# Patient Record
Sex: MALE | Race: BLACK OR AFRICAN AMERICAN | NOT HISPANIC OR LATINO | Employment: OTHER | URBAN - METROPOLITAN AREA
[De-identification: names, ages, dates, MRNs, and addresses within clinical notes are randomized per-mention and may not be internally consistent; named-entity substitution may affect disease eponyms.]

---

## 2019-02-18 ENCOUNTER — APPOINTMENT (EMERGENCY)
Dept: CT IMAGING | Facility: HOSPITAL | Age: 69
DRG: 065 | End: 2019-02-18
Payer: COMMERCIAL

## 2019-02-18 ENCOUNTER — APPOINTMENT (OUTPATIENT)
Dept: ULTRASOUND IMAGING | Facility: HOSPITAL | Age: 69
DRG: 065 | End: 2019-02-18
Payer: COMMERCIAL

## 2019-02-18 ENCOUNTER — APPOINTMENT (OUTPATIENT)
Dept: MRI IMAGING | Facility: HOSPITAL | Age: 69
DRG: 065 | End: 2019-02-18
Payer: COMMERCIAL

## 2019-02-18 ENCOUNTER — HOSPITAL ENCOUNTER (INPATIENT)
Facility: HOSPITAL | Age: 69
LOS: 4 days | Discharge: HOME HEALTH | DRG: 065 | End: 2019-02-24
Attending: EMERGENCY MEDICINE | Admitting: GENERAL PRACTICE
Payer: COMMERCIAL

## 2019-02-18 DIAGNOSIS — I63.50 CEREBROVASCULAR ACCIDENT (CVA) DUE TO OCCLUSION OF CEREBRAL ARTERY (HCC): ICD-10-CM

## 2019-02-18 DIAGNOSIS — R42 DIZZINESS: Primary | ICD-10-CM

## 2019-02-18 DIAGNOSIS — G45.9 TIA (TRANSIENT ISCHEMIC ATTACK): ICD-10-CM

## 2019-02-18 DIAGNOSIS — R47.81 SLURRED SPEECH: ICD-10-CM

## 2019-02-18 DIAGNOSIS — I47.2 NSVT (NONSUSTAINED VENTRICULAR TACHYCARDIA) (HCC): ICD-10-CM

## 2019-02-18 PROBLEM — E11.9 DIABETES MELLITUS TYPE 2 IN NONOBESE (HCC): Status: ACTIVE | Noted: 2019-02-18

## 2019-02-18 LAB
ANION GAP SERPL CALCULATED.3IONS-SCNC: 10 MMOL/L (ref 4–13)
APTT PPP: 28 SECONDS (ref 26–38)
BILIRUB UR QL STRIP: NEGATIVE
BUN SERPL-MCNC: 9 MG/DL (ref 5–25)
CALCIUM SERPL-MCNC: 9.5 MG/DL (ref 8.3–10.1)
CHLORIDE SERPL-SCNC: 102 MMOL/L (ref 100–108)
CLARITY UR: CLEAR
CO2 SERPL-SCNC: 26 MMOL/L (ref 21–32)
COLOR UR: YELLOW
CREAT SERPL-MCNC: 1.08 MG/DL (ref 0.6–1.3)
ERYTHROCYTE [DISTWIDTH] IN BLOOD BY AUTOMATED COUNT: 11.8 % (ref 11.6–15.1)
GFR SERPL CREATININE-BSD FRML MDRD: 71 ML/MIN/1.73SQ M
GLUCOSE SERPL-MCNC: 122 MG/DL (ref 65–140)
GLUCOSE SERPL-MCNC: 198 MG/DL (ref 65–140)
GLUCOSE SERPL-MCNC: 221 MG/DL (ref 65–140)
GLUCOSE SERPL-MCNC: 232 MG/DL (ref 65–140)
GLUCOSE UR STRIP-MCNC: ABNORMAL MG/DL
HCT VFR BLD AUTO: 47 % (ref 36.5–49.3)
HGB BLD-MCNC: 15.1 G/DL (ref 12–17)
HGB UR QL STRIP.AUTO: NEGATIVE
INR PPP: 1.01 (ref 0.86–1.17)
KETONES UR STRIP-MCNC: NEGATIVE MG/DL
LEUKOCYTE ESTERASE UR QL STRIP: NEGATIVE
MCH RBC QN AUTO: 29.1 PG (ref 26.8–34.3)
MCHC RBC AUTO-ENTMCNC: 32.1 G/DL (ref 31.4–37.4)
MCV RBC AUTO: 91 FL (ref 82–98)
NITRITE UR QL STRIP: NEGATIVE
PH UR STRIP.AUTO: 6 [PH] (ref 4.5–8)
PLATELET # BLD AUTO: 213 THOUSANDS/UL (ref 149–390)
PMV BLD AUTO: 10.7 FL (ref 8.9–12.7)
POTASSIUM SERPL-SCNC: 3.8 MMOL/L (ref 3.5–5.3)
PROT UR STRIP-MCNC: NEGATIVE MG/DL
PROTHROMBIN TIME: 13.2 SECONDS (ref 11.8–14.2)
RBC # BLD AUTO: 5.19 MILLION/UL (ref 3.88–5.62)
SODIUM SERPL-SCNC: 138 MMOL/L (ref 136–145)
SP GR UR STRIP.AUTO: 1.02 (ref 1–1.03)
TROPONIN I SERPL-MCNC: <0.02 NG/ML
UROBILINOGEN UR QL STRIP.AUTO: 0.2 E.U./DL
WBC # BLD AUTO: 7.16 THOUSAND/UL (ref 4.31–10.16)

## 2019-02-18 PROCEDURE — 93005 ELECTROCARDIOGRAM TRACING: CPT

## 2019-02-18 PROCEDURE — 99220 PR INITIAL OBSERVATION CARE/DAY 70 MINUTES: CPT | Performed by: INTERNAL MEDICINE

## 2019-02-18 PROCEDURE — 93880 EXTRACRANIAL BILAT STUDY: CPT | Performed by: SURGERY

## 2019-02-18 PROCEDURE — 85027 COMPLETE CBC AUTOMATED: CPT | Performed by: EMERGENCY MEDICINE

## 2019-02-18 PROCEDURE — 84484 ASSAY OF TROPONIN QUANT: CPT | Performed by: EMERGENCY MEDICINE

## 2019-02-18 PROCEDURE — 70450 CT HEAD/BRAIN W/O DYE: CPT

## 2019-02-18 PROCEDURE — 36415 COLL VENOUS BLD VENIPUNCTURE: CPT | Performed by: EMERGENCY MEDICINE

## 2019-02-18 PROCEDURE — 82948 REAGENT STRIP/BLOOD GLUCOSE: CPT

## 2019-02-18 PROCEDURE — 99285 EMERGENCY DEPT VISIT HI MDM: CPT

## 2019-02-18 PROCEDURE — 85610 PROTHROMBIN TIME: CPT | Performed by: EMERGENCY MEDICINE

## 2019-02-18 PROCEDURE — 93880 EXTRACRANIAL BILAT STUDY: CPT

## 2019-02-18 PROCEDURE — 70551 MRI BRAIN STEM W/O DYE: CPT

## 2019-02-18 PROCEDURE — 80048 BASIC METABOLIC PNL TOTAL CA: CPT | Performed by: EMERGENCY MEDICINE

## 2019-02-18 PROCEDURE — 81003 URINALYSIS AUTO W/O SCOPE: CPT | Performed by: EMERGENCY MEDICINE

## 2019-02-18 PROCEDURE — 85730 THROMBOPLASTIN TIME PARTIAL: CPT | Performed by: EMERGENCY MEDICINE

## 2019-02-18 RX ORDER — ATORVASTATIN CALCIUM 40 MG/1
40 TABLET, FILM COATED ORAL EVERY EVENING
Status: DISCONTINUED | OUTPATIENT
Start: 2019-02-18 | End: 2019-02-21

## 2019-02-18 RX ORDER — ASPIRIN 325 MG
325 TABLET ORAL ONCE
Status: COMPLETED | OUTPATIENT
Start: 2019-02-18 | End: 2019-02-18

## 2019-02-18 RX ORDER — ONDANSETRON 2 MG/ML
4 INJECTION INTRAMUSCULAR; INTRAVENOUS EVERY 6 HOURS PRN
Status: DISCONTINUED | OUTPATIENT
Start: 2019-02-18 | End: 2019-02-24 | Stop reason: HOSPADM

## 2019-02-18 RX ORDER — ACETAMINOPHEN 325 MG/1
650 TABLET ORAL EVERY 4 HOURS PRN
Status: DISCONTINUED | OUTPATIENT
Start: 2019-02-18 | End: 2019-02-24 | Stop reason: HOSPADM

## 2019-02-18 RX ORDER — SODIUM CHLORIDE 9 MG/ML
1000 INJECTION, SOLUTION INTRAVENOUS ONCE
Status: COMPLETED | OUTPATIENT
Start: 2019-02-18 | End: 2019-02-18

## 2019-02-18 RX ORDER — ASPIRIN 325 MG
325 TABLET ORAL DAILY
Status: DISCONTINUED | OUTPATIENT
Start: 2019-02-19 | End: 2019-02-22

## 2019-02-18 RX ORDER — ASPIRIN 325 MG
325 TABLET ORAL DAILY
Status: DISCONTINUED | OUTPATIENT
Start: 2019-02-18 | End: 2019-02-18

## 2019-02-18 RX ADMIN — SODIUM CHLORIDE 1000 ML/HR: 0.9 INJECTION, SOLUTION INTRAVENOUS at 14:03

## 2019-02-18 RX ADMIN — ATORVASTATIN CALCIUM 40 MG: 40 TABLET, FILM COATED ORAL at 17:26

## 2019-02-18 RX ADMIN — INSULIN LISPRO 2 UNITS: 100 INJECTION, SOLUTION INTRAVENOUS; SUBCUTANEOUS at 21:25

## 2019-02-18 RX ADMIN — ASPIRIN 325 MG: 325 TABLET ORAL at 15:40

## 2019-02-18 NOTE — ASSESSMENT & PLAN NOTE
Possible TIA  Patient is somewhat slow to talk although he also had some trouble with walking last night  He did not check his sugar and I am also concerned about if patient was having hypoglycemic episode  He does not check his sugars  on aspirin and statin  His blood pressure is controlled and really not elevated  PT/OT evaluation and neurology evaluation    Stroke pathway started

## 2019-02-18 NOTE — ASSESSMENT & PLAN NOTE
No results found for: HGBA1C    Recent Labs     02/18/19  1257   POCGLU 221*       Blood Sugar Average: Last 72 hrs:  (P) 221     Patient has history of diabetes mellitus  His blood sugars 221  He takes a medication injection in addition to metformin  He does not know the name of the medication  He has not seen a primary care physician for many months as he moved from [place] to here last year  Would ask  to have him establish with a primary care physician    a1c 8 9 ss coverage

## 2019-02-18 NOTE — PLAN OF CARE
Problem: Potential for Falls  Goal: Patient will remain free of falls  Description  INTERVENTIONS:  - Assess patient frequently for physical needs  -  Identify cognitive and physical deficits and behaviors that affect risk of falls  -  [place] fall precautions as indicated by assessment   - Educate patient/family on patient safety including physical limitations  - Instruct patient to call for assistance with activity based on assessment  - Modify environment to reduce risk of injury  - Consider OT/PT consult to assist with strengthening/mobility  Outcome: Progressing     Problem: Neurological Deficit  Goal: Neurological status is stable or improving  Description  Interventions:  - Monitor and assess patient's level of consciousness, motor function, sensory function, and level of assistance needed for ADLs  - Monitor and report changes from baseline  Collaborate with interdisciplinary team to initiate plan and implement interventions as ordered  - Provide and maintain a safe environment  - Utilize seizure precautions  - Utilize fall precautions  - Utilize aspiration precautions  - Utilize bleeding precautions  Outcome: Progressing     Problem: Activity Intolerance/Impaired Mobility  Goal: Mobility/activity is maintained at optimum level for patient  Description  Interventions:  - Assess and monitor patient  barriers to mobility and need for assistive/adaptive devices  - Assess patient's emotional response to limitations  - Collaborate with interdisciplinary team and initiate plans and interventions as ordered  - Encourage independent activity per ability   - Maintain proper body alignment  - Perform active/passive rom as tolerated/ordered    - Plan activities to conserve energy   - Turn patient  Outcome: Progressing     Problem: Communication Impairment  Goal: Ability to express needs and understand communication  Description  Assess patient's communication skills and ability to understand information  Patient will demonstrate use of effective communication techniques, alternative methods of communication and understanding even if not able to speak  - Encourage communication and provide alternate methods of communication as needed  - Collaborate with case management/ for discharge needs  - Include patient/family/caregiver in decisions related to communication  Outcome: Progressing     Problem: Potential for Aspiration  Goal: Non-ventilated patient's risk of aspiration is minimized  Description  Assess and monitor vital signs, respiratory status, and labs (WBC)  Monitor for signs of aspiration (tachypnea, cough, rales, wheezing, cyanosis, fever)  - Assess and monitor patient's ability to swallow  - Place patient up in chair to eat if possible  - HOB up at 90 degrees to eat if unable to get patient up into chair   - Supervise patient during oral intake  - Instruct patient to take small bites  - Instruct patient to take small single sips when taking liquids  - Follow patient-specific strategies generated by speech pathologist   Outcome: Progressing     Problem: Nutrition  Goal: Nutrition/Hydration status is improving  Description  Monitor and assess patient's nutrition/hydration status for malnutrition (ex- brittle hair, bruises, dry skin, pale skin and conjunctiva, muscle wasting, smooth red tongue, and disorientation)  Collaborate with interdisciplinary team and initiate plan and interventions as ordered  Monitor patient's weight and dietary intake as ordered or per policy  Utilize nutrition screening tool and intervene per policy  Determine patient's food preferences and provide high-protein, high-caloric foods as appropriate  - Assist patient with eating   - Allow adequate time for meals   - Encourage patient to take dietary supplement as ordered    - Collaborate with clinical nutritionist   - Include patient/family/caregiver in decisions related to nutrition    Outcome: Progressing     Problem: PAIN - ADULT  Goal: Verbalizes/displays adequate comfort level or baseline comfort level  Description  Interventions:  - Encourage patient to monitor pain and request assistance  - Assess pain using appropriate pain scale  - Administer analgesics based on type and severity of pain and evaluate response  - Implement non-pharmacological measures as appropriate and evaluate response  - Consider cultural and social influences on pain and pain management  - Notify physician/advanced practitioner if interventions unsuccessful or patient reports new pain  Outcome: Progressing     Problem: INFECTION - ADULT  Goal: Absence or prevention of progression during hospitalization  Description  INTERVENTIONS:  - Assess and monitor for signs and symptoms of infection  - Monitor lab/diagnostic results  - Monitor all insertion sites, i e  indwelling lines, tubes, and drains  - Monitor endotracheal (as able) and nasal secretions for changes in amount and color  - [place] appropriate cooling/warming therapies per order  - Administer medications as ordered  - Instruct and encourage patient and family to use good hand hygiene technique  - Identify and instruct in appropriate isolation precautions for identified infection/condition  Outcome: Progressing  Goal: Absence of fever/infection during neutropenic period  Description  INTERVENTIONS:  - Monitor WBC  - Implement neutropenic guidelines  Outcome: Progressing     Problem: SAFETY ADULT  Goal: Maintain or return to baseline ADL function  Description  INTERVENTIONS:  -  Assess patient's ability to carry out ADLs; assess patient's baseline for ADL function and identify physical deficits which impact ability to perform ADLs (bathing, care of mouth/teeth, toileting, grooming, dressing, etc )  - Assess/evaluate cause of self-care deficits   - Assess range of motion  - Assess patient's mobility; develop plan if impaired  - Assess patient's need for assistive devices and provide as appropriate  - Encourage maximum independence but intervene and supervise when necessary  ¯ Involve family in performance of ADLs  ¯ Assess for home care needs following discharge   ¯ Request OT consult to assist with ADL evaluation and planning for discharge  ¯ Provide patient education as appropriate  Outcome: Progressing  Goal: Maintain or return mobility status to optimal level  Description  INTERVENTIONS:  - Assess patient's baseline mobility status (ambulation, transfers, stairs, etc )    - Identify cognitive and physical deficits and behaviors that affect mobility  - Identify mobility aids required to assist with transfers and/or ambulation (gait belt, sit-to-stand, lift, walker, cane, etc )  - [place] fall precautions as indicated by assessment  - Record patient progress and toleration of activity level on Mobility SBAR; progress patient to next Phase/Stage  - Instruct patient to call for assistance with activity based on assessment  - Request Rehabilitation consult to assist with strengthening/weightbearing, etc   Outcome: Progressing     Problem: DISCHARGE PLANNING  Goal: Discharge to home or other facility with appropriate resources  Description  INTERVENTIONS:  - Identify barriers to discharge w/patient and caregiver  - Arrange for needed discharge resources and transportation as appropriate  - Identify discharge learning needs (meds, wound care, etc )  - Arrange for interpretive services to assist at discharge as needed  - Refer to Case Management Department for coordinating discharge planning if the patient needs post-hospital services based on physician/advanced practitioner order or complex needs related to functional status, cognitive ability, or social support system  Outcome: Progressing     Problem: Knowledge Deficit  Goal: Patient/family/caregiver demonstrates understanding of disease process, treatment plan, medications, and discharge instructions  Description  Complete learning assessment and assess knowledge base    Interventions:  - Provide teaching at level of understanding  - Provide teaching via preferred learning methods  Outcome: Progressing

## 2019-02-18 NOTE — ED PROVIDER NOTES
History  Chief Complaint   Patient presents with    CVA/TIA-like Symptoms     Patient presents with dizziness and slurred speech that began last night  HPI  79 yo M presents with dizziness that started last night at about 10 pm  He states he was having trouble balancing and could not walk  His wife also noted that he had some slurred speech "sounded like he was drunk " Slurred speech resolved prior to arrival to ED, still feeling dizzy although improved from last night, difficult to characterize but somewhat like room is spinning, constant and worse when trying to walk  Denies weakness, numbness, confusion  Has history of DM, hld but has not been taking his medications because they are difficult to swallow  Denies chest pain, shortness of breath, abdominal pain, nausea  None       Past Medical History:   Diagnosis Date    Diabetes mellitus ([facility] [address] )        History reviewed  No pertinent surgical history  History reviewed  No pertinent family history  I have reviewed and agree with the history as documented  Social History     Tobacco Use    Smoking status: Never Smoker    Smokeless tobacco: Never Used   Substance Use Topics    Alcohol use: Never     Frequency: Never    Drug use: Never        Review of Systems   Constitutional: Negative for chills and fever  HENT: Negative for dental problem and ear pain  Eyes: Negative for pain and redness  Respiratory: Negative for cough and shortness of breath  Cardiovascular: Negative for chest pain and palpitations  Gastrointestinal: Negative for abdominal pain and nausea  Endocrine: Negative for polydipsia and polyphagia  Genitourinary: Negative for dysuria and frequency  Musculoskeletal: Negative for arthralgias and joint swelling  Skin: Negative for color change and rash  Neurological: Positive for dizziness and speech difficulty  Negative for headaches  Psychiatric/Behavioral: Negative for behavioral problems and confusion     All other systems reviewed and are negative  Physical Exam  Physical Exam   Constitutional: He is oriented to person, place, and time  He appears well-developed and well-nourished  No distress  HENT:   Head: Atraumatic  Right Ear: External ear normal    Left Ear: External ear normal    Nose: Nose normal    Eyes: Pupils are equal, round, and reactive to light  Conjunctivae and EOM are normal    Neck: Normal range of motion  Neck supple  No JVD present  Cardiovascular: Normal rate, regular rhythm and normal heart sounds  No murmur heard  Pulmonary/Chest: Effort normal and breath sounds normal  No respiratory distress  He has no wheezes  Abdominal: Soft  Bowel sounds are normal  He exhibits no distension  There is no tenderness  Musculoskeletal: Normal range of motion  He exhibits no edema  Neurological: He is alert and oriented to person, place, and time  No cranial nerve deficit  CN II-XII intact grossly, no focal deficits  Normal strength and sensation in b/l upper and lower extremities  Unsteady gait   Skin: Skin is warm and dry  Capillary refill takes less than 2 seconds  He is not diaphoretic  Psychiatric: He has a normal mood and affect  His behavior is normal    Nursing note and vitals reviewed        Vital Signs  ED Triage Vitals [02/18/19 1250]   Temperature Pulse Respirations Blood Pressure SpO2   98 3 °F (36 8 °C) 80 16 156/70 100 %      Temp Source Heart Rate Source Patient Position - Orthostatic VS BP Location FiO2 (%)   Oral Monitor Sitting Left arm --      Pain Score       No Pain           Vitals:    02/18/19 1250 02/18/19 1403 02/18/19 1408   BP: 156/70 122/75 122/75   Pulse: 80 69 69   Patient Position - Orthostatic VS: Sitting Lying Lying       Visual Acuity      ED Medications  Medications   sodium chloride 0 9 % infusion (1,000 mL/hr Intravenous New Bag 2/18/19 1403)       Diagnostic Studies  Results Reviewed     Procedure Component Value Units Date/Time    UA (URINE) with reflex to Microscopic [[id number]]  (Abnormal) Collected:  02/18/19 1412    Lab Status:  Final result Specimen:  Urine, Clean Catch Updated:  02/18/19 1428     Color, UA Yellow     Clarity, UA Clear     Specific Gravity, UA 1 025     pH, UA 6 0     Leukocytes, UA Negative     Nitrite, UA Negative     Protein, UA Negative mg/dl      Glucose,  (1/4%) mg/dl      Ketones, UA Negative mg/dl      Urobilinogen, UA 0 2 E U /dl      Bilirubin, UA Negative     Blood, UA Negative    Troponin I [[id number]]  (Normal) Collected:  02/18/19 1325    Lab Status:  Final result Specimen:  Blood from Arm, Right Updated:  02/18/19 1356     Troponin I <0 02 ng/mL     Basic metabolic panel [[id number]]  (Abnormal) Collected:  02/18/19 1326    Lab Status:  Final result Specimen:  Blood from Arm, Right Updated:  02/18/19 1347     Sodium 138 mmol/L      Potassium 3 8 mmol/L      Chloride 102 mmol/L      CO2 26 mmol/L      ANION GAP 10 mmol/L      BUN 9 mg/dL      Creatinine 1 08 mg/dL      Glucose 232 mg/dL      Calcium 9 5 mg/dL      eGFR 71 ml/min/1 73sq m     Narrative:       National Kidney Disease Education Program recommendations are as follows:  GFR calculation is accurate only with a steady state creatinine  Chronic Kidney disease less than 60 ml/min/1 73 sq  meters  Kidney failure less than 15 ml/min/1 73 sq  meters      Protime-INR [[id number]]  (Normal) Collected:  02/18/19 1326    Lab Status:  Final result Specimen:  Blood from Arm, Right Updated:  02/18/19 1344     Protime 13 2 seconds      INR 1 01    APTT [[id number]]  (Normal) Collected:  02/18/19 1326    Lab Status:  Final result Specimen:  Blood from Arm, Right Updated:  02/18/19 1344     PTT 28 seconds     CBC and Platelet [[id number]]  (Normal) Collected:  02/18/19 1325    Lab Status:  Final result Specimen:  Blood from Arm, Right Updated:  02/18/19 1336     WBC 7 16 Thousand/uL      RBC 5 19 Million/uL      Hemoglobin 15 1 g/dL      Hematocrit 47 0 %      MCV 91 fL      MCH 29 1 pg MCHC 32 1 g/dL      RDW 11 8 %      Platelets 659 Thousands/uL      MPV 10 7 fL     Fingerstick Glucose (POCT) [[id number]]  (Abnormal) Collected:  02/18/19 1257    Lab Status:  Final result Updated:  02/18/19 1303     POC Glucose 221 mg/dl                  CT head without contrast   Final Result by Fina Gomez MD (02/18 1405)      No acute intracranial abnormality  Workstation performed: [id number]                    Procedures  ECG 12 Lead Documentation  Date/Time: 2/18/2019 1:13 PM  Performed by: Karime Blake MD  Authorized by: Karime Blake MD     Comments:      NSR rate of 78, normal axis and intervals, no acute ST elevations or depressions           Phone Contacts  ED Phone Contact    ED Course                               MDM  Number of Diagnoses or Management Options  Dizziness:   Slurred speech:   Diagnosis management comments: 81 yo M with DM, hld presenting with slurred speech and dizziness starting yesterday evening, CT head unremarkable, labs unremarkable except for glucose somewhat high at 221, still feels unsteady on feet cannot rule out posterior circulation cva, will admit observation/tele for stroke pathway      Disposition  Final diagnoses:   Dizziness   Slurred speech     Time reflects when diagnosis was documented in both MDM as applicable and the Disposition within this note     Time User Action Codes Description Comment    2/18/2019  2:17 PM Jose Center Add [R42] Dizziness     2/18/2019  2:17 PM Jose Center Add [R47 81] Slurred speech       ED Disposition     ED Disposition Condition Date/Time Comment    Admit Stable Mon Feb 18, 2019  2:14 PM Case was discussed with MO and the patient's admission status was agreed to be Admission Status: observation status to the service of Dr Matt Mancilla          Follow-up Information    None         Patient's Medications    No medications on file     No discharge procedures on file      ED Provider  Electronically Signed by Imtiaz Brink MD  02/18/19 0182

## 2019-02-19 ENCOUNTER — APPOINTMENT (OUTPATIENT)
Dept: CT IMAGING | Facility: HOSPITAL | Age: 69
DRG: 065 | End: 2019-02-19
Payer: COMMERCIAL

## 2019-02-19 ENCOUNTER — APPOINTMENT (OUTPATIENT)
Dept: NON INVASIVE DIAGNOSTICS | Facility: HOSPITAL | Age: 69
DRG: 065 | End: 2019-02-19
Payer: COMMERCIAL

## 2019-02-19 ENCOUNTER — TELEPHONE (OUTPATIENT)
Dept: INTERNAL MEDICINE CLINIC | Facility: CLINIC | Age: 69
End: 2019-02-19

## 2019-02-19 ENCOUNTER — TRANSITIONAL CARE MANAGEMENT (OUTPATIENT)
Dept: INTERNAL MEDICINE CLINIC | Facility: CLINIC | Age: 69
End: 2019-02-19

## 2019-02-19 PROBLEM — I63.50 CEREBROVASCULAR ACCIDENT (CVA) DUE TO OCCLUSION OF CEREBRAL ARTERY (HCC): Status: ACTIVE | Noted: 2019-02-18

## 2019-02-19 PROBLEM — I47.29 NSVT (NONSUSTAINED VENTRICULAR TACHYCARDIA): Status: ACTIVE | Noted: 2019-02-19

## 2019-02-19 PROBLEM — I47.2 NSVT (NONSUSTAINED VENTRICULAR TACHYCARDIA) (HCC): Status: ACTIVE | Noted: 2019-02-19

## 2019-02-19 PROBLEM — E78.2 MIXED HYPERLIPIDEMIA: Status: ACTIVE | Noted: 2019-02-19

## 2019-02-19 LAB
ANION GAP SERPL CALCULATED.3IONS-SCNC: 10 MMOL/L (ref 4–13)
ATRIAL RATE: 78 BPM
BILIRUB UR QL STRIP: NEGATIVE
BUN SERPL-MCNC: 8 MG/DL (ref 5–25)
CALCIUM SERPL-MCNC: 9 MG/DL (ref 8.3–10.1)
CHLORIDE SERPL-SCNC: 107 MMOL/L (ref 100–108)
CHOLEST SERPL-MCNC: 151 MG/DL (ref 50–200)
CLARITY UR: CLEAR
CO2 SERPL-SCNC: 24 MMOL/L (ref 21–32)
COLOR UR: YELLOW
CREAT SERPL-MCNC: 0.96 MG/DL (ref 0.6–1.3)
ERYTHROCYTE [DISTWIDTH] IN BLOOD BY AUTOMATED COUNT: 11.7 % (ref 11.6–15.1)
EST. AVERAGE GLUCOSE BLD GHB EST-MCNC: 197 MG/DL
GFR SERPL CREATININE-BSD FRML MDRD: 94 ML/MIN/1.73SQ M
GLUCOSE SERPL-MCNC: 140 MG/DL (ref 65–140)
GLUCOSE SERPL-MCNC: 152 MG/DL (ref 65–140)
GLUCOSE SERPL-MCNC: 174 MG/DL (ref 65–140)
GLUCOSE SERPL-MCNC: 180 MG/DL (ref 65–140)
GLUCOSE SERPL-MCNC: 195 MG/DL (ref 65–140)
GLUCOSE UR STRIP-MCNC: NEGATIVE MG/DL
HBA1C MFR BLD: 8.5 % (ref 4.2–6.3)
HCT VFR BLD AUTO: 42.2 % (ref 36.5–49.3)
HDLC SERPL-MCNC: 29 MG/DL (ref 40–60)
HGB BLD-MCNC: 13.9 G/DL (ref 12–17)
HGB UR QL STRIP.AUTO: NEGATIVE
KETONES UR STRIP-MCNC: NEGATIVE MG/DL
LDLC SERPL CALC-MCNC: 81 MG/DL (ref 0–100)
LEUKOCYTE ESTERASE UR QL STRIP: NEGATIVE
MAGNESIUM SERPL-MCNC: 1.8 MG/DL (ref 1.6–2.6)
MCH RBC QN AUTO: 29 PG (ref 26.8–34.3)
MCHC RBC AUTO-ENTMCNC: 32.9 G/DL (ref 31.4–37.4)
MCV RBC AUTO: 88 FL (ref 82–98)
NITRITE UR QL STRIP: NEGATIVE
P AXIS: 67 DEGREES
PH UR STRIP.AUTO: 7 [PH] (ref 4.5–8)
PLATELET # BLD AUTO: 210 THOUSANDS/UL (ref 149–390)
PMV BLD AUTO: 10.5 FL (ref 8.9–12.7)
POTASSIUM SERPL-SCNC: 3.7 MMOL/L (ref 3.5–5.3)
PR INTERVAL: 140 MS
PROT UR STRIP-MCNC: NEGATIVE MG/DL
QRS AXIS: -4 DEGREES
QRSD INTERVAL: 78 MS
QT INTERVAL: 342 MS
QTC INTERVAL: 389 MS
RBC # BLD AUTO: 4.8 MILLION/UL (ref 3.88–5.62)
SODIUM SERPL-SCNC: 141 MMOL/L (ref 136–145)
SP GR UR STRIP.AUTO: 1.01 (ref 1–1.03)
T WAVE AXIS: -11 DEGREES
TRIGL SERPL-MCNC: 203 MG/DL
UROBILINOGEN UR QL STRIP.AUTO: 1 E.U./DL
VENTRICULAR RATE: 78 BPM
WBC # BLD AUTO: 6.54 THOUSAND/UL (ref 4.31–10.16)

## 2019-02-19 PROCEDURE — 99226 PR SBSQ OBSERVATION CARE/DAY 35 MINUTES: CPT | Performed by: GENERAL PRACTICE

## 2019-02-19 PROCEDURE — 92522 EVALUATE SPEECH PRODUCTION: CPT

## 2019-02-19 PROCEDURE — G8979 MOBILITY GOAL STATUS: HCPCS

## 2019-02-19 PROCEDURE — G8997 SWALLOW GOAL STATUS: HCPCS

## 2019-02-19 PROCEDURE — 83036 HEMOGLOBIN GLYCOSYLATED A1C: CPT | Performed by: EMERGENCY MEDICINE

## 2019-02-19 PROCEDURE — 83735 ASSAY OF MAGNESIUM: CPT | Performed by: INTERNAL MEDICINE

## 2019-02-19 PROCEDURE — 82948 REAGENT STRIP/BLOOD GLUCOSE: CPT

## 2019-02-19 PROCEDURE — 97163 PT EVAL HIGH COMPLEX 45 MIN: CPT

## 2019-02-19 PROCEDURE — G8978 MOBILITY CURRENT STATUS: HCPCS

## 2019-02-19 PROCEDURE — 93010 ELECTROCARDIOGRAM REPORT: CPT | Performed by: INTERNAL MEDICINE

## 2019-02-19 PROCEDURE — 99205 OFFICE O/P NEW HI 60 MIN: CPT | Performed by: NURSE PRACTITIONER

## 2019-02-19 PROCEDURE — 85027 COMPLETE CBC AUTOMATED: CPT | Performed by: INTERNAL MEDICINE

## 2019-02-19 PROCEDURE — G8988 SELF CARE GOAL STATUS: HCPCS

## 2019-02-19 PROCEDURE — G8996 SWALLOW CURRENT STATUS: HCPCS

## 2019-02-19 PROCEDURE — 99204 OFFICE O/P NEW MOD 45 MIN: CPT | Performed by: INTERNAL MEDICINE

## 2019-02-19 PROCEDURE — 97166 OT EVAL MOD COMPLEX 45 MIN: CPT

## 2019-02-19 PROCEDURE — 81003 URINALYSIS AUTO W/O SCOPE: CPT | Performed by: INTERNAL MEDICINE

## 2019-02-19 PROCEDURE — 70496 CT ANGIOGRAPHY HEAD: CPT

## 2019-02-19 PROCEDURE — 80048 BASIC METABOLIC PNL TOTAL CA: CPT | Performed by: INTERNAL MEDICINE

## 2019-02-19 PROCEDURE — G8987 SELF CARE CURRENT STATUS: HCPCS

## 2019-02-19 PROCEDURE — 70498 CT ANGIOGRAPHY NECK: CPT

## 2019-02-19 PROCEDURE — 80061 LIPID PANEL: CPT | Performed by: INTERNAL MEDICINE

## 2019-02-19 RX ORDER — MAGNESIUM SULFATE HEPTAHYDRATE 40 MG/ML
2 INJECTION, SOLUTION INTRAVENOUS ONCE
Status: COMPLETED | OUTPATIENT
Start: 2019-02-19 | End: 2019-02-20

## 2019-02-19 RX ORDER — POTASSIUM CHLORIDE 20 MEQ/1
20 TABLET, EXTENDED RELEASE ORAL ONCE
Status: COMPLETED | OUTPATIENT
Start: 2019-02-19 | End: 2019-02-19

## 2019-02-19 RX ORDER — POTASSIUM CHLORIDE 20 MEQ/1
40 TABLET, EXTENDED RELEASE ORAL ONCE
Status: COMPLETED | OUTPATIENT
Start: 2019-02-19 | End: 2019-02-19

## 2019-02-19 RX ADMIN — METOPROLOL TARTRATE 12.5 MG: 25 TABLET ORAL at 22:50

## 2019-02-19 RX ADMIN — INSULIN LISPRO 1 UNITS: 100 INJECTION, SOLUTION INTRAVENOUS; SUBCUTANEOUS at 17:24

## 2019-02-19 RX ADMIN — INSULIN LISPRO 1 UNITS: 100 INJECTION, SOLUTION INTRAVENOUS; SUBCUTANEOUS at 23:24

## 2019-02-19 RX ADMIN — IOHEXOL 85 ML: 350 INJECTION, SOLUTION INTRAVENOUS at 17:38

## 2019-02-19 RX ADMIN — ENOXAPARIN SODIUM 40 MG: 40 INJECTION SUBCUTANEOUS at 09:26

## 2019-02-19 RX ADMIN — POTASSIUM CHLORIDE 20 MEQ: 1500 TABLET, EXTENDED RELEASE ORAL at 01:36

## 2019-02-19 RX ADMIN — INSULIN LISPRO 1 UNITS: 100 INJECTION, SOLUTION INTRAVENOUS; SUBCUTANEOUS at 09:26

## 2019-02-19 RX ADMIN — ATORVASTATIN CALCIUM 40 MG: 40 TABLET, FILM COATED ORAL at 17:24

## 2019-02-19 RX ADMIN — ASPIRIN 325 MG: 325 TABLET ORAL at 09:26

## 2019-02-19 RX ADMIN — POTASSIUM CHLORIDE 40 MEQ: 1500 TABLET, EXTENDED RELEASE ORAL at 17:24

## 2019-02-19 RX ADMIN — MAGNESIUM SULFATE HEPTAHYDRATE 2 G: 40 INJECTION, SOLUTION INTRAVENOUS at 19:28

## 2019-02-19 RX ADMIN — INSULIN LISPRO 1 UNITS: 100 INJECTION, SOLUTION INTRAVENOUS; SUBCUTANEOUS at 12:02

## 2019-02-19 RX ADMIN — METOPROLOL TARTRATE 12.5 MG: 25 TABLET ORAL at 14:46

## 2019-02-19 NOTE — PLAN OF CARE
Problem: PHYSICAL THERAPY ADULT  Goal: Performs mobility at highest level of function for planned discharge setting  See evaluation for individualized goals  Description  Treatment/Interventions: Functional transfer training, LE strengthening/ROM, Elevations, Therapeutic exercise, Endurance training, Gait training, Spoke to nursing, OT  Equipment Recommended: (none anticipated)       See flowsheet documentation for full assessment, interventions and recommendations  Note:   Prognosis: Good  Problem List: Decreased strength, Decreased endurance, Impaired balance, Decreased mobility  Assessment: Pt is 80 y o  male seen for PT evaluation on 2/19/2019 s/p admit to [facility] on 2/18/2019 w/ Cerebrovascular accident (CVA) due to occlusion of cerebral artery ([facility] [address] )  Pt presents c slurred speech, not steady on his feet  Pt reports improvement of such  PT consulted to assess pt's functional mobility and d/c needs  Order placed for PT eval and tx, w/ up w/ A order  Performed at least 2 patient identifiers during session: Name and wristband  Comorbidities affecting pt's physical performance at time of assessment include: DM  PTA, pt was independent w/ all functional mobility w/ no AD/DME, ambulates unrestricted distances and all terrain, has 0 ERICK, lives w/ wife and family in [facility] and retired  Personal factors affecting pt at time of IE include: lives in [address] [facility]  Please find objective findings from PT assessment regarding body systems outlined above with impairments and limitations including weakness, impaired balance, decreased endurance, gait deviations, decreased activity tolerance and fall risk, as well as mobility assessment (need for SBA assist w/ all phases of mobility when usually ambulating independently and need for cueing for mobility technique)  The following objective measures performed on IE also reveal limitations: Barthel Index: 75/100 and Modified Dario: 2 (slight disability)  Pt's clinical presentation is currently unstable/unpredictable seen in pt's presentation of poor blood sugar control, on telemetry monitoring and ongoing medical assessment  Pt to benefit from continued PT tx to address deficits as defined above and maximize level of functional independent mobility and consistency  From PT/mobility standpoint, recommendation at time of d/c would be Home PT with family support pending progress in order to facilitate return to PLOF  Barriers to Discharge: None     Recommendation: Home PT, Home with family support     PT - OK to Discharge: Yes(when medically cleared c HHPT and family support)    See flowsheet documentation for full assessment

## 2019-02-19 NOTE — SOCIAL WORK
[facility]  Referral placed to [address] [place] [address]  with the understanding that this can be cancelled at d/c if he feels it is not needed

## 2019-02-19 NOTE — PHYSICAL THERAPY NOTE
Physical Therapy Evaluation     Patient's Name: Edwardo Schaffer    Admitting Diagnosis  TIA (transient ischemic attack) [G45 9]  Dizziness [R42]  Slurred speech [R47 81]  CVA (cerebral vascular accident) [facility]) [I63 9]    Problem List  Patient Active Problem List   Diagnosis    Cerebrovascular accident (CVA) due to occlusion of cerebral artery ([facility] )    Diabetes mellitus type 2 in nonobese ([place] [address] )    NSVT (nonsustained ventricular tachycardia) ([place] [address] )    Mixed hyperlipidemia       Past Medical History  Past Medical History:   Diagnosis Date    Diabetes mellitus ([place] [address] )        Past Surgical History  History reviewed  No pertinent surgical history  02/19/19 1013   Note Type   Note type Eval only   Pain Assessment   Pain Assessment No/denies pain   Pain Score No Pain   Home Living   Type of Home House  ([facility])   Home Layout Two level;Bed/bath upstairs;1/2 bath on main level  (No ERICK, 10- 15 steps to 2nd floor)   Bathroom Shower/Tub Tub/shower unit   Bathroom Toilet Standard   Bathroom Equipment Grab bars in shower   [address]   (no AD/DME at baseline)   Prior Function   Level of [place] Independent with ADLs and functional mobility   Lives With Spouse; Family   Receives Help From Family   ADL Assistance Independent   IADLs Independent   Falls in the last 6 months 0   Vocational Retired   Comments (+) driving   Restrictions/Precautions   [place] Bearing Precautions Per Order No   Braces or Orthoses   (none per pt)   Other Precautions Bed Alarm; Fall Risk;Telemetry;Multiple lines   General   Family/Caregiver Present No   Cognition   Overall Cognitive Status WFL   Arousal/Participation Alert   Attention Within functional limits   Orientation Level Oriented X4   Memory Within functional limits   Following Commands Follows all commands and directions without difficulty   RUE Assessment   RUE Assessment WFL   LUE Assessment   LUE Assessment WFL   RLE Assessment   RLE Assessment WFL   LLE Assessment   LLE Assessment WFL   Coordination   Movements are Fluid and Coordinated 1   Sensation WFL   Light Touch   RLE Light Touch Grossly intact   LLE Light Touch Grossly intact   Bed Mobility   Supine to Sit 5  Supervision   Additional items Verbal cues;HOB elevated   Sit to Supine 5  Supervision   Additional items HOB elevated;Verbal cues   Transfers   Sit to Stand 5  Supervision   Additional items Assist x 1;Verbal cues   Stand to Sit 5  Supervision   Additional items Assist x 1;Verbal cues   Additional Comments pt reports feeling "woozy" at times, no overt LOB observed   Ambulation/Elevation   Gait pattern Short stride; Inconsistent tamiko   Gait Assistance 5  Supervision   Additional items Assist x 1;Verbal cues   Assistive Device None   Distance 200'   Stair Management Assistance 5  Supervision   Additional items Assist x 1;Verbal cues   Stair Management Technique One rail L;Alternating pattern; Foreward   Number of Stairs 13   Balance   Static Sitting Normal   Dynamic Sitting Normal   Static Standing Good   Dynamic Standing Fair +   Ambulatory Fair +   Endurance Deficit   Endurance Deficit Yes   Activity Tolerance   Activity Tolerance Patient limited by fatigue  ("I feel whoozy")   Medical Staff Made Aware OT Balbir Amato   Nurse Made Aware RN Talat Pepe verbalized pt appropriate to see, made aware of session outcome/recs   Assessment   Prognosis Good   Problem List Decreased strength;Decreased endurance; Impaired balance;Decreased mobility   Assessment Pt is 80 y o  male seen for PT evaluation on 2/19/2019 s/p admit to [facility] on 2/18/2019 w/ Cerebrovascular accident (CVA) due to occlusion of cerebral artery ([facility] )  Pt presents c slurred speech, not steady on his feet  Pt reports improvement of such  PT consulted to assess pt's functional mobility and d/c needs  Order placed for PT eval and tx, w/ up w/ A order   Performed at least 2 patient identifiers during session: Name and wristband  Comorbidities affecting pt's physical performance at time of assessment include: DM  PTA, pt was independent w/ all functional mobility w/ no AD/DME, ambulates unrestricted distances and all terrain, has 0 ERICK, lives w/ wife and family in 2 level [facility] and retired  Personal factors affecting pt at time of IE include: lives in 2 story house  Please find objective findings from PT assessment regarding body systems outlined above with impairments and limitations including weakness, impaired balance, decreased endurance, gait deviations, decreased activity tolerance and fall risk, as well as mobility assessment (need for SBA assist w/ all phases of mobility when usually ambulating independently and need for cueing for mobility technique)  The following objective measures performed on IE also reveal limitations: Barthel Index: 75/100 and Modified [place]: 2 (slight disability)  Pt's clinical presentation is currently unstable/unpredictable seen in pt's presentation of poor blood sugar control, on telemetry monitoring and ongoing medical assessment  Pt to benefit from continued PT tx to address deficits as defined above and maximize level of functional independent mobility and consistency  From PT/mobility standpoint, recommendation at time of d/c would be Home PT with family support pending progress in order to facilitate return to PLOF     Barriers to Discharge None   Goals   Patient Goals to return home   STG Expiration Date 03/01/19   Short Term Goal #1 In 7-10 days: Increase bilateral LE strength 1/2 grade to facilitate independent mobility, Perform all bed mobility tasks modified independent to decrease caregiver burden, Perform all transfers modified independent to improve independence, Ambulate > 300 ft  with least restrictive assistive device modified independent w/o LOB and w/ normalized gait pattern 100% of the time, Navigate 13 stairs modified independent with unilateral handrail to facilitate return to previous living environment, Increase all balance 1/2 grade to decrease risk for falls, Complete exercise program independently, Tolerate 4 hr OOB to faciliate upright tolerance, Improve Barthel Index score to 90 or greater to facilitate independence and PT provider will perform functional balance assessment to determine fall risk   Treatment Day 0   Plan   Treatment/Interventions Functional transfer training;LE strengthening/ROM; Elevations; Therapeutic exercise; Endurance training;Gait training;Spoke to nursing;OT   PT Frequency   (3-5x/wk)   Recommendation   Recommendation Home PT; Home with family support   Equipment Recommended   (none anticipated)   PT - OK to Discharge Yes  (when medically cleared c HHPT and family support)   Modified [place] Scale   Modified [place] Scale 2   Barthel Index   Feeding 10   Bathing 0   Grooming Score 5   Dressing Score 5   Bladder Score 10   Bowels Score 10   Toilet Use Score 10   Transfers (Bed/Chair) Score 10   Mobility (Level Surface) Score 10   Stairs Score 5   Barthel Index Score 75         Donna Points, PT, DPT

## 2019-02-19 NOTE — UTILIZATION REVIEW
Initial Clinical Review    Admission: Date/Time/Statement: OBSERVATION 2/18/19 @1414  Orders Placed This Encounter   Procedures    Place in Observation (expected length of stay for this patient is less than two midnights)     Standing Status:   Standing     Number of Occurrences:   1     Order Specific Question:   Admitting Physician     Answer:   Jeevan Menendez [[id number]]     Order Specific Question:   Level of Care     Answer:   Med Surg [16]     ED: Date/Time/Mode of Arrival:   ED Arrival Information     Expected Arrival Acuity Means of Arrival Escorted By Service Admission Type    - 2/18/2019 12:43 Emergent Walk-In [facility] Emergency    Arrival Complaint    Nausea/slurred speech        Chief Complaint:   Chief Complaint   Patient presents with    CVA/TIA-like Symptoms     Patient presents with dizziness and slurred speech that began last night  Assessment/Plan: 79 yo m to ED from home admitted under OBS due to possible TIA with slurred speech  Sx started 2200 the night pta, unsteady on feet  On arrival, is more slow to speak  Concern for possible hypoglycemic episode  Consulting neuro, stroke pathway started  Workup in progress       ED Vital Signs:   ED Triage Vitals [02/18/19 1250]   Temperature Pulse Respirations Blood Pressure SpO2   98 3 °F (36 8 °C) 80 16 156/70 100 %      Temp Source Heart Rate Source Patient Position - Orthostatic VS BP Location FiO2 (%)   Oral Monitor Sitting Left arm --      Pain Score       No Pain        Wt Readings from Last 1 Encounters:   02/19/19 73 5 kg (162 lb 0 6 oz)     Vital Signs (abnormal):  x 1  Pertinent Labs/Diagnostic Test Results:   Gluc 221, 232, 122, 198  Trop wnl  Cbc,  Chem wnl  a1c 8 5      UA: 250 gluc     CT head: nothing acute  MRI brain: pending  CTA head/neck: pending  Echo: pending    ED Treatment:   Medication Administration from 02/18/2019 1243 to 02/18/2019 1651       Date/Time Order Dose Route Action     02/18/2019 1403 sodium chloride 0 9 % infusion 1,000 mL/hr Intravenous New Bag     02/18/2019 1540 aspirin tablet 325 mg 325 mg Oral Given        Past Medical History:   Diagnosis Date    Diabetes mellitus ([facility] )      Admitting Diagnosis: TIA (transient ischemic attack) [G45 9]  Dizziness [R42]  Slurred speech [R47 81]  CVA (cerebral vascular accident) ([facility] ) [I63 9]  Age/Sex: 80 y o  male    Admission Orders:  Scheduled Meds:   Current Facility-Administered Medications:  acetaminophen 650 mg Oral Q4H PRN   aspirin 325 mg Oral Daily   atorvastatin 40 mg Oral QPM   enoxaparin 40 mg Subcutaneous Daily   insulin lispro 1-6 Units Subcutaneous TID AC   insulin lispro 1-6 Units Subcutaneous HS   ondansetron 4 mg Intravenous Q6H PRN     Cons PT/OT/speech  Echo  Cons nutrition  Cons case mgmt  Cons cardio  Cons neuro  Diabetic diet  NIH stroke scale  Dysphagia eval  Up w/assist  scd's  I/O  Daily wt  Tele  Neuro checks Every 1 hour x 4 hours, then every 2 hours x 4, then every 4 hours x 72 hours  Stroke education  Gluc checks ac / hs      Network Utilization Review Department  Phone: [phone]; Fax [phone]  [email or web address]  ATTENTION: Please call with any questions or concerns to [phone]  and carefully listen to the prompts so that you are directed to the right person  Send all requests for admission clinical reviews, approved or denied determinations and any other requests to fax [phone]   All voicemails are confidential

## 2019-02-19 NOTE — SOCIAL WORK
CM met with pt at bedside  OBS status reviewed and signed by pt  Copy to pt and copy to MR for scanning  Pt lives with his wife Rochelle Lim in a townhouse with 15 steps w/railing to reach his bedroom and no ERICK  Pt is able to navigate steps and is independent with ADL's  He uses no DME's  He has never been in rehab or used [place] services  Denies substance abuse, tobacco abuse or mental health issues  He uses [facility] and has no problem with his co-pays  He does not have a POA or Advanced Directive and does not want info at this time  He is retired, but still drives  His daughter Glen Chatman will transport home  CM discussed d/c needs including [place] services, but pt does not feel this will be needed  Pt recently moved here from [place] and does not have a local PCP  An appoint was set up with Dr Nadeem Hong for Friday, 3/1/19 at 11:15  CM will follow through hospitalization  CM reviewed discharge planning process including the following: identifying help at home, patient preference for discharge planning needs, pharmacy preference, and availability of treatment team to discuss questions or concerns patient and/or family may have regarding understanding medications and recognizing signs and symptoms once discharged  CM also encouraged patient to follow up with all recommended appointments after discharge  Patient advised of importance for patient and family to participate in managing patients medical well being  CM name and role reviewed  Discharge Checklist reviewed and CM will continue to monitor for progress toward discharge goals in nursing and provider rounds

## 2019-02-19 NOTE — PLAN OF CARE
Recommend 4gm Na (PATRICIA), CCD 2 diet  RD provided Heart Healthy Consistent Carbohydrate nutrition education  Problem: Nutrition/Hydration-ADULT  Goal: Nutrient/Hydration intake appropriate for improving, restoring or maintaining nutritional needs  Description  Monitor and assess patient's nutrition/hydration status for malnutrition (ex- brittle hair, bruises, dry skin, pale skin and conjunctiva, muscle wasting, smooth red tongue, and disorientation)  Collaborate with interdisciplinary team and initiate plan and interventions as ordered  Monitor patient's weight and dietary intake as ordered or per policy  Utilize nutrition screening tool and intervene per policy  Determine patient's food preferences and provide high-protein, high-caloric foods as appropriate       INTERVENTIONS:  - Monitor oral intake, urinary output, labs, and treatment plans  - Assess nutrition and hydration status and recommend course of action  - Evaluate amount of meals eaten  - Assist patient with eating if necessary   - Allow adequate time for meals  - Recommend/ encourage appropriate diets, oral nutritional supplements, and vitamin/mineral supplements  - Order, calculate, and assess calorie counts as needed  - Recommend, monitor, and adjust tube feedings and TPN/PPN based on assessed needs  - Assess need for intravenous fluids  - Provide specific nutrition/hydration education as appropriate  - Include patient/family/caregiver in decisions related to nutrition  Outcome: Progressing

## 2019-02-19 NOTE — PLAN OF CARE
Problem: DISCHARGE PLANNING - CARE MANAGEMENT  Goal: Discharge to post-acute care or home with appropriate resources  Description  INTERVENTIONS:  - Conduct assessment to determine patient/family and health care team treatment goals, and need for post-acute services based on payer coverage, community resources, and patient preferences, and barriers to discharge  - Address psychosocial, clinical, and financial barriers to discharge as identified in assessment in conjunction with the patient/family and health care team  - Arrange appropriate level of post-acute services according to patient's   needs and preference and payer coverage in collaboration with the physician and health care team  - Communicate with and update the patient/family, physician, and health care team regarding progress on the discharge plan  - Arrange appropriate transportation to post-acute venues   Outcome: Progressing  Note:   CM met with pt at bedside  OBS status reviewed and signed by pt  Copy to pt and copy to MR for scanning  Pt lives with his wife Nadege Jones in a townhouse with 15 steps w/railing to reach his bedroom and no ERICK  Pt is able to navigate steps and is independent with ADL's  He uses no DME's  He has never been in rehab or used [facility] services  Denies substance abuse, tobacco abuse or mental health issues  He uses [facility] and has no problem with his co-pays  He does not have a POA or Advanced Directive and does not want info at this time  He is retired, but still drives  His daughter Qasim Will will transport home  CM discussed d/c needs including [facility] services, but pt does not feel this will be needed  Pt recently moved here from [place] and does not have a local PCP  An appoint was set up with Dr Marialuisa Berger for Friday, 3/1/19 at 11:15  CM will follow through hospitalization      CM reviewed discharge planning process including the following: identifying help at home, patient preference for discharge planning needs, pharmacy preference, and availability of treatment team to discuss questions or concerns patient and/or family may have regarding understanding medications and recognizing signs and symptoms once discharged  CM also encouraged patient to follow up with all recommended appointments after discharge  Patient advised of importance for patient and family to participate in managing patient?s medical well being  CM name and role reviewed  Discharge Checklist reviewed and CM will continue to monitor for progress toward discharge goals in nursing and provider rounds

## 2019-02-19 NOTE — CONSULTS
Consultation - Cardiology Team One  Emilia Henderson 80 y o  male MRN: [id number]  Unit/Bed#: -01 Encounter: [id number]    Inpatient consult to Cardiology  Consult performed by: Dinesh Vizcarra PA-C  Consult ordered by: Shirley Alonso MD          Physician Requesting Consult: Milena Snow,*  Reason for Consult / Principal Problem:  NSVT    HPI: Cardiologist Dr Tucker Graves is a 80y o  year old male who has a history of uncontrolled diabetes with A1c of 8 5% and hyperlipidemia presents with slurred speech and unstable gait for 2 days which has been slowly improving since admission  Initially had been worked up by neurology with MRI brain which shows some acute lacunar infarcts  On telemetry patient had an episode of 12 beat run of NSVT overnight followed by sinus rhythm with frequent PVCs and thus cardiology was consulted  K - 3 8 during that time, was given 20mEq PO x1  Denies any chest pain or discomfort, palpitations, shortness of breath, pain or swelling in the extremities  Patient denies previous history of CAD, CHF, arrhythmias, abnormal ECHO or stress tests  Denies family history of cardiac disease  Denies TOB, occasional Etoh use       REVIEW OF SYSTEMS:  Constitutional:  Denies fever or chills   Eyes:  Denies change in visual acuity   HENT:  Denies nasal congestion or sore throat   Respiratory:  Denies cough or shortness of breath   Cardiovascular:  Denies chest pain or edema   GI:  Denies abdominal pain, nausea, vomiting, bloody stools or diarrhea   :  Denies dysuria, frequency, difficulty in micturition and nocturia  Musculoskeletal:  Denies back pain or joint pain   Neurologic:  Denies headache, focal weakness or sensory changes   Endocrine:  Denies polyuria or polydipsia   Lymphatic:  Denies swollen glands   Psychiatric:  Denies depression or anxiety     Historical Information   Past Medical History:   Diagnosis Date    Diabetes mellitus ([address] ) History reviewed  No pertinent surgical history  Social History     Substance and Sexual Activity   Alcohol Use Never    Frequency: Never     Social History     Substance and Sexual Activity   Drug Use Never     Social History     Tobacco Use   Smoking Status Never Smoker   Smokeless Tobacco Never Used       Family History: History reviewed  No pertinent family history  MEDS & ALLERGIES:  all current active meds have been reviewed and current meds:   Current Facility-Administered Medications   Medication Dose Route Frequency    acetaminophen (TYLENOL) tablet 650 mg  650 mg Oral Q4H PRN    aspirin tablet 325 mg  325 mg Oral Daily    atorvastatin (LIPITOR) tablet 40 mg  40 mg Oral QPM    enoxaparin (LOVENOX) subcutaneous injection 40 mg  40 mg Subcutaneous Daily    insulin lispro (HumaLOG) 100 units/mL subcutaneous injection 1-6 Units  1-6 Units Subcutaneous TID AC    insulin lispro (HumaLOG) 100 units/mL subcutaneous injection 1-6 Units  1-6 Units Subcutaneous HS    ondansetron (ZOFRAN) injection 4 mg  4 mg Intravenous Q6H PRN        No Known Allergies    OBJECTIVE:  Vitals:   Vitals:    19 1329   BP: 159/86   Pulse: 89   Resp:    Temp: 97 7 °F (36 5 °C)   SpO2: 99%     Body mass index is 26 96 kg/m²  Systolic (37BBQ), PHV:994 , Min:118 , NN     Diastolic (55XHS), TE, Min:71, Max:86      Intake/Output Summary (Last 24 hours) at 2019 1354  Last data filed at 2019 1103  Gross per 24 hour   Intake 240 ml   Output 1250 ml   Net -1010 ml     Weight (last 2 days)     Date/Time   Weight    19 1330   73 5 (162)    19 0600   73 5 (162 04)    19 1657   73 7 (162 48)    19 1250   72 4 (159 61)            Invasive Devices     Peripheral Intravenous Line            Peripheral IV 19 Right Antecubital 1 day                PHYSICAL EXAMS:  General:  Patient is not in acute distress, laying in the bed comfortably, awake, alert responding to commands    Head: Normocephalic, Atraumatic  HEENT: White sclera, pink conjunctiva,  PERRLA,pharynx benign  Neck:  Supple, no neck vein distention, carotids+2/+2 no bruits, thyromegaly, adenopathy  Respiratory: clear to P/A  Cardiovascular:  PMI normal, S1-S2 normal, No  Murmurs, thrills, gallops, rubs   Regular rhythm  GI:  Abdomen soft nontender  No hepatosplenomegaly, adenopathy, ascites,or rebound tenderness  Extremities: + trace bilateral lower extremity edema    normal pulses, no calf tenderness, no joint deformities, no venous disease   Integument:  No skin rashes or ulceration  Lymphatic:  No cervical or inguinal lymphadenopathy  Neurologic:  Patient is awake alert, responding to command, well-oriented to time and place and person moving all extremities    LABORATORY RESULTS:  Results from last 7 days   Lab Units 02/18/19  1325   TROPONIN I ng/mL <0 02     CBC with diff:   Results from last 7 days   Lab Units 02/19/19  0500 02/18/19  1325   WBC Thousand/uL 6 54 7 16   HEMOGLOBIN g/dL 13 9 15 1   HEMATOCRIT % 42 2 47 0   MCV fL 88 91   PLATELETS Thousands/uL 210 213   MCH pg 29 0 29 1   MCHC g/dL 32 9 32 1   RDW % 11 7 11 8   MPV fL 10 5 10 7       CMP:  Results from last 7 days   Lab Units 02/19/19  0138 02/18/19  1326   POTASSIUM mmol/L 3 7 3 8   CHLORIDE mmol/L 107 102   CO2 mmol/L 24 26   BUN mg/dL 8 9   CREATININE mg/dL 0 96 1 08   CALCIUM mg/dL 9 0 9 5   EGFR ml/min/1 73sq m 94 71       BMP:  Results from last 7 days   Lab Units 02/19/19  0138 02/18/19  1326   POTASSIUM mmol/L 3 7 3 8   CHLORIDE mmol/L 107 102   CO2 mmol/L 24 26   BUN mg/dL 8 9   CREATININE mg/dL 0 96 1 08   CALCIUM mg/dL 9 0 9 5            Results from last 7 days   Lab Units 02/19/19  0138   MAGNESIUM mg/dL 1 8     Results from last 7 days   Lab Units 02/19/19  0500   HEMOGLOBIN A1C % 8 5*         Results from last 7 days   Lab Units 02/18/19  1326   INR  1 01       Lipid Profile:   No results found for: CHOL  Lab Results   Component Value Date    HDL 29 (L) 02/19/2019     Lab Results   Component Value Date    LDLCALC 81 02/19/2019     Lab Results   Component Value Date    TRIG 203 (H) 02/19/2019       EKG reviewed personally:  Unavailable to me at this time    Telemetry reviewed personally:   12-beat run of NSVT with single fused beat, frequent PVCs  Assessment/Plan:  1- CVA/TIA  MRI brain shows patchy areas of diffusion restriction bilaterally in the jenaro suggesting recent/acute pontine lacunar infarctions mild chronic microangiopathy  CT head and bilateral carotid artery duplex normal  Patient started on aspirin, statin  Neurology following    2- Monomorphic NSVT on telemetry  EKG shows QT normal interval   Was given KCl 20mEq PO x1, repeat K level- 3 7, magnesium- 1 8  Give magnesium 2g x 1 and KCl 40mEq x 1  Repeat BMP tomorrow AM  Check echocardiogram to assess LVEF, valvular and/or wall motion abnormalities  Start Lopressor 12 5 mg b i d  Continue ASA, statin  Will consider ischemic workup prior to discharge  3- Hypertension  Had been previously placed on antihypertensives per his PCP in [place] however patient states that he has not taking medications regularly  Stable, continue to monitor    4- Hyperlipidemia  Total cholesterol 151, triglycerides 203, HDL 29, LDL 81  Continue with statin    Code Status: Level 1 - Full Code    Counseling / Coordination of Care  Total floor / unit time spent today 35 minutes  Greater than 50% of total time was spent with the patient and / or family counseling and / or coordination of care  A description of the counseling / coordination of care: Review of history, current assessment, development of a plan      Muna Lowry PA-C  2/19/2019,1:54 PM

## 2019-02-19 NOTE — SPEECH THERAPY NOTE
Speech Pathology  Assessment    Patient Name: Berto العلي  NRJRC'I Date: 2/19/2019     Problem List  Patient Active Problem List   Diagnosis    TIA (transient ischemic attack)    Diabetes mellitus type 2 in nonobese [facility])     Past Medical History  Past Medical History:   Diagnosis Date    Diabetes mellitus ([address] )        MOTOR SPEECH EVALUATION    Impressions:  Pt presented c no s/s dysphonia or dysphagia  He presented c mildly slowed speech rate and c/o decline in memory  Recommendation/Plan: await results of MRI, PT & OT findings and recommendations  ? Benefit from additional cognitive/linguistic testing/tx  Chief Complaint:   Slurred speech     History of Present Illness:     Berto العلي is a 80 y o  male who presents with slowed and slurred speech, as well as unsteadiness on his feet  W/U for etiology underway (await MRI results)  DX:  TIA, DM      Motor Speech Assessment:    Vowel prolongation: /a/ (Normal 15-20 seconds): 15 secs    Diadochokinesis: mildly slowed rate    puh tuh kuh (or buttercup)- (normal should exceed 8 reps in 10 seconds): 7    Articulation:  Single words: good articulatory precision    Multisyllabic words: good articulatory precision but mildly slowed rate    Repetition of multisyllabic words: good articulatory precision but again mildly slowed rate noted    Oral Motor Skills:  Facial symmetry: WFL  Labial: WFL  Lingual:no asymmetry and good ROM  Palate: no asymmetry, good elevation  Voice: No dysphonia     Oral apraxia:  No s/s    Conversation:  Articulatory groping:  none  Imprecise articulation;  None this am  Decreased breath support for speech:  none  Mildly Slow rate    Swallow Assessment:   Pt took 3ozs of thin liquid via successive sips with no s/s laryngeal penetration or aspiration  Pt passed RN screening assessment as well

## 2019-02-19 NOTE — PLAN OF CARE
Problem: OCCUPATIONAL THERAPY ADULT  Goal: Performs self-care activities at highest level of function for planned discharge setting  See evaluation for individualized goals  Description  Treatment Interventions: Functional transfer training, Endurance training, Compensatory technique education, Continued evaluation, Energy conservation, Activityengagement          See flowsheet documentation for full assessment, interventions and recommendations  Note:   Limitation: Decreased endurance, Decreased Safe judgement during ADL, Decreased high-level ADLs  Prognosis: Good  Assessment: Patient is a 80 y o  male seen for OT evaluation s/p admit to [id number] [place] [address] on 2/18/2019 w/TIA (transient ischemic attack)  Patient presented to ED with slurred speech and difficulty walking  Commorbidities affecting patient's functional performance at time of assessment include: DM type II, non sustained ventricular tachycardia, mixed hyperlipidemia  Orders placed for OT evaluation and treatment  Performed at least two patient identifiers during session including name and wristband  Prior to admission, Patient reporting independent with ADLs/ IADLs, lives with his wife in a townhouse, 2 story, no ERICK with 15 steps to 2nd floor bedroom, Patient ambulates with no AD, usually drives  Personal factors affecting patient at time of initial evaluation include: steps to enter, limited insight into deficits, decreased initiation and engagement, difficulty performing ADLs and difficulty performing IADLs  Upon evaluation, patient requires supervision, set up and contact guard assist for UB ADLs, supervision, set up and contact guard assist for LB ADLs, transfers and functional ambulation in room and bathroom with supervision assist, with no AD   Occupational performance is affected by the following deficits: dynamic sit/ stand balance deficit with poor standing tolerance time for self care and functional mobility, decreased activity tolerance and decreased safety awareness related to management of diabetes and effects on physical functioning    Therapist completed expanded review of medical records and additional review of physical, cognitive or psychosocial history, clinical examination identifying 3-5 performance deficits, clinical decision making of a moderate complexity, consistent with moderate complexity level evaluation  Patient to benefit from continued Occupational Therapy treatment while in the hospital to address deficits as defined above and maximize level of functional independence with ADLs and functional mobility  Occupational Performance areas to address include: bathing/ shower, dressing, transfer to all surfaces, functional mobility, health maintenance, medication routine/ management, IADLs: safety procedures, Leisure Participation and Social participation  From OT standpoint, recommendation at time of d/c would be Home with family support, [address] and Home OT         OT Discharge Recommendation: Home OT

## 2019-02-19 NOTE — TELEPHONE ENCOUNTER
ALLI: Jennifer Ahn    I'm hoping I handled this correctly- since the patient has to establish w/a PCP dr red Iverson  No episode in his chart  Disch from [facility] today, 2/19/19  Don't think this falls under TCM; since Brett Stone is establishing  Has an appt w/Dr Greene to establish on March 1 at 11:15AM - 30 minute appt

## 2019-02-19 NOTE — PLAN OF CARE
Problem: DISCHARGE PLANNING - CARE MANAGEMENT  Goal: Discharge to post-acute care or home with appropriate resources  Description  INTERVENTIONS:  - Conduct assessment to determine patient/family and health care team treatment goals, and need for post-acute services based on payer coverage, community resources, and patient preferences, and barriers to discharge  - Address psychosocial, clinical, and financial barriers to discharge as identified in assessment in conjunction with the patient/family and health care team  - Arrange appropriate level of post-acute services according to patient's   needs and preference and payer coverage in collaboration with the physician and health care team  - Communicate with and update the patient/family, physician, and health care team regarding progress on the discharge plan  - Arrange appropriate transportation to post-acute venues   2/19/2019 1121 by Kelvin Underwood RN  Outcome: Progressing  Note:   Pt will now consider [facility] services  Referral placed to [facility] with the understanding that this can be cancelled at d/c if he feels it is not needed

## 2019-02-19 NOTE — CONSULTS
Neurology Consult- Inge Or 1950, 80 y o  male    MRN: [id number] Unit/Bed#: -01 Encounter: [id number]      Inpatient consult to Neurology  Consult performed by: [place], CRNP  Consult ordered by: Jose Antonio Kennedy MD      Reason for Consult / Principal Problem:  Disequilibrium and slurred speech  Hx and PE limited by:  none  Review of previous medical records was completed, although the patient has very little of medical record available he has recently relocated to the [facility]      Family, was not present at the bedside for history and examination, the patient was deemed to be a reliable historian  * Cerebrovascular accident (CVA) due to occlusion of cerebral artery [facility])  Assessment & Plan  This patient reports his onset symptoms yesterday a rather abrupt  He reports the initial sensation of being off balance has dissipated but he reports a slurred speech which he initially experienced yesterday has persisted although it has improved  He reports he previously had not been on either a statin or a low-dose aspirin, which he is now started on an should continue  He reports he has never been told he has had a stroke before  I have discussed with this patient stroke risk factors including that of dyslipidemia and poorly controlled diabetes  He is new to this area and will be adopting of both a new Internal Medicine physician and I have discussed with him that he will need to follow up with us at our [address] office for his neurologic follow-up  Mixed hyperlipidemia  Assessment & Plan  I reviewed this patient's cholesterol panel with him and specifically his poor HDL  He is generally near goal with his LDL  I have discussed with him the risk that poorly controlled lipidemia serves in cerebral vascular disease  He has not been on a statin by his report previously      Diabetes mellitus type 2 in nonobese [facility])  Assessment & Plan  Lab Results   Component Value Date    HGBA1C 8 5 (H) 02/19/2019     I have discussed with the patient that his poorly controlled diabetes remains a stroke risk factor  He has well-preserved sensation in his bilateral lower extremities at this point and reinforced with him the maintenance of tight blood sugar control to avoid the occurrence of neuropathy  I have discussed with him to start with and endocrinologist in this area  HPI: Elva Santamaria is a right handed  80 y o  male who  neurology is asked to see for a complaint of an episode at home of what he reports to be were he felt like he was read off balance and could not stand correctly  He reports that he felt like he was going to sort of fall over  He also reports that he had slurred speech episode  He presented to the emergency room last yesterday approximately 12 noon or so  He reports that over the course of the day yesterday he began to feel better and today he generally feels much improved  Others no family some at the bedside to supplement his history although he appears to be a reliable historian  He reports he has never had this episode before  He reports he is generally well  ROS: 12 system cued query:  He denies any visual disturbance or field loss  He denies any headache at this time  No further episodes of this off balance or disequilibrium that he experienced yesterday  Upon more specific questioning it does not appear that this was dizziness as opposed to a more generalized disequilibrium  However the patient does report that he feels as if subjectively his speech is still slightly slurred compared to what he reports is his premorbid baseline  He denies any chest pain or shortness of breath no palpitations  He denies that he had any lateralizing features such as weakness on 1 side or the other    No some limb numbness or tingling no bladder or bowel complaints no recent constitutional problems or fever etc       Historical Information Past Medical History:   Diagnosis Date    Diabetes mellitus ([facility] [address] )      History reviewed  No pertinent surgical history  Social History  the patient is  he lives at home with his wife  He is a adamant nonsmoker only very rare social alcohol no recreational is  Family History: History reviewed  No pertinent family history  No Known Allergies  Meds:all current active meds have been reviewed    Scheduled Meds:  Current Facility-Administered Medications:  acetaminophen 650 mg Oral Q4H PRN   aspirin 325 mg Oral Daily   atorvastatin 40 mg Oral QPM   enoxaparin 40 mg Subcutaneous Daily   insulin lispro 1-6 Units Subcutaneous TID AC   insulin lispro 1-6 Units Subcutaneous HS   metoprolol tartrate 12 5 mg Oral Q12H PINO   ondansetron 4 mg Intravenous Q6H PRN     PRN Meds:   acetaminophen    ondansetron      Physical Exam:   Objective   Vitals:Blood pressure 119/73, pulse 73, temperature 98 06 °F (36 7 °C), resp  rate 19, height 5' 5" (1 651 m), weight 73 5 kg (162 lb), SpO2 100 %  ,Body mass index is 26 96 kg/m²  Patient was examined in bed he was gaited and return to the bed  General: alert, appears stated age and cooperative  Head: Normocephalic, without obvious abnormality, atraumatic  Oral exam: lips, mucosa, and tongue moist;   Neck: no carotid bruit,   Lungs: clear to auscultation ant  bilaterally  Heart: regular rate and rhythm, S1, S2 normal, no murmur appreciated,   Abdomen: soft, +BS    Extremities: atraumatic, no cyanosis or edema    Neurologic:   Mental status: Alert, oriented, thought content appropriate, there were no cognitive deficits he is English-speaking and fluent in [place] although his accent is present    CN Exam: TRACIE, EOM's I, VF full, Gaze conjugate No acute volitional sensory or motor lateralizations (No PP on face) however his eye active eye closure on the left appeared to be tighter with a greater degree of of resistance to manual lid retraction than his right , Hearing I B, CNIX-XII I B  Motor: full power, age appropriate x 4 limbs  Sensory: intact  X 4 limbs, 4 mod inc lt, temp, vib and was persistent in the bilateral great toe  and prop was also grossly intact , (not PP tested)  Cerebellar: no past pointing or drift from Romberg position, no ataxia w maneuvers, Fine motor & finger taps, age appropriate, WNL  DTR's: Age appropriate, WNL; Plantars: downgoing B  Gait: Fluid smooth, no LOB w cadance change  He was able to tolerate a tandem of approximately 6-8 feet his forward reverse and forced gaits were all within normal limits  Lab Results:   I have personally reviewed pertinent reports  , CBC:   Results from last 7 days   Lab Units 02/19/19  0500 02/18/19  1325   WBC Thousand/uL 6 54 7 16   RBC Million/uL 4 80 5 19   HEMOGLOBIN g/dL 13 9 15 1   HEMATOCRIT % 42 2 47 0   MCV fL 88 91   PLATELETS Thousands/uL 210 213   , BMP/CMP:   Results from last 7 days   Lab Units 02/19/19  0138 02/18/19  1326   SODIUM mmol/L 141 138   POTASSIUM mmol/L 3 7 3 8   CHLORIDE mmol/L 107 102   CO2 mmol/L 24 26   BUN mg/dL 8 9   CREATININE mg/dL 0 96 1 08   CALCIUM mg/dL 9 0 9 5   EGFR ml/min/1 73sq m 94 71   , Vitamin B12:   , HgBA1C:   Results from last 7 days   Lab Units 02/19/19  0500   HEMOGLOBIN A1C % 8 5*   , TSH:   , Coagulation:   Results from last 7 days   Lab Units 02/18/19  1326   INR  1 01   , Lipid Profile:   Results from last 7 days   Lab Units 02/19/19  0500   HDL mg/dL 29*   LDL CALC mg/dL 81   TRIGLYCERIDES mg/dL 203*        Imaging Studies: I have personally reviewed pertinent films in PACS the films were reviewed with the patient at bedside indicating his stroke  Of this was apparent on both his CT scan as well as his MRI  He does not have vascular imaging other than as carotid ultrasound and have asked him to have a CTA of his head and neck  EEG, Echo, Pathology, and Other Studies: His echocardiogram is pending      Counseling / Coordination of Care  Total time spent today 45 minutes  Greater than 50% of total time was spent with the patient and / or family counseling and / or coordination of care  A description of the counseling / coordination of care: Of the above was discussed with the patient the bedside  He also some reviewed his films with me at the bedside  We discussed diabetes dyslipidemia medications the role of diet and exercise follow-up care for both his stroke as well as his diabetes  He had several questions I believe they were all addressed to satisfaction at this time  Dictation voice to text software has been used in the creation of this document  Please consider this in light of any contextual or grammatical errors

## 2019-02-19 NOTE — ASSESSMENT & PLAN NOTE
Lab Results   Component Value Date    HGBA1C 8 5 (H) 02/19/2019     I have discussed with the patient that his poorly controlled diabetes remains a stroke risk factor  He has well-preserved sensation in his bilateral lower extremities at this point and reinforced with him the maintenance of tight blood sugar control to avoid the occurrence of neuropathy  I have discussed with him to start with and endocrinologist in this area

## 2019-02-19 NOTE — PROGRESS NOTES
Called by RN for 7 beats of NSVT followed by 4 beats  Labs reviewed, no Mg     K 3 8 - replete w/KCl 20 mEq PO x 1     Check BMP and Mg STAT

## 2019-02-19 NOTE — OCCUPATIONAL THERAPY NOTE
Occupational Therapy Evaluation        Patient Name: Oniel Sharma  [id number] Date: 2/19/2019 02/19/19 0598   Note Type   Note type Eval only   Restrictions/Precautions   Weight Bearing Precautions Per Order No   Other Precautions Bed Alarm; Fall Risk   Pain Assessment   Pain Assessment No/denies pain   Pain Score No Pain   Home Living   Type of Home Other (Comment)  ([facility])   Home Layout Two level;Bed/bath upstairs;1/2 bath on main level  (No ERICK, 10- 15 steps to 2nd floor)   Bathroom Shower/Tub Tub/shower unit   Bathroom Toilet Standard   Bathroom Equipment Grab bars in shower   [address] Other (Comment)  (No DME)   Additional Comments ambulatory with no AD   Prior Function   Level of [place] Independent with ADLs and functional mobility   Lives With Spouse; Family   Receives Help From Family   ADL Assistance Independent   IADLs Independent   Falls in the last 6 months 0   Vocational Retired   Comments patient drives   Lifestyle   Autonomy Patient reporting independent with ADLs/ IADLs, lives with his wife in a townhouse, 2 story, no ERICK with 15 steps to 2nd floor bedroom, Patient ambulates with no AD, usually drives      Reciprocal Relationships Supportive Family   Service to Others Retired    Psychosocial   Psychosocial (WDL) WDL   ADL   Eating Assistance 6  Modified independent   [address] [facility] [address]  [address] [place] [address] [address]  [address]  [address]  5  [address]  5  [id number] [facility] 5  Supervision/Setup   Bed Mobility   Supine to Sit 5  Supervision   Additional items Verbal cues   Transfers   Sit to Stand 5  Supervision   Additional items Verbal cues   Toilet transfer 5  Supervision   Additional items Verbal cues   Functional Mobility   Functional Mobility 5  Supervision   Additional items   (no AD/ close contact guard due to lightheadedness)   Balance   Static Sitting Normal   Dynamic Sitting Normal   Static Standing Good   Dynamic Standing Fair +   Activity Tolerance   Activity Tolerance Patient limited by fatigue  ("I feel whoozy")   Nurse Made Aware RN Adwoa Vaca verbalized patient appropriate for therapy , made aware of concern for LOB, recommended supervision/ contact guard for mobility tasks  RUE Assessment   RUE Assessment WFL   LUE Assessment   LUE Assessment WFL   Hand Function   Gross Motor Coordination Functional   Fine Motor Coordination Functional   Sensation   Light Touch No apparent deficits  (BUEs)   Vision-Basic Assessment   Current Vision Wears glasses only for reading   Patient Visual Report   (No significant changes reported)   Perception   Inattention/Neglect Appears intact   Cognition   Overall Cognitive Status WFL   Arousal/Participation Alert; Cooperative   Attention Within functional limits   Orientation Level Oriented X4   Memory Within functional limits   Following Commands Follows all commands and directions without difficulty   Assessment   Limitation Decreased endurance;Decreased Safe judgement during ADL;Decreased high-level ADLs   Prognosis Good   Assessment Patient is a 80 y o  male seen for OT evaluation s/p admit to [id number] [place] [address] on 2/18/2019 w/TIA (transient ischemic attack)  Patient presented to ED with slurred speech and difficulty walking  Commorbidities affecting patient's functional performance at time of assessment include: DM type II, non sustained ventricular tachycardia, mixed hyperlipidemia  Orders placed for OT evaluation and treatment  Performed at least two patient identifiers during session including name and wristband    Prior to admission, Patient reporting independent with ADLs/ IADLs, lives with his wife in a townhouse, 2 story, no ERICK with 15 steps to 2nd floor bedroom, Patient ambulates with no AD, usually drives  Personal factors affecting patient at time of initial evaluation include: steps to enter, limited insight into deficits, decreased initiation and engagement, difficulty performing ADLs and difficulty performing IADLs  Upon evaluation, patient requires supervision, set up and contact guard assist for UB ADLs, supervision, set up and contact guard assist for LB ADLs, transfers and functional ambulation in room and bathroom with supervision assist, with no AD  Occupational performance is affected by the following deficits: dynamic sit/ stand balance deficit with poor standing tolerance time for self care and functional mobility, decreased activity tolerance and decreased safety awareness related to management of diabetes and effects on physical functioning    Therapist completed expanded review of medical records and additional review of physical, cognitive or psychosocial history, clinical examination identifying 3-5 performance deficits, clinical decision making of a moderate complexity, consistent with moderate complexity level evaluation  Patient to benefit from continued Occupational Therapy treatment while in the hospital to address deficits as defined above and maximize level of functional independence with ADLs and functional mobility  Occupational Performance areas to address include: bathing/ shower, dressing, transfer to all surfaces, functional mobility, health maintenance, medication routine/ management, IADLs: safety procedures, Leisure Participation and Social participation  From OT standpoint, recommendation at time of d/c would be Home with family support, [address] and [place] OT  Goals   Patient Goals "go home"   Plan   Treatment Interventions Functional transfer training; Endurance training; Compensatory technique education;Continued evaluation; Energy conservation; Activityengagement   Goal Expiration Date 02/22/19   OT Frequency 2-3x/wk   Recommendation   OT Discharge Recommendation Home OT   Barthel Index   Feeding 10   Bathing 0   Grooming Score 5   Dressing Score 5   Bladder Score 10   Bowels Score 10   Toilet Use Score 10   Transfers (Bed/Chair) Score 10   Mobility (Level Surface) Score 10   Stairs Score 5   Barthel Index Score 75   Modified [place] Scale   Modified [place] Scale 2     Patient  will engage in ongoing cognitive assessment  to assist with safe d/c planning/recommendations  Patient will identify 3 positive coping strategies to assist with emotional regulation and management  Patient will engage in depression screen/ leisure interest check list to identify s/s of depression and facilitate patients involvement in play/ leisure tasks  Patient will engage in ongoing visual perceptual assessments, screens and activities to r/o visual perceptual impairments affecting functional performance  Patient  will engage in activity configuration activity with good participation and mod I to increase time management skills and improve participation in a structured routine to improve overall quality of life

## 2019-02-19 NOTE — PROGRESS NOTES
Progress Note Turner Alanis 1950, 80 y o  male MRN: [id number]    Unit/Bed#: -01 Encounter: [id number]    Primary Care Provider: No primary care provider on file  Date and time admitted to hospital: 2/18/2019 12:51 PM        NSVT (nonsustained ventricular tachycardia) ([facility] [address] )  Assessment & Plan  On telemetry-k replaced  Echo pending  Cardiology consult    Diabetes mellitus type 2 in nonobese [facility])  Assessment & Plan  No results found for: HGBA1C    Recent Labs     02/18/19  1257   POCGLU 221*       Blood Sugar Average: Last 72 hrs:  (P) 221     Patient has history of diabetes mellitus  His blood sugars 221  He takes a medication injection in addition to metformin  He does not know the name of the medication  He has not seen a primary care physician for many months as he moved from [place] to here last year  Would ask  to have him establish with a primary care physician  a1c 8 9 ss coverage      * TIA (transient ischemic attack)  Assessment & Plan  Possible TIA  Patient is somewhat slow to talk although he also had some trouble with walking last night  He did not check his sugar and I am also concerned about if patient was having hypoglycemic episode  He does not check his sugars  on aspirin and statin  His blood pressure is controlled and really not elevated  PT/OT evaluation and neurology evaluation  Stroke pathway started        VTE Pharmacologic Prophylaxis:   Pharmacologic: Enoxaparin (Lovenox)  Mechanical VTE Prophylaxis in Place: Yes    Patient Centered Rounds: I have performed bedside rounds with nursing staff today  Discussions with Specialists or Other Care Team Provider:     Education and Discussions with Family / Patient:     Time Spent for Care: 30 minutes  More than 50% of total time spent on counseling and coordination of care as described above  Current Length of Stay: 0 day(s)    Current Patient Status: Observation   Certification Statement:  The patient will continue to require additional inpatient hospital stay due to tia/nsvt    Discharge Plan: home    Code Status: Level 1 - Full Code      Subjective:   No c/o-denies chest pain or shortness of breath    Objective:     Vitals:   Temp (24hrs), Av °F (36 7 °C), Min:97 34 °F (36 3 °C), Max:98 5 °F (36 9 °C)    Temp:  [97 34 °F (36 3 °C)-98 5 °F (36 9 °C)] 97 7 °F (36 5 °C)  HR:  [] 82  Resp:  [16-21] 18  BP: (118-156)/(70-83) 120/74  SpO2:  [94 %-100 %] 98 %  Body mass index is 26 96 kg/m²  Input and Output Summary (last 24 hours): Intake/Output Summary (Last 24 hours) at 2019 0935  Last data filed at 2019 0601  Gross per 24 hour   Intake    Output 1250 ml   Net -1250 ml       Physical Exam:     Physical Exam   Constitutional: He is oriented to person, place, and time  He appears well-developed and well-nourished  HENT:   Head: Normocephalic and atraumatic  Eyes: Pupils are equal, round, and reactive to light  EOM are normal    Neck: Normal range of motion  Neck supple  Cardiovascular: Normal rate and regular rhythm  Pulmonary/Chest: Effort normal and breath sounds normal    Abdominal: Soft  Bowel sounds are normal    Musculoskeletal: He exhibits no edema  Neurological: He is alert and oriented to person, place, and time  No cranial nerve deficit  Skin: Skin is warm and dry  Psychiatric: He has a normal mood and affect           Additional Data:     Labs:    Results from last 7 days   Lab Units 19  0500   WBC Thousand/uL 6 54   HEMOGLOBIN g/dL 13 9   HEMATOCRIT % 42 2   PLATELETS Thousands/uL 210     Results from last 7 days   Lab Units 19  0138   SODIUM mmol/L 141   POTASSIUM mmol/L 3 7   CHLORIDE mmol/L 107   CO2 mmol/L 24   BUN mg/dL 8   CREATININE mg/dL 0 96   ANION GAP mmol/L 10   CALCIUM mg/dL 9 0   GLUCOSE RANDOM mg/dL 140     Results from last 7 days   Lab Units 19  1326   INR  1 01     Results from last 7 days   Lab Units 19  0606 02/18/19  2044 02/18/19  1700 02/18/19  1257   POC GLUCOSE mg/dl 152* 198* 122 221*     Results from last 7 days   Lab Units 02/19/19  0500   HEMOGLOBIN A1C % 8 5*               * I Have Reviewed All Lab Data Listed Above  * Additional Pertinent Lab Tests Reviewed: All Labs Within Last 24 Hours Reviewed    Imaging:    Imaging Reports Reviewed Today Include:   Imaging Personally Reviewed by Myself Includes:      Recent Cultures (last 7 days):           Last 24 Hours Medication List:     Current Facility-Administered Medications:  acetaminophen 650 mg Oral Q4H PRN Idelle Every, MD   aspirin 325 mg Oral Daily Idelle Every, MD   atorvastatin 40 mg Oral QPM Idelle Every, MD   enoxaparin 40 mg Subcutaneous Daily Idelle Every, MD   insulin lispro 1-6 Units Subcutaneous TID Scot Ibarra MD   insulin lispro 1-6 Units Subcutaneous HS Idelle Every, MD   ondansetron 4 mg Intravenous Q6H PRN Idelle Every, MD        Today, Patient Was Seen By: Jose Alberto Tee MD    ** Please Note: Dictation voice to text software may have been used in the creation of this document   **

## 2019-02-19 NOTE — ASSESSMENT & PLAN NOTE
This patient reports his onset symptoms yesterday a rather abrupt  He reports the initial sensation of being off balance has dissipated but he reports a slurred speech which he initially experienced yesterday has persisted although it has improved  He reports he previously had not been on either a statin or a low-dose aspirin, which he is now started on an should continue  He reports he has never been told he has had a stroke before  I have discussed with this patient stroke risk factors including that of dyslipidemia and poorly controlled diabetes  He is new to this area and will be adopting of both a new Internal Medicine physician and I have discussed with him that he will need to follow up with us at our [address] office for his neurologic follow-up

## 2019-02-20 ENCOUNTER — APPOINTMENT (OUTPATIENT)
Dept: NON INVASIVE DIAGNOSTICS | Facility: HOSPITAL | Age: 69
DRG: 065 | End: 2019-02-20
Payer: COMMERCIAL

## 2019-02-20 ENCOUNTER — APPOINTMENT (OUTPATIENT)
Dept: CT IMAGING | Facility: HOSPITAL | Age: 69
DRG: 065 | End: 2019-02-20
Payer: COMMERCIAL

## 2019-02-20 LAB
GLUCOSE SERPL-MCNC: 158 MG/DL (ref 65–140)
GLUCOSE SERPL-MCNC: 163 MG/DL (ref 65–140)
GLUCOSE SERPL-MCNC: 177 MG/DL (ref 65–140)
GLUCOSE SERPL-MCNC: 233 MG/DL (ref 65–140)

## 2019-02-20 PROCEDURE — 99232 SBSQ HOSP IP/OBS MODERATE 35: CPT | Performed by: GENERAL PRACTICE

## 2019-02-20 PROCEDURE — 82948 REAGENT STRIP/BLOOD GLUCOSE: CPT

## 2019-02-20 PROCEDURE — 92507 TX SP LANG VOICE COMM INDIV: CPT

## 2019-02-20 PROCEDURE — 99233 SBSQ HOSP IP/OBS HIGH 50: CPT | Performed by: PSYCHIATRY & NEUROLOGY

## 2019-02-20 PROCEDURE — 93799 UNLISTED CV SVC/PROCEDURE: CPT

## 2019-02-20 PROCEDURE — 93306 TTE W/DOPPLER COMPLETE: CPT | Performed by: INTERNAL MEDICINE

## 2019-02-20 PROCEDURE — 92610 EVALUATE SWALLOWING FUNCTION: CPT

## 2019-02-20 PROCEDURE — 70450 CT HEAD/BRAIN W/O DYE: CPT

## 2019-02-20 PROCEDURE — 92526 ORAL FUNCTION THERAPY: CPT

## 2019-02-20 PROCEDURE — 93306 TTE W/DOPPLER COMPLETE: CPT

## 2019-02-20 RX ORDER — SODIUM CHLORIDE 9 MG/ML
75 INJECTION, SOLUTION INTRAVENOUS CONTINUOUS
Status: DISCONTINUED | OUTPATIENT
Start: 2019-02-20 | End: 2019-02-22

## 2019-02-20 RX ORDER — CLOPIDOGREL BISULFATE 75 MG/1
300 TABLET ORAL ONCE
Status: COMPLETED | OUTPATIENT
Start: 2019-02-20 | End: 2019-02-20

## 2019-02-20 RX ORDER — CLOPIDOGREL BISULFATE 75 MG/1
75 TABLET ORAL DAILY
Status: DISCONTINUED | OUTPATIENT
Start: 2019-02-21 | End: 2019-02-24 | Stop reason: HOSPADM

## 2019-02-20 RX ORDER — CLOPIDOGREL BISULFATE 75 MG/1
75 TABLET ORAL DAILY
Status: DISCONTINUED | OUTPATIENT
Start: 2019-02-20 | End: 2019-02-20

## 2019-02-20 RX ADMIN — METOPROLOL TARTRATE 12.5 MG: 25 TABLET ORAL at 21:20

## 2019-02-20 RX ADMIN — SODIUM CHLORIDE 500 ML: 0.9 INJECTION, SOLUTION INTRAVENOUS at 09:00

## 2019-02-20 RX ADMIN — SODIUM CHLORIDE 75 ML/HR: 0.9 INJECTION, SOLUTION INTRAVENOUS at 17:00

## 2019-02-20 RX ADMIN — CLOPIDOGREL BISULFATE 300 MG: 75 TABLET ORAL at 17:00

## 2019-02-20 RX ADMIN — ASPIRIN 325 MG: 325 TABLET ORAL at 08:18

## 2019-02-20 RX ADMIN — ENOXAPARIN SODIUM 40 MG: 40 INJECTION SUBCUTANEOUS at 08:19

## 2019-02-20 RX ADMIN — ATORVASTATIN CALCIUM 40 MG: 40 TABLET, FILM COATED ORAL at 17:33

## 2019-02-20 RX ADMIN — METOPROLOL TARTRATE 12.5 MG: 25 TABLET ORAL at 08:18

## 2019-02-20 RX ADMIN — INSULIN LISPRO 1 UNITS: 100 INJECTION, SOLUTION INTRAVENOUS; SUBCUTANEOUS at 22:56

## 2019-02-20 RX ADMIN — INSULIN LISPRO 1 UNITS: 100 INJECTION, SOLUTION INTRAVENOUS; SUBCUTANEOUS at 08:19

## 2019-02-20 RX ADMIN — INSULIN LISPRO 3 UNITS: 100 INJECTION, SOLUTION INTRAVENOUS; SUBCUTANEOUS at 17:01

## 2019-02-20 NOTE — SPEECH THERAPY NOTE
Speech/Language Pathology Progress Note    Patient Name: Shauna MICHEL Date: 2/20/2019    Subjective:  "Why do I have these hiccups?"    Objective:  F/U to swallowing evaluation completed bedside  Wife now bedside  And, pt presenting with hiccoughs throughout entire session  Wife presented c questions re: reason for dysarthria (?evalution of CVA vs new stroke) and she stated desire to speak c MD   Education re: swallowing A & P completed  I assessed pt with additional nectar thick and thin liquid  Pt presented c "wet" vocal quality and cough (each x1) c 3ozs of thin liquid but no s/s c nectar thick liquid  Pt, wife & I agreed that presence of (frequent) hiccoughs may interfere c chewing/swallowing with more textured food  All in agreement with conservative diet of puree & nectar thick liquids  Assessment:  Pt presented c occasion of "wet" vocal quality and with cough x1 in f/u assessment with liquids  Pt also with frequent hiccoughs  I spoke c MD re: same (and diet downgraded to maximize safety)  Plan/Recommendations:  Initiate pureed diet c nectar thick liquid  Dysphagia tx to continue 3-5 x weekly

## 2019-02-20 NOTE — SPEECH THERAPY NOTE
Speech-Language Pathology Bedside Swallow Evaluation      Patient Name: Cat Saldivar    CABGH'M Date: 2/20/2019     Problem List  Patient Active Problem List   Diagnosis    Cerebrovascular accident (CVA) due to occlusion of cerebral artery ([facility] )    Diabetes mellitus type 2 in nonobese ([facility] )    NSVT (nonsustained ventricular tachycardia) ([facility] )    Mixed hyperlipidemia       Past Medical History  Past Medical History:   Diagnosis Date    Diabetes mellitus ([place] [address] )        Summary   Pt presented with s/s suggestive of mild-moderate oral and suspected mild pharyngeal dysphagia  Symptoms or concerns included oral weakness and mildly delayed and effortful appearing swallow  Management of soft moist food and individual sips of  liquid was functional (risk for dysphagia c dense solid food/pills and if liquids ingested quickly)    Recommendations: mechanically altered/level 2 diet, thin liquids and liquids 1 sip at a time     Recommended Form of Meds: whole with puree and crushed with puree     Aspiration precautions and compensatory swallowing strategies: upright posture, slow rate of feeding and small bites/sips        History of Present Illness:     Kimberly Sam a 80 y  o  male who was admitted 2/18 with slowed and slurred speech, as well as unsteadiness on his feet   Swallowing screened 2/19 c no s/s dysphagia noted 2/19 am   Early this am (2/20 00:40), speech & swallowing problems reported  DX:  Recent/acute pontine lacunar infarcts b/l  Swallowing Evaluation requested & completed bedside      Past medical history:  Please see H&P for details    Special Studies:  MRI 2/18 pm:   1  Patchy areas of faint diffusion restriction bilaterally in the jenaro suggesting recent /acute pontine lacunar infarctions  2   Mild, chronic microangiopathy  CT of head of 2/20:   Subacute infarct identified within the mid to inferior jenaro corresponding to the subtle diffusion abnormality identified on MRI 2 days ago    The CT finding appears more prominent than the diffusion abnormality on MRI  If patient's symptoms are   worsening, consider repeat diffusion-weighted imaging to reassess stroke size    No hemorrhagic transformation    Mild chronic microangiopathic change within the cerebral hemispheres      Social/Education/Vocational Hx:  Pt lives with family    Swallow Information   Current Risks for Dysphagia & Aspiration: CVA c new osnet of dysarthria    Current Diet: NPO      Baseline Diet: regular diet and thin liquids      Baseline Assessment   Behavior/Cognition: alert    Speech/Language Status: able to follow commands and able to express needs with no s/s aphasia but pt now c s/s moderate dysarthria    Patient Positioning: upright in bed    Pain Status/Interventions/Response to Interventions: No report of or nonverbal indications of pain  Swallow Mechanism Exam    Facial: right facial droop  Labial: decreased strength on R  Lingual: decreased strength and decreased coordination (but no gross asymmetry)  Velum: symmetrical  Mandible: adequate ROM  Dentition: adequate  Vocal quality:clear/adequate   Volitional Cough: strong/productive     Consistencies Assessed and Performance   Consistencies Administered: thin liquids, nectar thick, puree, mechanical soft and soft solids      Oral Stage: mild-moderate  Mastication was prolonged and effortful appearing c soft/solid food, but adequate with mechanical soft  Bolus formation and transfer were functional with minimal residue noted along teeth and in R cheek  No overt s/s premature spillage over base of tongue  Mild labial leakage x1 c large sip of nectar thick liquid  Pharyngeal Stage: suspect mild imapirment  Swallow Mechanics:  Swallowing initiation appeared prompt c liquids but mildly delayed and effortful appearing with foods  Laryngeal rise was palpated and judged to be within functional limits    No coughing, throat clearing, change in vocal quality or respiratory status noted today  Esophageal Concerns: none reported    Summary and Recommendations (see above)    Results Reviewed with: patient and MD     Treatment Recommended: yes    Frequency of treatment: 3-5x weekly    Patient Stated Goal: "I want to here and here to get better" (pointed to R extremities and to face/mouth)  Dysphagia Goals per SLP:   1  pt will tolerate Level 1,2 & then 3 dysphagi diet items with thin liquid without s/s of aspiration x48 hrs for each consistency  2   pt will demonstrate accurate use of trained strategy with 90% accuracy given minimal cues  Pt/Family Education: initiated  Pt and caregivers would benefit from/require continued education

## 2019-02-20 NOTE — TELEPHONE ENCOUNTER
Pt is currently in hospital; has not been discharged  I will deep TCM note open and continue when discharged

## 2019-02-20 NOTE — PROGRESS NOTES
Progress Note - Neurology   Ken Potter 80 y o  male MRN: [id number]  Unit/Bed#: -01 Encounter: [id number]    Assessment/Plan:  64-year-old male with history of diabetes, hyperlipidemia presenting with gait instability and dysarthria  Neuro imaging consistent with left pontine infarction  1  L pontine infarction:  -stroke pathway initiated:  -repeat CT head obtained this morning due to worsening of dysarthria, noted interval increase and pontine infarction size compared to MRI this admission  -MRI brain with left pontomedullary infarction  -CTA head/neck read is pending  -carotid Dopplers obtained with no evidence of carotid disease  -echocardiogram pending  -on full-dose aspirin and statin therapy  Will add plavix and continue dual antiplatelet upon discharge    -hemoglobin A1c of 8 5, lipid panel with LDL of 81  -encourage more normotension/minimal hypertension (start on IV Fluids)  -frequent neuro checks  -telemetry monitoring   -stroke education provided  -PT/OT/speech therapy/PMR evaluations  -will arrange follow-up with vascular Neurology as outpatient for continued stroke management      Subjective:   Patient resting in bed, asked to evaluate acutely by primary team following worsening of dysarthric speech beginning overnight per nursing staff  Patient also voiced tingling in the bilateral lower extremities, but upon questioning the patient he states this has been going on for several weeks  He denies any current headache, acute vision changes, focal weakness or sensory changes  Vitals: Blood pressure 114/68, pulse 68, temperature 98 1 °F (36 7 °C), resp  rate 18, height 5' 5" (1 651 m), weight 75 2 kg (165 lb 12 6 oz), SpO2 97 %  ,Body mass index is 27 59 kg/m²  Physical Exam:  Physical Exam   Constitutional: He is oriented to person, place, and time  He appears well-developed and well-nourished  HENT:   Head: Normocephalic and atraumatic     Eyes: Pupils are equal, round, and reactive to light  Conjunctivae and EOM are normal    Neck: Normal range of motion  Neck supple  Cardiovascular: Normal rate and regular rhythm  Pulmonary/Chest: Effort normal and breath sounds normal    Abdominal: Soft  Bowel sounds are normal    Musculoskeletal: Normal range of motion  Neurological: He is alert and oriented to person, place, and time  He has a normal Finger-Nose-Finger Test and a normal Heel to [employer]    Reflex Scores:       Patellar reflexes are 2+ on the right side and 2+ on the left side  Skin: Skin is warm and dry  Neurologic Exam     Mental Status   Oriented to person, place, and time  Able to read  Able to repeat  Normal comprehension  Speech is dysarthric, no aphasia with naming objects or conversing  Cranial Nerves     CN II   Visual fields full to confrontation  CN III, IV, VI   Pupils are equal, round, and reactive to light  Extraocular motions are normal    Diplopia: none  Ophthalmoparesis: none    CN V   Facial sensation intact  CN VII   Facial expression full, symmetric  CN VIII   CN VIII normal      CN IX, X   CN IX normal    CN X normal      CN XI   CN XI normal      CN XII   CN XII normal      Some possible restricted end gaze bilaterally and upgaze  Motor Exam   Muscle bulk: normal  Overall muscle tone: normal  Right arm pronator drift: absent  Left arm pronator drift: absent  5/5 upper and lower extremity strength throughout  No motor focality or laterality on exam      Sensory Exam   Light touch normal      No extinction with double simultaneous tactile stimulation       Gait, Coordination, and Reflexes     Coordination   Finger to nose coordination: normal  Heel to shin coordination: normal    Tremor   Resting tremor: absent  Intention tremor: absent    Reflexes   Right patellar: 2+  Left patellar: 2+  Right ankle clonus: absent  Left ankle clonus: absent      Lab, Imaging and other studies:   CT head 02/20/2019: Subacute infarct identified within the mid to inferior jenaro corresponding to the subtle diffusion abnormality identified on MRI 2 days ago  The CT finding appears more prominent than the diffusion abnormality on MRI  If patient's symptoms are   worsening, consider repeat diffusion-weighted imaging to reassess stroke size      No hemorrhagic transformation      Mild chronic microangiopathic change within the cerebral hemispheres      CBC:   Results from last 7 days   Lab Units 02/19/19  0500 02/18/19  1325   WBC Thousand/uL 6 54 7 16   RBC Million/uL 4 80 5 19   HEMOGLOBIN g/dL 13 9 15 1   HEMATOCRIT % 42 2 47 0   MCV fL 88 91   PLATELETS Thousands/uL 210 213   , BMP/CMP:   Results from last 7 days   Lab Units 02/19/19  0138 02/18/19  1326   SODIUM mmol/L 141 138   POTASSIUM mmol/L 3 7 3 8   CHLORIDE mmol/L 107 102   CO2 mmol/L 24 26   BUN mg/dL 8 9   CREATININE mg/dL 0 96 1 08   CALCIUM mg/dL 9 0 9 5   EGFR ml/min/1 73sq m 94 71   , Vitamin B12:   , HgBA1C:   Results from last 7 days   Lab Units 02/19/19  0500   HEMOGLOBIN A1C % 8 5*   , TSH:   , Coagulation:   Results from last 7 days   Lab Units 02/18/19  1326   INR  1 01   , Lipid Profile:   Results from last 7 days   Lab Units 02/19/19  0500   HDL mg/dL 29*   LDL CALC mg/dL 81   TRIGLYCERIDES mg/dL 203*   , Ammonia:   , Urinalysis:   Results from last 7 days   Lab Units 02/19/19  0436 02/18/19  1412   COLOR UA  Yellow Yellow   CLARITY UA  Clear Clear   SPEC GRAV UA  1 015 1 025   PH UA  7 0 6 0   LEUKOCYTES UA  Negative Negative   NITRITE UA  Negative Negative   GLUCOSE UA mg/dl Negative 250 (1/4%)*   KETONES UA mg/dl Negative Negative   BILIRUBIN UA  Negative Negative   BLOOD UA  Negative Negative   , Drug Screen:   , Medication Drug Levels:       Invalid input(s): CARBAMAZEPINE,  PHENOBARB, LACOSAMIDE, OXCARBAZEPINE  VTE Prophylaxis: Sequential compression device (Venodyne)  and Enoxaparin (Lovenox)    Counseling / Coordination of Care  Total time spent today 20 minutes  Greater than 50% of total time was spent with the patient and / or family counseling and / or coordination of care   A description of the counseling / coordination of care: Discussed plan of care with patient and primary team

## 2019-02-20 NOTE — PLAN OF CARE
Problem: DISCHARGE PLANNING - CARE MANAGEMENT  Goal: Discharge to post-acute care or home with appropriate resources  Description  INTERVENTIONS:  - Conduct assessment to determine patient/family and health care team treatment goals, and need for post-acute services based on payer coverage, community resources, and patient preferences, and barriers to discharge  - Address psychosocial, clinical, and financial barriers to discharge as identified in assessment in conjunction with the patient/family and health care team  - Arrange appropriate level of post-acute services according to patient's   needs and preference and payer coverage in collaboration with the physician and health care team  - Communicate with and update the patient/family, physician, and health care team regarding progress on the discharge plan  - Arrange appropriate transportation to post-acute venues   Outcome: Progressing  Note:   Increasing stroke symptoms  Referral placed to Mamie    Will keep pt informed

## 2019-02-20 NOTE — PROGRESS NOTES
2/20-Pt is currently in hospital; has not been discharged  Will hold TCM note open until discharged

## 2019-02-20 NOTE — SPEECH THERAPY NOTE
Speech/Language Pathology Progress Note    Patient Name: Ramonita Osorio  MVVNT'O Date: 2/20/2019    Subjective:  "Worse"  Pt tearful at beginning of session  Support provided  Objective:  Pt seen for diagnostic speech therapy after review of records  Aware of report of L pontine infarct w/worsening dysphagia  Speech Mechanism assessment revealed mild R facial weakness at rest (+retraction w/volitional tasks)  Tongue is midline  Re-assessment of speech revealed impairment in coordinating respiration c speech attempts and imprecise articulation at phrase/sentence/conversational speech level  Precision and intelligibility at word level (90% & reduced by accent    Pt from [place]);  intelligibility at at t phrases level 80-85% Increased impairment noted c speech of greater length (70% on average at sentence and conversational level  I began training in use of basic strategies of: Take breathe in, then produce phrase/short sentence length speech only  (then repeat)  Pt required mod/max cues to use strategies in first trials today  Assessment:  Pt presents w/ increased dysarthria (mild/moderate now)  Training in strategies to maximize intelligibility begun and to continue  Plan/Recommendations:  Agree c SLP Swallowing Evaluation   Recommend speech therapy 3x weekly)

## 2019-02-20 NOTE — UTILIZATION REVIEW
[address] [place] [address] in the [facility] by Mike Connelly for 2017  Network Utilization Review Department  Phone: [phone]; Fax [phone]  ATTENTION: The Network Utilization Review Department is now centralized for our 7 Facilities  Please call with any questions or concerns to [phone] and carefully follow the prompts so that you are directed to the right person  All voicemails are confidential  Fax any determinations, approvals, denials, and requests for initial or continue stay review clinical to [phone]  Due to HIGH CALL volume, it would be easier if you could please send faxed requests to expedite your requests and in part, help us provide discharge notifications faster   /////////////////////////////////////////////////////////////////////////////////////////////////////////////////      ADMIT  OBS  On  2/18  @  1414     CHANGED to INPT status on  2/20 @  1141    Continued Stay Review    2/19  MRI brain   1  Patchy areas of faint diffusion restriction bilaterally in the jenaro suggesting recent /acute pontine lacunar infarctions  2   Mild, chronic microangiopathy  2/ [address]     Neurology has started pt on aspirin and atorvastatin  On telemetry was noted to have a 12 beat run of monomorphic NSVt  -  NO h/o QTC prolongation  -   check an echocardiogram  -  start metoprolol tartrate 12 5 mg p  o  B i d  -    replete electrolytes  He was mildly hyponatremic and hypomagnesemic  -  additionaly episodes of  NSVT ,  will consider an ischemic evaluation prior to discharge         2/20  @  0300    98   65    20   115/70      2/20  @  0730   98 1    77   18   114/68    97% ra     2/20  CT head w/out contrast:    Subacute infarct identified within the mid to inferior jenaro corresponding to the subtle diffusion abnormality identified on MRI 2 days ago  Timi Drake CT finding appears more prominent than the diffusion abnormality on MRI     The study was marked in [facility] for immediate notification  2/20    CT head and neck - PENDING     2/20/2019     0750      NSG NOTES:    patient was found to have slurred speech, other assessments were negative   Patient stated that he noticed same about 0400 am today  -  Neurology ordered  STAT CT head and IVF bolus NSS    0925  INTERNAL MEDICINE  NSVT -  K repleted, echo pending  HGB A1C =  8 5 ,  Cont basal and sliding scale insulins  CVA due to occlusion of Cerebral artery -  ACUTE CVA on MRI   Cont asa, statin   Worsening symptoms since 0400 ,  Also w/dysphagia (get speech consult)   Echo,  CT head/neck pending     Scheduled Meds:   Current Facility-Administered Medications:  acetaminophen 650 mg Oral Q4H PRN Stephania Puga MD   aspirin 325 mg Oral Daily Stephania Puga MD   atorvastatin 40 mg Oral QPM Stephania Puga MD   enoxaparin 40 mg Subcutaneous Daily Stephania Puga MD   insulin lispro 1-6 Units Subcutaneous TID Lui Bloom MD   insulin lispro 1-6 Units Subcutaneous HS Stephania Puga MD   metoprolol tartrate 12 5 mg Oral Q12H [facility] Fredy Nichole PA-C   ondansetron 4 mg Intravenous Q6H PRN Stephania Puga MD     Discharge Plan: tbd

## 2019-02-20 NOTE — ASSESSMENT & PLAN NOTE
Lab Results   Component Value Date    HGBA1C 8 5 (H) 02/19/2019       Recent Labs     02/19/19  1155 02/19/19  1616 02/19/19  2313 02/20/19  0653   POCGLU 174* 195* 180* 177*       Blood Sugar Average: Last 72 hrs:  (P) 177 375     Patient has history of diabetes mellitus  His blood sugars 221  He takes a medication injection in addition to metformin  He does not know the name of the medication  He has not seen a primary care physician for many months as he moved from [place] to here last year    a1c 8 9 ss coverage

## 2019-02-20 NOTE — PROGRESS NOTES
Progress Note Jomar Flores 1950, 80 y o  male MRN: [id number]    Unit/Bed#: -01 Encounter: [id number]    Primary Care Provider: No primary care provider on file  Date and time admitted to hospital: 2/18/2019 12:51 PM        Mixed hyperlipidemia  Assessment & Plan  lipitor    NSVT (nonsustained ventricular tachycardia) ([address] )  Assessment & Plan  On telemetry-k replaced  Echo pending  Cardiology consult appreciated    [facility]  Assessment & Plan  Lab Results   Component Value Date    HGBA1C 8 5 (H) 02/19/2019       Recent Labs     02/19/19  1155 02/19/19  1616 02/19/19  2313 02/20/19  0653   POCGLU 174* 195* 180* 177*       Blood Sugar Average: Last 72 hrs:  (P) 177 375     Patient has history of diabetes mellitus  His blood sugars 221  He takes a medication injection in addition to metformin  He does not know the name of the medication  He has not seen a primary care physician for many months as he moved from [place] to here last year  a1c 8 9 ss coverage      * Cerebrovascular accident (CVA) due to occlusion of cerebral artery ([facility] [address] )  Assessment & Plan  Acute cva on mri  On asa and statin  Worsening sxs since 0400 this am per patient with lower bp-stat ct head and fluid bolus as bp lower side  Also with dysphagia-speech consult  D/w neurology  Echo result and ct head/neck results pending        VTE Pharmacologic Prophylaxis:   Pharmacologic: Enoxaparin (Lovenox)  Mechanical VTE Prophylaxis in Place: Yes    Patient Centered Rounds: I have performed bedside rounds with nursing staff today  Discussions with Specialists or Other Care Team Provider:     Education and Discussions with Family / Patient:     Time Spent for Care: 30 minutes  More than 50% of total time spent on counseling and coordination of care as described above      Current Length of Stay: 0 day(s)    Current Patient Status: Observation   Certification Statement: The patient will continue to require additional inpatient hospital stay due to cva    Discharge Plan: PT/OT consults-reassess    Code Status: Level 1 - Full Code      Subjective:   C/o difficulty with speech and swallowing since 040 today    Objective:     Vitals:   Temp (24hrs), Av °F (36 7 °C), Min:97 7 °F (36 5 °C), Max:98 1 °F (36 7 °C)    Temp:  [97 7 °F (36 5 °C)-98 1 °F (36 7 °C)] 98 1 °F (36 7 °C)  HR:  [56-89] 68  Resp:  [18-20] 18  BP: (114-159)/(68-86) 114/68  SpO2:  [94 %-100 %] 97 %  Body mass index is 27 59 kg/m²  Input and Output Summary (last 24 hours): Intake/Output Summary (Last 24 hours) at 2019 0925  Last data filed at 2019 0401  Gross per 24 hour   Intake 520 ml   Output 700 ml   Net -180 ml       Physical Exam:     Physical Exam   Constitutional: He is oriented to person, place, and time  He appears well-developed and well-nourished  HENT:   Head: Normocephalic and atraumatic  Eyes: Pupils are equal, round, and reactive to light  EOM are normal    Neck: Normal range of motion  Neck supple  Cardiovascular: Normal rate and regular rhythm  Pulmonary/Chest: Effort normal and breath sounds normal    Abdominal: Soft  Bowel sounds are normal    Musculoskeletal: He exhibits no edema  Neurological: He is alert and oriented to person, place, and time  Speech slurred   Skin: Skin is warm and dry  Psychiatric: He has a normal mood and affect           Additional Data:     Labs:    Results from last 7 days   Lab Units 19  0500   WBC Thousand/uL 6 54   HEMOGLOBIN g/dL 13 9   HEMATOCRIT % 42 2   PLATELETS Thousands/uL 210     Results from last 7 days   Lab Units 19  0138   SODIUM mmol/L 141   POTASSIUM mmol/L 3 7   CHLORIDE mmol/L 107   CO2 mmol/L 24   BUN mg/dL 8   CREATININE mg/dL 0 96   ANION GAP mmol/L 10   CALCIUM mg/dL 9 0   GLUCOSE RANDOM mg/dL 140     Results from last 7 days   Lab Units 19  1326   INR  1 01     Results from last 7 days   Lab Units 19  6484 02/19/19  2313 02/19/19  1616 02/19/19  1155 02/19/19  0606 02/18/19  2044 02/18/19  1700 02/18/19  1257   POC GLUCOSE mg/dl 177* 180* 195* 174* 152* 198* 122 221*     Results from last 7 days   Lab Units 02/19/19  0500   HEMOGLOBIN A1C % 8 5*               * I Have Reviewed All Lab Data Listed Above  * Additional Pertinent Lab Tests Reviewed: All Labs Within Last 24 Hours Reviewed    Imaging:    Imaging Reports Reviewed Today Include:   Imaging Personally Reviewed by Myself Includes:      Recent Cultures (last 7 days):           Last 24 Hours Medication List:     Current Facility-Administered Medications:  acetaminophen 650 mg Oral Q4H PRN Ramirez Peters MD   aspirin 325 mg Oral Daily Ramirez Peters MD   atorvastatin 40 mg Oral QPM Ramirez Peters MD   enoxaparin 40 mg Subcutaneous Daily Ramirez Peters MD   insulin lispro 1-6 Units Subcutaneous TID Kurtis Kumari MD   insulin lispro 1-6 Units Subcutaneous HS Ramirez Peters MD   metoprolol tartrate 12 5 mg Oral Q12H [address] Fredy Nichole PA-C   ondansetron 4 mg Intravenous Q6H PRN Ramirez Peters MD   sodium chloride 500 mL Intravenous Once Brenton Platt MD        Today, Patient Was Seen By: Brenton Platt MD    ** Please Note: Dictation voice to text software may have been used in the creation of this document   **

## 2019-02-20 NOTE — NURSING NOTE
During my assessment at 0750,patient was found to have slurred speech, other assessments were negative  Patient stated that he noticed same about 0400 am today  Dr Marito Phillips was notified of same and she ordered Neurologist talat, Zeinab Rosas (Neurologist) talat and he ordered a STAT CT head, and bolus 0 09% nss

## 2019-02-21 DIAGNOSIS — I47.2 VENTRICULAR TACHYCARDIA (HCC): Primary | ICD-10-CM

## 2019-02-21 LAB
ALBUMIN SERPL BCP-MCNC: 2.8 G/DL (ref 3.5–5)
ALP SERPL-CCNC: 80 U/L (ref 46–116)
ALT SERPL W P-5'-P-CCNC: 13 U/L (ref 12–78)
ANION GAP SERPL CALCULATED.3IONS-SCNC: 8 MMOL/L (ref 4–13)
AST SERPL W P-5'-P-CCNC: 13 U/L (ref 5–45)
BASOPHILS # BLD AUTO: 0.04 THOUSANDS/ΜL (ref 0–0.1)
BASOPHILS NFR BLD AUTO: 1 % (ref 0–1)
BILIRUB SERPL-MCNC: 0.5 MG/DL (ref 0.2–1)
BUN SERPL-MCNC: 6 MG/DL (ref 5–25)
CALCIUM SERPL-MCNC: 9 MG/DL (ref 8.3–10.1)
CHLORIDE SERPL-SCNC: 107 MMOL/L (ref 100–108)
CO2 SERPL-SCNC: 25 MMOL/L (ref 21–32)
CREAT SERPL-MCNC: 1 MG/DL (ref 0.6–1.3)
EOSINOPHIL # BLD AUTO: 0.11 THOUSAND/ΜL (ref 0–0.61)
EOSINOPHIL NFR BLD AUTO: 1 % (ref 0–6)
ERYTHROCYTE [DISTWIDTH] IN BLOOD BY AUTOMATED COUNT: 11.9 % (ref 11.6–15.1)
GFR SERPL CREATININE-BSD FRML MDRD: 90 ML/MIN/1.73SQ M
GLUCOSE SERPL-MCNC: 141 MG/DL (ref 65–140)
GLUCOSE SERPL-MCNC: 149 MG/DL (ref 65–140)
GLUCOSE SERPL-MCNC: 149 MG/DL (ref 65–140)
GLUCOSE SERPL-MCNC: 191 MG/DL (ref 65–140)
GLUCOSE SERPL-MCNC: 228 MG/DL (ref 65–140)
HCT VFR BLD AUTO: 40.3 % (ref 36.5–49.3)
HGB BLD-MCNC: 13.4 G/DL (ref 12–17)
IMM GRANULOCYTES # BLD AUTO: 0.03 THOUSAND/UL (ref 0–0.2)
IMM GRANULOCYTES NFR BLD AUTO: 0 % (ref 0–2)
LYMPHOCYTES # BLD AUTO: 2.6 THOUSANDS/ΜL (ref 0.6–4.47)
LYMPHOCYTES NFR BLD AUTO: 33 % (ref 14–44)
MCH RBC QN AUTO: 29.5 PG (ref 26.8–34.3)
MCHC RBC AUTO-ENTMCNC: 33.3 G/DL (ref 31.4–37.4)
MCV RBC AUTO: 89 FL (ref 82–98)
MONOCYTES # BLD AUTO: 0.55 THOUSAND/ΜL (ref 0.17–1.22)
MONOCYTES NFR BLD AUTO: 7 % (ref 4–12)
NEUTROPHILS # BLD AUTO: 4.52 THOUSANDS/ΜL (ref 1.85–7.62)
NEUTS SEG NFR BLD AUTO: 58 % (ref 43–75)
NRBC BLD AUTO-RTO: 0 /100 WBCS
PLATELET # BLD AUTO: 211 THOUSANDS/UL (ref 149–390)
PMV BLD AUTO: 10.5 FL (ref 8.9–12.7)
POTASSIUM SERPL-SCNC: 4 MMOL/L (ref 3.5–5.3)
PROT SERPL-MCNC: 6.6 G/DL (ref 6.4–8.2)
RBC # BLD AUTO: 4.54 MILLION/UL (ref 3.88–5.62)
SODIUM SERPL-SCNC: 140 MMOL/L (ref 136–145)
WBC # BLD AUTO: 7.85 THOUSAND/UL (ref 4.31–10.16)

## 2019-02-21 PROCEDURE — G8987 SELF CARE CURRENT STATUS: HCPCS

## 2019-02-21 PROCEDURE — 80053 COMPREHEN METABOLIC PANEL: CPT | Performed by: GENERAL PRACTICE

## 2019-02-21 PROCEDURE — 85025 COMPLETE CBC W/AUTO DIFF WBC: CPT | Performed by: GENERAL PRACTICE

## 2019-02-21 PROCEDURE — 99232 SBSQ HOSP IP/OBS MODERATE 35: CPT | Performed by: GENERAL PRACTICE

## 2019-02-21 PROCEDURE — 99232 SBSQ HOSP IP/OBS MODERATE 35: CPT | Performed by: PSYCHIATRY & NEUROLOGY

## 2019-02-21 PROCEDURE — 97530 THERAPEUTIC ACTIVITIES: CPT

## 2019-02-21 PROCEDURE — G8978 MOBILITY CURRENT STATUS: HCPCS

## 2019-02-21 PROCEDURE — G8979 MOBILITY GOAL STATUS: HCPCS

## 2019-02-21 PROCEDURE — 92526 ORAL FUNCTION THERAPY: CPT

## 2019-02-21 PROCEDURE — 97168 OT RE-EVAL EST PLAN CARE: CPT

## 2019-02-21 PROCEDURE — G8988 SELF CARE GOAL STATUS: HCPCS

## 2019-02-21 PROCEDURE — 82948 REAGENT STRIP/BLOOD GLUCOSE: CPT

## 2019-02-21 PROCEDURE — 92507 TX SP LANG VOICE COMM INDIV: CPT

## 2019-02-21 PROCEDURE — 97164 PT RE-EVAL EST PLAN CARE: CPT

## 2019-02-21 RX ORDER — BACLOFEN 10 MG/1
10 TABLET ORAL 3 TIMES DAILY
Status: DISCONTINUED | OUTPATIENT
Start: 2019-02-21 | End: 2019-02-24 | Stop reason: HOSPADM

## 2019-02-21 RX ORDER — ATORVASTATIN CALCIUM 40 MG/1
80 TABLET, FILM COATED ORAL EVERY EVENING
Status: DISCONTINUED | OUTPATIENT
Start: 2019-02-21 | End: 2019-02-24 | Stop reason: HOSPADM

## 2019-02-21 RX ADMIN — ATORVASTATIN CALCIUM 80 MG: 40 TABLET, FILM COATED ORAL at 18:32

## 2019-02-21 RX ADMIN — INSULIN LISPRO 2 UNITS: 100 INJECTION, SOLUTION INTRAVENOUS; SUBCUTANEOUS at 12:18

## 2019-02-21 RX ADMIN — METOPROLOL TARTRATE 12.5 MG: 25 TABLET ORAL at 09:32

## 2019-02-21 RX ADMIN — CLOPIDOGREL BISULFATE 75 MG: 75 TABLET ORAL at 09:32

## 2019-02-21 RX ADMIN — SODIUM CHLORIDE 75 ML/HR: 0.9 INJECTION, SOLUTION INTRAVENOUS at 21:28

## 2019-02-21 RX ADMIN — ENOXAPARIN SODIUM 40 MG: 40 INJECTION SUBCUTANEOUS at 09:33

## 2019-02-21 RX ADMIN — ASPIRIN 325 MG: 325 TABLET ORAL at 09:32

## 2019-02-21 RX ADMIN — INSULIN LISPRO 2 UNITS: 100 INJECTION, SOLUTION INTRAVENOUS; SUBCUTANEOUS at 21:35

## 2019-02-21 RX ADMIN — BACLOFEN 10 MG: 10 TABLET ORAL at 21:29

## 2019-02-21 RX ADMIN — METOPROLOL TARTRATE 12.5 MG: 25 TABLET ORAL at 21:29

## 2019-02-21 RX ADMIN — BACLOFEN 10 MG: 10 TABLET ORAL at 12:59

## 2019-02-21 RX ADMIN — SODIUM CHLORIDE 75 ML/HR: 0.9 INJECTION, SOLUTION INTRAVENOUS at 06:05

## 2019-02-21 NOTE — ASSESSMENT & PLAN NOTE
Acute cva on mri-with evolution-has facial droop and dysphagia  On asa and statin  D/w neurology-on asa and plavix  Echo reviewed and ct head/neck without stenosis  bp has been lower side since started on metoprolol for nsvt-receiving IVF

## 2019-02-21 NOTE — PLAN OF CARE
Problem: OCCUPATIONAL THERAPY ADULT  Goal: Performs self-care activities at highest level of function for planned discharge setting  See evaluation for individualized goals  Description  Treatment Interventions: Functional transfer training, Endurance training, Compensatory technique education, Continued evaluation, Energy conservation, Activityengagement          See flowsheet documentation for full assessment, interventions and recommendations  Note:   Limitation: Decreased ADL status, Decreased UE ROM, Decreased UE strength, Decreased Safe judgement during ADL, Decreased endurance, Decreased fine motor control, Decreased self-care trans, Decreased high-level ADLs  Prognosis: Good  Assessment: Patient is a 80 y o  male seen for OT evaluation s/p admit to [facility] on 2/18/2019 w/Cerebrovascular accident (CVA) due to occlusion of cerebral artery ([address] )  Re-eval performed secondary to worsening symptoms  Repeat CT head on 2/20 (for worsening dysarthria) noted interval increase/evolution of L pontine/medullary infarction  Patient reporting RUE/ RLE weakness with decreased functional use of RUE in functional activities  Prior to admission,  Patient reporting independent with ADLs/ IADLs, lives with his wife in a townhouse, 2 story, no ERICK with 15 steps to 2nd floor bedroom, Patient ambulates with no AD, usually drives    Personal factors affecting patient at time of initial evaluation include: steps to enter, limited insight into deficits, decreased initiation and engagement, difficulty performing ADLs and difficulty performing IADLs  Upon evaluation, patient requires minimal  assist for UB ADLs, moderate assist for LB ADLs, transfers and functional ambulation in room and bathroom with moderate assist, with the use of Rolling Walker   Occupational performance is affected by the following deficits: decreased functional use of BUEs, in hand manipulation deficit with impaired reach, grasp and coordination, limited active ROM, decreased muscle strength, impaired gross motor coordination, impaired fine motor coordination, dynamic sit/ stand balance deficit with poor standing tolerance time for self care and functional mobility, decreased activity tolerance, decreased safety awareness and postural control and postural alignment deficit, requiring external assistance to complete transitional movements    Patient to benefit from continued Occupational Therapy treatment while in the hospital to address deficits as defined above and maximize level of functional independence with ADLs and functional mobility  Occupational Performance areas to address include: eating, grooming , bathing/ shower, dressing, toilet hygiene, transfer to all surfaces, functional mobility, health maintenance, medication routine/ management, IADLS: Household maintenance, IADLs: safety procedures, Leisure Participation and Social participation  From OT standpoint, recommendation at time of d/c would be Short Term Rehab         OT Discharge Recommendation: Short Term Rehab

## 2019-02-21 NOTE — PLAN OF CARE
Problem: Potential for Falls  Goal: Patient will remain free of falls  Description  INTERVENTIONS:  - Assess patient frequently for physical needs  -  Identify cognitive and physical deficits and behaviors that affect risk of falls  -  [place] fall precautions as indicated by assessment   - Educate patient/family on patient safety including physical limitations  - Instruct patient to call for assistance with activity based on assessment  - Modify environment to reduce risk of injury  - Consider OT/PT consult to assist with strengthening/mobility  Outcome: Progressing     Problem: Neurological Deficit  Goal: Neurological status is stable or improving  Description  Interventions:  - Monitor and assess patient's level of consciousness, motor function, sensory function, and level of assistance needed for ADLs  - Monitor and report changes from baseline  Collaborate with interdisciplinary team to initiate plan and implement interventions as ordered  - Provide and maintain a safe environment  - Utilize seizure precautions  - Utilize fall precautions  - Utilize aspiration precautions  - Utilize bleeding precautions  Outcome: Progressing     Problem: Activity Intolerance/Impaired Mobility  Goal: Mobility/activity is maintained at optimum level for patient  Description  Interventions:  - Assess and monitor patient  barriers to mobility and need for assistive/adaptive devices  - Assess patient's emotional response to limitations  - Collaborate with interdisciplinary team and initiate plans and interventions as ordered  - Encourage independent activity per ability   - Maintain proper body alignment  - Perform active/passive rom as tolerated/ordered    - Plan activities to conserve energy   - Turn patient  Outcome: Progressing     Problem: Communication Impairment  Goal: Ability to express needs and understand communication  Description  Assess patient's communication skills and ability to understand information  Patient will demonstrate use of effective communication techniques, alternative methods of communication and understanding even if not able to speak  - Encourage communication and provide alternate methods of communication as needed  - Collaborate with case management/ for discharge needs  - Include patient/family/caregiver in decisions related to communication  Outcome: Progressing     Problem: Potential for Aspiration  Goal: Non-ventilated patient's risk of aspiration is minimized  Description  Assess and monitor vital signs, respiratory status, and labs (WBC)  Monitor for signs of aspiration (tachypnea, cough, rales, wheezing, cyanosis, fever)  - Assess and monitor patient's ability to swallow  - Place patient up in chair to eat if possible  - HOB up at 90 degrees to eat if unable to get patient up into chair   - Supervise patient during oral intake  - Instruct patient to take small bites  - Instruct patient to take small single sips when taking liquids  - Follow patient-specific strategies generated by speech pathologist   Outcome: Progressing     Problem: Nutrition  Goal: Nutrition/Hydration status is improving  Description  Monitor and assess patient's nutrition/hydration status for malnutrition (ex- brittle hair, bruises, dry skin, pale skin and conjunctiva, muscle wasting, smooth red tongue, and disorientation)  Collaborate with interdisciplinary team and initiate plan and interventions as ordered  Monitor patient's weight and dietary intake as ordered or per policy  Utilize nutrition screening tool and intervene per policy  Determine patient's food preferences and provide high-protein, high-caloric foods as appropriate  - Assist patient with eating   - Allow adequate time for meals   - Encourage patient to take dietary supplement as ordered    - Collaborate with clinical nutritionist   - Include patient/family/caregiver in decisions related to nutrition    Outcome: Progressing     Problem: PAIN - ADULT  Goal: Verbalizes/displays adequate comfort level or baseline comfort level  Description  Interventions:  - Encourage patient to monitor pain and request assistance  - Assess pain using appropriate pain scale  - Administer analgesics based on type and severity of pain and evaluate response  - Implement non-pharmacological measures as appropriate and evaluate response  - Consider cultural and social influences on pain and pain management  - Notify physician/advanced practitioner if interventions unsuccessful or patient reports new pain  Outcome: Progressing     Problem: INFECTION - ADULT  Goal: Absence or prevention of progression during hospitalization  Description  INTERVENTIONS:  - Assess and monitor for signs and symptoms of infection  - Monitor lab/diagnostic results  - Monitor all insertion sites, i e  indwelling lines, tubes, and drains  - Monitor endotracheal (as able) and nasal secretions for changes in amount and color  - [place] appropriate cooling/warming therapies per order  - Administer medications as ordered  - Instruct and encourage patient and family to use good hand hygiene technique  - Identify and instruct in appropriate isolation precautions for identified infection/condition  Outcome: Progressing  Goal: Absence of fever/infection during neutropenic period  Description  INTERVENTIONS:  - Monitor WBC  - Implement neutropenic guidelines  Outcome: Progressing     Problem: SAFETY ADULT  Goal: Maintain or return to baseline ADL function  Description  INTERVENTIONS:  -  Assess patient's ability to carry out ADLs; assess patient's baseline for ADL function and identify physical deficits which impact ability to perform ADLs (bathing, care of mouth/teeth, toileting, grooming, dressing, etc )  - Assess/evaluate cause of self-care deficits   - Assess range of motion  - Assess patient's mobility; develop plan if impaired  - Assess patient's need for assistive devices and provide as appropriate  - Encourage maximum independence but intervene and supervise when necessary  ¯ Involve family in performance of ADLs  ¯ Assess for home care needs following discharge   ¯ Request OT consult to assist with ADL evaluation and planning for discharge  ¯ Provide patient education as appropriate  Outcome: Progressing  Goal: Maintain or return mobility status to optimal level  Description  INTERVENTIONS:  - Assess patient's baseline mobility status (ambulation, transfers, stairs, etc )    - Identify cognitive and physical deficits and behaviors that affect mobility  - Identify mobility aids required to assist with transfers and/or ambulation (gait belt, sit-to-stand, lift, walker, cane, etc )  - [place] fall precautions as indicated by assessment  - Record patient progress and toleration of activity level on Mobility SBAR; progress patient to next Phase/Stage  - Instruct patient to call for assistance with activity based on assessment  - Request Rehabilitation consult to assist with strengthening/weightbearing, etc   Outcome: Progressing     Problem: DISCHARGE PLANNING  Goal: Discharge to home or other facility with appropriate resources  Description  INTERVENTIONS:  - Identify barriers to discharge w/patient and caregiver  - Arrange for needed discharge resources and transportation as appropriate  - Identify discharge learning needs (meds, wound care, etc )  - Arrange for interpretive services to assist at discharge as needed  - Refer to Case Management Department for coordinating discharge planning if the patient needs post-hospital services based on physician/advanced practitioner order or complex needs related to functional status, cognitive ability, or social support system  Outcome: Progressing     Problem: Knowledge Deficit  Goal: Patient/family/caregiver demonstrates understanding of disease process, treatment plan, medications, and discharge instructions  Description  Complete learning assessment and assess knowledge base  Interventions:  - Provide teaching at level of understanding  - Provide teaching via preferred learning methods  Outcome: Progressing     Problem: DISCHARGE PLANNING - CARE MANAGEMENT  Goal: Discharge to post-acute care or home with appropriate resources  Description  INTERVENTIONS:  - Conduct assessment to determine patient/family and health care team treatment goals, and need for post-acute services based on payer coverage, community resources, and patient preferences, and barriers to discharge  - Address psychosocial, clinical, and financial barriers to discharge as identified in assessment in conjunction with the patient/family and health care team  - Arrange appropriate level of post-acute services according to patient's   needs and preference and payer coverage in collaboration with the physician and health care team  - Communicate with and update the patient/family, physician, and health care team regarding progress on the discharge plan  - Arrange appropriate transportation to post-acute venues   Outcome: Progressing     Problem: Nutrition/Hydration-ADULT  Goal: Nutrient/Hydration intake appropriate for improving, restoring or maintaining nutritional needs  Description  Monitor and assess patient's nutrition/hydration status for malnutrition (ex- brittle hair, bruises, dry skin, pale skin and conjunctiva, muscle wasting, smooth red tongue, and disorientation)  Collaborate with interdisciplinary team and initiate plan and interventions as ordered  Monitor patient's weight and dietary intake as ordered or per policy  Utilize nutrition screening tool and intervene per policy  Determine patient's food preferences and provide high-protein, high-caloric foods as appropriate       INTERVENTIONS:  - Monitor oral intake, urinary output, labs, and treatment plans  - Assess nutrition and hydration status and recommend course of action  - Evaluate amount of meals eaten  - Assist patient with eating if necessary   - Allow adequate time for meals  - Recommend/ encourage appropriate diets, oral nutritional supplements, and vitamin/mineral supplements  - Order, calculate, and assess calorie counts as needed  - Recommend, monitor, and adjust tube feedings and TPN/PPN based on assessed needs  - Assess need for intravenous fluids  - Provide specific nutrition/hydration education as appropriate  - Include patient/family/caregiver in decisions related to nutrition  Outcome: Progressing

## 2019-02-21 NOTE — PROGRESS NOTES
Progress Note - Neurology   Prema Ohara 80 y o  male MRN: [id number]  Unit/Bed#: -01 Encounter: [id number]    Assessment/Plan:  80year old male with history of DM, HLD, presenting with gait instability and dysarthria  Neuroimaging consistent with L pontine infarction  1  L pontine infarction:  -stroke pathway initiated:   -repeat CT head on 2/20 (for worsening dysarthria) noted interval increase/evolution of L pontine/medullary infarction  -MRI brain with left pontine/medullary infarction  -CTA head/neck with no flow restrictive disease or critical stenosis  -Echocardiogram with EF 55-65%, no regional wall motion abnormalities  -placed on dual antiplatelet yesterday, will continue upon discharge (Continue statin therapy)  -hemoglobin A1C of 8 5, lipid panel with LDL of 81  -normotensive goals (on IV fluids with mild hypotension and worsening symptoms yesterday)  -frequent neuro checks  -telemetry monitoring  -stroke education provided  -therapies following  -follow up arranged with vascular neurology upon discharge for continued stroke management  2  Intractable Hiccups: likely a result of brainstem infarction  - will start on baclofen 10 mg 3 times daily, may require a neuroleptics in the future if intractable hiccups continue  No further inpatient neurologic workup  Discussed plan of care with attending neurologist     Subjective:   Patient resting in bed, continuing with frequent hiccups, notes some improvement in speech and R sided weakness (able to lift R leg better today)  No other acute neurologic changes overnight  Vitals: Blood pressure 131/79, pulse 71, temperature 98 2 °F (36 8 °C), resp  rate 18, height 5' 5" (1 651 m), weight 75 2 kg (165 lb 12 6 oz), SpO2 95 %  ,Body mass index is 27 59 kg/m²  Physical Exam:   Physical Exam   Constitutional: He is oriented to person, place, and time  He appears well-developed and well-nourished     HENT:   Head: Normocephalic and atraumatic  Eyes: Pupils are equal, round, and reactive to light  Conjunctivae and EOM are normal    Neck: Normal range of motion  Neck supple  Cardiovascular: Normal rate and regular rhythm  Pulmonary/Chest: Effort normal and breath sounds normal    Abdominal: Soft  Bowel sounds are normal    Musculoskeletal: Normal range of motion  Neurological: He is alert and oriented to person, place, and time  He has a normal Finger-Nose-Finger Test    Reflex Scores:       Patellar reflexes are 1+ on the right side and 1+ on the left side  Skin: Skin is warm and dry  Neurologic Exam     Mental Status   Oriented to person, place, and time  Follows 2 step commands  Attention: normal  Concentration: normal    Able to read  Able to repeat  Normal comprehension  Speech dysarthric, no word finding difficulty  Cranial Nerves     CN II   Visual fields full to confrontation  CN III, IV, VI   Pupils are equal, round, and reactive to light  Extraocular motions are normal    Nystagmus: none   Diplopia: none  Ophthalmoparesis: none    CN V   Facial sensation intact  CN VII   Right facial weakness: R NL fold decrease     CN VIII   CN VIII normal      CN IX, X   CN IX normal    CN X normal      CN XI   CN XI normal      CN XII   CN XII normal      Motor Exam   Muscle bulk: normal  Overall muscle tone: normal  Right arm pronator drift: absent  Left arm pronator drift: absent5/5 UE strength throughout  Mild weakness with proximal R LE compared to L, full strength in distal LE bilaterally        Sensory Exam   Light touch normal      Gait, Coordination, and Reflexes     Coordination   Finger to nose coordination: normal    Tremor   Resting tremor: absent  Intention tremor: absent    Reflexes   Right patellar: 1+  Left patellar: 1+  Right plantar: equivocal  Left plantar: normal  Right ankle clonus: absent  Left ankle clonus: absent      Lab, Imaging and other studies:   No new neuroimaging at time of evaluation  CBC:   Results from last 7 days   Lab Units 02/21/19  0507 02/19/19  0500 02/18/19  1325   WBC Thousand/uL 7 85 6 54 7 16   RBC Million/uL 4 54 4 80 5 19   HEMOGLOBIN g/dL 13 4 13 9 15 1   HEMATOCRIT % 40 3 42 2 47 0   MCV fL 89 88 91   PLATELETS Thousands/uL 211 210 213   , BMP/CMP:   Results from last 7 days   Lab Units 02/21/19  0512 02/19/19  0138 02/18/19  1326   SODIUM mmol/L 140 141 138   POTASSIUM mmol/L 4 0 3 7 3 8   CHLORIDE mmol/L 107 107 102   CO2 mmol/L 25 24 26   BUN mg/dL 6 8 9   CREATININE mg/dL 1 00 0 96 1 08   CALCIUM mg/dL 9 0 9 0 9 5   AST U/L 13  --   --    ALT U/L 13  --   --    ALK PHOS U/L 80  --   --    EGFR ml/min/1 73sq m 90 94 71   , Vitamin B12:   , HgBA1C:   Results from last 7 days   Lab Units 02/19/19  0500   HEMOGLOBIN A1C % 8 5*   , TSH:   , Coagulation:   Results from last 7 days   Lab Units 02/18/19  1326   INR  1 01   , Lipid Profile:   Results from last 7 days   Lab Units 02/19/19  0500   HDL mg/dL 29*   LDL CALC mg/dL 81   TRIGLYCERIDES mg/dL 203*   , Ammonia:   , Urinalysis:   Results from last 7 days   Lab Units 02/19/19  0436 02/18/19  1412   COLOR UA  Yellow Yellow   CLARITY UA  Clear Clear   SPEC GRAV UA  1 015 1 025   PH UA  7 0 6 0   LEUKOCYTES UA  Negative Negative   NITRITE UA  Negative Negative   GLUCOSE UA mg/dl Negative 250 (1/4%)*   KETONES UA mg/dl Negative Negative   BILIRUBIN UA  Negative Negative   BLOOD UA  Negative Negative   , Drug Screen:     VTE Prophylaxis: Sequential compression device (Venodyne)  and Enoxaparin (Lovenox)    Counseling / Coordination of Care  Total time spent today 20 minutes  Greater than 50% of total time was spent with the patient and / or family counseling and / or coordination of care   A description of the counseling / coordination of care: Discussed plan of care with patient and primary team

## 2019-02-21 NOTE — PHYSICAL THERAPY NOTE
Physical Therapy Re-Evaluation     Patient's Name: Sawyer Maurice    Admitting Diagnosis  TIA (transient ischemic attack) [G45 9]  Dizziness [R42]  Slurred speech [R47 81]  CVA (cerebral vascular accident) [facility]) [I63 9]    Problem List  Patient Active Problem List   Diagnosis    Cerebrovascular accident (CVA) due to occlusion of cerebral artery ([facility] )    Diabetes mellitus type 2 in nonobese ([place] [address] )    NSVT (nonsustained ventricular tachycardia) ([place] [address] )    Mixed hyperlipidemia       Past Medical History  Past Medical History:   Diagnosis Date    Diabetes mellitus ([place] [address] )        Past Surgical History  History reviewed  No pertinent surgical history  02/21/19 1417   Note Type   Note type Re-eval   Pain Assessment   Pain Assessment No/denies pain   Pain Score No Pain   Home Living   Additional Comments please reference PLOF and social history information obtained in PT IE   Restrictions/Precautions   Weight Bearing Precautions Per Order No   Braces or Orthoses   (none per pt)   Other Precautions Chair Alarm; Bed Alarm;Multiple lines; Fall Risk   General   Family/Caregiver Present No   Cognition   Arousal/Participation Cooperative   Attention Within functional limits   Orientation Level Oriented X4   Following Commands Follows all commands and directions without difficulty   RUE Assessment   RUE Assessment WFL   LUE Assessment   LUE Assessment WFL   RLE Assessment   RLE Assessment WFL   Strength RLE   R Hip Flexion 4-/5   R Knee Extension 4+/5   R Ankle Dorsiflexion 5/5   LLE Assessment   LLE Assessment WFL   Strength LLE   L Hip Flexion 4+/5   L Knee Extension 5/5   L Ankle Dorsiflexion 5/5   Coordination   Movements are Fluid and Coordinated 0   Coordination and Movement Description ataxic qualities to R LE SLA during gait   Sensation WFL   Light Touch   RLE Light Touch Grossly intact   LLE Light Touch Grossly intact   Bed Mobility   Rolling L 5  Supervision   Additional items Assist x 1;HOB elevated; Bedrails; Increased time required;Verbal cues   Supine to Sit 4  Minimal assistance   Additional items Assist x 1; Increased time required;Verbal cues   Sit to Supine 5  Supervision   Additional items Assist x 1; Increased time required;Verbal cues  (reduced control)   Transfers   Sit to Stand 4  Minimal assistance   Additional items Assist x 1; Increased time required;Verbal cues   Stand to Sit 4  Minimal assistance   Additional items Assist x 1; Increased time required;Verbal cues   Additional Comments lateral LOB towards L side, pt self corrects to neutral   Ambulation/Elevation   Gait pattern Ataxia; Short stride; Inconsistent tamiko;Decreased foot clearance; Improper Weight shift   Gait Assistance 4  Minimal assist   Additional items Assist x 1;Verbal cues; Tactile cues   Assistive Device Rolling walker   Distance 20 ft in room   Stair Management Assistance Not tested   Balance   Static Sitting Fair +   Dynamic Sitting Fair -   Static Standing Fair -   Dynamic Standing Poor +   Ambulatory Poor +   Endurance Deficit   Endurance Deficit Yes   Activity Tolerance   Activity Tolerance Patient limited by fatigue   Nurse Made Aware Yes, LUKE Ren verbalized pt appropriate for PT session, made aware of outcomes   Assessment   Prognosis Good   Problem List Decreased strength;Decreased endurance; Impaired balance;Decreased mobility; Decreased safety awareness   Assessment Pt is 80 y o  male seen for PT re-evaluation on 2/21/2019 s/p admit to [facility] on 2/18/2019 w/ Cerebrovascular accident (CVA) due to occlusion of cerebral artery ([address] )  Re-eval performed 2/2 worsening symptoms  Repeat CT head on 2/20 (for worsening dysarthria) noted interval increase/evolution of L pontine/medullary infarction  PT consulted to assess pt's functional mobility and d/c needs  Performed at least 2 patient identifiers during session: Name and wristband   PTA, pt was independent w/ all functional mobility w/ no AD/DME, ambulates unrestricted distances and all terrain, has 0 ERICK, [place]  Personal factors affecting pt at time of IE include: inaccessible home environment, lives in 2 story house, ambulating w/ assistive device, inability to navigate level surfaces w/o external assistance, unable to perform dynamic tasks in community, decreased initiation and engagement and difficulty w/ ADLs/IADLs  Please find objective findings from PT assessment regarding body systems outlined above with impairments and limitations including weakness, impaired balance, decreased endurance, gait deviations, decreased activity tolerance, decreased functional mobility tolerance, decreased safety awareness and fall risk, as well as mobility assessment (need for minimal assist w/ all phases of mobility when usually ambulating independently)  The following objective measures performed on re-eval also reveal limitations: Barthel Index: 50/100 and Modified [place]: 4 (moderate/severe disability)  Pt to benefit from continued PT tx to address deficits as defined above and maximize level of functional independent mobility and consistency  From PT/mobility standpoint, recommendation at time of d/c would be IP rehab pending progress in order to facilitate return to PLOF     Barriers to Discharge None   Goals   Patient Goals "to return home"   [facility] Expiration Date 03/03/19   Short Term Goal #1 In 7-10 days: Increase bilateral LE strength 1/2 grade to facilitate independent mobility, Perform all bed mobility tasks modified independent to decrease caregiver burden, Perform all transfers modified independent to improve independence, Ambulate > 100 ft  with least restrictive assistive device distant supervision w/o LOB and w/ normalized gait pattern 100% of the time in order to return to community, Navigate 5 stairs w/ distant supervision with unilateral handrail to facilitate return to previous living environment, Increase all balance 1/2 grade to decrease risk for falls, Complete exercise program independently, Tolerate 4 hr OOB to faciliate upright tolerance, Improve Barthel Index score to 90 or greater to facilitate independence and PT provider will perform functional balance assessment to determine fall risk   Plan   Treatment/Interventions Functional transfer training;LE strengthening/ROM; Elevations; Therapeutic exercise; Endurance training;Patient/family training;Equipment eval/education; Bed mobility;Gait training;Spoke to nursing;Spoke to case management   PT Frequency 5x/wk  (+1x/wkend)   Recommendation   Recommendation Rehab consult; Post acute IP rehab   Equipment Recommended Walker   PT - OK to Discharge Yes  (when medically cleared, if to STR)   Modified [place] Scale   Modified [place] Scale 4   Barthel Index   Feeding 10   Bathing 0   Grooming Score 0   Dressing Score 5   Bladder Score 10   Bowels Score 10   Toilet Use Score 5   Transfers (Bed/Chair) Score 10   Mobility (Level Surface) Score 0   Stairs Score 0   Barthel Index Score 50       Stanford Vee, PT

## 2019-02-21 NOTE — NURSING NOTE
Patient reassessed  Patient denied any tingling or numbness of his RLE  Patient also able to elevate both arms and keep them elevated for 2 minutes  Patient right hand  stronger than previous assessment  No tongue deviation noted, patient's tongue midline  Slight right facial droop noted  Patient stated feeling much better than before  Will continue to monitor

## 2019-02-21 NOTE — ASSESSMENT & PLAN NOTE
On telemetry-k replaced  Echo reviewed  Cardiology consult appreciated-on metoprolol-no further work up needed

## 2019-02-21 NOTE — ASSESSMENT & PLAN NOTE
Lab Results   Component Value Date    HGBA1C 8 5 (H) 02/19/2019       Recent Labs     02/20/19  1122 02/20/19  1606 02/20/19 2050 02/21/19  0633   POCGLU 158* 233* 163* 149*       Blood Sugar Average: Last 72 hrs:  (P) 782 [id number]     Patient has history of diabetes mellitus  His blood sugars 221  He takes a medication injection in addition to metformin  He does not know the name of the medication  He has not seen a primary care physician for many months as he moved from [place] to here last year    a1c 8 9 ss coverage for now until tolerating diet

## 2019-02-21 NOTE — NURSING NOTE
Patient refused to wear SCD's regardless of the education concerning SCD's  Patient stated he didn't have to wear them and refused to sign the treatment refusal form  Charge nurse made aware

## 2019-02-21 NOTE — PROGRESS NOTES
Primary nurse brought to charge nurse attention that patient is refusing to where SCD's as patient was told that he does not have to wear them  Primary nurse extensively explained to patient the importance of wearing his SCD's, given his history, however patient still refusing  Charge nurse also spoke to patient regarding this issue, explaining all the risks and benefits of SCD's however patient still refusing  Refusal form to be signed, however patient is refusing to sign refusal paperwork as he "cannot see to read" the paperwork  Both primary nurse and charge nurse offered to read paperwork to patient out loud, however patient, again, adamantly refusing to even sign paperwork  Day shift SLIM to discuss with patient       Shayy Edmonds RN  02/21/19  5:34 AM

## 2019-02-21 NOTE — OCCUPATIONAL THERAPY NOTE
Occupational Therapy Re evaluation Note        Patient Name: Emilia Henderson  BPAKU'R Date: 2/21/2019 02/21/19 1320   Note Type   Note type Re-eval  (presents with RLE/ RUE weakness  Ct 2/20 )   Restrictions/Precautions   Weight Bearing Precautions Per Order No   Other Precautions Chair Alarm; Bed Alarm;Multiple lines;Telemetry; Fall Risk   Pain Assessment   Pain Assessment No/denies pain   Pain Score No Pain   Home Living   Type of [place] Other (Comment)  ([place]   Home Layout Two level;Bed/bath upstairs;1/2 bath on main level  (no ERICK 10- 15  steps to 2nd floor)   Bathroom Shower/Tub Tub/shower unit   Bathroom Toilet Standard   Bathroom Equipment Grab bars in shower   [address] Other (Comment)  (No DME)   Prior Function   Level of [place] Independent with ADLs and functional mobility   Lives With Spouse; Family   Receives Help From Family   ADL Assistance Independent   IADLs Independent   Falls in the last 6 months 0   Vocational Retired   Comments patient drives   Lifestyle   Autonomy Patient reporting independent with ADLs/ IADLs, lives with his wife in a townhouse, 2 story, no ERICK with 15 steps to 2nd floor bedroom, Patient ambulates with no AD, usually drives  Reciprocal Relationships Supportive Family   Service to Others Retired    Psychosocial   Psychosocial (WDL) [address]  4  Minimal Assistance   Grooming Assistance 4  [address] [facility] 3  Moderate Assistance   LB [address] 3  Moderate Assistance   [address] [facility] 4  [address] 3  Moderate [address]  3  Moderate Assistance   Functional Assistance 3  Moderate Assistance   Bed Mobility   Rolling L 5  Supervision   Additional items Assist x 1;HOB elevated; Bedrails; Increased time required;Verbal cues   Supine to Sit 4  Minimal assistance   Additional items Assist x 1;HOB elevated; Bedrails; Increased time required;Verbal cues;LE management   Transfers   Sit to Stand 4  Minimal assistance   Additional items Assist x 1; Increased time required;Verbal cues   Stand to Sit 4  Minimal assistance   Additional items Assist x 1;HOB elevated; Increased time required;Verbal cues   Additional Comments slow, unsteady gait, hesitant when advancing RLE   Functional Mobility   Functional Mobility 3  Moderate assistance   Additional items Rolling walker   Balance   Static Sitting Fair +   Dynamic Sitting Fair -   Static Standing Fair -   Dynamic Standing Poor +   Activity Tolerance   Activity Tolerance Patient limited by fatigue   RUE Assessment   RUE Assessment X  (limited spontaneous use of RUE)   RUE Overall AROM   R Shoulder Flexion ~70 degrees with decreased motor control, dysmetria noted with finger to nose test, missing target 50 % of all trials   RUE Strength   R Shoulder Flexion 2+/5   R Elbow Flexion 3+/5   R Wrist Extension 2+/5   RUE Tone   RUE Tone WFL   LUE Assessment   LUE Assessment WFL   Hand Function   Gross Motor Coordination Impaired   Fine Motor Coordination Impaired   Sensation   Light Touch No apparent deficits  (BUEs)   Proprioception   Proprioception No apparent deficits   Vision-Basic Assessment   Current Vision Wears glasses only for reading   Cognition   Overall Cognitive Status [facility]   Arousal/Participation Alert; Cooperative   Attention Within functional limits   Orientation Level Oriented X4   Memory Within functional limits   Following Commands Follows all commands and directions without difficulty   Assessment   Limitation Decreased ADL status; Decreased UE ROM; Decreased UE strength;Decreased Safe judgement during ADL;Decreased endurance;Decreased fine motor control;Decreased self-care trans;Decreased high-level ADLs   Prognosis Good   Assessment Patient is a 80 y o  male seen for OT evaluation s/p admit to [email or web address] on 2/18/2019 w/Cerebrovascular accident (CVA) due to occlusion of cerebral artery ([facility] [address] )  Re-eval performed secondary to worsening symptoms  Repeat CT head on 2/20 (for worsening dysarthria) noted interval increase/evolution of L pontine/medullary infarction  Patient reporting RUE/ RLE weakness with decreased functional use of RUE in functional activities  Prior to admission,  Patient reporting independent with ADLs/ IADLs, lives with his wife in a townhouse, 2 story, no ERICK with 15 steps to 2nd floor bedroom, Patient ambulates with no AD, usually drives    Personal factors affecting patient at time of initial evaluation include: steps to enter, limited insight into deficits, decreased initiation and engagement, difficulty performing ADLs and difficulty performing IADLs  Upon evaluation, patient requires minimal  assist for UB ADLs, moderate assist for LB ADLs, transfers and functional ambulation in room and bathroom with moderate assist, with the use of Rolling Walker  Occupational performance is affected by the following deficits: decreased functional use of BUEs, in hand manipulation deficit with impaired reach, grasp and coordination, limited active ROM, decreased muscle strength, impaired gross motor coordination, impaired fine motor coordination, dynamic sit/ stand balance deficit with poor standing tolerance time for self care and functional mobility, decreased activity tolerance, decreased safety awareness and postural control and postural alignment deficit, requiring external assistance to complete transitional movements    Patient to benefit from continued Occupational Therapy treatment while in the hospital to address deficits as defined above and maximize level of functional independence with ADLs and functional mobility   Occupational Performance areas to address include: eating, grooming , bathing/ shower, dressing, toilet hygiene, transfer to all surfaces, functional mobility, health maintenance, medication routine/ management, IADLS: Household maintenance, IADLs: safety procedures, Leisure Participation and Social participation  From OT standpoint, recommendation at time of d/c would be Short Term Rehab  Goals   Patient Goals to return home   Plan   Treatment Interventions ADL retraining;Functional transfer training;UE strengthening/ROM; Endurance training;Patient/family training;Fine motor coordination activities; Compensatory technique education;Continued evaluation;Cardiac education; Energy conservation; Activityengagement   Goal Expiration Date 03/07/19   OT Frequency 3-5x/wk   Additional Treatment Session   Start Time 1348   End Time 1358   Treatment Assessment Patient participated in Skilled OT session this date with interventions consisting of ADL re training with the use of correct body mechnaics, Work simplification skills , safety awareness and fall prevention techniques,  therapeutic activities to: increase activity tolerance, increase standing tolerance time with unilateral UE support to complete sink level ADLs, increase cardiovascular endurance , increase dynamic sit/ stand balance during functional activity , increase postural control and increase OOB/ sitting tolerance   Patient agreeable to OT treatment session, upon arrival patient was found supine in bed, alert, responsive  and in no apparent distress  Patient requiring verbal cues for safety, verbal cues for correct technique, verbal cues for pacing thru activity steps and ocassional safety reminders  Patient continues to be functioning below baseline level, occupational performance remains limited secondary to factors listed above and increased risk for falls and injury  From OT standpoint, recommendation at time of d/c would be Short Term Rehab  Patient to benefit from continued Occupational Therapy treatment while in the hospital to address deficits as defined above and maximize level of functional independence with ADLs and functional mobility  Recommendation   OT Discharge Recommendation Short Term Rehab   Barthel Index   Feeding 10   Bathing 0   Grooming Score 0   Dressing Score 5   Bladder Score 10   Bowels Score 10   Toilet Use Score 5   Transfers (Bed/Chair) Score 10   Mobility (Level Surface) Score 0   Stairs Score 0   Barthel Index Score 50   Modified Dario Scale   Modified Dario Scale 4           Patient will demonstrate use of  Right hand as a functional assist during self feeding task  Patient/ Family  will demonstrate competency with UE Home Exercise Program            Patient will verbalize and demonstrate use of energy conservation/ deep breathing technique and work simplification skills during functional activity with no verbal cues  Patient will verbalize and demonstrate good body mechanics and joint protection techniques during  ADLs/ IADLs with no verbal cues     Patient will increase OOB/ sitting tolerance to 2-4 hours per day for increased participation in self care and leisure tasks with no s/s of exertion  Patient will increase standing tolerance time to 5  minutes with unilateral UE support to complete sink level ADLs@ mod I level      Patient will increase sitting tolerance at edge of bed to 20 minutes to complete UB ADLs @ set up assist level  Patient will transfer bed to Chair / toilet at Set up assist level with AD as indicated  Patient will complete UB ADLs with set up assist     Patient will complete LB ADLs with min assist with the use of adaptive equipment  Patient will complete toileting hygiene with set up assist/ supervision for thoroughness

## 2019-02-21 NOTE — PLAN OF CARE
Problem: DISCHARGE PLANNING - CARE MANAGEMENT  Goal: Discharge to post-acute care or home with appropriate resources  Description  INTERVENTIONS:  - Conduct assessment to determine patient/family and health care team treatment goals, and need for post-acute services based on payer coverage, community resources, and patient preferences, and barriers to discharge  - Address psychosocial, clinical, and financial barriers to discharge as identified in assessment in conjunction with the patient/family and health care team  - Arrange appropriate level of post-acute services according to patient's   needs and preference and payer coverage in collaboration with the physician and health care team  - Communicate with and update the patient/family, physician, and health care team regarding progress on the discharge plan  - Arrange appropriate transportation to post-acute venues   Outcome: Progressing  Note:   VERENICE spoke to ΦΑΡΜΑΚΑΣ from [facility]  She is willing to accept pt, but needs updated PT/OT notes  CM spoke to PT/OT and they will see pt today  As soon as notes are in, GS will start the insurance auth

## 2019-02-21 NOTE — PROGRESS NOTES
Progress Note Julia Joaquin 1950, 80 y o  male MRN: [id number]    Unit/Bed#: -01 Encounter: [id number]    Primary Care Provider: No primary care provider on file  Date and time admitted to hospital: 2/18/2019 12:51 PM        Mixed hyperlipidemia  Assessment & Plan  Continue lipitor    NSVT (nonsustained ventricular tachycardia) ([facility] [address] )  Assessment & Plan  On telemetry-k replaced  Echo reviewed  Cardiology consult appreciated-on metoprolol-no further work up needed    [facility]  Assessment & Plan  Lab Results   Component Value Date    HGBA1C 8 5 (H) 02/19/2019       Recent Labs     02/20/19  1122 02/20/19  1606 02/20/19  2050 02/21/19  0633   POCGLU 158* 233* 163* 149*       Blood Sugar Average: Last 72 hrs:  (P) 382 [id number]     Patient has history of diabetes mellitus  His blood sugars 221  He takes a medication injection in addition to metformin  He does not know the name of the medication  He has not seen a primary care physician for many months as he moved from [place] to [facility] last year  a1c 8 9 ss coverage for now until tolerating diet      * Cerebrovascular accident (CVA) due to occlusion of cerebral artery ([facility] [address] )  Assessment & Plan  Acute cva on mri-with evolution-has facial droop and dysphagia  On asa and statin  D/w neurology-on asa and plavix  Echo reviewed and ct head/neck without stenosis  bp has been lower side since started on metoprolol for nsvt-receiving IVF        VTE Pharmacologic Prophylaxis:   Pharmacologic: Enoxaparin (Lovenox)  Mechanical VTE Prophylaxis in Place: Yes    Patient Centered Rounds: I have performed bedside rounds with nursing staff today  Discussions with Specialists or Other Care Team Provider:     Education and Discussions with Family / Patient:     Time Spent for Care: 30 minutes  More than 50% of total time spent on counseling and coordination of care as described above      Current Length of Stay: 1 day(s)    Current Patient Status: Inpatient   Certification Statement: The patient will continue to require additional inpatient hospital stay due to cva    Discharge Plan: PT/OT to reassess    Code Status: Level 1 - Full Code      Subjective:   No c/o  Objective:     Vitals:   Temp (24hrs), Av 2 °F (36 8 °C), Min:97 52 °F (36 4 °C), Max:98 8 °F (37 1 °C)    Temp:  [97 52 °F (36 4 °C)-98 8 °F (37 1 °C)] 98 2 °F (36 8 °C)  HR:  [] 71  Resp:  [16-19] 18  BP: (109-146)/(64-79) 131/79  SpO2:  [91 %-98 %] 95 %  Body mass index is 27 59 kg/m²  Input and Output Summary (last 24 hours): Intake/Output Summary (Last 24 hours) at 2019 0946  Last data filed at 2019 1437  Gross per 24 hour   Intake 1521 25 ml   Output 1700 ml   Net -178 75 ml       Physical Exam:     Physical Exam   Constitutional: He is oriented to person, place, and time  He appears well-developed and well-nourished  HENT:   Head: Normocephalic and atraumatic  Eyes: Pupils are equal, round, and reactive to light  EOM are normal    Neck: Normal range of motion  Neck supple  Cardiovascular: Normal rate and regular rhythm  Pulmonary/Chest: Effort normal and breath sounds normal    Abdominal: Soft  Bowel sounds are normal    Musculoskeletal: He exhibits no edema  Neurological: He is alert and oriented to person, place, and time  A cranial nerve deficit is present  Skin: Skin is warm and dry  Psychiatric: He has a normal mood and affect   His behavior is normal          Additional Data:     Labs:    Results from last 7 days   Lab Units 19  0507   WBC Thousand/uL 7 85   HEMOGLOBIN g/dL 13 4   HEMATOCRIT % 40 3   PLATELETS Thousands/uL 211   NEUTROS PCT % 58   LYMPHS PCT % 33   MONOS PCT % 7   EOS PCT % 1     Results from last 7 days   Lab Units 19  0512   SODIUM mmol/L 140   POTASSIUM mmol/L 4 0   CHLORIDE mmol/L 107   CO2 mmol/L 25   BUN mg/dL 6   CREATININE mg/dL 1 00   ANION GAP mmol/L 8   CALCIUM mg/dL 9  0   ALBUMIN g/dL 2 8*   TOTAL BILIRUBIN mg/dL 0 50   ALK PHOS U/L 80   ALT U/L 13   AST U/L 13   GLUCOSE RANDOM mg/dL 149*     Results from last 7 days   Lab Units 02/18/19  1326   INR  1 01     Results from last 7 days   Lab Units 02/21/19  0633 02/20/19  2050 02/20/19  1606 02/20/19  1122 02/20/19  0653 02/19/19  2313 02/19/19  1616 02/19/19  1155 02/19/19  0606 02/18/19  2044 02/18/19  1700 02/18/19  1257   POC GLUCOSE mg/dl 149* 163* 233* 158* 177* 180* 195* 174* 152* 198* 122 221*     Results from last 7 days   Lab Units 02/19/19  0500   HEMOGLOBIN A1C % 8 5*               * I Have Reviewed All Lab Data Listed Above  * Additional Pertinent Lab Tests Reviewed: All Labs Within Last 24 Hours Reviewed    Imaging:    Imaging Reports Reviewed Today Include:   Imaging Personally Reviewed by Myself Includes:      Recent Cultures (last 7 days):           Last 24 Hours Medication List:     Current Facility-Administered Medications:  acetaminophen 650 mg Oral Q4H PRN Idelle Every, MD    aspirin 325 mg Oral Daily Idelle Every, MD    atorvastatin 40 mg Oral QPM Idelle Every, MD    clopidogrel 75 mg Oral Daily Kalani Oates PA-C    enoxaparin 40 mg Subcutaneous Daily Idelle Every, MD    insulin lispro 1-6 Units Subcutaneous TID Scot Ibarra MD    insulin lispro 1-6 Units Subcutaneous HS Idelle Every, MD    metoprolol tartrate 12 5 mg Oral Q12H [address] Fredy Nichole PA-C    ondansetron 4 mg Intravenous Q6H PRN Idelle Every, MD    sodium chloride 75 mL/hr Intravenous Continuous Kalani Oates PA-C Last Rate: 75 mL/hr (02/21/19 0665)        Today, Patient Was Seen By: Jose Alberto Tee MD    ** Please Note: Dictation voice to text software may have been used in the creation of this document   **

## 2019-02-21 NOTE — SPEECH THERAPY NOTE
Speech/Language Pathology Progress Note    Patient Name: Alicia Ham  HRFFM'U Date: 2/21/2019     Problem List  Patient Active Problem List   Diagnosis    Cerebrovascular accident (CVA) due to occlusion of cerebral artery (L pontine CVA) ([facility] [address] )    Diabetes mellitus type 2 in nonobese ([facility] [address] )    NSVT (nonsustained ventricular tachycardia) ([facility] [address] )    Mixed hyperlipidemia          Subjective:  "Let's try eggs "    Objective:  Pt was seen for speech/swallowing therapy (10 mins early am, then 40 mins mid am)  Re: speech, oral mechanism assessment revealed mild persistent R facial weakness (no progression)  Assessment of motor speech suggested no further progression of dysarthria (ie mild/moderate dysarthria characterized by facial weakness, reduced oral opening and reduced articulatory effort and precision)  I trained pt in facial and lingual exercises(x5 total) and completed multiple repetitions of each  I reviewed strategies of slowed rate & increased articulatory effort  Re: swallowing, RN reported that pt reportedly declined nectar thick liquid last pm, obtained small amt of thin liquid & presented c cough c same  I assessed pt c puree (applesauce), eggs, "[place]" (thick) [place] toast, nectar thick juice by cup & straw in individual and successive sips and thin liquid by small cup sips  Mastication was adequate c eggs but mildly prolonged and effortful c thick [place] toast   Bolus formation and transfer were [facility] c all foods (but pt c/o mild stasis by teeth c effortful appearing lingual sweep for clearance)  Swallow initiation suspected to be mildly delayed (at least on occasion)  Laryngeal rise was fair by palpation  No cough, throat clearing, change in vocal quality or BS c intake of any food or nectar thick liquid  Pt presented c throat clearing c 1st sip of coffee and significant cough c 3rd sip despite taking small sips and good positioning (suspect reduced oral control)      Assessment:  Pt presented c mild/moderate dysarthria (no further progression since yesterday am) and tolerance for v soft food and nectar thick liquid  S/S aspiration (cough) noted c thin liquid despite taking small sips in full upright position)  Plan/Recommendations:  Consider upgrade to Mechanical Soft diet, continue nectar thick liquid  Continue speech/swallowing therapy per POC

## 2019-02-21 NOTE — SOCIAL WORK
CM spoke to Zahra De Luna from [facility]  She is willing to accept pt, but needs updated PT/OT notes  CM spoke to PT/OT and they will see pt today  As soon as notes are in, GS will start the insurance auth

## 2019-02-22 LAB
GLUCOSE SERPL-MCNC: 130 MG/DL (ref 65–140)
GLUCOSE SERPL-MCNC: 161 MG/DL (ref 65–140)
GLUCOSE SERPL-MCNC: 169 MG/DL (ref 65–140)
GLUCOSE SERPL-MCNC: 197 MG/DL (ref 65–140)

## 2019-02-22 PROCEDURE — 99232 SBSQ HOSP IP/OBS MODERATE 35: CPT | Performed by: PSYCHIATRY & NEUROLOGY

## 2019-02-22 PROCEDURE — 97110 THERAPEUTIC EXERCISES: CPT

## 2019-02-22 PROCEDURE — 99232 SBSQ HOSP IP/OBS MODERATE 35: CPT | Performed by: GENERAL PRACTICE

## 2019-02-22 PROCEDURE — 97116 GAIT TRAINING THERAPY: CPT

## 2019-02-22 PROCEDURE — 99222 1ST HOSP IP/OBS MODERATE 55: CPT | Performed by: PHYSICAL MEDICINE & REHABILITATION

## 2019-02-22 PROCEDURE — 82948 REAGENT STRIP/BLOOD GLUCOSE: CPT

## 2019-02-22 RX ORDER — BISACODYL 10 MG
10 SUPPOSITORY, RECTAL RECTAL DAILY PRN
Status: DISCONTINUED | OUTPATIENT
Start: 2019-02-22 | End: 2019-02-24 | Stop reason: HOSPADM

## 2019-02-22 RX ORDER — SENNOSIDES 8.6 MG
1 TABLET ORAL
Status: DISCONTINUED | OUTPATIENT
Start: 2019-02-22 | End: 2019-02-24 | Stop reason: HOSPADM

## 2019-02-22 RX ORDER — ASPIRIN 81 MG/1
81 TABLET ORAL DAILY
Status: DISCONTINUED | OUTPATIENT
Start: 2019-02-23 | End: 2019-02-24 | Stop reason: HOSPADM

## 2019-02-22 RX ADMIN — ENOXAPARIN SODIUM 40 MG: 40 INJECTION SUBCUTANEOUS at 09:50

## 2019-02-22 RX ADMIN — INSULIN LISPRO 1 UNITS: 100 INJECTION, SOLUTION INTRAVENOUS; SUBCUTANEOUS at 23:19

## 2019-02-22 RX ADMIN — BACLOFEN 10 MG: 10 TABLET ORAL at 09:49

## 2019-02-22 RX ADMIN — ATORVASTATIN CALCIUM 80 MG: 40 TABLET, FILM COATED ORAL at 16:50

## 2019-02-22 RX ADMIN — ASPIRIN 325 MG: 325 TABLET ORAL at 09:49

## 2019-02-22 RX ADMIN — SENNOSIDES 8.6 MG: 8.6 TABLET, FILM COATED ORAL at 23:18

## 2019-02-22 RX ADMIN — METOPROLOL TARTRATE 12.5 MG: 25 TABLET ORAL at 09:49

## 2019-02-22 RX ADMIN — INSULIN LISPRO 1 UNITS: 100 INJECTION, SOLUTION INTRAVENOUS; SUBCUTANEOUS at 16:48

## 2019-02-22 RX ADMIN — INSULIN LISPRO 2 UNITS: 100 INJECTION, SOLUTION INTRAVENOUS; SUBCUTANEOUS at 12:07

## 2019-02-22 RX ADMIN — CLOPIDOGREL BISULFATE 75 MG: 75 TABLET ORAL at 09:49

## 2019-02-22 RX ADMIN — BACLOFEN 10 MG: 10 TABLET ORAL at 16:49

## 2019-02-22 RX ADMIN — METOPROLOL TARTRATE 12.5 MG: 25 TABLET ORAL at 20:53

## 2019-02-22 RX ADMIN — BACLOFEN 10 MG: 10 TABLET ORAL at 20:53

## 2019-02-22 NOTE — PROGRESS NOTES
Progress Note Gerry Sargent 1950, 80 y o  male MRN: [id number]    Unit/Bed#: -01 Encounter: [id number]    Primary Care Provider: No primary care provider on file  Date and time admitted to hospital: 2/18/2019 12:51 PM        Mixed hyperlipidemia  Assessment & Plan  Continue lipitor    NSVT (nonsustained ventricular tachycardia) ([facility] [address] )  Assessment & Plan  On telemetry-k replaced  Echo reviewed  Cardiology consult appreciated-on metoprolol-no further work up needed    [facility]  Assessment & Plan  Lab Results   Component Value Date    HGBA1C 8 5 (H) 02/19/2019       Recent Labs     02/21/19  1101 02/21/19  1538 02/21/19  2047 02/22/19  0620   POCGLU 228* 141* 191* 130       Blood Sugar Average: Last 72 hrs:  (P) 535 [id number]     Patient has history of diabetes mellitus  His blood sugars 221  He takes a medication injection in addition to metformin  He does not know the name of the medication  He has not seen a primary care physician for many months as he moved from [place] to here last year  a1c 8 9 ss coverage for now until tolerating diet      * Cerebrovascular accident (CVA) due to occlusion of cerebral artery ([facility] [address] )  Assessment & Plan  Acute cva on mri-with evolution-has facial droop and dysphagia  On asa and statin  D/w neurology-on asa and plavix  Echo reviewed and ct head/neck without stenosis  bp has been lower side since started on metoprolol for nsvt-received IVF        VTE Pharmacologic Prophylaxis:   Pharmacologic: Enoxaparin (Lovenox)  Mechanical VTE Prophylaxis in Place: Yes    Patient Centered Rounds: I have performed bedside rounds with nursing staff today  Discussions with Specialists or Other Care Team Provider:     Education and Discussions with Family / Patient:     Time Spent for Care: 30 minutes  More than 50% of total time spent on counseling and coordination of care as described above      Current Length of Stay: 2 day(s)    Current Patient Status: Inpatient   Certification Statement: The patient will continue to require additional inpatient hospital stay due to STR    Discharge Plan: STR    Code Status: Level 1 - Full Code      Subjective:   Has some numbness in his toes    Objective:     Vitals:   Temp (24hrs), Av °F (36 7 °C), Min:97 5 °F (36 4 °C), Max:98 8 °F (37 1 °C)    Temp:  [97 5 °F (36 4 °C)-98 8 °F (37 1 °C)] 97 5 °F (36 4 °C)  HR:  [59-79] 71  Resp:  [16-20] 18  BP: (113-139)/(64-83) 135/80  SpO2:  [96 %-100 %] 99 %  Body mass index is 27 59 kg/m²  Input and Output Summary (last 24 hours): Intake/Output Summary (Last 24 hours) at 2019 0827  Last data filed at 2019 0300  Gross per 24 hour   Intake 280 ml   Output 900 ml   Net -620 ml       Physical Exam:     Physical Exam   Constitutional: He is oriented to person, place, and time  He appears well-developed and well-nourished  HENT:   Head: Normocephalic and atraumatic  Eyes: Pupils are equal, round, and reactive to light  EOM are normal    Neck: Neck supple  Cardiovascular: Normal rate and regular rhythm  Pulmonary/Chest: Effort normal and breath sounds normal    Abdominal: Soft  Bowel sounds are normal    Musculoskeletal: He exhibits no edema  Neurological: He is alert and oriented to person, place, and time  A cranial nerve deficit is present  Skin: Skin is warm  Psychiatric: He has a normal mood and affect   His behavior is normal        Additional Data:     Labs:    Results from last 7 days   Lab Units 19  0507   WBC Thousand/uL 7 85   HEMOGLOBIN g/dL 13 4   HEMATOCRIT % 40 3   PLATELETS Thousands/uL 211   NEUTROS PCT % 58   LYMPHS PCT % 33   MONOS PCT % 7   EOS PCT % 1     Results from last 7 days   Lab Units 19  0512   SODIUM mmol/L 140   POTASSIUM mmol/L 4 0   CHLORIDE mmol/L 107   CO2 mmol/L 25   BUN mg/dL 6   CREATININE mg/dL 1 00   ANION GAP mmol/L 8   CALCIUM mg/dL 9 0   ALBUMIN g/dL 2 8*   TOTAL BILIRUBIN mg/dL 0 50   ALK PHOS U/L 80   ALT U/L 13   AST U/L 13   GLUCOSE RANDOM mg/dL 149*     Results from last 7 days   Lab Units 02/18/19  1326   INR  1 01     Results from last 7 days   Lab Units 02/22/19  0620 02/21/19  2047 02/21/19  1538 02/21/19  1101 02/21/19  7404 02/20/19  2050 02/20/19  1606 02/20/19  1122 02/20/19  0653 02/19/19  2313 02/19/19  1616 02/19/19  1155   POC GLUCOSE mg/dl 130 191* 141* 228* 149* 163* 233* 158* 177* 180* 195* 174*     Results from last 7 days   Lab Units 02/19/19  0500   HEMOGLOBIN A1C % 8 5*               * I Have Reviewed All Lab Data Listed Above  * Additional Pertinent Lab Tests Reviewed: All Labs Within Last 24 Hours Reviewed    Imaging:    Imaging Reports Reviewed Today Include:   Imaging Personally Reviewed by Myself Includes:      Recent Cultures (last 7 days):           Last 24 Hours Medication List:     Current Facility-Administered Medications:  acetaminophen 650 mg Oral Q4H PRN Luis Armstrong MD    aspirin 325 mg Oral Daily Luis Armstrong MD    atorvastatin 80 mg Oral QPM Ez Spann MD    baclofen 10 mg Oral TID Felicity Oates PA-C    clopidogrel 75 mg Oral Daily Felicity Oates PA-C    enoxaparin 40 mg Subcutaneous Daily Luis Armstrong MD    insulin lispro 1-6 Units Subcutaneous TID Sowmya Nice MD    insulin lispro 1-6 Units Subcutaneous HS Luis Armstrong MD    metoprolol tartrate 12 5 mg Oral Q12H [facility] Fredy Nichole PA-C    ondansetron 4 mg Intravenous Q6H PRN Luis Armstrong MD    sodium chloride 75 mL/hr Intravenous Continuous Felicity Oates PA-C Last Rate: 75 mL/hr (02/21/19 2128)        Today, Patient Was Seen By: Bari Prestno MD    ** Please Note: Dictation voice to text software may have been used in the creation of this document   **

## 2019-02-22 NOTE — PROGRESS NOTES
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  Plan:  -Continue dual anti-platelet therapy with aspirin 81 mg and Plavix 75 mg  -Continue Lipitor 80 mg  -Recommend strict blood pressure and glucose control  -Continue baclofen 10 mg TID for hiccups, can be titrated up if it does worsen  -PT/OT/ST  -Continue telemetry  -Continue supportive care per primary team  -Follow-up as outpatient with stroke clinic  Appointment requested  Results:  -MRI brain revealed left pontine/medullary infarction  -CT head/neck without acute occlusion or hemodynamically significant stenosis  -TTE:  LVEF 55-65%, no regional wall abnormalities  -Hemoglobin A1c 8 5, LDL 81    Subjective:   No acute events overnight  Hiccups nearly resolved  Patient without complaints  Denies CP, SOB, headache, dizziness, vision changes, N/V, abdominal pain, weakness or numbness  ROS:  12 point ROS performed, as stated above, all others negative  Medications:   All current active meds have been reviewed and current meds:  Scheduled Meds:  Current Facility-Administered Medications:  acetaminophen 650 mg Oral Q4H PRN Kingsley White MD   aspirin 325 mg Oral Daily Kingsley White MD   atorvastatin 80 mg Oral QPM Stuart Contreras MD   baclofen 10 mg Oral TID Ramila Oates PA-C   bisacodyl 10 mg Rectal Daily PRN Pasha Warner MD   clopidogrel 75 mg Oral Daily Ramila Oates PA-C   enoxaparin 40 mg Subcutaneous Daily Kingsley White MD   insulin lispro 1-6 Units Subcutaneous TID Tuan Vidal MD   insulin lispro 1-6 Units Subcutaneous HS Kingsley White MD   metoprolol tartrate 12 5 mg Oral Q12H [facility] Artem Herndon PA-C ondansetron 4 mg Intravenous Q6H PRN Melina Wyatt MD   senna 1 tablet Oral HS Syeda Landeros MD     Continuous Infusions:   PRN Meds:   acetaminophen    bisacodyl    ondansetron     Vitals: Blood pressure 131/79, pulse 63, temperature 98 1 °F (36 7 °C), resp  rate 18, height 5' 5" (1 651 m), weight 75 2 kg (165 lb 12 6 oz), SpO2 98 %  ,Body mass index is 27 59 kg/m²  Physical Exam:   Physical Exam   Constitutional: He is oriented to person, place, and time  No distress  HENT:   Head: Normocephalic and atraumatic  Eyes: Pupils are equal, round, and reactive to light  EOM are normal    Neck: Normal range of motion  Neck supple  Cardiovascular: Normal rate and regular rhythm  Pulmonary/Chest: Effort normal and breath sounds normal    Neurological: He is oriented to person, place, and time  Finger-nose-finger test: Difficulty with velocity of movement right upper extremity, no ataxia noted, left normal    Skin: Skin is warm and dry  He is not diaphoretic  Psychiatric: He has a normal mood and affect  His behavior is normal  Judgment and thought content normal      Neurologic Exam     Mental Status   Oriented to person, place, and time  Level of consciousness: alert  Moderate dysarthria, no evidence of difficulty word finding     Cranial Nerves     CN III, IV, VI   Pupils are equal, round, and reactive to light  Extraocular motions are normal      CN VIII   CN VIII normal    Right facial weakness noted     Motor Exam   Subtle right upper extremity drift, no pronation  Moving extremities x4 symmetrically     Gait, Coordination, and Reflexes     Coordination   Finger-nose-finger test: Difficulty with velocity of movement right upper extremity, no ataxia noted, left normal       Lab Results: I have personally reviewed pertinent reports    Recent Results (from the past 24 hour(s))   Fingerstick Glucose (POCT)    Collection Time: 02/21/19  3:38 PM   Result Value Ref Range    POC Glucose 141 (H) 65 - 140 mg/dl   Fingerstick Glucose (POCT)    Collection Time: 02/21/19  8:47 PM   Result Value Ref Range    POC Glucose 191 (H) 65 - 140 mg/dl   Fingerstick Glucose (POCT)    Collection Time: 02/22/19  6:20 AM   Result Value Ref Range    POC Glucose 130 65 - 140 mg/dl   Fingerstick Glucose (POCT)    Collection Time: 02/22/19 10:56 AM   Result Value Ref Range    POC Glucose 197 (H) 65 - 140 mg/dl     Imaging Studies: I have personally reviewed pertinent reports and I have personally reviewed pertinent films in PACS  EKG, Pathology, and Other Studies: I have personally reviewed pertinent reports    VTE Prophylaxis: Enoxaparin (Lovenox)

## 2019-02-22 NOTE — ASSESSMENT & PLAN NOTE
Acute cva on mri-with evolution-has facial droop and dysphagia  On asa and statin  D/w neurology-on asa and plavix  Echo reviewed and ct head/neck without stenosis  bp has been lower side since started on metoprolol for nsvt-received IVF

## 2019-02-22 NOTE — ASSESSMENT & PLAN NOTE
Lab Results   Component Value Date    HGBA1C 8 5 (H) 02/19/2019       Recent Labs     02/21/19  1101 02/21/19  1538 02/21/19  2047 02/22/19  0620   POCGLU 228* 141* 191* 130       Blood Sugar Average: Last 72 hrs:  (P) 91 [id number]     Patient has history of diabetes mellitus  His blood sugars 221  He takes a medication injection in addition to metformin  He does not know the name of the medication  He has not seen a primary care physician for many months as he moved from [place] to here last year    a1c 8 9 ss coverage for now until tolerating diet

## 2019-02-22 NOTE — SOCIAL WORK
VERENICE spoke with Zaki Munguia from [facility] and she stated that Brianda Parada is being submitted for today

## 2019-02-22 NOTE — CONSULTS
PHYSICAL MEDICINE AND REHABILITATION CONSULT NOTE  Yuly Du 80 y o  male MRN: [id number]  Unit/Bed#: -01 Encounter: [id number]    Requested by (Physician/Service): Garret Landeros, Tahir  Reason for Consultation:  Assessment of rehabilitation needs  Reason for Hospitalization:  TIA (transient ischemic attack) [G45 9]  Dizziness [R42]  Slurred speech [R47 81]  CVA (cerebral vascular accident) ([facility] [address] ) [I63 9]      History of Present Illness:  Yuly Du is a 80 y o  male who  has a past medical history of Diabetes mellitus ([facility] [address] )  who presented to the [address] [place] [address] with slurred speech and gait instability  He was found to have a subacute pontine  infarct and was evaluated by Neurology  Patient was started on aspirin and atorvastatin  PM&R consulted for rehabilitation recommendations  Restrictions include:  none    Hospital Complications/Comorbidities:   Complications: As above  Comorbidities: As above    Morbid Obesity (BMI > 40) No     Last Weight Last BMI   Wt Readings from Last 1 Encounters:   02/22/19 75 2 kg (165 lb 12 6 oz)    Body mass index is 27 59 kg/m²  Functional History:     Prior to Admission:     Functional Status: Patient was independent with mobility/ambulation, transfers, ADL's, IADL's  Present:  Physical Therapy Occupational Therapy Speech Therapy   Minimal assistance with bed mobility and transfers sit-to-stand and stand to sit, ambulation with minimal assistance with rolling walker 20 feet in the room Minimal assistance with eating, grooming, moderate assistance with upper body and lower body bathing, minimal assistance with upper body dressing, mod assist with lower body dressing and toileting Evaluation is pending     Past Medical History:   Past Surgical History:   Family History:     Past Medical History:   Diagnosis Date    Diabetes mellitus ([facility] [address] )     History reviewed  No pertinent surgical history  History reviewed   No pertinent family history   - No family history of neurologic diseases        Medications:    Current Facility-Administered Medications:     acetaminophen (TYLENOL) tablet 650 mg, 650 mg, Oral, Q4H PRN, Henrietta Yates MD    aspirin tablet 325 mg, 325 mg, Oral, Daily, Henrietta Yates MD, 325 mg at 02/22/19 5540    atorvastatin (LIPITOR) tablet 80 mg, 80 mg, Oral, QPM, Qiana Lanier MD, 80 mg at 02/21/19 1832    baclofen tablet 10 mg, 10 mg, Oral, TID, Emeterio Cam Jhoanal, PA-C, 10 mg at 02/22/19 4097    bisacodyl (DULCOLAX) rectal suppository 10 mg, 10 mg, Rectal, Daily PRN, Samy Brito MD    clopidogrel (PLAVIX) tablet 75 mg, 75 mg, Oral, Daily, Emeterio Cam Jhoanal, PA-C, 75 mg at 02/22/19 7689    enoxaparin (LOVENOX) subcutaneous injection 40 mg, 40 mg, Subcutaneous, Daily, Henrietta Yates MD, 40 mg at 02/22/19 0950    insulin lispro (HumaLOG) 100 units/mL subcutaneous injection 1-6 Units, 1-6 Units, Subcutaneous, TID AC, 2 Units at 02/22/19 1207 **AND** Fingerstick Glucose (POCT), , , TID AC, Henrietta Yates MD    insulin lispro (HumaLOG) 100 units/mL subcutaneous injection 1-6 Units, 1-6 Units, Subcutaneous, HS, Henrietta Yates MD, 2 Units at 02/21/19 2135    metoprolol tartrate (LOPRESSOR) partial tablet 12 5 mg, 12 5 mg, Oral, Q12H [facility], Fredy Nichole PA-C, 12 5 mg at 02/22/19 0949    ondansetron (ZOFRAN) injection 4 mg, 4 mg, Intravenous, Q6H PRN, Henrietta Yates MD    senna (SENOKOT) tablet 8 6 mg, 1 tablet, Oral, HS, Samy Brito MD    Allergies:   No Known Allergies     Social History:    Social History     Socioeconomic History    Marital status: /Civil Union     Spouse name: None    Number of children: None    Years of education: None    Highest education level: None   Occupational History    None   Social Needs    Financial resource strain: None    Food insecurity:     Worry: None     Inability: None    Transportation needs:     Medical: None     Non-medical: None   Tobacco Use  Smoking status: Never Smoker    Smokeless tobacco: Never Used   Substance and Sexual Activity    Alcohol use: Never     Frequency: Never    Drug use: Never    Sexual activity: None   Lifestyle    Physical activity:     Days per week: None     Minutes per session: None    Stress: None   Relationships    Social connections:     Talks on phone: None     Gets together: None     Attends Sikh service: None     Active member of club or organization: None     Attends meetings of clubs or organizations: None     Relationship status: None    Intimate partner violence:     Fear of current or ex partner: None     Emotionally abused: None     Physically abused: None     Forced sexual activity: None   Other Topics Concern    None   Social History Narrative    None        Deni Fernandez lives in a 2 level town house with no steps to enter  Review of Systems: A 10-point review of systems was performed  Negative except as listed above  Physical Exam:  Vital Signs:      Temp:  [97 5 °F (36 4 °C)-98 8 °F (37 1 °C)] 98 1 °F (36 7 °C)  HR:  [59-79] 63  Resp:  [17-20] 18  BP: (113-139)/(64-83) 131/79   Intake/Output Summary (Last 24 hours) at 2/22/2019 1221  Last data filed at 2/22/2019 1101  Gross per 24 hour   Intake 280 ml   Output 1300 ml   Net -1020 ml        Laboratory:      Lab Results   Component Value Date    HGB 13 4 02/21/2019    HCT 40 3 02/21/2019    WBC 7 85 02/21/2019     Lab Results   Component Value Date    BUN 6 02/21/2019    K 4 0 02/21/2019     02/21/2019    CREATININE 1 00 02/21/2019     Lab Results   Component Value Date    PROTIME 13 2 02/18/2019    INR 1 01 02/18/2019        General: alert, no apparent distress, cooperative and comfortable  Head: Normal, normocephalic, atraumatic  Eye: Normal external eye, conjunctiva, lids   Ears: Normal external ears  Nose: Normal external nose, mucus membranes  Pharynx: Dental Hygiene adequate  Normal buccal mucosa  Normal pharynx    Neck / Thyroid: Supple, no masses, nodes, nodules or enlargement  Pulmonary: clear to auscultation bilaterally and no crackles, no wheezes, chest expansion normal  Cardiovascular: normal rate, regular rhythm, normal S1, S2, no murmurs, rubs, clicks or gallops  Abdomen: soft, nontender, nondistended, no masses or organomegaly  Skin/Extremity: no rashes, no erythema, no peripheral edema    Neurologic:  Cranial nerves 2-12 are intact  Normal tone and bulk on motor examination, sensations are intact to light touch bilaterally, finger-to-nose is intact bilaterally, reflexes are 2+ and symmetric bilaterally  Psych: Appropriate affect, alert and oriented to person, place and time   Musculoskeletal - Strength:   Right  Left  Site  Right  Left  Site    2+ 5  S Ab: Shoulder Abductors  4- 5  HF: Hip Flexors    3+ 5  EF: Elbow Flexors  4+ 5 KF: Knee Flexors    3+  5  EE: Elbow Extensors  5  5  KE: Knee Extensors    3+  5  WE: Wrist Extensors  5  5  DR: Dorsi Flexors    5  5  FF: Finger Flexors  5  5  PF: Plantar Flexors    5  5  HI: Hand Intrinsics  5  5  EHL: Extensor Hallucis Longus         Imaging: Reviewed  Cta Head And Neck W Wo Contrast    Result Date: 2/20/2019  Impression: Ischemic edema central jenaro slightly more prominent than that depicted on recent MR  No hemorrhage or mass effect  No large vessel flow restrictive disease within the head or neck  Workstation performed: [id number]     Ct Head Wo Contrast    Result Date: 2/20/2019  Impression: Subacute infarct identified within the mid to inferior jenaro corresponding to the subtle diffusion abnormality identified on MRI 2 days ago  The CT finding appears more prominent than the diffusion abnormality on MRI  If patient's symptoms are worsening, consider repeat diffusion-weighted imaging to reassess stroke size  No hemorrhagic transformation  Mild chronic microangiopathic change within the cerebral hemispheres  The study was marked in [facility] for immediate notification  Workstation performed: [id number]     Ct Head Without Contrast    Result Date: 2/18/2019  Impression: No acute intracranial abnormality  Workstation performed: [id number]     Mri Brain Wo Contrast    Result Date: 2/19/2019  Impression: 1  Patchy areas of faint diffusion restriction bilaterally in the jenaro suggesting recent /acute pontine lacunar infarctions  2   Mild, chronic microangiopathy  Workstation performed: [id number]       Assessment and Recommendations:  44-year-old male with gait instability and dysarthria secondary to left pontine CVA    Impairments:  Impaired functional mobility and ability to perform ADL's  Impaired  speech    Recommendations:  - Chart reviewed  - Imaging reviewed   - Continue PT/OT/SLP while inpatient  - Pt is unable to safely return home until he is at the supervision/contact-guard functional level (25% assistance level with or without a device)  modified-independent functional level (0% assistance with a device) independent functional level (0% assistance without a device)      -Based on the patient's prior and current functional status, and ability to tolerate 3 hrs of therapy per day, we believe he is a good acute inpatient rehab candidate (3 hours of therapy a day, medical and nursing care, highest level of rehab)     - Disposition unclear at this point in time but inpatient rehab a possibility in the near future pending achievement of clinically stability, potential for functional progress  Thank you for allowing the PM&R service to participate in the care of this patient  We will continue to follow Felicia Edgar's progress with you  Please do not hesitate to call with questions or concerns    ** Please Note: Fluency Direct voice to text software may have been used in the creation of this document   **

## 2019-02-22 NOTE — SOCIAL WORK
CM received vm from [place] that pt's insurance denied pt for acute rehab at [facility]

## 2019-02-22 NOTE — PLAN OF CARE
Problem: PHYSICAL THERAPY ADULT  Goal: Performs mobility at highest level of function for planned discharge setting  See evaluation for individualized goals  Description  Treatment/Interventions: Functional transfer training, LE strengthening/ROM, Elevations, Therapeutic exercise, Endurance training, Gait training, Spoke to nursing, OT  Equipment Recommended: (none anticipated)       See flowsheet documentation for full assessment, interventions and recommendations  Outcome: Progressing  Note:   Prognosis: Good  Problem List: Decreased strength, Decreased endurance, Impaired balance, Decreased mobility, Decreased safety awareness  Assessment: Pt was found sleeping supine in bed to begin session  He was able to perform all bed mobility this session with (S) needed as well as increased time  Pt also was able to perform xfers on his own with cuing needed for hand placement and center of balance  Minimal LOB was noted with handwashing post BR but able to re collect himself  Educated pt to always be aweare when standing without any A or the walker with compliance noted  He was able to ambulate increased distances compared to LV with good tolerance amd minimal fatigue post session  Instructed on and performed seated TE in bedside chair with no increased symptoms  He felt good overall and had all needs met and callbell in reach withchair alarm ON  Pt would benefit from continued PT in order to promote safe and functional mobility  Barriers to Discharge: None     Recommendation: Post acute IP rehab     PT - OK to Discharge: Yes(When medically cleared, if to STR)    See flowsheet documentation for full assessment

## 2019-02-22 NOTE — PHYSICAL THERAPY NOTE
Physical Therapy Progress Note     19 1333   Pain Assessment   Pain Assessment No/denies pain   Pain Score No Pain   Restrictions/Precautions   Weight Bearing Precautions Per Order No   Other Precautions Bed Alarm; Chair Alarm;Telemetry; Fall Risk   General   Family/Caregiver Present No   Cognition   Overall Cognitive Status WFL   Arousal/Participation Alert; Cooperative   Attention Within functional limits   Orientation Level Oriented X4   Memory Within functional limits   Following Commands Follows all commands and directions without difficulty   Comments Pt was identified by name and , agreeable to treatment  Bed Mobility   Supine to Sit 5  Supervision   Additional items Assist x 1;HOB elevated; Increased time required;Verbal cues   Transfers   Sit to Stand 4  Minimal assistance   Additional items Assist x 1; Armrests;Trapeze bar;Verbal cues   Stand to Sit 5  Supervision   Additional items Assist x 1; Armrests; Increased time required;Verbal cues   Toilet transfer 5  Supervision   Additional items Verbal cues;Standard toilet   Ambulation/Elevation   Gait pattern Ataxia; Excessively slow; Short stride; Inconsistent tamiko;Decreased foot clearance; Improper Weight shift   Gait Assistance 4  Minimal assist   Additional items Assist x 1;Verbal cues   Assistive Device Rolling walker   Distance 80'x2   Stair Management Assistance Not tested   Balance   Static Sitting Fair +   Dynamic Sitting Fair -   Static Standing Fair -   Dynamic Standing Poor +   Ambulatory Poor +   Endurance Deficit   Endurance Deficit Yes   Activity Tolerance   Activity Tolerance Patient limited by fatigue   Nurse Made Aware yes, ok to see   Exercises   Hip Abduction Sitting;20 reps;AROM; Bilateral   Hip Adduction Sitting;20 reps;AROM; Bilateral   Knee AROM Long Arc Quad Sitting;20 reps;AROM; Bilateral   Ankle Pumps Sitting;20 reps;AROM; Bilateral   Marching Sitting;20 reps;AROM; Bilateral   Assessment   Prognosis Good   Problem List Decreased strength;Decreased endurance; Impaired balance;Decreased mobility; Decreased safety awareness   Assessment Pt was found sleeping supine in bed to begin session  He was able to perform all bed mobility this session with (S) needed as well as increased time  Pt also was able to perform xfers on his own with cuing needed for hand placement and center of balance  Minimal LOB was noted with handwashing post BR but able to re collect himself  Educated pt to always be aweare when standing without any A or the walker with compliance noted  He was able to ambulate increased distances compared to LV with good tolerance amd minimal fatigue post session  Instructed on and performed seated TE in bedside chair with no increased symptoms  He felt good overall and had all needs met and callbell in reach withchair alarm ON  Pt would benefit from continued PT in order to promote safe and functional mobility  Goals   Patient Goals "To get out of bed and walk "   [facility] Expiration Date 03/03/19   Short Term Goal #1 As per PT 2/21/19:  In 7-10 days: Increase bilateral LE strength 1/2 grade to facilitate independent mobility, Perform all bed mobility tasks modified independent to decrease caregiver burden, Perform all transfers modified independent to improve independence, Ambulate > 100 ft  with least restrictive assistive device distant supervision w/o LOB and w/ normalized gait pattern 100% of the time in order to return to community, Navigate 5 stairs w/ distant supervision with unilateral handrail to facilitate return to previous living environment, Increase all balance 1/2 grade to decrease risk for falls, Complete exercise program independently, Tolerate 4 hr OOB to faciliate upright tolerance, Improve Barthel Index score to 90 or greater to facilitate independence and PT provider will perform functional balance assessment to determine fall risk   Treatment Day 1   Plan   Treatment/Interventions Functional transfer training;LE strengthening/ROM; Elevations; Therapeutic exercise; Endurance training;Patient/family training;Equipment eval/education; Bed mobility;Gait training;Spoke to nursing   PT Frequency 5x/wk; Other (Comment)  (+1 weekend)   Recommendation   Recommendation Post acute IP rehab   Equipment Recommended Walker   PT - OK to Discharge Yes  (When medically cleared, if to STR)     Faviola Loza, PTA

## 2019-02-23 LAB
GLUCOSE SERPL-MCNC: 140 MG/DL (ref 65–140)
GLUCOSE SERPL-MCNC: 151 MG/DL (ref 65–140)
GLUCOSE SERPL-MCNC: 151 MG/DL (ref 65–140)
GLUCOSE SERPL-MCNC: 326 MG/DL (ref 65–140)

## 2019-02-23 PROCEDURE — 99232 SBSQ HOSP IP/OBS MODERATE 35: CPT | Performed by: GENERAL PRACTICE

## 2019-02-23 PROCEDURE — 82948 REAGENT STRIP/BLOOD GLUCOSE: CPT

## 2019-02-23 RX ADMIN — BACLOFEN 10 MG: 10 TABLET ORAL at 17:00

## 2019-02-23 RX ADMIN — CLOPIDOGREL BISULFATE 75 MG: 75 TABLET ORAL at 09:25

## 2019-02-23 RX ADMIN — SENNOSIDES 8.6 MG: 8.6 TABLET, FILM COATED ORAL at 20:35

## 2019-02-23 RX ADMIN — INSULIN LISPRO 1 UNITS: 100 INJECTION, SOLUTION INTRAVENOUS; SUBCUTANEOUS at 22:15

## 2019-02-23 RX ADMIN — BACLOFEN 10 MG: 10 TABLET ORAL at 09:25

## 2019-02-23 RX ADMIN — INSULIN LISPRO 5 UNITS: 100 INJECTION, SOLUTION INTRAVENOUS; SUBCUTANEOUS at 12:09

## 2019-02-23 RX ADMIN — ENOXAPARIN SODIUM 40 MG: 40 INJECTION SUBCUTANEOUS at 09:25

## 2019-02-23 RX ADMIN — ASPIRIN 81 MG: 81 TABLET, COATED ORAL at 09:25

## 2019-02-23 RX ADMIN — INSULIN LISPRO 1 UNITS: 100 INJECTION, SOLUTION INTRAVENOUS; SUBCUTANEOUS at 06:55

## 2019-02-23 RX ADMIN — ATORVASTATIN CALCIUM 80 MG: 40 TABLET, FILM COATED ORAL at 18:20

## 2019-02-23 RX ADMIN — METOPROLOL TARTRATE 12.5 MG: 25 TABLET ORAL at 20:35

## 2019-02-23 RX ADMIN — BACLOFEN 10 MG: 10 TABLET ORAL at 20:35

## 2019-02-23 NOTE — SOCIAL WORK
Cm met with patient at bedside, patient provided consent for local str referrals being as though insurance denied by acute rehab admission

## 2019-02-23 NOTE — ASSESSMENT & PLAN NOTE
Acute cva on mri-with evolution-has facial droop and dysphagia-improved   On asa and statin  D/w neurology-on asa and plavix  Echo reviewed and ct head/neck without stenosis  bp with good control  bg with better control  On metformin as outpatint

## 2019-02-23 NOTE — ASSESSMENT & PLAN NOTE
Lab Results   Component Value Date    HGBA1C 8 5 (H) 02/19/2019       Recent Labs     02/22/19  1056 02/22/19  1603 02/22/19  2318 02/23/19  0619   POCGLU 197* 161* 169* 151*       Blood Sugar Average: Last 72 hrs:  (P) 412 [id number]     Patient has history of diabetes mellitus  His blood sugars 221  He takes a medication injection in addition to metformin  He does not know the name of the medication  He has not seen a primary care physician for many months as he moved from [place] to here last year    a1c 8 9 ss coverage for now until tolerating diet; resume metformin

## 2019-02-23 NOTE — PROGRESS NOTES
Progress Note Rob Cancer 1950, 80 y o  male MRN: [id number]    Unit/Bed#: -01 Encounter: [id number]    Primary Care Provider: No primary care provider on file  Date and time admitted to hospital: 2/18/2019 12:51 PM        Mixed hyperlipidemia  Assessment & Plan  Continue lipitor    NSVT (nonsustained ventricular tachycardia) ([facility] [address] )  Assessment & Plan  On telemetry-k replaced  Echo reviewed  Cardiology consult appreciated-on metoprolol-no further work up needed    [facility]  Assessment & Plan  Lab Results   Component Value Date    HGBA1C 8 5 (H) 02/19/2019       Recent Labs     02/22/19  1056 02/22/19  1603 02/22/19  2318 02/23/19  0619   POCGLU 197* 161* 169* 151*       Blood Sugar Average: Last 72 hrs:  (P) 172 [id number]     Patient has history of diabetes mellitus  His blood sugars 221  He takes a medication injection in addition to metformin  He does not know the name of the medication  He has not seen a primary care physician for many months as he moved from [place] to here last year  a1c 8 9 ss coverage for now until tolerating diet; resume metformin      * Cerebrovascular accident (CVA) due to occlusion of cerebral artery (HCC)  Assessment & Plan  Acute cva on mri-with evolution-has facial droop and dysphagia-improved   On asa and statin  D/w neurology-on asa and plavix  Echo reviewed and ct head/neck without stenosis  bp with good control  bg with better control  On metformin as outpatint        VTE Pharmacologic Prophylaxis:   Pharmacologic: Enoxaparin (Lovenox)  Mechanical VTE Prophylaxis in Place: Yes    Patient Centered Rounds: I have performed bedside rounds with nursing staff today  Discussions with Specialists or Other Care Team Provider:     Education and Discussions with Family / Patient:     Time Spent for Care: 30 minutes    More than 50% of total time spent on counseling and coordination of care as described above     Current Length of Stay: 3 day(s)    Current Patient Status: Inpatient   Certification Statement: The patient will continue to require additional inpatient hospital stay due to awaiting STR    Discharge Plan: STR    Code Status: Level 1 - Full Code      Subjective:   No c/o-swallowing better-sxs overall improved    Objective:     Vitals:   Temp (24hrs), Av 2 °F (36 8 °C), Min:98 1 °F (36 7 °C), Max:98 24 °F (36 8 °C)    Temp:  [98 1 °F (36 7 °C)-98 24 °F (36 8 °C)] 98 24 °F (36 8 °C)  HR:  [63-81] 81  Resp:  [16-18] 16  BP: (104-131)/(65-79) 104/66  SpO2:  [96 %-99 %] 96 %  Body mass index is 27 96 kg/m²  Input and Output Summary (last 24 hours): Intake/Output Summary (Last 24 hours) at 2019 1005  Last data filed at 2019 1333  Gross per 24 hour   Intake 240 ml   Output 200 ml   Net 40 ml       Physical Exam:     Physical Exam   Constitutional: He is oriented to person, place, and time  He appears well-developed and well-nourished  HENT:   Head: Normocephalic and atraumatic  Eyes: Pupils are equal, round, and reactive to light  EOM are normal    Neck: Normal range of motion  Neck supple  Cardiovascular: Normal rate and regular rhythm  Pulmonary/Chest: Effort normal and breath sounds normal    Abdominal: Soft  Bowel sounds are normal    Neurological: He is alert and oriented to person, place, and time  facial droop improved    Skin: Skin is warm and dry  Psychiatric: He has a normal mood and affect   His behavior is normal          Additional Data:     Labs:    Results from last 7 days   Lab Units 19  0507   WBC Thousand/uL 7 85   HEMOGLOBIN g/dL 13 4   HEMATOCRIT % 40 3   PLATELETS Thousands/uL 211   NEUTROS PCT % 58   LYMPHS PCT % 33   MONOS PCT % 7   EOS PCT % 1     Results from last 7 days   Lab Units 19  0512   SODIUM mmol/L 140   POTASSIUM mmol/L 4 0   CHLORIDE mmol/L 107   CO2 mmol/L 25   BUN mg/dL 6   CREATININE mg/dL 1 00   ANION GAP mmol/L 8   CALCIUM mg/dL 9 0   ALBUMIN g/dL 2 8*   TOTAL BILIRUBIN mg/dL 0 50   ALK PHOS U/L 80   ALT U/L 13   AST U/L 13   GLUCOSE RANDOM mg/dL 149*     Results from last 7 days   Lab Units 02/18/19  1326   INR  1 01     Results from last 7 days   Lab Units 02/23/19  0619 02/22/19  2318 02/22/19  1603 02/22/19  1056 02/22/19  0620 02/21/19  2047 02/21/19  1538 02/21/19  1101 02/21/19  0633 02/20/19  2050 02/20/19  1606 02/20/19  1122   POC GLUCOSE mg/dl 151* 169* 161* 197* 130 191* 141* 228* 149* 163* 233* 158*     Results from last 7 days   Lab Units 02/19/19  0500   HEMOGLOBIN A1C % 8 5*               * I Have Reviewed All Lab Data Listed Above  * Additional Pertinent Lab Tests Reviewed: All Labs Within Last 24 Hours Reviewed    Imaging:    Imaging Reports Reviewed Today Include:   Imaging Personally Reviewed by Myself Includes:      Recent Cultures (last 7 days):           Last 24 Hours Medication List:     Current Facility-Administered Medications:  acetaminophen 650 mg Oral Q4H PRN Kingsley White MD   aspirin 81 mg Oral Daily Liana Willis PA-C   atorvastatin 80 mg Oral QPM Stuart Contreras MD   baclofen 10 mg Oral TID Ramila Oates PA-C   bisacodyl 10 mg Rectal Daily PRN Pasha Warner MD   clopidogrel 75 mg Oral Daily Ramila Oates PA-C   enoxaparin 40 mg Subcutaneous Daily Kingsley White MD   insulin lispro 1-6 Units Subcutaneous TID Tuan Vidal MD   insulin lispro 1-6 Units Subcutaneous HS Kingsley White MD   metoprolol tartrate 12 5 mg Oral Q12H [facility] Fredy Nichole PA-C   ondansetron 4 mg Intravenous Q6H PRN Kingsley White MD   senna 1 tablet Oral HS Pasha Warner MD        Today, Patient Was Seen By: Pasha Warner MD    ** Please Note: Dictation voice to text software may have been used in the creation of this document   **

## 2019-02-24 VITALS
BODY MASS INDEX: 27.44 KG/M2 | RESPIRATION RATE: 16 BRPM | HEART RATE: 66 BPM | TEMPERATURE: 98.1 F | SYSTOLIC BLOOD PRESSURE: 113 MMHG | DIASTOLIC BLOOD PRESSURE: 67 MMHG | HEIGHT: 65 IN | WEIGHT: 164.68 LBS | OXYGEN SATURATION: 98 %

## 2019-02-24 PROBLEM — I47.29 NSVT (NONSUSTAINED VENTRICULAR TACHYCARDIA): Status: RESOLVED | Noted: 2019-02-19 | Resolved: 2019-02-24

## 2019-02-24 PROBLEM — I47.2 NSVT (NONSUSTAINED VENTRICULAR TACHYCARDIA) (HCC): Status: RESOLVED | Noted: 2019-02-19 | Resolved: 2019-02-24

## 2019-02-24 LAB
GLUCOSE SERPL-MCNC: 126 MG/DL (ref 65–140)
GLUCOSE SERPL-MCNC: 141 MG/DL (ref 65–140)
GLUCOSE SERPL-MCNC: 141 MG/DL (ref 65–140)

## 2019-02-24 PROCEDURE — 82948 REAGENT STRIP/BLOOD GLUCOSE: CPT

## 2019-02-24 PROCEDURE — 99239 HOSP IP/OBS DSCHRG MGMT >30: CPT | Performed by: GENERAL PRACTICE

## 2019-02-24 RX ORDER — ASPIRIN 81 MG/1
81 TABLET ORAL DAILY
Qty: 30 TABLET | Refills: 0 | Status: SHIPPED | OUTPATIENT
Start: 2019-02-25 | End: 2019-04-01

## 2019-02-24 RX ORDER — CLOPIDOGREL BISULFATE 75 MG/1
75 TABLET ORAL DAILY
Qty: 30 TABLET | Refills: 0 | Status: SHIPPED | OUTPATIENT
Start: 2019-02-25 | End: 2019-04-01 | Stop reason: SDUPTHER

## 2019-02-24 RX ORDER — BACLOFEN 10 MG/1
10 TABLET ORAL 3 TIMES DAILY PRN
Qty: 21 TABLET | Refills: 0 | Status: SHIPPED | OUTPATIENT
Start: 2019-02-24 | End: 2019-03-04 | Stop reason: HOSPADM

## 2019-02-24 RX ORDER — ACETAMINOPHEN 325 MG/1
650 TABLET ORAL EVERY 4 HOURS PRN
Qty: 30 TABLET | Refills: 0 | Status: SHIPPED | OUTPATIENT
Start: 2019-02-24 | End: 2020-04-15

## 2019-02-24 RX ORDER — ATORVASTATIN CALCIUM 80 MG/1
80 TABLET, FILM COATED ORAL EVERY EVENING
Qty: 30 TABLET | Refills: 0 | Status: SHIPPED | OUTPATIENT
Start: 2019-02-24 | End: 2019-04-01 | Stop reason: SDUPTHER

## 2019-02-24 RX ADMIN — BACLOFEN 10 MG: 10 TABLET ORAL at 16:46

## 2019-02-24 RX ADMIN — ATORVASTATIN CALCIUM 80 MG: 40 TABLET, FILM COATED ORAL at 16:46

## 2019-02-24 RX ADMIN — METOPROLOL TARTRATE 12.5 MG: 25 TABLET ORAL at 08:55

## 2019-02-24 RX ADMIN — ENOXAPARIN SODIUM 40 MG: 40 INJECTION SUBCUTANEOUS at 08:54

## 2019-02-24 RX ADMIN — ASPIRIN 81 MG: 81 TABLET, COATED ORAL at 08:55

## 2019-02-24 RX ADMIN — CLOPIDOGREL BISULFATE 75 MG: 75 TABLET ORAL at 08:55

## 2019-02-24 RX ADMIN — BACLOFEN 10 MG: 10 TABLET ORAL at 08:55

## 2019-02-24 NOTE — ASSESSMENT & PLAN NOTE
Lab Results   Component Value Date    HGBA1C 8 5 (H) 02/19/2019       Recent Labs     02/23/19  2107 02/24/19  0617 02/24/19  1135 02/24/19  1530   POCGLU 151* 126 141* 141*       Blood Sugar Average: Last 72 hrs:  (P) 191 [id number]     Patient has history of diabetes mellitus  His blood sugars 221  He takes a medication injection in addition to metformin  He does not know the name of the medication  He has not seen a primary care physician for many months as he moved from [place] to here last year    a1c 8 9 ss coverage for now until tolerating diet; resume metformin as outpatient

## 2019-02-24 NOTE — ASSESSMENT & PLAN NOTE
Lab Results   Component Value Date    HGBA1C 8 5 (H) 02/19/2019       Recent Labs     02/23/19  1121 02/23/19  1535 02/23/19  2107 02/24/19  0617   POCGLU 326* 140 151* 126       Blood Sugar Average: Last 72 hrs:  (P) 438 [id number]     Patient has history of diabetes mellitus  His blood sugars 221  He takes a medication injection in addition to metformin  He does not know the name of the medication  He has not seen a primary care physician for many months as he moved from [place] to here last year    a1c 8 9 ss coverage for now until tolerating diet; resume metformin

## 2019-02-24 NOTE — DISCHARGE SUMMARY
Discharge- Emilia Henderson 1950, 80 y o  male MRN: [id number]    Unit/Bed#: -01 Encounter: [id number]    Primary Care Provider: No primary care provider on file  Date and time admitted to hospital: 2/18/2019 12:51 PM        Mixed hyperlipidemia  Assessment & Plan  Continue lipitor    Diabetes mellitus type 2 in nonobese [facility])  Assessment & Plan  Lab Results   Component Value Date    HGBA1C 8 5 (H) 02/19/2019       Recent Labs     02/23/19  2107 02/24/19  0617 02/24/19  1135 02/24/19  1530   POCGLU 151* 126 141* 141*       Blood Sugar Average: Last 72 hrs:  (P) 870 [id number]     Patient has history of diabetes mellitus  His blood sugars 221  He takes a medication injection in addition to metformin  He does not know the name of the medication  He has not seen a primary care physician for many months as he moved from [place] to here last year  a1c 8 9 ss coverage for now until tolerating diet; resume metformin as outpatient       * Cerebrovascular accident (CVA) due to occlusion of cerebral artery ([facility] [address] )  Assessment & Plan  Acute cva on mri-with evolution-has facial droop and dysphagia-improved   On asa and statin  D/w neurology-on asa and plavix  Echo reviewed and ct head/neck without stenosis  bg with better control  On metformin as outpatint  Declining STR at this time-will discharge with home services          Discharging Physician / Practitioner: Shirley Alonso MD  PCP: No primary care provider on file    Admission Date:   Admission Orders (From admission, onward)    Ordered        02/20/19 1141  Inpatient Admission  Once     Order ID Start Status   [id number] 02/20/19 1141 Completed          02/18/19 1414  Place in Observation (expected length of stay for this patient is less than two midnights)  Once     Order ID Start Status   [id number] 02/18/19 1414 Completed              Discharge Date: 02/24/19    Resolved Problems  Date Reviewed: 2/24/2019          Resolved    NSVT (nonsustained ventricular tachycardia) ([facility] [address] ) 2/24/2019     Resolved by  Shirley Alonso MD          Consultations During Hospital Stay:  · Neurology, cardiology, PMR    Procedures Performed:   ·     Significant Findings / Test Results:   · CVA  · Poorly controlled niddm    Incidental Findings:   ·      Test Results Pending at Discharge (will require follow up):   ·      Outpatient Tests Requested:  ·     Complications:      Reason for Admission: dizzy    Hospital Course:     Emilia Henderson is a 80 y o  male patient who originally presented to the hospital on 2/18/2019 due to dizziness and slurred speech        Please see above list of diagnoses and related plan for additional information  Condition at Discharge: stable     Discharge Day Visit / Exam:     * Please refer to separate progress note for these details *    Discussion with Family:     Discharge instructions/Information to patient and family:   See after visit summary for information provided to patient and family  Provisions for Follow-Up Care:  See after visit summary for information related to follow-up care and any pertinent home health orders  Disposition:     Home with [facility] Services (Reminder: Complete face to face encounter)    For Discharges to [facility]:   · Not Applicable to this Patient - Not Applicable to this Patient    Planned Readmission:      Discharge Statement:  I spent 40 minutes discharging the patient  This time was spent on the day of discharge  I had direct contact with the patient on the day of discharge  Greater than 50% of the total time was spent examining patient, answering all patient questions, arranging and discussing plan of care with patient as well as directly providing post-discharge instructions  Additional time then spent on discharge activities  Discharge Medications:  See after visit summary for reconciled discharge medications provided to patient and family        ** Please Note: This note has been constructed using a voice recognition system **

## 2019-02-24 NOTE — PROGRESS NOTES
Progress Note Kim Rodriguez 1950, 80 y o  male MRN: [id number]    Unit/Bed#: -01 Encounter: [id number]    Primary Care Provider: No primary care provider on file  Date and time admitted to hospital: 2/18/2019 12:51 PM        Mixed hyperlipidemia  Assessment & Plan  Continue lipitor    NSVT (nonsustained ventricular tachycardia) ([address] )  Assessment & Plan  On telemetry-k replaced  Echo reviewed  Cardiology consult appreciated-on metoprolol-no further work up needed    Diabetes mellitus type 2 in nonobese [facility])  Assessment & Plan  Lab Results   Component Value Date    HGBA1C 8 5 (H) 02/19/2019       Recent Labs     02/23/19  1121 02/23/19  1535 02/23/19  2107 02/24/19  0617   POCGLU 326* 140 151* 126       Blood Sugar Average: Last 72 hrs:  (P) 184 [id number]     Patient has history of diabetes mellitus  His blood sugars 221  He takes a medication injection in addition to metformin  He does not know the name of the medication  He has not seen a primary care physician for many months as he moved from [place] to here last year  a1c 8 9 ss coverage for now until tolerating diet; resume metformin      * Cerebrovascular accident (CVA) due to occlusion of cerebral artery (HCC)  Assessment & Plan  Acute cva on mri-with evolution-has facial droop and dysphagia-improved   On asa and statin  D/w neurology-on asa and plavix  Echo reviewed and ct head/neck without stenosis  bp with good control  bg with better control  On metformin as outpatint        VTE Pharmacologic Prophylaxis:   Pharmacologic: Enoxaparin (Lovenox)  Mechanical VTE Prophylaxis in Place: Yes    Patient Centered Rounds: I have performed bedside rounds with nursing staff today  Discussions with Specialists or Other Care Team Provider:     Education and Discussions with Family / Patient:     Time Spent for Care: 30 minutes  More than 50% of total time spent on counseling and coordination of care as described above      Current Length of Stay: 4 day(s)    Current Patient Status: Inpatient   Certification Statement: The patient will continue to require additional inpatient hospital stay due to str    Discharge Plan: sTR    Code Status: Level 1 - Full Code      Subjective:   nO c/o-sxs improving    Objective:     Vitals:   Temp (24hrs), Av 2 °F (36 8 °C), Min:97 7 °F (36 5 °C), Max:98 6 °F (37 °C)    Temp:  [97 7 °F (36 5 °C)-98 6 °F (37 °C)] 98 2 °F (36 8 °C)  HR:  [65-82] 75  Resp:  [16-18] 18  BP: (109-132)/(66-77) 114/68  SpO2:  [97 %-99 %] 98 %  Body mass index is 27 4 kg/m²  Input and Output Summary (last 24 hours): Intake/Output Summary (Last 24 hours) at 2019 1010  Last data filed at 2019 2353  Gross per 24 hour   Intake 240 ml   Output 201 ml   Net 39 ml       Physical Exam:     Physical Exam   Constitutional: He is oriented to person, place, and time  He appears well-developed and well-nourished  HENT:   Head: Normocephalic and atraumatic  Eyes: Pupils are equal, round, and reactive to light  Neck: Neck supple  Cardiovascular: Normal rate and regular rhythm  Pulmonary/Chest: Effort normal and breath sounds normal    Abdominal: Soft  Bowel sounds are normal    Musculoskeletal: He exhibits no edema  Neurological: He is alert and oriented to person, place, and time  Skin: Skin is warm and dry  Psychiatric: He has a normal mood and affect   His behavior is normal          Additional Data:     Labs:    Results from last 7 days   Lab Units 19  0507   WBC Thousand/uL 7 85   HEMOGLOBIN g/dL 13 4   HEMATOCRIT % 40 3   PLATELETS Thousands/uL 211   NEUTROS PCT % 58   LYMPHS PCT % 33   MONOS PCT % 7   EOS PCT % 1     Results from last 7 days   Lab Units 19  0512   SODIUM mmol/L 140   POTASSIUM mmol/L 4 0   CHLORIDE mmol/L 107   CO2 mmol/L 25   BUN mg/dL 6   CREATININE mg/dL 1 00   ANION GAP mmol/L 8   CALCIUM mg/dL 9 0   ALBUMIN g/dL 2 8*   TOTAL BILIRUBIN mg/dL 0 50   ALK PHOS U/L 80   ALT U/L 13   AST U/L 13   GLUCOSE RANDOM mg/dL 149*     Results from last 7 days   Lab Units 02/18/19  1326   INR  1 01     Results from last 7 days   Lab Units 02/24/19  0617 02/23/19  2107 02/23/19  1535 02/23/19  1121 02/23/19  0619 02/22/19  2318 02/22/19  1603 02/22/19  1056 02/22/19  0620 02/21/19  2047 02/21/19  1538 02/21/19  1101   POC GLUCOSE mg/dl 126 151* 140 326* 151* 169* 161* 197* 130 191* 141* 228*     Results from last 7 days   Lab Units 02/19/19  0500   HEMOGLOBIN A1C % 8 5*               * I Have Reviewed All Lab Data Listed Above  * Additional Pertinent Lab Tests Reviewed: All Labs Within Last 24 Hours Reviewed    Imaging:    Imaging Reports Reviewed Today Include:   Imaging Personally Reviewed by Myself Includes:      Recent Cultures (last 7 days):           Last 24 Hours Medication List:     Current Facility-Administered Medications:  acetaminophen 650 mg Oral Q4H PRN Laurie Ross MD   aspirin 81 mg Oral Daily Kath Ybarra PA-C   atorvastatin 80 mg Oral QPM Ravi Fong MD   baclofen 10 mg Oral TID Duc Oates PA-C   bisacodyl 10 mg Rectal Daily PRN Cat Gunderson MD   clopidogrel 75 mg Oral Daily Duc Oates PA-C   enoxaparin 40 mg Subcutaneous Daily Laurie Ross MD   insulin lispro 1-6 Units Subcutaneous TID Lonnie Gupta MD   insulin lispro 1-6 Units Subcutaneous HS Laurie Ross MD   metoprolol tartrate 12 5 mg Oral Q12H [address] Fredy Nichole PA-C   ondansetron 4 mg Intravenous Q6H PRN Laurie Ross MD   senna 1 tablet Oral HS Cat Gunderson MD        Today, Patient Was Seen By: Cat Gunderson MD    ** Please Note: Dictation voice to text software may have been used in the creation of this document   **

## 2019-02-24 NOTE — ASSESSMENT & PLAN NOTE
Acute cva on mri-with evolution-has facial droop and dysphagia-improved   On asa and statin  D/w neurology-on asa and plavix  Echo reviewed and ct head/neck without stenosis  bg with better control  On metformin as outpatint  Declining STR at this time-will discharge with home services

## 2019-02-24 NOTE — SOCIAL WORK
Pt will be unable to transfer to [facility] today as PT/OT and ins authorization needed  Cm spoke w pt who would not like to delay his stay in hospital and preference is to return home w [facility] today  [facility] not accepting  Referral sent to [facility]   MD informed

## 2019-02-25 ENCOUNTER — TELEPHONE (OUTPATIENT)
Dept: CARDIOLOGY CLINIC | Facility: CLINIC | Age: 69
End: 2019-02-25

## 2019-02-25 NOTE — TELEPHONE ENCOUNTER
PT WAS D/C ON 2/24/19 & DR Bridgette Lisa WANTS PT TO GET A [facility]WIN STRESS TEST DONE BEFORE HIS HOSP F/UP  THE TEST WILL NEED AUTH; PLEASE LET ME KNOW WHEN IT HAS BEEN APPROVED SO I CAN CALL [facility] & GET PT [facility] FOR THE HOSP F/UP

## 2019-02-25 NOTE — TELEPHONE ENCOUNTER
SPOKE TO [facility] & SHE IS GOING TO CALL PT & ONCE SHE GETS HIM [facility], SHE WILL LET US KNOW SO WE CAN MAKE THE FOLLOW UP

## 2019-02-25 NOTE — TELEPHONE ENCOUNTER
SPOKE TO PT'S WIFE & PT IS GOING TO [place] ON 3/29/19 TO SEE DR Tc Mcfarland; PT'S TESTING IS ON 3/4/19

## 2019-02-26 ENCOUNTER — HOSPITAL ENCOUNTER (EMERGENCY)
Facility: HOSPITAL | Age: 69
Discharge: HOME | End: 2019-02-26
Attending: EMERGENCY MEDICINE | Admitting: EMERGENCY MEDICINE
Payer: COMMERCIAL

## 2019-02-26 VITALS
RESPIRATION RATE: 22 BRPM | OXYGEN SATURATION: 98 % | SYSTOLIC BLOOD PRESSURE: 123 MMHG | DIASTOLIC BLOOD PRESSURE: 70 MMHG | HEART RATE: 92 BPM | TEMPERATURE: 97.6 F

## 2019-02-26 DIAGNOSIS — R06.6 HICCUPS: ICD-10-CM

## 2019-02-26 DIAGNOSIS — K92.0 HEMATEMESIS: Primary | ICD-10-CM

## 2019-02-26 LAB
ALBUMIN SERPL BCP-MCNC: 3.3 G/DL (ref 3.5–5)
ALP SERPL-CCNC: 95 U/L (ref 46–116)
ALT SERPL W P-5'-P-CCNC: 41 U/L (ref 12–78)
ANION GAP SERPL CALCULATED.3IONS-SCNC: 10 MMOL/L (ref 4–13)
AST SERPL W P-5'-P-CCNC: 29 U/L (ref 5–45)
ATRIAL RATE: 75 BPM
BASOPHILS # BLD AUTO: 0.02 THOUSANDS/ΜL (ref 0–0.1)
BASOPHILS NFR BLD AUTO: 0 % (ref 0–1)
BILIRUB SERPL-MCNC: 0.4 MG/DL (ref 0.2–1)
BUN SERPL-MCNC: 13 MG/DL (ref 5–25)
CALCIUM SERPL-MCNC: 9.5 MG/DL (ref 8.3–10.1)
CHLORIDE SERPL-SCNC: 100 MMOL/L (ref 100–108)
CO2 SERPL-SCNC: 28 MMOL/L (ref 21–32)
CREAT SERPL-MCNC: 1.14 MG/DL (ref 0.6–1.3)
EOSINOPHIL # BLD AUTO: 0.02 THOUSAND/ΜL (ref 0–0.61)
EOSINOPHIL NFR BLD AUTO: 0 % (ref 0–6)
ERYTHROCYTE [DISTWIDTH] IN BLOOD BY AUTOMATED COUNT: 12.2 % (ref 11.6–15.1)
GFR SERPL CREATININE-BSD FRML MDRD: 77 ML/MIN/1.73SQ M
GLUCOSE SERPL-MCNC: 180 MG/DL (ref 65–140)
HCT VFR BLD AUTO: 44.4 % (ref 36.5–49.3)
HGB BLD-MCNC: 14.8 G/DL (ref 12–17)
IMM GRANULOCYTES # BLD AUTO: 0.03 THOUSAND/UL (ref 0–0.2)
IMM GRANULOCYTES NFR BLD AUTO: 0 % (ref 0–2)
LIPASE SERPL-CCNC: 159 U/L (ref 73–393)
LYMPHOCYTES # BLD AUTO: 1.68 THOUSANDS/ΜL (ref 0.6–4.47)
LYMPHOCYTES NFR BLD AUTO: 18 % (ref 14–44)
MCH RBC QN AUTO: 29.4 PG (ref 26.8–34.3)
MCHC RBC AUTO-ENTMCNC: 33.3 G/DL (ref 31.4–37.4)
MCV RBC AUTO: 88 FL (ref 82–98)
MONOCYTES # BLD AUTO: 0.49 THOUSAND/ΜL (ref 0.17–1.22)
MONOCYTES NFR BLD AUTO: 5 % (ref 4–12)
NEUTROPHILS # BLD AUTO: 7.35 THOUSANDS/ΜL (ref 1.85–7.62)
NEUTS SEG NFR BLD AUTO: 77 % (ref 43–75)
NRBC BLD AUTO-RTO: 0 /100 WBCS
P AXIS: 65 DEGREES
PLATELET # BLD AUTO: 244 THOUSANDS/UL (ref 149–390)
PMV BLD AUTO: 10.5 FL (ref 8.9–12.7)
POTASSIUM SERPL-SCNC: 4.5 MMOL/L (ref 3.5–5.3)
PR INTERVAL: 120 MS
PROT SERPL-MCNC: 7.6 G/DL (ref 6.4–8.2)
QRS AXIS: -4 DEGREES
QRSD INTERVAL: 74 MS
QT INTERVAL: 358 MS
QTC INTERVAL: 399 MS
RBC # BLD AUTO: 5.04 MILLION/UL (ref 3.88–5.62)
SODIUM SERPL-SCNC: 138 MMOL/L (ref 136–145)
T WAVE AXIS: 25 DEGREES
TROPONIN I SERPL-MCNC: <0.02 NG/ML
VENTRICULAR RATE: 75 BPM
WBC # BLD AUTO: 9.59 THOUSAND/UL (ref 4.31–10.16)

## 2019-02-26 PROCEDURE — 93010 ELECTROCARDIOGRAM REPORT: CPT | Performed by: INTERNAL MEDICINE

## 2019-02-26 PROCEDURE — 85025 COMPLETE CBC W/AUTO DIFF WBC: CPT | Performed by: PHYSICIAN ASSISTANT

## 2019-02-26 PROCEDURE — 80053 COMPREHEN METABOLIC PANEL: CPT | Performed by: PHYSICIAN ASSISTANT

## 2019-02-26 PROCEDURE — 36415 COLL VENOUS BLD VENIPUNCTURE: CPT | Performed by: PHYSICIAN ASSISTANT

## 2019-02-26 PROCEDURE — 93005 ELECTROCARDIOGRAM TRACING: CPT

## 2019-02-26 PROCEDURE — 84484 ASSAY OF TROPONIN QUANT: CPT | Performed by: PHYSICIAN ASSISTANT

## 2019-02-26 PROCEDURE — 82272 OCCULT BLD FECES 1-3 TESTS: CPT

## 2019-02-26 PROCEDURE — 83690 ASSAY OF LIPASE: CPT | Performed by: PHYSICIAN ASSISTANT

## 2019-02-26 PROCEDURE — 99285 EMERGENCY DEPT VISIT HI MDM: CPT

## 2019-02-26 RX ORDER — PROCHLORPERAZINE 25 MG
25 SUPPOSITORY, RECTAL RECTAL ONCE
Status: COMPLETED | OUTPATIENT
Start: 2019-02-26 | End: 2019-02-26

## 2019-02-26 RX ADMIN — LIDOCAINE HYDROCHLORIDE 15 ML: 20 SOLUTION ORAL; TOPICAL at 18:40

## 2019-02-26 RX ADMIN — PROCHLORPERAZINE 25 MG: 25 SUPPOSITORY RECTAL at 18:41

## 2019-02-26 NOTE — ED PROVIDER NOTES
History  Chief Complaint   Patient presents with    Vomiting Blood     c/o vomiting blood x 1 about an hr ago   recently released with a stroke DX     Edwardo Schaffer is a 80 y o  male w PMH HTN, HLD who presents for evaluation of vomiting  Pt had a one time episode of vomiting today  His emesis was black  Denies black color to stool or any change in consistency of stool  He denies abdominal pain or current nausea  He reports recently being d/c from the hospital  At that time he felt weak and has intermittently been lightheaded but denies any syncope  Denies shortness of breath or chest pain  Was started on asa / plavix on recent admission for CVA  Prior to Admission Medications   Prescriptions Last Dose Informant Patient Reported? Taking?   acetaminophen (TYLENOL) 325 mg tablet   No No   Sig: Take 2 tablets (650 mg total) by mouth every 4 (four) hours as needed for headaches   aspirin (ECOTRIN LOW STRENGTH) 81 mg EC tablet   No No   Sig: Take 1 tablet (81 mg total) by mouth daily   atorvastatin (LIPITOR) 80 mg tablet   No No   Sig: Take 1 tablet (80 mg total) by mouth every evening   baclofen 10 mg tablet   No No   Sig: Take 1 tablet (10 mg total) by mouth 3 (three) times a day as needed for muscle spasms   clopidogrel (PLAVIX) 75 mg tablet   No No   Sig: Take 1 tablet (75 mg total) by mouth daily   metFORMIN (GLUCOPHAGE) 1000 MG tablet   No No   Sig: Take 0 5 tablets (500 mg total) by mouth 2 (two) times a day with meals   metoprolol tartrate (LOPRESSOR) 25 mg tablet   No No   Sig: Take 0 5 tablets (12 5 mg total) by mouth every 12 (twelve) hours      Facility-Administered Medications: None       Past Medical History:   Diagnosis Date    Diabetes mellitus ([facility] [address] )        History reviewed  No pertinent surgical history  History reviewed  No pertinent family history  I have reviewed and agree with the history as documented      Social History     Tobacco Use    Smoking status: Never Smoker    Smokeless tobacco: Never Used   Substance Use Topics    Alcohol use: Never     Frequency: Never    Drug use: Never        Review of Systems   Constitutional: Negative for activity change, chills, diaphoresis, fatigue and fever  HENT: Negative for congestion and rhinorrhea  Eyes: Negative for pain  Respiratory: Negative for cough, chest tightness, shortness of breath and wheezing  Cardiovascular: Negative for chest pain and palpitations  Gastrointestinal: Positive for vomiting  Negative for abdominal distention, constipation, diarrhea and nausea  Genitourinary: Negative for difficulty urinating and dysuria  Musculoskeletal: Negative for arthralgias and myalgias  Neurological: Negative for dizziness, weakness, light-headedness and headaches  Psychiatric/Behavioral: The patient is not nervous/anxious  Physical Exam  Physical Exam   Constitutional: He is oriented to person, place, and time  He appears well-developed and well-nourished  No distress  HENT:   Head: Normocephalic and atraumatic  Eyes: Pupils are equal, round, and reactive to light  Neck: Normal range of motion  Neck supple  No tracheal deviation present  No thyromegaly present  Cardiovascular: Normal rate, regular rhythm, normal heart sounds and intact distal pulses  Exam reveals no gallop and no friction rub  No murmur heard  Pulmonary/Chest: Effort normal and breath sounds normal  No respiratory distress  He has no wheezes  He has no rales  Abdominal: Soft  Bowel sounds are normal  He exhibits no distension and no mass  There is no tenderness  There is no guarding  Genitourinary: Rectal exam shows guaiac negative stool  Genitourinary Comments: Stool is brown on exam and is guiac negative   Musculoskeletal: He exhibits no edema or deformity  Neurological: He is alert and oriented to person, place, and time  Skin: Skin is warm and dry  He is not diaphoretic  Psychiatric: He has a normal mood and affect  His behavior is normal    Nursing note and vitals reviewed        Vital Signs  ED Triage Vitals   Temperature Pulse Respirations Blood Pressure SpO2   02/26/19 1515 02/26/19 1515 02/26/19 1515 02/26/19 1515 02/26/19 1515   97 6 °F (36 4 °C) (!) 107 20 129/67 98 %      Temp Source Heart Rate Source Patient Position - Orthostatic VS BP Location FiO2 (%)   02/26/19 1515 02/26/19 1515 02/26/19 1515 02/26/19 1515 --   Oral Monitor Sitting Left arm       Pain Score       02/26/19 1606       No Pain           Vitals:    02/26/19 1800 02/26/19 1815 02/26/19 1830 02/26/19 1900   BP:       Pulse: 87 87 84 92   Patient Position - Orthostatic VS:           Visual Acuity      ED Medications  Medications   prochlorperazine (COMPAZINE) rectal suppository 25 mg (25 mg Rectal Given 2/26/19 1841)   lidocaine viscous (XYLOCAINE) 2 % mucosal solution 15 mL (15 mL Swish & Swallow Given 2/26/19 1840)       Diagnostic Studies  Results Reviewed     Procedure Component Value Units Date/Time    Troponin I [[id number]]  (Normal) Collected:  02/26/19 1839    Lab Status:  Final result Specimen:  Blood from Arm, Right Updated:  02/26/19 1914     Troponin I <0 02 ng/mL     Comprehensive metabolic panel [[id number]]  (Abnormal) Collected:  02/26/19 1644    Lab Status:  Final result Specimen:  Blood from Arm, Right Updated:  02/26/19 1713     Sodium 138 mmol/L      Potassium 4 5 mmol/L      Chloride 100 mmol/L      CO2 28 mmol/L      ANION GAP 10 mmol/L      BUN 13 mg/dL      Creatinine 1 14 mg/dL      Glucose 180 mg/dL      Calcium 9 5 mg/dL      AST 29 U/L      ALT 41 U/L      Alkaline Phosphatase 95 U/L      Total Protein 7 6 g/dL      Albumin 3 3 g/dL      Total Bilirubin 0 40 mg/dL      eGFR 77 ml/min/1 73sq m     Narrative:       National Kidney Disease Education Program recommendations are as follows:  GFR calculation is accurate only with a steady state creatinine  Chronic Kidney disease less than 60 ml/min/1 73 sq  meters  Kidney failure less than 15 ml/min/1 73 sq  meters  Lipase [[id number]]  (Normal) Collected:  02/26/19 1644    Lab Status:  Final result Specimen:  Blood from Arm, Right Updated:  02/26/19 1713     Lipase 159 u/L     CBC and differential [[id number]]  (Abnormal) Collected:  02/26/19 1644    Lab Status:  Final result Specimen:  Blood from Arm, Right Updated:  02/26/19 1650     WBC 9 59 Thousand/uL      RBC 5 04 Million/uL      Hemoglobin 14 8 g/dL      Hematocrit 44 4 %      MCV 88 fL      MCH 29 4 pg      MCHC 33 3 g/dL      RDW 12 2 %      MPV 10 5 fL      Platelets 785 Thousands/uL      nRBC 0 /100 WBCs      Neutrophils Relative 77 %      Immat GRANS % 0 %      Lymphocytes Relative 18 %      Monocytes Relative 5 %      Eosinophils Relative 0 %      Basophils Relative 0 %      Neutrophils Absolute 7 35 Thousands/µL      Immature Grans Absolute 0 03 Thousand/uL      Lymphocytes Absolute 1 68 Thousands/µL      Monocytes Absolute 0 49 Thousand/µL      Eosinophils Absolute 0 02 Thousand/µL      Basophils Absolute 0 02 Thousands/µL                  No orders to display              Procedures  Procedures       Phone Contacts  ED Phone Contact    ED Course                               MDM  Number of Diagnoses or Management Options  Hematemesis:   Hiccups:   Diagnosis management comments: DDX includes but not ltd to:   Patient w possible upper GI bleed, gastric or duodenal ulcer    Plan is to obtain:  CBC as marker of infectivity, hemoconcentration for hydration status  CMP to check for electrolytes, renal function, liver function   Lipase to check for pancreatitis    Based on results:  Patient with normal labs and guiac negative stool on exam  He has stable hemoglobin, tachycardia resolved  w hiccups added on cardiac workup which was non-concerning for acute ischemia  He had no resolution of hiccups here despite meds  He was instructed to use a spoonful of sugar, drink water or hold his breath at home to help w this   He can follow up with his PCP and w GI if he continues to have episodes of possible hematemesis  EKG Interpretation    Rate: 75 BPM  Rhythm: NSR  Axis: normal  Intervals: Normal, no blocks, QTc 399ms  Q waves: no  T waves: flat in III  ST segments: no dep / elevation    Impression: abnormal EKG  EKG for comparison: T waves are now upright compared to prior on 2/18/19  EKG interpreted by me  Return parameters discussed  Pt requires f/u as an outpt  Pt expresses understanding w above treatment plan  All questions answered prior to d/c  Disposition  Final diagnoses:   Hematemesis   Hiccups     Time reflects when diagnosis was documented in both MDM as applicable and the Disposition within this note     Time User Action Codes Description Comment    2/26/2019  6:03 PM Arno Purpura [K92 0] Hematemesis     2/26/2019  7:25 PM Obdulia Poolegina Add [R06 6] Hiccups       ED Disposition     ED Disposition Condition Date/Time Comment    Discharge Stable Tue Feb 26, 2019  5:58 PM Pedro Gutierrez discharge to home/self care              Follow-up Information     Follow up With Specialties Details Why Contact Info Additional Information    [address] [place] [address] Emergency Department Emergency Medicine  If symptoms worsen [address] [facility] [address] ED, [address] [place], [place], [address]  [facility] Call in 1 day  [facility] [address] [facility] [address]  [address] [facility] [place], [address] [facility],  [address] [place], [place], [address]          Discharge Medication List as of 2/26/2019  7:26 PM      CONTINUE these medications which have NOT CHANGED    Details   acetaminophen (TYLENOL) 325 mg tablet Take 2 tablets (650 mg total) by mouth every 4 (four) hours as needed for headaches, Starting Sun 2/24/2019, Normal aspirin (ECOTRIN LOW STRENGTH) 81 mg EC tablet Take 1 tablet (81 mg total) by mouth daily, Starting Mon 2/25/2019, Normal      atorvastatin (LIPITOR) 80 mg tablet Take 1 tablet (80 mg total) by mouth every evening, Starting Sun 2/24/2019, Normal      baclofen 10 mg tablet Take 1 tablet (10 mg total) by mouth 3 (three) times a day as needed for muscle spasms, Starting Sun 2/24/2019, Normal      clopidogrel (PLAVIX) 75 mg tablet Take 1 tablet (75 mg total) by mouth daily, Starting Mon 2/25/2019, Normal      metFORMIN (GLUCOPHAGE) 1000 MG tablet Take 0 5 tablets (500 mg total) by mouth 2 (two) times a day with meals, Starting Sun 2/24/2019, Normal      metoprolol tartrate (LOPRESSOR) 25 mg tablet Take 0 5 tablets (12 5 mg total) by mouth every 12 (twelve) hours, Starting Sun 2/24/2019, Normal           No discharge procedures on file      ED Provider  Electronically Signed by           Francisco Ireland, CHRIS  02/26/19 7395

## 2019-03-01 ENCOUNTER — HOSPITAL ENCOUNTER (INPATIENT)
Facility: HOSPITAL | Age: 69
LOS: 3 days | Discharge: HOME | DRG: 382 | End: 2019-03-04
Attending: EMERGENCY MEDICINE | Admitting: GENERAL PRACTICE
Payer: COMMERCIAL

## 2019-03-01 ENCOUNTER — APPOINTMENT (EMERGENCY)
Dept: CT IMAGING | Facility: HOSPITAL | Age: 69
DRG: 382 | End: 2019-03-01
Payer: COMMERCIAL

## 2019-03-01 DIAGNOSIS — K92.0 COFFEE GROUND EMESIS: ICD-10-CM

## 2019-03-01 DIAGNOSIS — I63.50 CEREBROVASCULAR ACCIDENT (CVA) DUE TO OCCLUSION OF CEREBRAL ARTERY (HCC): ICD-10-CM

## 2019-03-01 DIAGNOSIS — K29.80 DUODENITIS: Primary | ICD-10-CM

## 2019-03-01 LAB
ABO GROUP BLD: NORMAL
ALBUMIN SERPL BCP-MCNC: 3.5 G/DL (ref 3.5–5)
ALP SERPL-CCNC: 90 U/L (ref 46–116)
ALT SERPL W P-5'-P-CCNC: 27 U/L (ref 12–78)
ANION GAP SERPL CALCULATED.3IONS-SCNC: 12 MMOL/L (ref 4–13)
AST SERPL W P-5'-P-CCNC: 18 U/L (ref 5–45)
BASOPHILS # BLD AUTO: 0.03 THOUSANDS/ΜL (ref 0–0.1)
BASOPHILS NFR BLD AUTO: 0 % (ref 0–1)
BILIRUB SERPL-MCNC: 0.4 MG/DL (ref 0.2–1)
BLD GP AB SCN SERPL QL: NEGATIVE
BUN SERPL-MCNC: 18 MG/DL (ref 5–25)
CALCIUM SERPL-MCNC: 10.4 MG/DL (ref 8.3–10.1)
CHLORIDE SERPL-SCNC: 99 MMOL/L (ref 100–108)
CO2 SERPL-SCNC: 28 MMOL/L (ref 21–32)
CREAT SERPL-MCNC: 1.19 MG/DL (ref 0.6–1.3)
EOSINOPHIL # BLD AUTO: 0.02 THOUSAND/ΜL (ref 0–0.61)
EOSINOPHIL NFR BLD AUTO: 0 % (ref 0–6)
ERYTHROCYTE [DISTWIDTH] IN BLOOD BY AUTOMATED COUNT: 12 % (ref 11.6–15.1)
GFR SERPL CREATININE-BSD FRML MDRD: 73 ML/MIN/1.73SQ M
GLUCOSE SERPL-MCNC: 196 MG/DL (ref 65–140)
GLUCOSE SERPL-MCNC: 237 MG/DL (ref 65–140)
HCT VFR BLD AUTO: 41.2 % (ref 36.5–49.3)
HGB BLD-MCNC: 13.6 G/DL (ref 12–17)
IMM GRANULOCYTES # BLD AUTO: 0.03 THOUSAND/UL (ref 0–0.2)
IMM GRANULOCYTES NFR BLD AUTO: 0 % (ref 0–2)
INR PPP: 1.06 (ref 0.86–1.17)
LYMPHOCYTES # BLD AUTO: 1.65 THOUSANDS/ΜL (ref 0.6–4.47)
LYMPHOCYTES NFR BLD AUTO: 16 % (ref 14–44)
MCH RBC QN AUTO: 29.4 PG (ref 26.8–34.3)
MCHC RBC AUTO-ENTMCNC: 33 G/DL (ref 31.4–37.4)
MCV RBC AUTO: 89 FL (ref 82–98)
MONOCYTES # BLD AUTO: 0.58 THOUSAND/ΜL (ref 0.17–1.22)
MONOCYTES NFR BLD AUTO: 6 % (ref 4–12)
NEUTROPHILS # BLD AUTO: 7.76 THOUSANDS/ΜL (ref 1.85–7.62)
NEUTS SEG NFR BLD AUTO: 78 % (ref 43–75)
NRBC BLD AUTO-RTO: 0 /100 WBCS
PLATELET # BLD AUTO: 282 THOUSANDS/UL (ref 149–390)
PMV BLD AUTO: 10.8 FL (ref 8.9–12.7)
POTASSIUM SERPL-SCNC: 4.6 MMOL/L (ref 3.5–5.3)
PROT SERPL-MCNC: 8.3 G/DL (ref 6.4–8.2)
PROTHROMBIN TIME: 13.7 SECONDS (ref 11.8–14.2)
RBC # BLD AUTO: 4.62 MILLION/UL (ref 3.88–5.62)
RH BLD: POSITIVE
SODIUM SERPL-SCNC: 139 MMOL/L (ref 136–145)
SPECIMEN EXPIRATION DATE: NORMAL
TROPONIN I SERPL-MCNC: <0.02 NG/ML
TROPONIN I SERPL-MCNC: <0.02 NG/ML
WBC # BLD AUTO: 10.07 THOUSAND/UL (ref 4.31–10.16)

## 2019-03-01 PROCEDURE — 36415 COLL VENOUS BLD VENIPUNCTURE: CPT | Performed by: EMERGENCY MEDICINE

## 2019-03-01 PROCEDURE — 80053 COMPREHEN METABOLIC PANEL: CPT | Performed by: EMERGENCY MEDICINE

## 2019-03-01 PROCEDURE — 86901 BLOOD TYPING SEROLOGIC RH(D): CPT | Performed by: EMERGENCY MEDICINE

## 2019-03-01 PROCEDURE — 96375 TX/PRO/DX INJ NEW DRUG ADDON: CPT

## 2019-03-01 PROCEDURE — 96376 TX/PRO/DX INJ SAME DRUG ADON: CPT

## 2019-03-01 PROCEDURE — C9113 INJ PANTOPRAZOLE SODIUM, VIA: HCPCS | Performed by: EMERGENCY MEDICINE

## 2019-03-01 PROCEDURE — 85610 PROTHROMBIN TIME: CPT | Performed by: EMERGENCY MEDICINE

## 2019-03-01 PROCEDURE — 71260 CT THORAX DX C+: CPT

## 2019-03-01 PROCEDURE — 96374 THER/PROPH/DIAG INJ IV PUSH: CPT

## 2019-03-01 PROCEDURE — 84484 ASSAY OF TROPONIN QUANT: CPT | Performed by: EMERGENCY MEDICINE

## 2019-03-01 PROCEDURE — 99223 1ST HOSP IP/OBS HIGH 75: CPT | Performed by: INTERNAL MEDICINE

## 2019-03-01 PROCEDURE — 85025 COMPLETE CBC W/AUTO DIFF WBC: CPT | Performed by: EMERGENCY MEDICINE

## 2019-03-01 PROCEDURE — 99285 EMERGENCY DEPT VISIT HI MDM: CPT

## 2019-03-01 PROCEDURE — 82948 REAGENT STRIP/BLOOD GLUCOSE: CPT

## 2019-03-01 PROCEDURE — 86850 RBC ANTIBODY SCREEN: CPT | Performed by: EMERGENCY MEDICINE

## 2019-03-01 PROCEDURE — 74177 CT ABD & PELVIS W/CONTRAST: CPT

## 2019-03-01 PROCEDURE — 86900 BLOOD TYPING SEROLOGIC ABO: CPT | Performed by: EMERGENCY MEDICINE

## 2019-03-01 RX ORDER — METOPROLOL TARTRATE 5 MG/5ML
5 INJECTION INTRAVENOUS EVERY 6 HOURS PRN
Status: DISCONTINUED | OUTPATIENT
Start: 2019-03-01 | End: 2019-03-04 | Stop reason: HOSPADM

## 2019-03-01 RX ORDER — SODIUM CHLORIDE 9 MG/ML
75 INJECTION, SOLUTION INTRAVENOUS CONTINUOUS
Status: DISCONTINUED | OUTPATIENT
Start: 2019-03-01 | End: 2019-03-04 | Stop reason: HOSPADM

## 2019-03-01 RX ORDER — PANTOPRAZOLE SODIUM 40 MG/1
40 INJECTION, POWDER, FOR SOLUTION INTRAVENOUS ONCE
Status: COMPLETED | OUTPATIENT
Start: 2019-03-01 | End: 2019-03-01

## 2019-03-01 RX ORDER — ONDANSETRON 2 MG/ML
4 INJECTION INTRAMUSCULAR; INTRAVENOUS ONCE
Status: COMPLETED | OUTPATIENT
Start: 2019-03-01 | End: 2019-03-01

## 2019-03-01 RX ORDER — BACLOFEN 10 MG/1
10 TABLET ORAL 3 TIMES DAILY PRN
Status: DISCONTINUED | OUTPATIENT
Start: 2019-03-01 | End: 2019-03-03

## 2019-03-01 RX ORDER — ACETAMINOPHEN 325 MG/1
650 TABLET ORAL EVERY 4 HOURS PRN
Status: DISCONTINUED | OUTPATIENT
Start: 2019-03-01 | End: 2019-03-04 | Stop reason: HOSPADM

## 2019-03-01 RX ORDER — DIPHENHYDRAMINE HYDROCHLORIDE 50 MG/ML
50 INJECTION INTRAMUSCULAR; INTRAVENOUS ONCE
Status: COMPLETED | OUTPATIENT
Start: 2019-03-01 | End: 2019-03-01

## 2019-03-01 RX ORDER — METOCLOPRAMIDE HYDROCHLORIDE 5 MG/ML
10 INJECTION INTRAMUSCULAR; INTRAVENOUS ONCE
Status: COMPLETED | OUTPATIENT
Start: 2019-03-01 | End: 2019-03-01

## 2019-03-01 RX ORDER — ATORVASTATIN CALCIUM 40 MG/1
80 TABLET, FILM COATED ORAL EVERY EVENING
Status: DISCONTINUED | OUTPATIENT
Start: 2019-03-01 | End: 2019-03-04 | Stop reason: HOSPADM

## 2019-03-01 RX ORDER — ONDANSETRON 2 MG/ML
4 INJECTION INTRAMUSCULAR; INTRAVENOUS EVERY 4 HOURS PRN
Status: DISCONTINUED | OUTPATIENT
Start: 2019-03-01 | End: 2019-03-04 | Stop reason: HOSPADM

## 2019-03-01 RX ADMIN — DIPHENHYDRAMINE HYDROCHLORIDE 50 MG: 50 INJECTION, SOLUTION INTRAMUSCULAR; INTRAVENOUS at 18:14

## 2019-03-01 RX ADMIN — SODIUM CHLORIDE 8 MG/HR: 9 INJECTION, SOLUTION INTRAVENOUS at 18:26

## 2019-03-01 RX ADMIN — ONDANSETRON 4 MG: 2 INJECTION INTRAMUSCULAR; INTRAVENOUS at 18:14

## 2019-03-01 RX ADMIN — METOCLOPRAMIDE 10 MG: 5 INJECTION, SOLUTION INTRAMUSCULAR; INTRAVENOUS at 16:50

## 2019-03-01 RX ADMIN — PANTOPRAZOLE SODIUM 40 MG: 40 INJECTION, POWDER, FOR SOLUTION INTRAVENOUS at 18:17

## 2019-03-01 RX ADMIN — SODIUM CHLORIDE 90 ML/HR: 0.9 INJECTION, SOLUTION INTRAVENOUS at 21:57

## 2019-03-01 RX ADMIN — DIPHENHYDRAMINE HYDROCHLORIDE 50 MG: 50 INJECTION, SOLUTION INTRAMUSCULAR; INTRAVENOUS at 16:48

## 2019-03-01 RX ADMIN — IOHEXOL 100 ML: 350 INJECTION, SOLUTION INTRAVENOUS at 17:49

## 2019-03-02 LAB
ANION GAP SERPL CALCULATED.3IONS-SCNC: 7 MMOL/L (ref 4–13)
BUN SERPL-MCNC: 18 MG/DL (ref 5–25)
CALCIUM SERPL-MCNC: 9 MG/DL (ref 8.3–10.1)
CHLORIDE SERPL-SCNC: 106 MMOL/L (ref 100–108)
CO2 SERPL-SCNC: 29 MMOL/L (ref 21–32)
CREAT SERPL-MCNC: 1.14 MG/DL (ref 0.6–1.3)
GFR SERPL CREATININE-BSD FRML MDRD: 77 ML/MIN/1.73SQ M
GLUCOSE SERPL-MCNC: 105 MG/DL (ref 65–140)
GLUCOSE SERPL-MCNC: 117 MG/DL (ref 65–140)
GLUCOSE SERPL-MCNC: 148 MG/DL (ref 65–140)
GLUCOSE SERPL-MCNC: 157 MG/DL (ref 65–140)
GLUCOSE SERPL-MCNC: 218 MG/DL (ref 65–140)
HCT VFR BLD AUTO: 33.6 % (ref 36.5–49.3)
HCT VFR BLD AUTO: 36.1 % (ref 36.5–49.3)
HGB BLD-MCNC: 10.9 G/DL (ref 12–17)
HGB BLD-MCNC: 11.8 G/DL (ref 12–17)
MAGNESIUM SERPL-MCNC: 1.9 MG/DL (ref 1.6–2.6)
PHOSPHATE SERPL-MCNC: 4 MG/DL (ref 2.3–4.1)
POTASSIUM SERPL-SCNC: 3.9 MMOL/L (ref 3.5–5.3)
SODIUM SERPL-SCNC: 142 MMOL/L (ref 136–145)

## 2019-03-02 PROCEDURE — 85018 HEMOGLOBIN: CPT | Performed by: INTERNAL MEDICINE

## 2019-03-02 PROCEDURE — 85014 HEMATOCRIT: CPT | Performed by: INTERNAL MEDICINE

## 2019-03-02 PROCEDURE — 83735 ASSAY OF MAGNESIUM: CPT | Performed by: INTERNAL MEDICINE

## 2019-03-02 PROCEDURE — 82948 REAGENT STRIP/BLOOD GLUCOSE: CPT

## 2019-03-02 PROCEDURE — 99232 SBSQ HOSP IP/OBS MODERATE 35: CPT | Performed by: PHYSICIAN ASSISTANT

## 2019-03-02 PROCEDURE — 99222 1ST HOSP IP/OBS MODERATE 55: CPT | Performed by: INTERNAL MEDICINE

## 2019-03-02 PROCEDURE — 80048 BASIC METABOLIC PNL TOTAL CA: CPT | Performed by: INTERNAL MEDICINE

## 2019-03-02 PROCEDURE — 84100 ASSAY OF PHOSPHORUS: CPT | Performed by: INTERNAL MEDICINE

## 2019-03-02 PROCEDURE — C9113 INJ PANTOPRAZOLE SODIUM, VIA: HCPCS | Performed by: INTERNAL MEDICINE

## 2019-03-02 RX ORDER — SUCRALFATE ORAL 1 G/10ML
1000 SUSPENSION ORAL
Status: DISCONTINUED | OUTPATIENT
Start: 2019-03-02 | End: 2019-03-04 | Stop reason: HOSPADM

## 2019-03-02 RX ADMIN — SODIUM CHLORIDE 8 MG/HR: 9 INJECTION, SOLUTION INTRAVENOUS at 14:53

## 2019-03-02 RX ADMIN — METOPROLOL TARTRATE 12.5 MG: 25 TABLET ORAL at 21:28

## 2019-03-02 RX ADMIN — ATORVASTATIN CALCIUM 80 MG: 40 TABLET, FILM COATED ORAL at 17:09

## 2019-03-02 RX ADMIN — SODIUM CHLORIDE 90 ML/HR: 0.9 INJECTION, SOLUTION INTRAVENOUS at 09:45

## 2019-03-02 RX ADMIN — METOPROLOL TARTRATE 12.5 MG: 25 TABLET ORAL at 08:35

## 2019-03-02 RX ADMIN — SUCRALFATE 1000 MG: 1 SUSPENSION ORAL at 17:00

## 2019-03-02 RX ADMIN — INSULIN LISPRO 1 UNITS: 100 INJECTION, SOLUTION INTRAVENOUS; SUBCUTANEOUS at 00:22

## 2019-03-02 RX ADMIN — SODIUM CHLORIDE 90 ML/HR: 0.9 INJECTION, SOLUTION INTRAVENOUS at 21:33

## 2019-03-02 RX ADMIN — INSULIN LISPRO 2 UNITS: 100 INJECTION, SOLUTION INTRAVENOUS; SUBCUTANEOUS at 17:17

## 2019-03-02 RX ADMIN — SODIUM CHLORIDE 8 MG/HR: 9 INJECTION, SOLUTION INTRAVENOUS at 05:00

## 2019-03-02 NOTE — UTILIZATION REVIEW
Notification of Inpatient Admission/Inpatient Authorization Request  This is a Notification of Inpatient Admission/Request for Inpatient Authorization for our facility [address]  Be advised that this patient was admitted to our facility under Inpatient Status  Please contact the Utilization Review Department where the patient is receiving care services for additional admission information  Place of Service Code: 24   Place of Service Name: Inpatient Hospital  Presentation Date & Time: 3/1/2019  3:38 PM  Inpatient Admission Date & Time: 3/1/19 1841  Discharge Date & Time: No discharge date for patient encounter  Discharge Disposition (if discharged): Home/Self Care  Attending Physician: Jazmyne Lopez Md [[id number]]  Admission Orders (From admission, onward)    Ordered        03/01/19 1841  Inpatient Admission  Once     Order ID Start Status   [id number] 03/01/19 1841 Completed                Facility: [facility]   Utilization Review Department  Phone: [phone]; Fax [phone]  [email or web address]  ATTENTION: Please call with any questions or concerns to [phone]  and carefully listen to the prompts so that you are directed to the right person  Send all requests for admission clinical reviews, approved or denied determinations and any other requests to fax [phone]   All voicemails are confidential

## 2019-03-02 NOTE — CONSULTS
Consultation - [address] [facility]  Claudia Abdi 80 y o  male MRN: [id number]  Unit/Bed#: -01 Encounter: [id number]        [facility] CRAIG    Reason for Consult / Principal Problem:     1  Abnormal CT scan with prominent gastric distension with moderate circumferential thickening of the duodenal  2  Borderline anemia with hemoglobin of 11 8 with MCV of 89 (normocytic)  3  Coffee-ground emesis- hemodynamically stable likely secondary to peptic ulcer disease  4  Intractable hiccups which is attributed to his stroke in the past   5  Unintentional weight loss over the last several weeks    ASSESSMENT AND PLAN:      Due to recent stroke we would like to have Neurology clearance as there is no urgency for endoscopic evaluation at this time especially if he is able to tolerate oral intake of food  He can be started on PPI therapy b i d, Carafate b i d  At this time  If Neurology anesthesia give clearance we can plan for endoscopic evaluation on Monday  He does need further evaluation of unintentional weight loss with both EGD and colonoscopy evaluation  I reviewed CT scan report and imaging study  He does have gastric distention may have outlet obstruction possibly from duodenal peptic ulcer disease  Continue monitor stool output, clears for now     ______________________________________________________________________    HPI:      He is a 73-year-old male with recurrent hiccups, recent history of left pontine infarct start on aspirin and Plavix due to this presents here for evaluation of intermittent nausea/vomiting associated coffee-ground emesis  His hemoglobin on presentation was within normal limits currently it is 11 8 with normocytic indices  He denies any NSAID use but has been taking aspirin and Plavix use diagnosed with left pontine CVA approximately 10 days ago  He underwent CT scan of abdomen evaluation which showed duodenitis and gastric distension    He has not had endoscopic evaluation in the past   Symptoms have been ongoing for approximately a week now of nausea/vomiting  Denies any chronic NSAID use  He has had colonoscopy in the past states that was anywhere from 4-5 years ago with polyps  Denies any family history of colorectal cancer  Denies any melena  He does have intermittent dyspepsia but denies any current abdominal pain at this time  REVIEW OF SYSTEMS:    CONSTITUTIONAL: Denies any fever, chills, rigors, and weight loss  HEENT: No earache or tinnitus  Denies hearing loss or visual disturbances  CARDIOVASCULAR: No chest pain or palpitations  RESPIRATORY: Denies any cough, hemoptysis, shortness of breath or dyspnea on exertion  GASTROINTESTINAL: As noted in the History of Present Illness  GENITOURINARY: No problems with urination  Denies any hematuria or dysuria  NEUROLOGIC: No dizziness or vertigo, denies headaches  MUSCULOSKELETAL: Denies any muscle or joint pain  SKIN: Denies skin rashes or itching  ENDOCRINE: Denies excessive thirst  Denies intolerance to heat or cold  PSYCHOSOCIAL: Denies depression or anxiety  Denies any recent memory loss  Historical Information   Past Medical History:   Diagnosis Date    [facility]      History reviewed  No pertinent surgical history  Social History   Social History     Substance and Sexual Activity   Alcohol Use Never    Frequency: Never     Social History     Substance and Sexual Activity   Drug Use Never     Social History     Tobacco Use   Smoking Status Never Smoker   Smokeless Tobacco Never Used     History reviewed  No pertinent family history      Meds/Allergies     Medications Prior to Admission   Medication    acetaminophen (TYLENOL) 325 mg tablet    aspirin (ECOTRIN LOW STRENGTH) 81 mg EC tablet    atorvastatin (LIPITOR) 80 mg tablet    baclofen 10 mg tablet    clopidogrel (PLAVIX) 75 mg tablet    metFORMIN (GLUCOPHAGE) 1000 MG tablet    metoprolol tartrate (LOPRESSOR) 25 mg tablet     Current Facility-Administered Medications   Medication Dose Route Frequency    acetaminophen (TYLENOL) tablet 650 mg  650 mg Oral Q4H PRN    atorvastatin (LIPITOR) tablet 80 mg  80 mg Oral QPM    baclofen tablet 10 mg  10 mg Oral TID PRN    insulin lispro (HumaLOG) 100 units/mL subcutaneous injection 1-5 Units  1-5 Units Subcutaneous Q6H    metoprolol (LOPRESSOR) injection 5 mg  5 mg Intravenous Q6H PRN    metoprolol tartrate (LOPRESSOR) tablet 12 5 mg  12 5 mg Oral Q12H [address]    ondansetron (ZOFRAN) injection 4 mg  4 mg Intravenous Q4H PRN    pantoprazole (PROTONIX) 80 mg in sodium chloride 0 9 % 100 mL infusion  8 mg/hr Intravenous Continuous    sodium chloride 0 9 % infusion  90 mL/hr Intravenous Continuous       No Known Allergies        Objective     Blood pressure 110/68, pulse 79, temperature 98 2 °F (36 8 °C), resp  rate 18, height 5' 5" (1 651 m), weight 81 6 kg (180 lb), SpO2 98 %  Body mass index is 29 95 kg/m²  No intake or output data in the 24 hours ending 03/02/19 1408      PHYSICAL EXAM:      General Appearance:   Alert, cooperative, no distress   HEENT:   Normocephalic, atraumatic, anicteric      Neck:  Supple, symmetrical, trachea midline   Lungs:   Clear to auscultation bilaterally; no rales, rhonchi or wheezing; respirations unlabored    Heart[de-identified]   Regular rate and rhythm; no murmur, rub, or gallop     Abdomen:   Soft, non-tender, non-distended; normal bowel sounds; no masses, no organomegaly    Genitalia:   Deferred    Rectal:   Deferred    Extremities:  No cyanosis, clubbing or edema    Pulses:  2+ and symmetric all extremities    Skin:  No jaundice, rashes, or lesions    Lymph nodes:  No palpable cervical lymphadenopathy        Lab Results:   Admission on 03/01/2019   Component Date Value    WBC 03/01/2019 10 07     RBC 03/01/2019 4 62     Hemoglobin 03/01/2019 13 6     Hematocrit 03/01/2019 41 2     MCV 03/01/2019 89     MCH 03/01/2019 29 4     MCHC 03/01/2019 33 0     RDW 03/01/2019 12 0     MPV 03/01/2019 10 8     Platelets 97/28/3837 282     nRBC 03/01/2019 0     Neutrophils Relative 03/01/2019 78*    Immat GRANS % 03/01/2019 0     Lymphocytes Relative 03/01/2019 16     Monocytes Relative 03/01/2019 6     Eosinophils Relative 03/01/2019 0     Basophils Relative 03/01/2019 0     Neutrophils Absolute 03/01/2019 7 76*    Immature Grans Absolute 03/01/2019 0 03     Lymphocytes Absolute 03/01/2019 1 65     Monocytes Absolute 03/01/2019 0 58     Eosinophils Absolute 03/01/2019 0 02     Basophils Absolute 03/01/2019 0 03     Sodium 03/01/2019 139     Potassium 03/01/2019 4 6     Chloride 03/01/2019 99*    CO2 03/01/2019 28     ANION GAP 03/01/2019 12     BUN 03/01/2019 18     Creatinine 03/01/2019 1 19     Glucose 03/01/2019 237*    Calcium 03/01/2019 10 4*    AST 03/01/2019 18     ALT 03/01/2019 27     Alkaline Phosphatase 03/01/2019 90     Total Protein 03/01/2019 8 3*    Albumin 03/01/2019 3 5     Total Bilirubin 03/01/2019 0 40     eGFR 03/01/2019 73     Protime 03/01/2019 13 7     INR 03/01/2019 1 06     Troponin I 03/01/2019 <0 02     Troponin I 03/01/2019 <0 02     ABO Grouping 03/01/2019 O     Rh Factor 03/01/2019 Positive     Antibody Screen 03/01/2019 Negative     Specimen Expiration Date 03/01/2019 [id number]     POC Glucose 03/01/2019 196*    POC Glucose 03/02/2019 157*    Magnesium 03/02/2019 1 9     Phosphorus 03/02/2019 4 0     Hemoglobin 03/02/2019 11 8*    Hematocrit 03/02/2019 36 1*    Sodium 03/02/2019 142     Potassium 03/02/2019 3 9     Chloride 03/02/2019 106     CO2 03/02/2019 29     ANION GAP 03/02/2019 7     BUN 03/02/2019 18     Creatinine 03/02/2019 1 14     Glucose 03/02/2019 117     Calcium 03/02/2019 9 0     eGFR 03/02/2019 77     POC Glucose 03/02/2019 105     POC Glucose 03/02/2019 148*       Imaging Studies: I have personally reviewed pertinent imaging studies

## 2019-03-02 NOTE — ASSESSMENT & PLAN NOTE
· HbA1c of 8 5 earlier this month  · Hold oral hypoglycemics -  SSI coverage Q6h per Accu-Cheks (while NPO)

## 2019-03-02 NOTE — PLAN OF CARE
Problem: Potential for Falls  Goal: Patient will remain free of falls  Description  INTERVENTIONS:  - Assess patient frequently for physical needs  -  Identify cognitive and physical deficits and behaviors that affect risk of falls  -  [place] fall precautions as indicated by assessment   - Educate patient/family on patient safety including physical limitations  - Instruct patient to call for assistance with activity based on assessment  - Modify environment to reduce risk of injury  - Consider OT/PT consult to assist with strengthening/mobility  Outcome: Progressing     Problem: Nutrition/Hydration-ADULT  Goal: Nutrient/Hydration intake appropriate for improving, restoring or maintaining nutritional needs  Description  Monitor and assess patient's nutrition/hydration status for malnutrition (ex- brittle hair, bruises, dry skin, pale skin and conjunctiva, muscle wasting, smooth red tongue, and disorientation)  Collaborate with interdisciplinary team and initiate plan and interventions as ordered  Monitor patient's weight and dietary intake as ordered or per policy  Utilize nutrition screening tool and intervene per policy  Determine patient's food preferences and provide high-protein, high-caloric foods as appropriate       INTERVENTIONS:  - Monitor oral intake, urinary output, labs, and treatment plans  - Assess nutrition and hydration status and recommend course of action  - Evaluate amount of meals eaten  - Assist patient with eating if necessary   - Allow adequate time for meals  - Recommend/ encourage appropriate diets, oral nutritional supplements, and vitamin/mineral supplements  - Order, calculate, and assess calorie counts as needed  - Recommend, monitor, and adjust tube feedings and TPN/PPN based on assessed needs  - Assess need for intravenous fluids  - Provide specific nutrition/hydration education as appropriate  - Include patient/family/caregiver in decisions related to nutrition  Outcome: Progressing     Problem: PAIN - ADULT  Goal: Verbalizes/displays adequate comfort level or baseline comfort level  Description  Interventions:  - Encourage patient to monitor pain and request assistance  - Assess pain using appropriate pain scale  - Administer analgesics based on type and severity of pain and evaluate response  - Implement non-pharmacological measures as appropriate and evaluate response  - Consider cultural and social influences on pain and pain management  - Notify physician/advanced practitioner if interventions unsuccessful or patient reports new pain  Outcome: Progressing     Problem: INFECTION - ADULT  Goal: Absence or prevention of progression during hospitalization  Description  INTERVENTIONS:  - Assess and monitor for signs and symptoms of infection  - Monitor lab/diagnostic results  - Monitor all insertion sites, i e  indwelling lines, tubes, and drains  - Monitor endotracheal (as able) and nasal secretions for changes in amount and color  - [place] appropriate cooling/warming therapies per order  - Administer medications as ordered  - Instruct and encourage patient and family to use good hand hygiene technique  - Identify and instruct in appropriate isolation precautions for identified infection/condition  Outcome: Progressing  Goal: Absence of fever/infection during neutropenic period  Description  INTERVENTIONS:  - Monitor WBC  - Implement neutropenic guidelines  Outcome: Progressing     Problem: SAFETY ADULT  Goal: Patient will remain free of falls  Description  INTERVENTIONS:  - Assess patient frequently for physical needs  -  Identify cognitive and physical deficits and behaviors that affect risk of falls    -  [place] fall precautions as indicated by assessment   - Educate patient/family on patient safety including physical limitations  - Instruct patient to call for assistance with activity based on assessment  - Modify environment to reduce risk of injury  - Consider OT/PT consult to assist with strengthening/mobility  Outcome: Progressing  Goal: Maintain or return to baseline ADL function  Description  INTERVENTIONS:  -  Assess patient's ability to carry out ADLs; assess patient's baseline for ADL function and identify physical deficits which impact ability to perform ADLs (bathing, care of mouth/teeth, toileting, grooming, dressing, etc )  - Assess/evaluate cause of self-care deficits   - Assess range of motion  - Assess patient's mobility; develop plan if impaired  - Assess patient's need for assistive devices and provide as appropriate  - Encourage maximum independence but intervene and supervise when necessary  ¯ Involve family in performance of ADLs  ¯ Assess for home care needs following discharge   ¯ Request OT consult to assist with ADL evaluation and planning for discharge  ¯ Provide patient education as appropriate  Outcome: Progressing  Goal: Maintain or return mobility status to optimal level  Description  INTERVENTIONS:  - Assess patient's baseline mobility status (ambulation, transfers, stairs, etc )    - Identify cognitive and physical deficits and behaviors that affect mobility  - Identify mobility aids required to assist with transfers and/or ambulation (gait belt, sit-to-stand, lift, walker, cane, etc )  - [place] fall precautions as indicated by assessment  - Record patient progress and toleration of activity level on Mobility SBAR; progress patient to next Phase/Stage  - Instruct patient to call for assistance with activity based on assessment  - Request Rehabilitation consult to assist with strengthening/weightbearing, etc   Outcome: Progressing     Problem: DISCHARGE PLANNING  Goal: Discharge to home or other facility with appropriate resources  Description  INTERVENTIONS:  - Identify barriers to discharge w/patient and caregiver  - Arrange for needed discharge resources and transportation as appropriate  - Identify discharge learning needs (meds, wound care, etc )  - Arrange for interpretive services to assist at discharge as needed  - Refer to Case Management Department for coordinating discharge planning if the patient needs post-hospital services based on physician/advanced practitioner order or complex needs related to functional status, cognitive ability, or social support system  Outcome: Progressing     Problem: Knowledge Deficit  Goal: Patient/family/caregiver demonstrates understanding of disease process, treatment plan, medications, and discharge instructions  Description  Complete learning assessment and assess knowledge base    Interventions:  - Provide teaching at level of understanding  - Provide teaching via preferred learning methods  Outcome: Progressing

## 2019-03-02 NOTE — ASSESSMENT & PLAN NOTE
Patient presented with episodes of Coffee-ground emesis, hiccups, and burning epigastric pain  He also reports an unintentional weight loss but cannot clarify the amount  HGB currently 11 8  Continue to trend serially  CT Scan shows moderate circumferential thickening of the duodenum  Continue Protonix drip  IVFs  NPO  Holding ASA/Plavix    GI consulted for EGD - evaluate for PUD, malignancy, etc

## 2019-03-02 NOTE — UTILIZATION REVIEW
Initial Clinical Review    Admission: Date/Time/Statement: 3/1/19 @ 1841 INPATIENT  Orders Placed This Encounter   Procedures    Inpatient Admission     Standing Status:   Standing     Number of Occurrences:   1     Order Specific Question:   Admitting Physician     Answer:   Jaimie Flores     Order Specific Question:   Level of Care     Answer:   Med Surg [16]     Order Specific Question:   Estimated length of stay     Answer:   More than 2 Midnights     Order Specific Question:   Certification     Answer:   I certify that inpatient services are medically necessary for this patient for a duration of greater than two midnights  See H&P and MD Progress Notes for additional information about the patient's course of treatment  ED: Date/Time/Mode of Arrival:   ED Arrival Information     Expected Arrival Acuity Means of Arrival Escorted By Service Admission Type    - 3/1/2019 13:57 Emergent Walk-In Spouse General Medicine Urgent    Arrival Complaint    HICCUPS        Chief Complaint:   Chief Complaint   Patient presents with    Hiccups     patient is c/o hiccups x 2 weeks     History of Illness:      69-year-old male presents to the emergency department for evaluation of chest discomfort and hiccups  According to the patient, over the last several days he has been unable to eat because every time he tries to eat, he regurgitates the food that he is trying to consume  The patient has had hiccup- like spasms of his abdomen and his diaphragm which are also concerning to him  The patient states he feels dehydrated because he can't drink fluids easily          ED Vital Signs:   ED Triage Vitals   Temperature Pulse Respirations Blood Pressure SpO2   03/01/19 1403 03/01/19 1403 03/01/19 1404 03/01/19 1403 03/01/19 1403   97 8 °F (36 6 °C) 93 20 149/84 98 %      Temp Source Heart Rate Source Patient Position - Orthostatic VS BP Location FiO2 (%)   03/01/19 1403 03/01/19 1403 03/01/19 1403 03/01/19 1403 --   Oral Monitor Sitting Left arm       Pain Score       03/01/19 1918       No Pain        Wt Readings from Last 1 Encounters:   03/02/19 81 6 kg (180 lb)     Vital Signs (abnormal): WNL    Pertinent Labs/Diagnostic Test Results:     Glucose = 237       Ref Range & Units 3/2/19 0553 3/1/19 1646   Hemoglobin 12 0 - 17 0 g/dL 11 8Low   13 6    Hematocrit 36 5 - 49 3 % 36 1Low   41 2            CT chest/abdomen/pelvis: Prominent gastric distention  Moderate circumferential thickening of the duodenum in keeping with a nonspecific duodenitis  Ulcer disease is not excluded  Duodenal malignancy is also part of the differential diagnosis  Nonspecific circumferential thickening of the distal esophagus  ED Treatment:   Medication Administration from 03/01/2019 1357 to 03/01/2019 2155       Date/Time Order Dose Route Action     03/01/2019 1650 metoclopramide (REGLAN) injection 10 mg 10 mg Intravenous Given     03/01/2019 1648 diphenhydrAMINE (BENADRYL) injection 50 mg 50 mg Intravenous Given     03/01/2019 1814 diphenhydrAMINE (BENADRYL) injection 50 mg 50 mg Intravenous Given     03/01/2019 1814 ondansetron (ZOFRAN) injection 4 mg 4 mg Intravenous Given     03/01/2019 1817 pantoprazole (PROTONIX) injection 40 mg 40 mg Intravenous Given     03/01/2019 1826 pantoprazole (PROTONIX) 80 mg in sodium chloride 0 9 % 100 mL infusion 8 mg/hr Intravenous New Bag        Past Medical/Surgical History:     Past Medical History:   Diagnosis Date    Diabetes mellitus ([facility] [address] )      Admitting Diagnosis: Coffee ground emesis [K92 0]  Hiccups [R06 6]  Duodenitis [K29 80]  Age/Sex: 80 y o  male       Assessment/Plan:      80 y o  male who presented to the ED from home with intractable hiccups, nausea and vomiting and chest discomfort  He was recently admitted and discharged from the hospital just a week prior for an acute stroke for which he was placed on dual anti-platelet therapy with ASA/Plavix    He notes that over the last few days his vomitus was black in color with suspicion of bloody tinges  He does note some associated epigastric tenderness  He also notes decreased appetite over the last several weeks along with an unintentional weight loss  Generalized weakness/fatigue is evident  Plan:  NPO; Protonix drip, IV fluids, trend H&H, hold ASA/Plavix, consult Gastroenterology, QID accu- checks while NPO  Admission Orders: NPO, H&H Q12H, Gastroenterology consult, PT eval, sequential compression device  Scheduled Meds:   Current Facility-Administered Medications:  acetaminophen 650 mg Oral Q4H PRN   atorvastatin 80 mg Oral QPM   baclofen 10 mg Oral TID PRN   insulin lispro 1-5 Units Subcutaneous Q6H   metoprolol 5 mg Intravenous Q6H PRN   metoprolol tartrate 12 5 mg Oral Q12H PINO   ondansetron 4 mg Intravenous Q4H PRN   sucralfate 1,000 mg Oral BID AC     Continuous Infusions:   pantoprozole (PROTONIX) infusion (Continuous) 8 mg/hr   sodium chloride 90 mL/hr                     Network Utilization Review Department  Phone: [phone]; Fax [phone]  [email or web address]  ATTENTION: Please call with any questions or concerns to [phone]  and carefully listen to the prompts so that you are directed to the right person  Send all requests for admission clinical reviews, approved or denied determinations and any other requests to fax [phone]   All voicemails are confidential

## 2019-03-02 NOTE — ASSESSMENT & PLAN NOTE
· He was diagnosed with bilateral pontine lacunar infarcts just a few weeks ago  · In setting of acute GIB, ASA/Plavix was held until cleared for resuming anti-platelet agents by gastroenterology  · Continue Lipitor

## 2019-03-02 NOTE — PROGRESS NOTES
Progress Note Jomar Flores 1950, 80 y o  male MRN: [id number]    Unit/Bed#: -01 Encounter: [id number]    Primary Care Provider: No primary care provider on file  Date and time admitted to hospital: 3/1/2019  3:38 PM        * Coffee ground emesis  Assessment & Plan  Patient presented with episodes of Coffee-ground emesis, hiccups, and burning epigastric pain  He also reports an unintentional weight loss but cannot clarify the amount  HGB currently 11 8  Continue to trend serially  CT Scan shows moderate circumferential thickening of the duodenum  Continue Protonix drip  IVFs  NPO  Holding ASA/Plavix  GI consulted for EGD - evaluate for PUD, malignancy, etc     Recent cerebrovascular accident  Assessment & Plan  · He was diagnosed with bilateral pontine lacunar infarcts just a few weeks ago  · In setting of acute GIB, ASA/Plavix was held until cleared for resuming anti-platelet agents by gastroenterology  · Continue Lipitor  Mixed hyperlipidemia  Assessment & Plan  · Continue Lipitor  Diabetes mellitus type 2   Assessment & Plan  · HbA1c of 8 5 earlier this month  · Hold oral hypoglycemics -  SSI coverage Q6h per Accu-Cheks (while NPO)  VTE Pharmacologic Prophylaxis:   Pharmacologic: Pharmacologic VTE Prophylaxis contraindicated due to GIB  Mechanical VTE Prophylaxis in Place: Yes    Patient Centered Rounds: I have performed bedside rounds with nursing staff today  Discussions with Specialists or Other Care Team Provider: Awaiting GI consultation  Education and Discussions with Family / Patient: Discussed plan of care with patient  Time Spent for Care: 30 minutes  More than 50% of total time spent on counseling and coordination of care as described above      Current Length of Stay: 1 day(s)    Current Patient Status: Inpatient   Certification Statement: The patient will continue to require additional inpatient hospital stay due to further evaluation of GIB     Discharge Plan: Not medically cleared  Code Status: Level 1 - Full Code      Subjective:   Patient was admitted with coffee ground emesis at home  He reports he had episodes for several days  Also with hiccups x couple weeks and burning epigastric pain  No melena  He reports weight loss but cannot clarify how much  He recently was admitted with a CVA and was started on Asa/Plavix  Objective:     Vitals:   Temp (24hrs), Av 3 °F (36 8 °C), Min:97 8 °F (36 6 °C), Max:98 8 °F (37 1 °C)    Temp:  [97 8 °F (36 6 °C)-98 8 °F (37 1 °C)] 98 2 °F (36 8 °C)  HR:  [79-95] 79  Resp:  [18-20] 18  BP: (106-149)/(66-84) 110/68  SpO2:  [98 %-99 %] 98 %  Body mass index is 29 95 kg/m²  Input and Output Summary (last 24 hours):     No intake or output data in the 24 hours ending 19 1202    Physical Exam:     Physical Exam   Constitutional: He is oriented to person, place, and time  No distress  HENT:   Head: Normocephalic and atraumatic  Eyes: No scleral icterus  Neck: Neck supple  Cardiovascular: Normal rate and regular rhythm  Pulmonary/Chest: Effort normal  No respiratory distress  He has no wheezes  He has no rales  Abdominal: Soft  Bowel sounds are normal  He exhibits no distension  There is no tenderness  Musculoskeletal: He exhibits no edema  Neurological: He is alert and oriented to person, place, and time  Moving all extremities without difficulty  Skin: Skin is warm and dry  He is not diaphoretic  Vitals reviewed           Additional Data:     Labs:    Results from last 7 days   Lab Units 19  0553 19  1646   WBC Thousand/uL  --  10 07   HEMOGLOBIN g/dL 11 8* 13 6   HEMATOCRIT % 36 1* 41 2   PLATELETS Thousands/uL  --  282   NEUTROS PCT %  --  78*   LYMPHS PCT %  --  16   MONOS PCT %  --  6   EOS PCT %  --  0     Results from last 7 days   Lab Units 19  0542 19  1646   POTASSIUM mmol/L 3 9 4 6   CHLORIDE mmol/L 106 99*   CO2 mmol/L 29 28   BUN mg/dL 18 18   CREATININE mg/dL 1 14 1 19   CALCIUM mg/dL 9 0 10 4*   ALK PHOS U/L  --  90   ALT U/L  --  27   AST U/L  --  18     Results from last 7 days   Lab Units 03/01/19  1731   INR  1 06       * I Have Reviewed All Lab Data Listed Above  * Additional Pertinent Lab Tests Reviewed: All Labs Within Last 24 Hours Reviewed    Imaging:    Imaging Reports Reviewed Today Include: CT A/P as noted above  Recent Cultures (last 7 days):           Last 24 Hours Medication List:     Current Facility-Administered Medications:  acetaminophen 650 mg Oral Q4H PRN Kiana Vargas MD    atorvastatin 80 mg Oral QPM Kiana Vargas MD    baclofen 10 mg Oral TID PRN Kiana Vargas MD    insulin lispro 1-5 Units Subcutaneous Q6H Kiana Vargas MD    metoprolol 5 mg Intravenous Q6H PRN Kiana Vargas MD    metoprolol tartrate 12 5 mg Oral Q12H [facility] Kiana Vargas MD    ondansetron 4 mg Intravenous Q4H PRN Kiana Vargas MD    pantoprozole (PROTONIX) infusion (Continuous) 8 mg/hr Intravenous Continuous Kiana Vargas MD Last Rate: 8 mg/hr (03/02/19 0500)   sodium chloride 90 mL/hr Intravenous Continuous Kiana Vargas MD Last Rate: 90 mL/hr (03/02/19 0945)        Today, Patient Was Seen By: Starr Huynh PA-C    ** Please Note: Dictation voice to text software may have been used in the creation of this document   **

## 2019-03-02 NOTE — H&P
[facility]        PATIENT INFORMATION      Elva Santamaria 80 y o  male MRN: [id number]  Unit/Bed#: ED 25 Encounter: [id number]  Admitting Physician: Sreedhar Maki MD  PCP: No primary care provider on file  Date of Admission:  03/01/19      ASSESSMENTS & PLAN       Coffee ground emesis  Assessment & Plan  - remains NPO - Protonix drip initiated in the ER - continue maintenance IV fluids  - H/H currently stable - continue to trend  - holding ASA/Plavix  - CT of abdomen per reading radiologist read as "Prominent gastric distention  Moderate circumferential thickening of the duodenum #2/55, #601/74 in keeping with a nonspecific duodenitis  Ulcer disease is not excluded  Duodenal malignancy is also part of the differential diagnosis  Nonspecific circumferential thickening of the distal esophagus  "  - await gastroenterology evaluation in regards to EGD    Diabetes mellitus type 2   Assessment & Plan  - HbA1c of 8 5 earlier this month  - hold oral hypoglycemics - initiated SSI coverage Q6h per [employer] (while NPO)     Recent cerebrovascular accident  Assessment & Plan  - diagnosed with bilateral pontine lacunar infarcts just a few weeks ago  - in light of coffee-ground emesis, will hold ASA/Plavix until cleared for resuming anti-platelet agents by gastroenterology  - continue Lipitor/Lopressor   - PT/OT as tolerated      DVT Prophylaxis:  SCDs      CHIEF COMPLAINT      Hiccups with nausea/vomiting      HISTORY OF PRESENT ILLNESS      Elva Santamaria is a 80 y o  male who presents with complaints of intractable headache ups with intermittent nausea/vomiting over the last 2-3 days  Notably, he was recently admitted and discharged from the hospital just a week prior for an acute stroke for which he was placed on dual anti-platelet therapy with ASA/Plavix    He notes that over the last few days his vomitus was black in color with suspicion of bloody tinges  In the ER, his hemoglobin checked was within normal limits and he was started on a Protonix drip  He does note some associated epigastric tenderness  Upon my encounter in the ER, family is present at bedside and he notes that his hiccups have currently resolved  He also notes decreased appetite over the last several weeks along with an unintentional weight loss  Denies any changes in his bowel movements or habits at this time  Overall, he remains in hopeful in pleasant spirits  Generalized weakness/fatigue is evident  REVIEW OF SYSTEMS      Review of Systems - A thorough 12 point review systems was conducted  Pertinent positives and negatives are mentioned in the history of present illness  PAST MEDICAL & SURGICAL HISTORY      Past Medical History:   Diagnosis Date    Diabetes mellitus ([facility] [address] )        History reviewed  No pertinent surgical history  MEDICATIONS & ALLERGIES       Prior to Admission medications    Medication Sig Start Date End Date Taking?  Authorizing Provider   acetaminophen (TYLENOL) 325 mg tablet Take 2 tablets (650 mg total) by mouth every 4 (four) hours as needed for headaches 2/24/19   Christoph Landeros MD   aspirin (ECOTRIN LOW STRENGTH) 81 mg EC tablet Take 1 tablet (81 mg total) by mouth daily 2/25/19   Christoph Landeros MD   atorvastatin (LIPITOR) 80 mg tablet Take 1 tablet (80 mg total) by mouth every evening 2/24/19   Christoph Landeros MD   baclofen 10 mg tablet Take 1 tablet (10 mg total) by mouth 3 (three) times a day as needed for muscle spasms 2/24/19   Christoph Landeros MD   clopidogrel (PLAVIX) 75 mg tablet Take 1 tablet (75 mg total) by mouth daily 2/25/19   Christoph Landeros MD   metFORMIN (GLUCOPHAGE) 1000 MG tablet Take 0 5 tablets (500 mg total) by mouth 2 (two) times a day with meals 2/24/19   Christoph Landeros MD   metoprolol tartrate (LOPRESSOR) 25 mg tablet Take 0 5 tablets (12 5 mg total) by mouth every 12 (twelve) hours 2/24/19   Aniya Monahan MD         Allergies: No Known Allergies      SOCIAL HISTORY         Occupation:  Retired salesman    Substance Use History:   Social History     Substance and Sexual Activity   Alcohol Use Never    Frequency: Never     Social History     Tobacco Use   Smoking Status Never Smoker   Smokeless Tobacco Never Used     Social History     Substance and Sexual Activity   Drug Use Never         FAMILY HISTORY      Significant diabetes mellitus in mother and siblings        PHYSICAL EXAM      Vitals:   Blood Pressure: 147/76 (03/01/19 1730)  Pulse: 95 (03/01/19 1730)  Temperature: 97 8 °F (36 6 °C) (03/01/19 1403)  Temp Source: Oral (03/01/19 1403)  Respirations: 18 (03/01/19 1730)  SpO2: 99 % (03/01/19 1730)      GENERAL:  Well-developed/nourished - intermittent distress from nausea/vomiting  HEAD:  Normocephalic - atraumatic  EYES: PERRL - EOMI    MOUTH:  Mucosa moist  NECK:  Supple - full range of motion  CARDIAC:  Regular rate/rhythm - S1/S2 positive  PULMONARY:  Clear breath sounds bilaterally - nonlabored respirations  ABDOMEN:  Soft - nondistended - active bowel sounds - mild epigastric tenderness to palpation  NEUROLOGIC:  Alert/oriented x 3  SKIN:  Chronic wrinkles/blemishes   PSYCHIATRIC:  Mood/affect age-appropriate      ADDITIONAL DATA     Lab Results:     Results from last 7 days   Lab Units 03/01/19  1646   WBC Thousand/uL 10 07   HEMOGLOBIN g/dL 13 6   HEMATOCRIT % 41 2   PLATELETS Thousands/uL 282   NEUTROS PCT % 78*   LYMPHS PCT % 16   MONOS PCT % 6   EOS PCT % 0     Results from last 7 days   Lab Units 03/01/19  1646   SODIUM mmol/L 139   POTASSIUM mmol/L 4 6   CHLORIDE mmol/L 99*   CO2 mmol/L 28   BUN mg/dL 18   CREATININE mg/dL 1 19   CALCIUM mg/dL 10 4*   ALK PHOS U/L 90   ALT U/L 27   AST U/L 18     Results from last 7 days   Lab Units 03/01/19  1731   INR  1 06       Imaging:     Cta Head And Neck W Wo Contrast    Result Date: 2/20/2019  Narrative: CTA NECK AND BRAIN WITH AND WITHOUT CONTRAST INDICATION: Ataxia, stroke suspected as etiology Vertigo, persistent, central COMPARISON:   CT, and MR 2/18/2019, TECHNIQUE:  Routine CT imaging of the Brain without contrast   Post contrast imaging was performed after administration of iodinated contrast through the neck and brain  Post contrast axial 0 625 mm images timed to opacify the arterial system  3D rendering was performed on an independent workstation  MIP reconstructions performed  Coronal reconstructions were performed of the noncontrast portion of the brain  Radiation dose length product (DLP) for this visit:  1330 mGy-cm   This examination, like all CT scans performed in the [facility], was performed utilizing techniques to minimize radiation dose exposure, including the use of iterative reconstruction and automated exposure control  IV Contrast:  85 mL of iohexol (OMNIPAQUE)  IMAGE QUALITY:   Diagnostic FINDINGS: NONCONTRAST BRAIN PARENCHYMA:  Ill-defined hypodensity in the central jenaro, corresponding to the focus of recent ischemia on the MR 2 days earlier  Footprint of this low density is more impressive than on recent MR  Have patient's symptoms progress question  Diffuse generalized volume loss, mild degree of chronic small vessel disease  Vascular calcifications VENTRICLES AND EXTRA-AXIAL SPACES: Normal for age  VISUALIZED ORBITS AND PARANASAL SINUSES:  Retention cyst or polyp right maxillary sinus, mucosal thickening left maxillary sinus  CERVICAL VASCULATURE AORTIC ARCH AND GREAT VESSELS:  Normal aortic arch and great vessel origins  Normal visualized subclavian vessels  RIGHT VERTEBRAL ARTERY CERVICAL SEGMENT:  Normal origin  The vessel is normal in caliber throughout the neck  LEFT VERTEBRAL ARTERY CERVICAL SEGMENT:  Normal origin  The vessel is normal in caliber throughout the neck  RIGHT EXTRACRANIAL CAROTID SEGMENT:  Normal caliber common carotid artery    Normal bifurcation and cervical internal carotid artery  No stenosis or dissection  LEFT EXTRACRANIAL CAROTID SEGMENT:  Normal caliber common carotid artery  Normal bifurcation and cervical internal carotid artery  No stenosis or dissection  NASCET criteria was used to determine the degree of internal carotid artery diameter stenosis  INTRACRANIAL VASCULATURE INTERNAL CAROTID ARTERIES:  Normal enhancement of the intracranial portions of the internal carotid arteries  Normal ophthalmic artery origins  Normal ICA terminus  Posterior communicating arteries patent  ANTERIOR CIRCULATION:  Symmetric A1 segments and anterior cerebral arteries with normal enhancement  Normal anterior communicating artery  MIDDLE CEREBRAL ARTERY CIRCULATION:  M1 segment and middle cerebral artery branches demonstrate normal enhancement bilaterally  DISTAL VERTEBRAL ARTERIES:  Normal distal vertebral arteries  Posterior inferior cerebellar artery origins are normal  Normal vertebral basilar junction  BASILAR ARTERY:  Basilar artery is normal in caliber  Normal superior cerebellar arteries  POSTERIOR CEREBRAL ARTERIES: Both posterior cerebral arteries arises from the basilar tip  Both arteries demonstrate normal enhancement  Normal posterior communicating arteries  DURAL VENOUS SINUSES:  Normal  NON VASCULAR ANATOMY BONY STRUCTURES:  No acute osseous abnormality  Fused anterior osteophytes extending from the mid cervical to the upper thoracic spine  SOFT TISSUES OF THE NECK:  Normal  THORACIC INLET:  Unremarkable  Impression: Ischemic edema central jenaro slightly more prominent than that depicted on recent MR  No hemorrhage or mass effect  No large vessel flow restrictive disease within the head or neck  Workstation performed: [id number]     Ct Head Wo Contrast    Result Date: 2/20/2019  Narrative: CT BRAIN - WITHOUT CONTRAST INDICATION:   slurred speech  COMPARISON:  2/19/2019 TECHNIQUE:  CT examination of the brain was performed  In addition to axial images, coronal 2D reformatted images were created and submitted for interpretation  Radiation dose length product (DLP) for this visit:  880 mGy-cm   This examination, like all CT scans performed in the [facility], was performed utilizing techniques to minimize radiation dose exposure, including the use of iterative reconstruction and automated exposure control  IMAGE QUALITY:  Diagnostic  FINDINGS: PARENCHYMA:  There is a relatively large area of decreased attenuation noted within the mid to inferior jenaro on series 2 images 10 and 11 corresponding to the area of ischemia identified on MRI dated 2/18/2019 consistent with subacute infarction  The CT  findings are more pronounced than the MR findings and if patient's symptoms are worsening, consider repeat diffusion-weighted imaging to reassess stroke size  No hemorrhagic transformation  Mild cerebral volume loss with scattered decreased attenuation suggestive of chronic microangiopathy  VENTRICLES AND EXTRA-AXIAL SPACES:  Normal for the patient's age  VISUALIZED ORBITS AND PARANASAL SINUSES:  Unremarkable orbits  Small retention cysts are noted within the maxillary sinuses bilaterally  CALVARIUM AND EXTRACRANIAL SOFT TISSUES:  Normal      Impression: Subacute infarct identified within the mid to inferior jenaro corresponding to the subtle diffusion abnormality identified on MRI 2 days ago  The CT finding appears more prominent than the diffusion abnormality on MRI  If patient's symptoms are worsening, consider repeat diffusion-weighted imaging to reassess stroke size  No hemorrhagic transformation  Mild chronic microangiopathic change within the cerebral hemispheres  The study was marked in [facility] for immediate notification  Workstation performed: [id number]     Ct Head Without Contrast    Result Date: 2/18/2019  Narrative: CT BRAIN - WITHOUT CONTRAST INDICATION:   dizziness eval ich  COMPARISON:  None   TECHNIQUE:  CT examination of the brain was performed  In addition to axial images, coronal 2D reformatted images were created and submitted for interpretation  Radiation dose length product (DLP) for this visit:  827 mGy-cm   This examination, like all CT scans performed in the [facility], was performed utilizing techniques to minimize radiation dose exposure, including the use of iterative reconstruction and automated exposure control  IMAGE QUALITY:  Diagnostic  FINDINGS: PARENCHYMA:  No intracranial mass, mass effect or midline shift  No CT signs of acute infarction  No acute parenchymal hemorrhage  VENTRICLES AND EXTRA-AXIAL SPACES:  Normal for the patient's age  VISUALIZED ORBITS AND PARANASAL SINUSES:  No acute abnormality involving the orbits  Mild scattered sinus mucosal thickening is noted  No fluid levels are seen  CALVARIUM AND EXTRACRANIAL SOFT TISSUES:  Normal      Impression: No acute intracranial abnormality  Workstation performed: [id number]     Mri Brain Wo Contrast    Result Date: 2/19/2019  Narrative: MRI BRAIN WITHOUT CONTRAST INDICATION: TIA  weakness, slow speech, difficulty walking since yesterday COMPARISON:   None  TECHNIQUE:  Sagittal T1, axial T2, axial FLAIR, axial T1, axial [place] and axial diffusion imaging  IMAGE QUALITY:  Diagnostic  FINDINGS: BRAIN PARENCHYMA:  Patchy areas of diffusion restriction noted in the anterior aspect of the jenaro bilaterally, seen on images 7 and 8 of series 3, indicative of recent pontine lacunar infarction  Elsewhere there are mild patchy periventricular and subcortical foci of FLAIR hyperintensities in the supratentorial brain  No acute intracranial hemorrhage  No extra-axial fluid collection  Cerebellar tonsils normally positioned  VENTRICLES:  The ventricles are normal in size and contour   SELLA AND PITUITARY GLAND:  Normal  ORBITS:  Normal  PARANASAL SINUSES:  Mixed signal intensity lesion within the right maxillary sinus likely mucus retention cyst with inspissated secretions versus fungal colonization  Mild or other maxillary sinus signal abnormality  VASCULATURE:  Evaluation of the major intracranial vasculature demonstrates appropriate flow voids  CALVARIUM AND SKULL BASE:  Normal  EXTRACRANIAL SOFT TISSUES:  Normal      Impression: 1  Patchy areas of faint diffusion restriction bilaterally in the jenaro suggesting recent /acute pontine lacunar infarctions  2   Mild, chronic microangiopathy  Workstation performed: [id number]     Ct Chest Abdomen Pelvis W Contrast    Result Date: 3/1/2019  Narrative: CT CHEST, ABDOMEN AND PELVIS WITH IV CONTRAST INDICATION:   weeks of hiccups and unable to swallow foods  COMPARISON:  None  TECHNIQUE: CT examination of the chest, abdomen and pelvis was performed  Axial, sagittal, and coronal 2D reformatted images were created from the source data and submitted for interpretation  Radiation dose length product (DLP) for this visit:  613 mGy-cm   This examination, like all CT scans performed in the [facility], was performed utilizing techniques to minimize radiation dose exposure, including the use of iterative reconstruction and automated exposure control  IV Contrast:  100 mL of iohexol (OMNIPAQUE) Enteric Contrast: Enteric contrast was administered  FINDINGS: CHEST LUNGS:  Lungs are clear  There is no tracheal or endobronchial lesion  PLEURA:  Unremarkable  HEART/GREAT VESSELS:  Unremarkable for patient's age  MEDIASTINUM AND BORIS:  Mild nonspecific circumferential thickening of the distal esophagus   CHEST WALL AND LOWER NECK: 5 mm smaller left thyroid hypodensities  Incidental discovery of one or more thyroid nodule(s) measuring less than 1 5 cm and without suspicious features is noted in this patient who is above 28years old; according to guidelines published in the February 2015 white paper on incidental thyroid nodules in the Journal of the [employer] of Radiology Aydee Rossi), no further evaluation is recommended  ABDOMEN LIVER/BILIARY TREE:  13 mm posterior right hepatic hypodensity in keeping with a simple cyst  GALLBLADDER:  No calcified gallstones  No pericholecystic inflammatory change  SPLEEN:  Unremarkable  PANCREAS:  Unremarkable  ADRENAL GLANDS:  Unremarkable  KIDNEYS/URETERS:  Unremarkable  No hydronephrosis  STOMACH AND BOWEL:  Prominent gastric distention  Moderate circumferential thickening of the duodenum #2/55, #601/74 in keeping with a nonspecific duodenitis  Ulcer disease is not excluded  Duodenal malignancy is also part of the differential diagnosis  Scattered colonic diverticuli  No acute diverticulitis  APPENDIX:  Noninflamed ABDOMINOPELVIC CAVITY:  No ascites or free intraperitoneal air  No lymphadenopathy  VESSELS:  Unremarkable for patient's age  PELVIS REPRODUCTIVE ORGANS:  Partially imaged penile prostheses  URINARY BLADDER:  Unremarkable  ABDOMINAL WALL/INGUINAL REGIONS:  Unremarkable  OSSEOUS STRUCTURES:  No acute fracture or destructive osseous lesion  Impression: Prominent gastric distention  Moderate circumferential thickening of the duodenum #2/55, #601/74 in keeping with a nonspecific duodenitis  Ulcer disease is not excluded  Duodenal malignancy is also part of the differential diagnosis  Nonspecific circumferential thickening of the distal esophagus  The study was marked in [facility] for immediate notification  Workstation performed: [id number]     Vas Carotid Complete Study    Result Date: 2/18/2019  Narrative:  THE [facility]ULAR [facility]ENTER REPORT CLINICAL: Indications:  Transient cerebral ischemic attack, unspecified [G45 9]  Dizziness  FINDINGS:  Right        Impression  PSV  EDV (cm/s)  Direction of Flow  Ratio  Dist  ICA                 74          30                      0 74  Mid  ICA                  79          27                      0 79  Prox   ICA    Normal       92          22                      0 92  Dist CCA                  95          19 Mid CCA                  100          22                      0 92  Prox CCA                 108          21                            Ext Carotid               83           9                      0 83  Prox Vert                 36           6  Antegrade                 Subclavian                99           0                             Left         Impression  PSV  EDV (cm/s)  Direction of Flow  Ratio  Dist  ICA                 85          25                      0 82  Mid  ICA                  70          26                      0 68  Prox  ICA    Normal       77          19                      0 75  Dist CCA                 110          19                            Mid CCA                  103          19                      0 74  Prox CCA                 140          27                            Ext Carotid              104          10                      1 01  Prox Vert                 59          12  Antegrade                 Subclavian               116           0                               CONCLUSION: Impression RIGHT: There is no evidence of disease throughout the extracranial carotid arteries  Vertebral artery flow is antegrade  There is no significant subclavian artery disease  LEFT: There is no evidence of disease throughout the extracranial carotid arteries  Vertebral artery flow is antegrade  There is no significant subclavian artery disease  Internal carotid artery stenosis determination by consensus criteria from: Jo Tobin et al  Carotid Artery Stenosis: Gray-Scale and Doppler US Diagnosis - Society of Radiologists in [address] [place] [address], Radiology 2003; [id number]  SIGNATURE: Electronically Signed by: Jody Smith MD, RPVI on 2019-02-18 09:16:17 PM      Code Status:  Full code      Anticipated Length of Stay:  Patient will be admitted on an Inpatient basis with an anticipated length of stay of  greater than 2 midnights     Justification for ABDIFATAH SUAREZ Stay:  Coffee-ground emesis requiring gastroenterology evaluation, hemoglobin monitor, and IV fluids  Total Time for Visit, including Counseling / Coordination of Care: 72 minutes  Greater than 50% of this total time spent on direct patient counseling and coordination of care  ** Please Note: This note is constructed using a voice recognition dictation system  An occasional wrong word/phrase or sound-a-like substitution may have been picked up by dictation device due to the inherent limitations of voice recognition software  Read the chart carefully and recognize, using reasonable context, where substitutions may have occurred  **

## 2019-03-03 PROBLEM — K29.80 DUODENITIS: Status: ACTIVE | Noted: 2019-03-01

## 2019-03-03 LAB
ATRIAL RATE: 100 BPM
ATRIAL RATE: 104 BPM
GLUCOSE SERPL-MCNC: 120 MG/DL (ref 65–140)
GLUCOSE SERPL-MCNC: 179 MG/DL (ref 65–140)
GLUCOSE SERPL-MCNC: 183 MG/DL (ref 65–140)
HCT VFR BLD AUTO: 33.1 % (ref 36.5–49.3)
HCT VFR BLD AUTO: 33.2 % (ref 36.5–49.3)
HGB BLD-MCNC: 10.7 G/DL (ref 12–17)
HGB BLD-MCNC: 10.8 G/DL (ref 12–17)
P AXIS: 59 DEGREES
P AXIS: 60 DEGREES
PR INTERVAL: 106 MS
PR INTERVAL: 124 MS
QRS AXIS: -35 DEGREES
QRS AXIS: -36 DEGREES
QRSD INTERVAL: 78 MS
QRSD INTERVAL: 80 MS
QT INTERVAL: 374 MS
QT INTERVAL: 384 MS
QTC INTERVAL: 491 MS
QTC INTERVAL: 495 MS
T WAVE AXIS: 57 DEGREES
T WAVE AXIS: 58 DEGREES
VENTRICULAR RATE: 100 BPM
VENTRICULAR RATE: 104 BPM

## 2019-03-03 PROCEDURE — 99223 1ST HOSP IP/OBS HIGH 75: CPT | Performed by: PSYCHIATRY & NEUROLOGY

## 2019-03-03 PROCEDURE — 93005 ELECTROCARDIOGRAM TRACING: CPT

## 2019-03-03 PROCEDURE — 93010 ELECTROCARDIOGRAM REPORT: CPT | Performed by: INTERNAL MEDICINE

## 2019-03-03 PROCEDURE — 82948 REAGENT STRIP/BLOOD GLUCOSE: CPT

## 2019-03-03 PROCEDURE — C9113 INJ PANTOPRAZOLE SODIUM, VIA: HCPCS | Performed by: INTERNAL MEDICINE

## 2019-03-03 PROCEDURE — 85018 HEMOGLOBIN: CPT | Performed by: INTERNAL MEDICINE

## 2019-03-03 PROCEDURE — 85014 HEMATOCRIT: CPT | Performed by: INTERNAL MEDICINE

## 2019-03-03 PROCEDURE — 99232 SBSQ HOSP IP/OBS MODERATE 35: CPT | Performed by: PHYSICIAN ASSISTANT

## 2019-03-03 RX ORDER — GABAPENTIN 300 MG/1
300 CAPSULE ORAL 3 TIMES DAILY
Status: DISCONTINUED | OUTPATIENT
Start: 2019-03-03 | End: 2019-03-04 | Stop reason: HOSPADM

## 2019-03-03 RX ORDER — CLOPIDOGREL BISULFATE 75 MG/1
75 TABLET ORAL DAILY
Status: DISCONTINUED | OUTPATIENT
Start: 2019-03-03 | End: 2019-03-04 | Stop reason: HOSPADM

## 2019-03-03 RX ADMIN — SODIUM CHLORIDE 90 ML/HR: 0.9 INJECTION, SOLUTION INTRAVENOUS at 08:26

## 2019-03-03 RX ADMIN — SODIUM CHLORIDE 8 MG/HR: 9 INJECTION, SOLUTION INTRAVENOUS at 21:22

## 2019-03-03 RX ADMIN — GABAPENTIN 300 MG: 300 CAPSULE ORAL at 14:11

## 2019-03-03 RX ADMIN — CLOPIDOGREL BISULFATE 75 MG: 75 TABLET ORAL at 17:00

## 2019-03-03 RX ADMIN — SUCRALFATE 1000 MG: 1 SUSPENSION ORAL at 17:00

## 2019-03-03 RX ADMIN — SODIUM CHLORIDE 8 MG/HR: 9 INJECTION, SOLUTION INTRAVENOUS at 14:04

## 2019-03-03 RX ADMIN — METOPROLOL TARTRATE 12.5 MG: 25 TABLET ORAL at 08:31

## 2019-03-03 RX ADMIN — INSULIN LISPRO 1 UNITS: 100 INJECTION, SOLUTION INTRAVENOUS; SUBCUTANEOUS at 12:50

## 2019-03-03 RX ADMIN — ATORVASTATIN CALCIUM 80 MG: 40 TABLET, FILM COATED ORAL at 17:00

## 2019-03-03 RX ADMIN — METOPROLOL TARTRATE 12.5 MG: 25 TABLET ORAL at 21:14

## 2019-03-03 RX ADMIN — SODIUM CHLORIDE 75 ML/HR: 0.9 INJECTION, SOLUTION INTRAVENOUS at 23:00

## 2019-03-03 RX ADMIN — SODIUM CHLORIDE 8 MG/HR: 9 INJECTION, SOLUTION INTRAVENOUS at 02:37

## 2019-03-03 RX ADMIN — INSULIN LISPRO 1 UNITS: 100 INJECTION, SOLUTION INTRAVENOUS; SUBCUTANEOUS at 00:05

## 2019-03-03 RX ADMIN — INSULIN LISPRO 1 UNITS: 100 INJECTION, SOLUTION INTRAVENOUS; SUBCUTANEOUS at 17:17

## 2019-03-03 RX ADMIN — GABAPENTIN 300 MG: 300 CAPSULE ORAL at 21:14

## 2019-03-03 RX ADMIN — SUCRALFATE 1000 MG: 1 SUSPENSION ORAL at 06:43

## 2019-03-03 NOTE — PLAN OF CARE
Problem: Potential for Falls  Goal: Patient will remain free of falls  Description  INTERVENTIONS:  - Assess patient frequently for physical needs  -  Identify cognitive and physical deficits and behaviors that affect risk of falls  -  [place] fall precautions as indicated by assessment   - Educate patient/family on patient safety including physical limitations  - Instruct patient to call for assistance with activity based on assessment  - Modify environment to reduce risk of injury  - Consider OT/PT consult to assist with strengthening/mobility  Outcome: Progressing     Problem: Nutrition/Hydration-ADULT  Goal: Nutrient/Hydration intake appropriate for improving, restoring or maintaining nutritional needs  Description  Monitor and assess patient's nutrition/hydration status for malnutrition (ex- brittle hair, bruises, dry skin, pale skin and conjunctiva, muscle wasting, smooth red tongue, and disorientation)  Collaborate with interdisciplinary team and initiate plan and interventions as ordered  Monitor patient's weight and dietary intake as ordered or per policy  Utilize nutrition screening tool and intervene per policy  Determine patient's food preferences and provide high-protein, high-caloric foods as appropriate       INTERVENTIONS:  - Monitor oral intake, urinary output, labs, and treatment plans  - Assess nutrition and hydration status and recommend course of action  - Evaluate amount of meals eaten  - Assist patient with eating if necessary   - Allow adequate time for meals  - Recommend/ encourage appropriate diets, oral nutritional supplements, and vitamin/mineral supplements  - Order, calculate, and assess calorie counts as needed  - Recommend, monitor, and adjust tube feedings and TPN/PPN based on assessed needs  - Assess need for intravenous fluids  - Provide specific nutrition/hydration education as appropriate  - Include patient/family/caregiver in decisions related to nutrition  Outcome: Progressing     Problem: PAIN - ADULT  Goal: Verbalizes/displays adequate comfort level or baseline comfort level  Description  Interventions:  - Encourage patient to monitor pain and request assistance  - Assess pain using appropriate pain scale  - Administer analgesics based on type and severity of pain and evaluate response  - Implement non-pharmacological measures as appropriate and evaluate response  - Consider cultural and social influences on pain and pain management  - Notify physician/advanced practitioner if interventions unsuccessful or patient reports new pain  Outcome: Progressing     Problem: INFECTION - ADULT  Goal: Absence or prevention of progression during hospitalization  Description  INTERVENTIONS:  - Assess and monitor for signs and symptoms of infection  - Monitor lab/diagnostic results  - Monitor all insertion sites, i e  indwelling lines, tubes, and drains  - Monitor endotracheal (as able) and nasal secretions for changes in amount and color  - [place] appropriate cooling/warming therapies per order  - Administer medications as ordered  - Instruct and encourage patient and family to use good hand hygiene technique  - Identify and instruct in appropriate isolation precautions for identified infection/condition  Outcome: Progressing  Goal: Absence of fever/infection during neutropenic period  Description  INTERVENTIONS:  - Monitor WBC  - Implement neutropenic guidelines  Outcome: Progressing     Problem: SAFETY ADULT  Goal: Patient will remain free of falls  Description  INTERVENTIONS:  - Assess patient frequently for physical needs  -  Identify cognitive and physical deficits and behaviors that affect risk of falls    -  [place] fall precautions as indicated by assessment   - Educate patient/family on patient safety including physical limitations  - Instruct patient to call for assistance with activity based on assessment  - Modify environment to reduce risk of injury  - Consider OT/PT consult to assist with strengthening/mobility  Outcome: Progressing  Goal: Maintain or return to baseline ADL function  Description  INTERVENTIONS:  -  Assess patient's ability to carry out ADLs; assess patient's baseline for ADL function and identify physical deficits which impact ability to perform ADLs (bathing, care of mouth/teeth, toileting, grooming, dressing, etc )  - Assess/evaluate cause of self-care deficits   - Assess range of motion  - Assess patient's mobility; develop plan if impaired  - Assess patient's need for assistive devices and provide as appropriate  - Encourage maximum independence but intervene and supervise when necessary  ¯ Involve family in performance of ADLs  ¯ Assess for home care needs following discharge   ¯ Request OT consult to assist with ADL evaluation and planning for discharge  ¯ Provide patient education as appropriate  Outcome: Progressing  Goal: Maintain or return mobility status to optimal level  Description  INTERVENTIONS:  - Assess patient's baseline mobility status (ambulation, transfers, stairs, etc )    - Identify cognitive and physical deficits and behaviors that affect mobility  - Identify mobility aids required to assist with transfers and/or ambulation (gait belt, sit-to-stand, lift, walker, cane, etc )  - [place] fall precautions as indicated by assessment  - Record patient progress and toleration of activity level on Mobility SBAR; progress patient to next Phase/Stage  - Instruct patient to call for assistance with activity based on assessment  - Request Rehabilitation consult to assist with strengthening/weightbearing, etc   Outcome: Progressing     Problem: DISCHARGE PLANNING  Goal: Discharge to home or other facility with appropriate resources  Description  INTERVENTIONS:  - Identify barriers to discharge w/patient and caregiver  - Arrange for needed discharge resources and transportation as appropriate  - Identify discharge learning needs (meds, wound care, etc )  - Arrange for interpretive services to assist at discharge as needed  - Refer to Case Management Department for coordinating discharge planning if the patient needs post-hospital services based on physician/advanced practitioner order or complex needs related to functional status, cognitive ability, or social support system  Outcome: Progressing     Problem: Knowledge Deficit  Goal: Patient/family/caregiver demonstrates understanding of disease process, treatment plan, medications, and discharge instructions  Description  Complete learning assessment and assess knowledge base    Interventions:  - Provide teaching at level of understanding  - Provide teaching via preferred learning methods  Outcome: Progressing     Problem: GASTROINTESTINAL - ADULT  Goal: Minimal or absence of nausea and/or vomiting  Description  INTERVENTIONS:  - Administer IV fluids as ordered to ensure adequate hydration  - Maintain NPO status until nausea and vomiting are resolved  - Nasogastric tube as ordered  - Administer ordered antiemetic medications as needed  - Provide nonpharmacologic comfort measures as appropriate  - Advance diet as tolerated, if ordered  - Nutrition services referral to assist patient with adequate nutrition and appropriate food choices  Outcome: Progressing  Goal: Maintains or returns to baseline bowel function  Description  INTERVENTIONS:  - Assess bowel function  - Encourage oral fluids to ensure adequate hydration  - Administer IV fluids as ordered to ensure adequate hydration  - Administer ordered medications as needed  - Encourage mobilization and activity  - Nutrition services referral to assist patient with appropriate food choices  Outcome: Progressing  Goal: Maintains adequate nutritional intake  Description  INTERVENTIONS:  - Monitor percentage of each meal consumed  - Identify factors contributing to decreased intake, treat as appropriate  - Assist with meals as needed  - Monitor I&O, WT and lab values  - Obtain nutrition services referral as needed  Outcome: Progressing

## 2019-03-03 NOTE — CONSULTS
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  Large vessels intra/extracranially intact  Right sided weakness resolved, constant unrelenting hiccups since then  The constant hiccups are likely secondary to the stroke as he had never had this previously  Plan:  From neurologic standpoint, patient is now 11 days since presentation of stroke symptoms  Highest risk of restroke is within 1 month after stroke thus we manage small vessel strokes with dual antiplatelet therapy for three weeks, usually aspirin and plavix and revert back to monotherapy after  He has another week and half to be on this dual therapy  It would be preferred from a risk/benefit standpoint to have him at least on one antiplatelet agent given the increased immediate stroke risk and also taking into account his coffee ground emesis which hasn't occurred since Friday and has had stable H/Hs, I would recommend keeping him on plavix only given the decreased GI bleeding risk compared to aspirin  Additionally given that he is not at risk for further neurologic deterioration from the recent stroke as post stroke swelling lasts 5-6 days, and he has cerebral vasculature that is stable without significant atherosclerosis, he can safely undergo endoscopy on Monday with keeping in mind of normal bp goal as he will likely get sedation that can drop his bp  The baclofen 10 mg po tid prn which he was using three times a day did not help him  We will dc this  We will start him on gabapentin 300 mg po tid for the hiccups            HPI: Steve Rsoas is a 80 y o  male who was recently admitted at [facility] for acute left pontine small vessel cva 11 days ago with symptoms of dysarthria, rt side weakness causing balance difficulty  He was placed on dual antiplatelet therapy for three weeks, of which he has been fully complaliant  CTA head/neck during that admission did not show any significant atherosclerosis  He has been having hiccups since that time felt to be from the stroke and was started on baclofen  For the past week he has been having intermittent nausea/vomiting along with coffee ground emesis, stable H/H with borderline anemia  Denies NSAID use  Also unintentional weight loss for last several weeks  Patient was readmitted on 3/1 and CT abd/pelvis  Showed prominent gastric distension which according to GI team would benefit from endoscopic evaluation to see if there may be outlet obstruction from duodenal peptic ulcer disease  Has aspirin/plavix has been held and he has been placed on protonix drip  ROS:  Per 12 point review, every time hiccups feels like will vomit, coffee ground emesis last occurred on Friday, slight slurring, constipation, minimal po for the past week as he's felt that eating is uncomfortable  Hiccups nonstop, no improvement since stroke, every second or two and makes throat and chest burn  Burning sensation improved since being here  Historical Information   Past Medical History:   Diagnosis Date    Diabetes mellitus ([facility] [address] )      History reviewed  No pertinent surgical history    Social History   Social History     Tobacco Use   Smoking Status Never Smoker   Smokeless Tobacco Never Used     Social History     Substance and Sexual Activity   Alcohol Use Never    Frequency: Never     Social History     Substance and Sexual Activity   Drug Use Never       Family History: non-contributory      Meds/Allergies     No Known Allergies    all current active meds have been reviewed    Objective   Vitals:Blood pressure 117/69, pulse 80, temperature 97 9 °F (36 6 °C), resp  rate 16, height 5' 5" (1 651 m), weight 81 6 kg (180 lb), SpO2 99 %  ,Body mass index is 29 95 kg/m²  Physical Exam:   Physical Exam General appearance: alert, appears stated age and cooperative  Lungs: clear to auscultation bilaterally  Heart: regular rate and rhythma    Neurologic:  Cognitive:  Mental status: Alert, orientedX3, thought content appropriate  Insight, attention, concentration intact  CN 2-12 intact except for slight dysarthria and rt facial asymmetry  Fundoscopy wnl  Motor: normal tone and bulk  5 power ue/le bilat  Sensory: vibration, light touch, proprioception, temperature sensation intact throughout  Cerebellar: finger to nose, heel to shin no dysmetria or ataxia  DTR's: 1+ ue bilat, 2+ plantars, 1 ankles bilat  Plantars:  Equiv bilat      Lab Results: I have personally reviewed pertinent reports        Imaging Studies: I have personally reviewed pertinent films in PACS    EKG, Pathology, and Other Studies: I have personally reviewed pertinent films in PACS

## 2019-03-03 NOTE — ASSESSMENT & PLAN NOTE
· Patient presented with episodes of Coffee-ground emesis, hiccups, and burning epigastric pain  He also reports an unintentional weight loss but cannot clarify the amount  · HGB currently 10 7  Continue to trend serially  Prior baseline HGB around 13s to 14s  · CT Scan shows moderate circumferential thickening of the duodenum and thickening of the distal esophagus  · Continue Protonix gtt and Carafate  · IVFs  Currently on clear liquid diet for procedures Monday  · ASA/Plavix currently on hold  · GI following for endoscopic evaluation - evaluate for PUD, malignancy, etc   GI wishes for clearance from neurology due to recent stroke prior to procedures - neuro consulted

## 2019-03-03 NOTE — PROGRESS NOTES
Progress Note [facility] 1950, 80 y o  male MRN: [id number]    Unit/Bed#: -01 Encounter: [id number]    Primary Care Provider: No primary care provider on file  Date and time admitted to hospital: 3/1/2019  3:38 PM    Addendum - Neuro recommends restarting Plavix  Okay to restart per GI as well  Restarted Plavix  * Coffee ground emesis  Assessment & Plan  · Patient presented with episodes of Coffee-ground emesis, hiccups, and burning epigastric pain  He also reports an unintentional weight loss but cannot clarify the amount  · HGB currently 10 7  Continue to trend serially  Prior baseline HGB around 13s to 14s  · CT Scan shows moderate circumferential thickening of the duodenum and thickening of the distal esophagus  · Continue Protonix gtt and Carafate  · IVFs  Currently on clear liquid diet for procedures Monday  · Holding ASA/Plavix  · GI following for endoscopic evaluation - evaluate for PUD, malignancy, etc   GI wishes for clearance from neurology due to recent stroke prior to procedures - neuro consulted  Recent cerebrovascular accident  Assessment & Plan  · He was diagnosed with bilateral pontine lacunar infarcts just a few weeks ago  · In setting of acute GIB, ASA/Plavix was held until cleared for resuming anti-platelet agents by gastroenterology  · Continue Lipitor  Mixed hyperlipidemia  Assessment & Plan  · Continue Lipitor  Diabetes mellitus type 2   Assessment & Plan  · HbA1c of 8 5 earlier this month  · Hold oral hypoglycemics -  SSI coverage and Accu-Checks  VTE Pharmacologic Prophylaxis:   Pharmacologic: Pharmacologic VTE Prophylaxis contraindicated due to GIB  Mechanical VTE Prophylaxis in Place: Yes    Patient Centered Rounds: I have performed bedside rounds with nursing staff today  Discussions with Specialists or Other Care Team Provider: Appreciate GI input      Education and Discussions with Family / Patient: Called patient's wife to update  Time Spent for Care: 30 minutes  More than 50% of total time spent on counseling and coordination of care as described above  Current Length of Stay: 2 day(s)    Current Patient Status: Inpatient   Certification Statement: The patient will continue to require additional inpatient hospital stay due to further evaluation of GIB with endoscopic evaluation  Discharge Plan: Not medically cleared  Code Status: Level 1 - Full Code      Subjective:   Patient reports burning discomfort and hiccups  No further hematemesis  No melena  No SOB  No weakness  Objective:     Vitals:   Temp (24hrs), Av 1 °F (36 7 °C), Min:97 9 °F (36 6 °C), Max:98 2 °F (36 8 °C)    Temp:  [97 9 °F (36 6 °C)-98 2 °F (36 8 °C)] 97 9 °F (36 6 °C)  HR:  [79-92] 80  Resp:  [16] 16  BP: (110-123)/(68-69) 117/69  SpO2:  [98 %-99 %] 99 %  Body mass index is 29 95 kg/m²  Input and Output Summary (last 24 hours): Intake/Output Summary (Last 24 hours) at 3/3/2019 0905  Last data filed at 3/3/2019 0425  Gross per 24 hour   Intake 1140 ml   Output 1000 ml   Net 140 ml       Physical Exam:     Physical Exam   Constitutional: He is oriented to person, place, and time  No distress  HENT:   Head: Normocephalic and atraumatic  Eyes: No scleral icterus  Neck: Neck supple  Cardiovascular: Normal rate and regular rhythm  Pulmonary/Chest: Effort normal  No respiratory distress  He has no wheezes  He has no rales  Abdominal: Soft  Bowel sounds are normal  There is no tenderness  Musculoskeletal: He exhibits no edema  Neurological: He is alert and oriented to person, place, and time  Skin: Skin is warm and dry  He is not diaphoretic  Vitals reviewed           Additional Data:     Labs:    Results from last 7 days   Lab Units 19  0650  19  1646   WBC Thousand/uL  --   --  10 07   HEMOGLOBIN g/dL 10 7*   < > 13 6   HEMATOCRIT % 33 1*   < > 41 2   PLATELETS Thousands/uL  --   --  282   NEUTROS PCT % --   --  78*   LYMPHS PCT %  --   --  16   MONOS PCT %  --   --  6   EOS PCT %  --   --  0    < > = values in this interval not displayed  Results from last 7 days   Lab Units 03/02/19  0542 03/01/19  1646   POTASSIUM mmol/L 3 9 4 6   CHLORIDE mmol/L 106 99*   CO2 mmol/L 29 28   BUN mg/dL 18 18   CREATININE mg/dL 1 14 1 19   CALCIUM mg/dL 9 0 10 4*   ALK PHOS U/L  --  90   ALT U/L  --  27   AST U/L  --  18     Results from last 7 days   Lab Units 03/01/19  1731   INR  1 06       * I Have Reviewed All Lab Data Listed Above  * Additional Pertinent Lab Tests Reviewed: All Labs Within Last 24 Hours Reviewed    Imaging:    Imaging Reports Reviewed Today Include: No new imaging  Recent Cultures (last 7 days):           Last 24 Hours Medication List:     Current Facility-Administered Medications:  acetaminophen 650 mg Oral Q4H PRN Val Bentley MD    atorvastatin 80 mg Oral QPM Val Bentley MD    baclofen 10 mg Oral TID PRN Val Bentley MD    insulin lispro 1-5 Units Subcutaneous Q6H Val Bentley MD    metoprolol 5 mg Intravenous Q6H PRN Val Bentley MD    metoprolol tartrate 12 5 mg Oral Q12H Sonja Denis MD    ondansetron 4 mg Intravenous Q4H PRN Val Bentley MD    pantoprozole (PROTONIX) infusion (Continuous) 8 mg/hr Intravenous Continuous Val Bentley MD Last Rate: 8 mg/hr (03/03/19 0237)   sodium chloride 90 mL/hr Intravenous Continuous Val Bentley MD Last Rate: 90 mL/hr (03/03/19 0826)   sucralfate 1,000 mg Oral BID Juan Antonio Barillas MD         Today, Patient Was Seen By: Marielena Riley PA-C    ** Please Note: Dictation voice to text software may have been used in the creation of this document   **

## 2019-03-04 ENCOUNTER — ANESTHESIA (INPATIENT)
Dept: PERIOP | Facility: HOSPITAL | Age: 69
DRG: 382 | End: 2019-03-04
Payer: COMMERCIAL

## 2019-03-04 ENCOUNTER — ANESTHESIA EVENT (INPATIENT)
Dept: PERIOP | Facility: HOSPITAL | Age: 69
DRG: 382 | End: 2019-03-04
Payer: COMMERCIAL

## 2019-03-04 VITALS
DIASTOLIC BLOOD PRESSURE: 71 MMHG | BODY MASS INDEX: 29.99 KG/M2 | WEIGHT: 180 LBS | HEIGHT: 65 IN | RESPIRATION RATE: 18 BRPM | TEMPERATURE: 97.7 F | HEART RATE: 82 BPM | OXYGEN SATURATION: 99 % | SYSTOLIC BLOOD PRESSURE: 124 MMHG

## 2019-03-04 PROBLEM — K92.0 COFFEE GROUND EMESIS: Status: RESOLVED | Noted: 2019-03-01 | Resolved: 2019-03-04

## 2019-03-04 PROBLEM — K29.80 DUODENITIS: Status: RESOLVED | Noted: 2019-03-01 | Resolved: 2019-03-04

## 2019-03-04 LAB
ABO GROUP BLD: NORMAL
ANION GAP SERPL CALCULATED.3IONS-SCNC: 8 MMOL/L (ref 4–13)
BASOPHILS # BLD AUTO: 0.05 THOUSANDS/ΜL (ref 0–0.1)
BASOPHILS NFR BLD AUTO: 1 % (ref 0–1)
BLD GP AB SCN SERPL QL: NEGATIVE
BUN SERPL-MCNC: 5 MG/DL (ref 5–25)
CALCIUM SERPL-MCNC: 8.5 MG/DL (ref 8.3–10.1)
CHLORIDE SERPL-SCNC: 110 MMOL/L (ref 100–108)
CO2 SERPL-SCNC: 27 MMOL/L (ref 21–32)
CREAT SERPL-MCNC: 1.01 MG/DL (ref 0.6–1.3)
EOSINOPHIL # BLD AUTO: 0.18 THOUSAND/ΜL (ref 0–0.61)
EOSINOPHIL NFR BLD AUTO: 2 % (ref 0–6)
ERYTHROCYTE [DISTWIDTH] IN BLOOD BY AUTOMATED COUNT: 12.1 % (ref 11.6–15.1)
GFR SERPL CREATININE-BSD FRML MDRD: 89 ML/MIN/1.73SQ M
GLUCOSE SERPL-MCNC: 112 MG/DL (ref 65–140)
GLUCOSE SERPL-MCNC: 117 MG/DL (ref 65–140)
GLUCOSE SERPL-MCNC: 142 MG/DL (ref 65–140)
GLUCOSE SERPL-MCNC: 170 MG/DL (ref 65–140)
GLUCOSE SERPL-MCNC: 203 MG/DL (ref 65–140)
HCT VFR BLD AUTO: 31.6 % (ref 36.5–49.3)
HCT VFR BLD AUTO: 33.3 % (ref 36.5–49.3)
HGB BLD-MCNC: 10.4 G/DL (ref 12–17)
HGB BLD-MCNC: 10.8 G/DL (ref 12–17)
IMM GRANULOCYTES # BLD AUTO: 0.03 THOUSAND/UL (ref 0–0.2)
IMM GRANULOCYTES NFR BLD AUTO: 0 % (ref 0–2)
LYMPHOCYTES # BLD AUTO: 1.99 THOUSANDS/ΜL (ref 0.6–4.47)
LYMPHOCYTES NFR BLD AUTO: 25 % (ref 14–44)
MCH RBC QN AUTO: 29.5 PG (ref 26.8–34.3)
MCHC RBC AUTO-ENTMCNC: 32.9 G/DL (ref 31.4–37.4)
MCV RBC AUTO: 90 FL (ref 82–98)
MONOCYTES # BLD AUTO: 0.69 THOUSAND/ΜL (ref 0.17–1.22)
MONOCYTES NFR BLD AUTO: 9 % (ref 4–12)
NEUTROPHILS # BLD AUTO: 4.99 THOUSANDS/ΜL (ref 1.85–7.62)
NEUTS SEG NFR BLD AUTO: 63 % (ref 43–75)
NRBC BLD AUTO-RTO: 0 /100 WBCS
PLATELET # BLD AUTO: 234 THOUSANDS/UL (ref 149–390)
PMV BLD AUTO: 10 FL (ref 8.9–12.7)
POTASSIUM SERPL-SCNC: 3.7 MMOL/L (ref 3.5–5.3)
RBC # BLD AUTO: 3.53 MILLION/UL (ref 3.88–5.62)
RH BLD: POSITIVE
SODIUM SERPL-SCNC: 145 MMOL/L (ref 136–145)
SPECIMEN EXPIRATION DATE: NORMAL
WBC # BLD AUTO: 7.93 THOUSAND/UL (ref 4.31–10.16)

## 2019-03-04 PROCEDURE — 86901 BLOOD TYPING SEROLOGIC RH(D): CPT | Performed by: PHYSICIAN ASSISTANT

## 2019-03-04 PROCEDURE — 88342 IMHCHEM/IMCYTCHM 1ST ANTB: CPT | Performed by: PATHOLOGY

## 2019-03-04 PROCEDURE — 85025 COMPLETE CBC W/AUTO DIFF WBC: CPT | Performed by: PHYSICIAN ASSISTANT

## 2019-03-04 PROCEDURE — 88312 SPECIAL STAINS GROUP 1: CPT | Performed by: PATHOLOGY

## 2019-03-04 PROCEDURE — 85014 HEMATOCRIT: CPT | Performed by: INTERNAL MEDICINE

## 2019-03-04 PROCEDURE — 88341 IMHCHEM/IMCYTCHM EA ADD ANTB: CPT | Performed by: PATHOLOGY

## 2019-03-04 PROCEDURE — 99239 HOSP IP/OBS DSCHRG MGMT >30: CPT | Performed by: PHYSICIAN ASSISTANT

## 2019-03-04 PROCEDURE — 80048 BASIC METABOLIC PNL TOTAL CA: CPT | Performed by: PHYSICIAN ASSISTANT

## 2019-03-04 PROCEDURE — 99232 SBSQ HOSP IP/OBS MODERATE 35: CPT | Performed by: PSYCHIATRY & NEUROLOGY

## 2019-03-04 PROCEDURE — 82948 REAGENT STRIP/BLOOD GLUCOSE: CPT

## 2019-03-04 PROCEDURE — 86850 RBC ANTIBODY SCREEN: CPT | Performed by: PHYSICIAN ASSISTANT

## 2019-03-04 PROCEDURE — 85018 HEMOGLOBIN: CPT | Performed by: INTERNAL MEDICINE

## 2019-03-04 PROCEDURE — 88305 TISSUE EXAM BY PATHOLOGIST: CPT | Performed by: PATHOLOGY

## 2019-03-04 PROCEDURE — 86900 BLOOD TYPING SEROLOGIC ABO: CPT | Performed by: PHYSICIAN ASSISTANT

## 2019-03-04 PROCEDURE — 0DB38ZX EXCISION OF LOWER ESOPHAGUS, VIA NATURAL OR ARTIFICIAL OPENING ENDOSCOPIC, DIAGNOSTIC: ICD-10-PCS | Performed by: INTERNAL MEDICINE

## 2019-03-04 PROCEDURE — 43239 EGD BIOPSY SINGLE/MULTIPLE: CPT | Performed by: INTERNAL MEDICINE

## 2019-03-04 RX ORDER — PANTOPRAZOLE SODIUM 40 MG/1
40 TABLET, DELAYED RELEASE ORAL
Qty: 60 TABLET | Refills: 0 | Status: SHIPPED | OUTPATIENT
Start: 2019-03-04 | End: 2019-04-01 | Stop reason: SDUPTHER

## 2019-03-04 RX ORDER — SODIUM CHLORIDE, SODIUM LACTATE, POTASSIUM CHLORIDE, CALCIUM CHLORIDE 600; 310; 30; 20 MG/100ML; MG/100ML; MG/100ML; MG/100ML
INJECTION, SOLUTION INTRAVENOUS CONTINUOUS PRN
Status: DISCONTINUED | OUTPATIENT
Start: 2019-03-04 | End: 2019-03-04 | Stop reason: SURG

## 2019-03-04 RX ORDER — PROPOFOL 10 MG/ML
INJECTION, EMULSION INTRAVENOUS AS NEEDED
Status: DISCONTINUED | OUTPATIENT
Start: 2019-03-04 | End: 2019-03-04 | Stop reason: SURG

## 2019-03-04 RX ORDER — GABAPENTIN 300 MG/1
300 CAPSULE ORAL 3 TIMES DAILY
Qty: 90 CAPSULE | Refills: 0 | Status: SHIPPED | OUTPATIENT
Start: 2019-03-04 | End: 2019-04-01 | Stop reason: SDUPTHER

## 2019-03-04 RX ORDER — LIDOCAINE HYDROCHLORIDE 10 MG/ML
INJECTION, SOLUTION INFILTRATION; PERINEURAL AS NEEDED
Status: DISCONTINUED | OUTPATIENT
Start: 2019-03-04 | End: 2019-03-04 | Stop reason: SURG

## 2019-03-04 RX ADMIN — SODIUM CHLORIDE, SODIUM LACTATE, POTASSIUM CHLORIDE, AND CALCIUM CHLORIDE: .6; .31; .03; .02 INJECTION, SOLUTION INTRAVENOUS at 11:28

## 2019-03-04 RX ADMIN — CLOPIDOGREL BISULFATE 75 MG: 75 TABLET ORAL at 09:14

## 2019-03-04 RX ADMIN — INSULIN LISPRO 1 UNITS: 100 INJECTION, SOLUTION INTRAVENOUS; SUBCUTANEOUS at 00:15

## 2019-03-04 RX ADMIN — PROPOFOL 120 MG: 10 INJECTION, EMULSION INTRAVENOUS at 11:38

## 2019-03-04 RX ADMIN — LIDOCAINE HYDROCHLORIDE 50 MG: 10 INJECTION, SOLUTION INFILTRATION; PERINEURAL at 11:38

## 2019-03-04 RX ADMIN — SUCRALFATE 1000 MG: 1 SUSPENSION ORAL at 06:30

## 2019-03-04 RX ADMIN — GABAPENTIN 300 MG: 300 CAPSULE ORAL at 09:14

## 2019-03-04 RX ADMIN — METOPROLOL TARTRATE 12.5 MG: 25 TABLET ORAL at 09:14

## 2019-03-04 NOTE — DISCHARGE INSTRUCTIONS
You were diagnosed with esophagitis/esophageal ulcerations as a cause for the blood in the vomit which was seen during your endoscopy  It is recommended that you take the medication Protonix 40 mg twice a day for the next 8 weeks to heal the inflammation  Make sure to follow up with your primary care physician to have your CBC (blood counts) checked within the next week  Also, follow up with the gastroenterologist within a couple of weeks for results of the biopsies as well as consideration for colonoscopy if you have continued weight loss  Your also started on the medication gabapentin 300 mg 3 times a day for your intractable hiccups thought to be secondary to her recent stroke  Make sure to follow up with your neurologist as scheduled

## 2019-03-04 NOTE — PROGRESS NOTES
Progress Note - Neurology   Beryl Anderson 80 y o  male MRN: [id number]  Unit/Bed#: -01 Encounter: [id number]    Assessment/Plan:   3 80year old male with PMH DM, HLD and recent B/L pontine infarcts admitted for coffee ground emesis  - For endoscopic evaluation with GI today  - Recommend continue on Plavix 75 mg QD and atorvastatin 80 mg QPM for stroke prevention  Given concern for GI bleed, ASA has been discontinued  - Outpatient neurology follow-up already arranged for March 25, 2019  Patient should follow-up as previously instructed  2  Hiccups    - Likely secondary to stroke  - Gabapentin 300 mg TID started yesterday  Subjective:   Beryl Anderson is a 80year old male with PMH DM and HLD with recent B/L pontine infarcts admitted for N/V, coffee ground emesis and also persistent hiccups  Baclofen was started during prior admission but was not effective for hiccups  He notes that hiccups remain constant  He denies any additional nausea/vomiting  He is waiting for a scope to be completed today       ROS:  History obtained from the patient  General ROS: negative for - chills, fatigue or fever  Ophthalmic ROS: negative for - blurry vision, decreased vision or double vision  Respiratory ROS: negative for - shortness of breath  Cardiovascular ROS: negative for - chest pain  Gastrointestinal ROS: positive for - hiccups  negative for - abdominal pain or nausea/vomiting  Musculoskeletal ROS: negative for - gait disturbance or muscular weakness  Neurological ROS: negative for - confusion, dizziness, headaches, numbness/tingling, speech problems, visual changes or weakness    Medications  Scheduled Meds:  Current Facility-Administered Medications:  acetaminophen 650 mg Oral Q4H PRN Chacorta Lopez MD    atorvastatin 80 mg Oral QPM Chacorta Lopez MD    clopidogrel 75 mg Oral Daily German Diamond PA-C    gabapentin 300 mg Oral TID Gabriela Yousif MD    insulin lispro 1-5 Units Subcutaneous Q6H Eliud Jaffe Millicent Steiner MD    metoprolol 5 mg Intravenous Q6H PRN Val Bentley MD    metoprolol tartrate 12 5 mg Oral Q12H [facility] Val Bentley MD    ondansetron 4 mg Intravenous Q4H PRN Val Bentley MD    pantoprozole (PROTONIX) infusion (Continuous) 8 mg/hr Intravenous Continuous Val Bentley MD Last Rate: 8 mg/hr (03/03/19 2122)   sodium chloride 75 mL/hr Intravenous Continuous Marielena Riley PA-C Last Rate: 75 mL/hr (03/03/19 2300)   sucralfate 1,000 mg Oral BID AC Ailyn Kline MD      Continuous Infusions:  pantoprozole (PROTONIX) infusion (Continuous) 8 mg/hr Last Rate: 8 mg/hr (03/03/19 2122)   sodium chloride 75 mL/hr Last Rate: 75 mL/hr (03/03/19 2300)     PRN Meds:   acetaminophen    metoprolol    ondansetron    Vitals: Blood pressure 119/63, pulse 78, temperature 98 2 °F (36 8 °C), temperature source Oral, resp  rate 18, height 5' 5" (1 651 m), weight 81 6 kg (180 lb), SpO2 98 %  ,Body mass index is 29 95 kg/m²      Physical Exam: /63   Pulse 78   Temp 98 2 °F (36 8 °C) (Oral)   Resp 18   Ht 5' 5" (1 651 m)   Wt 81 6 kg (180 lb)   SpO2 98%   BMI 29 95 kg/m²   General appearance: alert and oriented, in no acute distress, hiccups noted throughout exam    Head: Normocephalic, without obvious abnormality, atraumatic  Eyes: negative findings: lids and lashes normal, conjunctivae and sclerae normal, pupils equal, round, reactive to light and accomodation and visual fields full to confrontation  Lungs: clear to auscultation bilaterally  Heart: regular rate and rhythm  Abdomen: soft, non-tender; bowel sounds normal; no masses,  no organomegaly  Extremities: extremities normal, warm and well-perfused; no cyanosis, clubbing, or edema  Skin: Skin color, texture, turgor normal  No rashes or lesions  Neurologic: Mental status: Alert, oriented, thought content appropriate  Cranial nerves: II: visual field normal, II: pupils equal, round, reactive to light and accommodation, III,VII: ptosis no ptosis noted, III,IV,VI: extraocular muscles extra-ocular motions intact, V: facial light touch sensation normal bilaterally, VII: upper facial muscle function normal bilaterally, VII: lower facial muscle function normal bilaterally, XII: tongue strength normal  Sensory: normal, Grossly intact to crude touch  Motor: Motor strength 5/5 B/L UE and LE  Coordination: finger to nose normal bilaterally    Labs  Recent Results (from the past 24 hour(s))   Fingerstick Glucose (POCT)    Collection Time: 03/03/19 12:31 PM   Result Value Ref Range    POC Glucose 183 (H) 65 - 140 mg/dl   ECG 12 lead    Collection Time: 03/03/19  1:44 PM   Result Value Ref Range    Ventricular Rate 100 BPM    Atrial Rate 100 BPM    MA Interval 106 ms    QRSD Interval 78 ms    QT Interval 384 ms    QTC Interval 495 ms    P Axis 60 degrees    QRS Axis -36 degrees    T Wave [place] 57 degrees   ECG 12 lead    Collection Time: 03/03/19  1:50 PM   Result Value Ref Range    Ventricular Rate 104 BPM    Atrial Rate 104 BPM    MA Interval 124 ms    QRSD Interval 80 ms    QT Interval 374 ms    QTC Interval 491 ms    P [place] 59 degrees    QRS Axis -35 degrees    T Wave Axis 58 degrees   Fingerstick Glucose (POCT)    Collection Time: 03/03/19  5:15 PM   Result Value Ref Range    POC Glucose 179 (H) 65 - 140 mg/dl   Hemoglobin and hematocrit, blood    Collection Time: 03/03/19  7:42 PM   Result Value Ref Range    Hemoglobin 10 8 (L) 12 0 - 17 0 g/dL    Hematocrit 33 2 (L) 36 5 - 49 3 %   Fingerstick Glucose (POCT)    Collection Time: 03/04/19 12:06 AM   Result Value Ref Range    POC Glucose 203 (H) 65 - 140 mg/dl   CBC and differential    Collection Time: 03/04/19  4:31 AM   Result Value Ref Range    WBC 7 93 4 31 - 10 16 Thousand/uL    RBC 3 53 (L) 3 88 - 5 62 Million/uL    Hemoglobin 10 4 (L) 12 0 - 17 0 g/dL    Hematocrit 31 6 (L) 36 5 - 49 3 %    MCV 90 82 - 98 fL    MCH 29 5 26 8 - 34 3 pg    MCHC 32 9 31 4 - 37 4 g/dL    RDW 12 1 11 6 - 15 1 %    MPV 10 0 8 9 - 12 7 fL Platelets 754 822 - 983 Thousands/uL    nRBC 0 /100 WBCs    Neutrophils Relative 63 43 - 75 %    Immat GRANS % 0 0 - 2 %    Lymphocytes Relative 25 14 - 44 %    Monocytes Relative 9 4 - 12 %    Eosinophils Relative 2 0 - 6 %    Basophils Relative 1 0 - 1 %    Neutrophils Absolute 4 99 1 85 - 7 62 Thousands/µL    Immature Grans Absolute 0 03 0 00 - 0 20 Thousand/uL    Lymphocytes Absolute 1 99 0 60 - 4 47 Thousands/µL    Monocytes Absolute 0 69 0 17 - 1 22 Thousand/µL    Eosinophils Absolute 0 18 0 00 - 0 61 Thousand/µL    Basophils Absolute 0 05 0 00 - 0 10 Thousands/µL   Basic metabolic panel    Collection Time: 03/04/19  4:31 AM   Result Value Ref Range    Sodium 145 136 - 145 mmol/L    Potassium 3 7 3 5 - 5 3 mmol/L    Chloride 110 (H) 100 - 108 mmol/L    CO2 27 21 - 32 mmol/L    ANION GAP 8 4 - 13 mmol/L    BUN 5 5 - 25 mg/dL    Creatinine 1 01 0 60 - 1 30 mg/dL    Glucose 117 65 - 140 mg/dL    Calcium 8 5 8 3 - 10 1 mg/dL    eGFR 89 ml/min/1 73sq m   Fingerstick Glucose (POCT)    Collection Time: 03/04/19  6:26 AM   Result Value Ref Range    POC Glucose 112 65 - 140 mg/dl     Imaging   No new neuro imaging available for review  VTE Prophylaxis: Reason for no pharmacologic prophylaxis Possible GI bleed    Counseling / Coordination of Care  Total time spent today 26 minutes  Greater than 50% of total time was spent with the patient and / or family counseling and / or coordination of care  A description of the counseling / coordination of care: Discussed plan of care with patient and his family at bedside  Will plan to continue on Gabapentin at current dose for now to monitor for effectiveness for hiccups  Consider titrating dose up if continues to have hiccups

## 2019-03-04 NOTE — ASSESSMENT & PLAN NOTE
· He was diagnosed with bilateral pontine lacunar infarcts just a few weeks ago  · Continue Aspirin, Plavix, and Lipitor  · Gabapentin TID started due to suspected hiccups from his stroke  · Follow up with neurology as outpatient

## 2019-03-04 NOTE — ASSESSMENT & PLAN NOTE
· Patient presented with episodes of coffee-ground emesis and burning epigastric pain  He also reports an unintentional weight loss but cannot clarify the amount  No further hematemesis since admission  · HGB today stable at 10 8  · CT Scan shows moderate circumferential thickening of the duodenum and thickening of the distal esophagus  · EGD showed linear esophagitis/esophageal ulcerations  Biopsy pending  · He has been cleared for discharge home today  · He may resume Aspirin and Plavix  Discussed with GI   · He will need to continue Pantoprazole 40mg po BID upon discharge for 8 weeks  · Recommend follow up with Dr Oliverio Gomes as an outpatient in 1-2 weeks  May need colonoscopy if continued weight loss  · Recommend repeat CBC by PCP within 1 week

## 2019-03-04 NOTE — PLAN OF CARE
Problem: Potential for Falls  Goal: Patient will remain free of falls  Description  INTERVENTIONS:  - Assess patient frequently for physical needs  -  Identify cognitive and physical deficits and behaviors that affect risk of falls  -  [place] fall precautions as indicated by assessment   - Educate patient/family on patient safety including physical limitations  - Instruct patient to call for assistance with activity based on assessment  - Modify environment to reduce risk of injury  - Consider OT/PT consult to assist with strengthening/mobility  Outcome: Adequate for Discharge     Problem: Nutrition/Hydration-ADULT  Goal: Nutrient/Hydration intake appropriate for improving, restoring or maintaining nutritional needs  Description  Monitor and assess patient's nutrition/hydration status for malnutrition (ex- brittle hair, bruises, dry skin, pale skin and conjunctiva, muscle wasting, smooth red tongue, and disorientation)  Collaborate with interdisciplinary team and initiate plan and interventions as ordered  Monitor patient's weight and dietary intake as ordered or per policy  Utilize nutrition screening tool and intervene per policy  Determine patient's food preferences and provide high-protein, high-caloric foods as appropriate       INTERVENTIONS:  - Monitor oral intake, urinary output, labs, and treatment plans  - Assess nutrition and hydration status and recommend course of action  - Evaluate amount of meals eaten  - Assist patient with eating if necessary   - Allow adequate time for meals  - Recommend/ encourage appropriate diets, oral nutritional supplements, and vitamin/mineral supplements  - Order, calculate, and assess calorie counts as needed  - Recommend, monitor, and adjust tube feedings and TPN/PPN based on assessed needs  - Assess need for intravenous fluids  - Provide specific nutrition/hydration education as appropriate  - Include patient/family/caregiver in decisions related to nutrition  Outcome: Adequate for Discharge     Problem: PAIN - ADULT  Goal: Verbalizes/displays adequate comfort level or baseline comfort level  Description  Interventions:  - Encourage patient to monitor pain and request assistance  - Assess pain using appropriate pain scale  - Administer analgesics based on type and severity of pain and evaluate response  - Implement non-pharmacological measures as appropriate and evaluate response  - Consider cultural and social influences on pain and pain management  - Notify physician/advanced practitioner if interventions unsuccessful or patient reports new pain  Outcome: Adequate for Discharge     Problem: INFECTION - ADULT  Goal: Absence or prevention of progression during hospitalization  Description  INTERVENTIONS:  - Assess and monitor for signs and symptoms of infection  - Monitor lab/diagnostic results  - Monitor all insertion sites, i e  indwelling lines, tubes, and drains  - Monitor endotracheal (as able) and nasal secretions for changes in amount and color  - [place] appropriate cooling/warming therapies per order  - Administer medications as ordered  - Instruct and encourage patient and family to use good hand hygiene technique  - Identify and instruct in appropriate isolation precautions for identified infection/condition  Outcome: Adequate for Discharge  Goal: Absence of fever/infection during neutropenic period  Description  INTERVENTIONS:  - Monitor WBC  - Implement neutropenic guidelines  Outcome: Adequate for Discharge     Problem: SAFETY ADULT  Goal: Patient will remain free of falls  Description  INTERVENTIONS:  - Assess patient frequently for physical needs  -  Identify cognitive and physical deficits and behaviors that affect risk of falls    -  [place] fall precautions as indicated by assessment   - Educate patient/family on patient safety including physical limitations  - Instruct patient to call for assistance with activity based on assessment  - Modify environment to reduce risk of injury  - Consider OT/PT consult to assist with strengthening/mobility  Outcome: Adequate for Discharge  Goal: Maintain or return to baseline ADL function  Description  INTERVENTIONS:  -  Assess patient's ability to carry out ADLs; assess patient's baseline for ADL function and identify physical deficits which impact ability to perform ADLs (bathing, care of mouth/teeth, toileting, grooming, dressing, etc )  - Assess/evaluate cause of self-care deficits   - Assess range of motion  - Assess patient's mobility; develop plan if impaired  - Assess patient's need for assistive devices and provide as appropriate  - Encourage maximum independence but intervene and supervise when necessary  ¯ Involve family in performance of ADLs  ¯ Assess for home care needs following discharge   ¯ Request OT consult to assist with ADL evaluation and planning for discharge  ¯ Provide patient education as appropriate  Outcome: Adequate for Discharge  Goal: Maintain or return mobility status to optimal level  Description  INTERVENTIONS:  - Assess patient's baseline mobility status (ambulation, transfers, stairs, etc )    - Identify cognitive and physical deficits and behaviors that affect mobility  - Identify mobility aids required to assist with transfers and/or ambulation (gait belt, sit-to-stand, lift, walker, cane, etc )  - [place] fall precautions as indicated by assessment  - Record patient progress and toleration of activity level on Mobility SBAR; progress patient to next Phase/Stage  - Instruct patient to call for assistance with activity based on assessment  - Request Rehabilitation consult to assist with strengthening/weightbearing, etc   Outcome: Adequate for Discharge     Problem: DISCHARGE PLANNING  Goal: Discharge to home or other facility with appropriate resources  Description  INTERVENTIONS:  - Identify barriers to discharge w/patient and caregiver  - Arrange for needed discharge resources and transportation as appropriate  - Identify discharge learning needs (meds, wound care, etc )  - Arrange for interpretive services to assist at discharge as needed  - Refer to Case Management Department for coordinating discharge planning if the patient needs post-hospital services based on physician/advanced practitioner order or complex needs related to functional status, cognitive ability, or social support system  Outcome: Adequate for Discharge     Problem: Knowledge Deficit  Goal: Patient/family/caregiver demonstrates understanding of disease process, treatment plan, medications, and discharge instructions  Description  Complete learning assessment and assess knowledge base    Interventions:  - Provide teaching at level of understanding  - Provide teaching via preferred learning methods  Outcome: Adequate for Discharge     Problem: GASTROINTESTINAL - ADULT  Goal: Minimal or absence of nausea and/or vomiting  Description  INTERVENTIONS:  - Administer IV fluids as ordered to ensure adequate hydration  - Maintain NPO status until nausea and vomiting are resolved  - Nasogastric tube as ordered  - Administer ordered antiemetic medications as needed  - Provide nonpharmacologic comfort measures as appropriate  - Advance diet as tolerated, if ordered  - Nutrition services referral to assist patient with adequate nutrition and appropriate food choices  Outcome: Adequate for Discharge  Goal: Maintains or returns to baseline bowel function  Description  INTERVENTIONS:  - Assess bowel function  - Encourage oral fluids to ensure adequate hydration  - Administer IV fluids as ordered to ensure adequate hydration  - Administer ordered medications as needed  - Encourage mobilization and activity  - Nutrition services referral to assist patient with appropriate food choices  Outcome: Adequate for Discharge  Goal: Maintains adequate nutritional intake  Description  INTERVENTIONS:  - Monitor percentage of each meal consumed  - Identify factors contributing to decreased intake, treat as appropriate  - Assist with meals as needed  - Monitor I&O, WT and lab values  - Obtain nutrition services referral as needed  Outcome: Adequate for Discharge

## 2019-03-04 NOTE — OP NOTE
ESOPHAGOGASTRODUODENOSCOPY    PROCEDURE: EGD    SEDATION: Monitored anesthesia care, check anesthesia records    ASA Class: 2    INDICATIONS:  Coffee-ground emesis    CONSENT:  Informed consent was obtained for the procedure, including sedation after explaining the risks and benefits of the procedure  Risks including but not limited to bleeding, perforation, infection, and missed lesion  PREPARATION:   Telemetry, pulse oximetry, blood pressure were monitored throughout the procedure  Patient was identified by myself both verbally and by visual inspection of ID band  DESCRIPTION:   Patient was placed in the left lateral decubitus position and was sedated with the above medication  The gastroscope was introduced in to the oropharynx and the esophagus was intubated under direct visualization  Scope was passed down the esophagus up to 2nd part of the duodenum  A careful inspection was made as the gastroscope was withdrawn, including a retroflexed view of the stomach; findings and interventions are described below  FINDINGS:    #1  Esophagus- there were 4 quadrant linear ulcerations extending 10 cm in the distal esophagus, down to 39 cm  There was no active bleeding 2 random cold biopsies were obtained along these linear erosions  #2  Stomach- the cardia, fundus, body, antrum, and pylorus were normal in appearance  The pylorus was patent and traversed without difficulty  #3  Duodenum- the 1st and 2nd portion of the duodenum were normal          IMPRESSIONS:      1  Linear esophagitis biopsies pending  2  Coffee-ground emesis probably related to the esophagitis  3  Otherwise normal esophagogastroduodenoscopy     RECOMMENDATIONS:     1  Protonix 40 mg p o  B i d   2  Resume his usual diet  3  From a GI perspective the patient can be discharged home  COMPLICATIONS:  None; patient tolerated the procedure well      SPECIMENS:  2 cold biopsy specimens were obtained from the distal esophagus    ESTIMATED BLOOD LOSS:  Minimal

## 2019-03-04 NOTE — ANESTHESIA POSTPROCEDURE EVALUATION
Post-Op Assessment Note    CV Status:  Stable    Pain management: adequate     Mental Status:  Alert and awake   Hydration Status:  Euvolemic   PONV Controlled:  Controlled   Airway Patency:  Patent   Post Op Vitals Reviewed: Yes      Staff: CRNA           BP   118/78   Temp      Pulse  93   Resp   16   SpO2   99%

## 2019-03-04 NOTE — DISCHARGE SUMMARY
Discharge- Santiago Christopher 1950, 80 y o  male MRN: [id number]    Unit/Bed#: -01 Encounter: [id number]    Primary Care Provider: No primary care provider on file  Date and time admitted to hospital: 3/1/2019  3:38 PM        * Coffee ground emesisresolved as of 3/4/2019  Assessment & Plan  · Patient presented with episodes of coffee-ground emesis and burning epigastric pain  He also reports an unintentional weight loss but cannot clarify the amount  No further hematemesis since admission  · HGB today stable at 10 8  · CT Scan shows moderate circumferential thickening of the duodenum and thickening of the distal esophagus  · EGD showed linear esophagitis/esophageal ulcerations  Biopsy pending  · He has been cleared for discharge home today  · He may resume Aspirin and Plavix  Discussed with GI   · He will need to continue Pantoprazole 40mg po BID upon discharge for 8 weeks  · Recommend follow up with Dr Everardo Jimenez as an outpatient in 1-2 weeks  May need colonoscopy if continued weight loss  · Recommend repeat CBC by PCP within 1 week  Recent cerebrovascular accident  Assessment & Plan  · He was diagnosed with bilateral pontine lacunar infarcts just a few weeks ago  · Continue Aspirin, Plavix, and Lipitor  · Gabapentin TID started due to suspected hiccups from his stroke  · Follow up with neurology as outpatient  Mixed hyperlipidemia  Assessment & Plan  · Continue Lipitor  Diabetes mellitus type 2   Assessment & Plan  · HbA1c of 8 5 earlier this month  · On Metformin  Follow up with PCP for management  Discharging Physician / Practitioner: Hernan Robertson PA-C  PCP: No primary care provider on file    Admission Date:   Admission Orders (From admission, onward)    Ordered        03/01/19 1841  Inpatient Admission  Once     Order ID Start Status   [id number] 03/01/19 1841 Completed              Discharge Date: 03/04/19    Resolved Problems  Date Reviewed: 3/4/2019 Resolved    * (Principal) Coffee ground emesis 3/4/2019     Resolved by  Asher Lamb PA-C    Duodenitis 3/4/2019     Resolved by  Asher Lamb PA-C    Overview Signed 3/3/2019  3:18 PM by Kirsten May MD     Added automatically from request for surgery [id number]             Refer to progress note earlier today regarding exam       Consultations During Hospital Stay:  · Gastroenterology  · Neurology    Procedures Performed:   · EGD    Significant Findings / Test Results:   · EGD showing linear esophagitis/esophageal ulcerations - biopsy pending  · CT scan A/P suggesting esophagitis and duodenitis  · Hemoglobin on discharge 10 8  Test Results Pending at Discharge (will require follow up): · Biopsy results from EGD     Outpatient Tests Requested:  · Outpatient follow-up with Gastroenterology - consideration for colonoscopy continued weight loss  · Follow-up with Neurology  · Follow-up with PCP for repeat CBC within 1 week  Complications:  None    Reason for Admission:  Coffee-ground emesis    Hospital Course:     Steve Rosas is a 80 y o  male patient who originally presented to the hospital on 3/1/2019 due to episodes of coffee-ground emesis  Initially patient had a CT scan upon admission which suggested esophagitis and duodenitis  He was started on a Protonix drip and his hemoglobin levels were monitored serially  Gastroenterology was consulted for EGD  Neurology was also consulted  Neurology recommended gabapentin t i d  For hiccups thought to be related to his stroke  EGD was performed which showed evidence of a linear esophagitis/ulcerations thought to be the cause of his coffee ground emesis and burning epigastric pain  He was recommended to take the medication Protonix 40 mg twice a day and was cleared for discharge to home today  Of note, during his admission he had no further episodes of coffee-ground emesis  He was cleared to take his aspirin and Plavix    He is to follow up with Gastroenterology as an outpatient as well as with Neurology as an outpatient  He was also instructed to have a CBC with his primary care to ensure that his hemoglobin is improving  Please see above list of diagnoses and related plan for additional information  Condition at Discharge: stable     Discharge Day Visit / Exam:     * Please refer to separate progress note for these details *    Discussion with Family:  Discussed with wife at bedside    Discharge instructions/Information to patient and family:   See after visit summary for information provided to patient and family  Provisions for Follow-Up Care:  See after visit summary for information related to follow-up care and any pertinent home health orders  Disposition:     Home    For Discharges to [facility] SNF:   · Not Applicable to this Patient - Not Applicable to this Patient    Planned Readmission:  None     Discharge Statement:  I spent 45 minutes discharging the patient  This time was spent on the day of discharge  I had direct contact with the patient on the day of discharge  Greater than 50% of the total time was spent examining patient, answering all patient questions, arranging and discussing plan of care with patient as well as directly providing post-discharge instructions  Additional time then spent on discharge activities  Discharge Medications:  See after visit summary for reconciled discharge medications provided to patient and family        ** Please Note: This note has been constructed using a voice recognition system **

## 2019-03-04 NOTE — ANESTHESIA PREPROCEDURE EVALUATION
Review of Systems/Medical History  Patient summary reviewed  Chart reviewed      Cardiovascular  Hyperlipidemia,    Pulmonary  Negative pulmonary ROS        GI/Hepatic  Negative GI/hepatic ROS          Negative  ROS        Endo/Other  Diabetes well controlled type 2 Oral agent,      GYN  Negative gynecology ROS          Hematology  Negative hematology ROS      Musculoskeletal  Negative musculoskeletal ROS        Neurology    CVA , no residual symptoms,    Psychology   Negative psychology ROS              Physical Exam    Airway    Mallampati score: II  TM Distance: >3 FB  Neck ROM: full     Dental       Cardiovascular  Rhythm: regular, Rate: normal,     Pulmonary  Breath sounds clear to auscultation,     Other Findings        Anesthesia Plan  ASA Score- 2     Anesthesia Type- IV sedation with anesthesia with ASA Monitors  Additional Monitors:   Airway Plan:         Plan Factors-    Induction- intravenous  Postoperative Plan- Plan for postoperative opioid use  Informed Consent- Anesthetic plan and risks discussed with patient and spouse  I personally reviewed this patient with the CRNA  Discussed and agreed on the Anesthesia Plan with the CRNA Gerhardt Sep

## 2019-03-05 NOTE — UTILIZATION REVIEW
Notification of Discharge  This is a Notification of Discharge from our facility [address]  Please be advised that this patient has been discharge from our facility  Below you will find the admission and discharge date and time including the patients disposition  PRESENTATION DATE: 3/1/2019  3:38 PM  IP ADMISSION DATE: 3/1/19 1841  DISCHARGE DATE: 3/4/2019  2:29 PM  DISPOSITION: [id number] Utilization Review Department  Phone: [phone]; Fax [phone]  [email or web address]  org  ATTENTION: Please call with any questions or concerns to [phone]  and carefully listen to the prompts so that you are directed to the right person  Send all requests for admission clinical reviews, approved or denied determinations and any other requests to fax [phone]   All voicemails are confidential

## 2019-03-29 ENCOUNTER — TELEPHONE (OUTPATIENT)
Dept: GASTROENTEROLOGY | Facility: CLINIC | Age: 69
End: 2019-03-29

## 2019-04-01 ENCOUNTER — OFFICE VISIT (OUTPATIENT)
Dept: INTERNAL MEDICINE CLINIC | Facility: CLINIC | Age: 69
End: 2019-04-01
Payer: COMMERCIAL

## 2019-04-01 VITALS
HEART RATE: 119 BPM | DIASTOLIC BLOOD PRESSURE: 76 MMHG | SYSTOLIC BLOOD PRESSURE: 124 MMHG | OXYGEN SATURATION: 96 % | HEIGHT: 66 IN | BODY MASS INDEX: 24.75 KG/M2 | WEIGHT: 154 LBS

## 2019-04-01 DIAGNOSIS — Z86.73 HISTORY OF LACUNAR CEREBROVASCULAR ACCIDENT (CVA): Chronic | ICD-10-CM

## 2019-04-01 DIAGNOSIS — K20.90 ESOPHAGITIS: ICD-10-CM

## 2019-04-01 DIAGNOSIS — I47.2 NONSUSTAINED VENTRICULAR TACHYCARDIA (HCC): ICD-10-CM

## 2019-04-01 DIAGNOSIS — Z11.59 NEED FOR HEPATITIS C SCREENING TEST: ICD-10-CM

## 2019-04-01 DIAGNOSIS — E11.9 DIABETES MELLITUS TYPE 2 IN NONOBESE (HCC): Primary | ICD-10-CM

## 2019-04-01 DIAGNOSIS — E78.2 MIXED HYPERLIPIDEMIA: ICD-10-CM

## 2019-04-01 LAB
LEFT EYE DIABETIC RETINOPATHY: ABNORMAL
LEFT EYE IMAGE QUALITY: ABNORMAL
LEFT EYE MACULAR EDEMA: ABNORMAL
LEFT EYE OTHER RETINOPATHY: ABNORMAL
RIGHT EYE DIABETIC RETINOPATHY: ABNORMAL
RIGHT EYE IMAGE QUALITY: ABNORMAL
RIGHT EYE MACULAR EDEMA: ABNORMAL
RIGHT EYE OTHER RETINOPATHY: ABNORMAL
SEVERITY (EYE EXAM): ABNORMAL

## 2019-04-01 PROCEDURE — 99204 OFFICE O/P NEW MOD 45 MIN: CPT | Performed by: INTERNAL MEDICINE

## 2019-04-01 PROCEDURE — 92250 FUNDUS PHOTOGRAPHY W/I&R: CPT | Performed by: INTERNAL MEDICINE

## 2019-04-01 PROCEDURE — 3725F SCREEN DEPRESSION PERFORMED: CPT | Performed by: INTERNAL MEDICINE

## 2019-04-01 RX ORDER — CLOPIDOGREL BISULFATE 75 MG/1
75 TABLET ORAL DAILY
Qty: 90 TABLET | Refills: 1 | Status: SHIPPED | OUTPATIENT
Start: 2019-04-01 | End: 2019-09-30 | Stop reason: SDUPTHER

## 2019-04-01 RX ORDER — BLOOD PRESSURE TEST KIT
KIT MISCELLANEOUS
Qty: 100 EACH | Refills: 0 | Status: SHIPPED | OUTPATIENT
Start: 2019-04-01

## 2019-04-01 RX ORDER — ATORVASTATIN CALCIUM 80 MG/1
80 TABLET, FILM COATED ORAL EVERY EVENING
Qty: 90 TABLET | Refills: 1 | Status: SHIPPED | OUTPATIENT
Start: 2019-04-01 | End: 2019-10-02 | Stop reason: SDUPTHER

## 2019-04-01 RX ORDER — PANTOPRAZOLE SODIUM 40 MG/1
40 TABLET, DELAYED RELEASE ORAL
Qty: 180 TABLET | Refills: 1 | Status: SHIPPED | OUTPATIENT
Start: 2019-04-01 | End: 2019-09-11 | Stop reason: SDUPTHER

## 2019-04-01 RX ORDER — GABAPENTIN 300 MG/1
300 CAPSULE ORAL 3 TIMES DAILY
Qty: 270 CAPSULE | Refills: 1 | Status: SHIPPED | OUTPATIENT
Start: 2019-04-01 | End: 2019-04-03

## 2019-04-02 ENCOUNTER — TELEPHONE (OUTPATIENT)
Dept: INTERNAL MEDICINE CLINIC | Facility: CLINIC | Age: 69
End: 2019-04-02

## 2019-04-03 ENCOUNTER — TELEPHONE (OUTPATIENT)
Dept: INTERNAL MEDICINE CLINIC | Facility: CLINIC | Age: 69
End: 2019-04-03

## 2019-04-04 ENCOUNTER — OFFICE VISIT (OUTPATIENT)
Dept: GASTROENTEROLOGY | Facility: CLINIC | Age: 69
End: 2019-04-04
Payer: COMMERCIAL

## 2019-04-04 VITALS
SYSTOLIC BLOOD PRESSURE: 110 MMHG | WEIGHT: 156 LBS | HEART RATE: 78 BPM | BODY MASS INDEX: 25.18 KG/M2 | DIASTOLIC BLOOD PRESSURE: 78 MMHG

## 2019-04-04 DIAGNOSIS — K21.9 GASTROESOPHAGEAL REFLUX DISEASE WITHOUT ESOPHAGITIS: Primary | ICD-10-CM

## 2019-04-04 DIAGNOSIS — K20.90 ESOPHAGITIS: ICD-10-CM

## 2019-04-04 PROCEDURE — 99213 OFFICE O/P EST LOW 20 MIN: CPT | Performed by: PHYSICIAN ASSISTANT

## 2019-04-04 RX ORDER — SUCRALFATE ORAL 1 G/10ML
1 SUSPENSION ORAL 4 TIMES DAILY
Qty: 420 ML | Refills: 0 | Status: SHIPPED | OUTPATIENT
Start: 2019-04-04 | End: 2019-09-11 | Stop reason: CLARIF

## 2019-04-09 ENCOUNTER — TELEPHONE (OUTPATIENT)
Dept: GASTROENTEROLOGY | Facility: CLINIC | Age: 69
End: 2019-04-09

## 2019-04-09 DIAGNOSIS — K21.9 GASTROESOPHAGEAL REFLUX DISEASE WITHOUT ESOPHAGITIS: Primary | ICD-10-CM

## 2019-04-10 RX ORDER — SUCRALFATE 1 G/1
1 TABLET ORAL 4 TIMES DAILY
Qty: 90 TABLET | Refills: 0 | Status: SHIPPED | OUTPATIENT
Start: 2019-04-10 | End: 2019-09-11

## 2019-05-03 ENCOUNTER — TELEPHONE (OUTPATIENT)
Dept: INTERNAL MEDICINE CLINIC | Facility: CLINIC | Age: 69
End: 2019-05-03

## 2019-07-04 PROBLEM — E11.22 TYPE 2 DIABETES MELLITUS WITH STAGE 2 CHRONIC KIDNEY DISEASE, WITH LONG-TERM CURRENT USE OF INSULIN (HCC): Chronic | Status: ACTIVE | Noted: 2019-02-18

## 2019-07-04 PROBLEM — N18.2 TYPE 2 DIABETES MELLITUS WITH STAGE 2 CHRONIC KIDNEY DISEASE, WITH LONG-TERM CURRENT USE OF INSULIN (HCC): Chronic | Status: ACTIVE | Noted: 2019-02-18

## 2019-07-04 PROBLEM — Z79.4 TYPE 2 DIABETES MELLITUS WITH STAGE 2 CHRONIC KIDNEY DISEASE, WITH LONG-TERM CURRENT USE OF INSULIN (HCC): Chronic | Status: ACTIVE | Noted: 2019-02-18

## 2019-09-11 ENCOUNTER — OFFICE VISIT (OUTPATIENT)
Dept: INTERNAL MEDICINE CLINIC | Facility: CLINIC | Age: 69
End: 2019-09-11
Payer: COMMERCIAL

## 2019-09-11 VITALS
SYSTOLIC BLOOD PRESSURE: 114 MMHG | HEART RATE: 96 BPM | WEIGHT: 150 LBS | OXYGEN SATURATION: 96 % | DIASTOLIC BLOOD PRESSURE: 72 MMHG | BODY MASS INDEX: 24.21 KG/M2

## 2019-09-11 DIAGNOSIS — E78.2 MIXED HYPERLIPIDEMIA: ICD-10-CM

## 2019-09-11 DIAGNOSIS — Z00.00 WELCOME TO MEDICARE PREVENTIVE VISIT: ICD-10-CM

## 2019-09-11 DIAGNOSIS — N18.2 TYPE 2 DIABETES MELLITUS WITH STAGE 2 CHRONIC KIDNEY DISEASE, WITH LONG-TERM CURRENT USE OF INSULIN (HCC): Primary | Chronic | ICD-10-CM

## 2019-09-11 DIAGNOSIS — Z11.59 NEED FOR HEPATITIS C SCREENING TEST: ICD-10-CM

## 2019-09-11 DIAGNOSIS — Z12.5 SCREENING FOR PROSTATE CANCER: ICD-10-CM

## 2019-09-11 DIAGNOSIS — K20.90 ESOPHAGITIS: ICD-10-CM

## 2019-09-11 DIAGNOSIS — E11.22 TYPE 2 DIABETES MELLITUS WITH STAGE 2 CHRONIC KIDNEY DISEASE, WITH LONG-TERM CURRENT USE OF INSULIN (HCC): Primary | Chronic | ICD-10-CM

## 2019-09-11 DIAGNOSIS — Z86.73 HISTORY OF LACUNAR CEREBROVASCULAR ACCIDENT (CVA): Chronic | ICD-10-CM

## 2019-09-11 DIAGNOSIS — Z79.4 TYPE 2 DIABETES MELLITUS WITH STAGE 2 CHRONIC KIDNEY DISEASE, WITH LONG-TERM CURRENT USE OF INSULIN (HCC): Primary | Chronic | ICD-10-CM

## 2019-09-11 LAB — SL AMB POCT HEMOGLOBIN AIC: 7.8 (ref ?–6.5)

## 2019-09-11 PROCEDURE — 83036 HEMOGLOBIN GLYCOSYLATED A1C: CPT | Performed by: INTERNAL MEDICINE

## 2019-09-11 PROCEDURE — 1170F FXNL STATUS ASSESSED: CPT | Performed by: INTERNAL MEDICINE

## 2019-09-11 PROCEDURE — G0438 PPPS, INITIAL VISIT: HCPCS | Performed by: INTERNAL MEDICINE

## 2019-09-11 PROCEDURE — 99214 OFFICE O/P EST MOD 30 MIN: CPT | Performed by: INTERNAL MEDICINE

## 2019-09-11 PROCEDURE — 93000 ELECTROCARDIOGRAM COMPLETE: CPT | Performed by: INTERNAL MEDICINE

## 2019-09-11 PROCEDURE — 1125F AMNT PAIN NOTED PAIN PRSNT: CPT | Performed by: INTERNAL MEDICINE

## 2019-09-11 RX ORDER — PANTOPRAZOLE SODIUM 40 MG/1
40 TABLET, DELAYED RELEASE ORAL DAILY
Qty: 90 TABLET | Refills: 3
Start: 2019-09-11 | End: 2020-09-16 | Stop reason: SDUPTHER

## 2019-09-11 RX ORDER — METFORMIN HYDROCHLORIDE 750 MG/1
1500 TABLET, EXTENDED RELEASE ORAL
Qty: 180 TABLET | Refills: 1 | Status: SHIPPED | OUTPATIENT
Start: 2019-09-11 | End: 2020-03-15

## 2019-09-11 NOTE — ASSESSMENT & PLAN NOTE
Patient with previous history of lacunar infarction  Can continue clopidogrel and lipitor 80mg       · BP goal <140/90  · A1C goal <7 0%  · LDL goal <70

## 2019-09-11 NOTE — PROGRESS NOTES
INTERNAL MEDICINE FOLLOW-UP OFFICE VISIT  [facility] RASTA GALVAN    NAME: Kyle Saint  AGE: 80 y o  SEX: male  : 1950     DATE: 2019     Assessment and Plan:     1  Type 2 diabetes mellitus with stage 2 chronic kidney disease, with long-term current use of insulin (HCC)  Assessment & Plan:  Lab Results   Component Value Date    HGBA1C 7 8 (A) 2019     A1C improved today even off insulin  Will switch to extended release metformin due to possible side effects from regular metformin  Will try to add jardiance, but depending on cost may have to consider another option  A1C goal <7 5%  Orders:  -     POCT hemoglobin A1c  -     Comprehensive metabolic panel; Future  -     Microalbumin / creatinine urine ratio; Future  -     CBC; Future  -     metFORMIN (GLUCOPHAGE-XR) 750 mg 24 hr tablet; Take 2 tablets (1,500 mg total) by mouth daily with dinner  -     Empagliflozin (JARDIANCE) 10 MG TABS; Take 1 tablet (10 mg total) by mouth every morning    2  History of lacunar cerebrovascular accident (CVA)  Assessment & Plan:  Patient with previous history of lacunar infarction  Can continue clopidogrel and lipitor 80mg  · BP goal <140/90  · A1C goal <7 0%  · LDL goal <70    Orders:  -     CBC; Future    3  Mixed hyperlipidemia  Assessment & Plan:  Continue high intensity statin  He is due for repeat lipid panel  Orders:  -     Lipid Panel with Direct LDL reflex; Future  -     CBC; Future    Return in about 4 months (around 2020) for Follow-up  Chief Complaint:     Chief Complaint   Patient presents with    Medicare Wellness Visit    Follow-up     3 month     Dizziness     off balance-since stroke      History of Present Illness:     Patient presents for routine follow-up  He did not get any lab testing before today's visit  Since increased metformin to 1000mg BID, he has not felt right   Increased episodes of dizziness and random episodes of weakness  Comes on like a wave  He stopped taking levemir due to cost  Blood sugars have been anywhere between 120-170s  Denies any vision changes or headaches  Taking plavix and statin with history of lacunar infarction  No recent falls  No chest pain or palpitations  Review of Systems:     Review of Systems   Constitutional: Negative for activity change, appetite change and fatigue  Respiratory: Negative for apnea, cough, chest tightness, shortness of breath and wheezing  Cardiovascular: Negative for chest pain, palpitations and leg swelling  Gastrointestinal: Negative for abdominal distention, abdominal pain, blood in stool, constipation, diarrhea, nausea and vomiting  Musculoskeletal: Negative for arthralgias, back pain, gait problem, joint swelling and myalgias  Skin: Negative for rash and wound  Neurological: Positive for dizziness  Negative for weakness, light-headedness, numbness and headaches  Psychiatric/Behavioral: Negative for behavioral problems, confusion, hallucinations, sleep disturbance and suicidal ideas  The patient is not nervous/anxious  Problem List:     Patient Active Problem List   Diagnosis    History of lacunar cerebrovascular accident (CVA)    Type 2 diabetes mellitus with stage 2 chronic kidney disease, with long-term current use of insulin (HCC)    Mixed hyperlipidemia      Objective:     /72 (BP Location: Left arm, Patient Position: Sitting, Cuff Size: Standard)   Pulse 96   Wt 68 kg (150 lb) Comment: w/ shoes denied off  SpO2 96%   BMI 24 21 kg/m²     Physical Exam   Constitutional: He is oriented to person, place, and time  He appears well-developed and well-nourished  No distress  HENT:   Right Ear: External ear normal    Left Ear: External ear normal    Eyes: Conjunctivae are normal  Right eye exhibits no discharge  Left eye exhibits no discharge  No scleral icterus  Neck: Neck supple  No JVD present  No thyromegaly present  Cardiovascular: Normal rate, regular rhythm and normal heart sounds  No murmur heard  Pulmonary/Chest: Effort normal and breath sounds normal  No respiratory distress  He has no wheezes  He has no rales  He exhibits no tenderness  Abdominal: Soft  Bowel sounds are normal  He exhibits no distension  There is no tenderness  Musculoskeletal: He exhibits no edema  Lymphadenopathy:     He has no cervical adenopathy  Neurological: He is alert and oriented to person, place, and time  Skin: Skin is warm and dry  He is not diaphoretic  Psychiatric: He has a normal mood and affect  His behavior is normal    Vitals reviewed      [place] DO Chuckie  [facility] [address] [place] [address]

## 2019-09-11 NOTE — PATIENT INSTRUCTIONS
Medicare Preventive Visit Patient Instructions  Thank you for completing your Welcome to Medicare Visit or Medicare Annual Wellness Visit today  Your next wellness visit will be due in one year (9/11/2020)  The screening/preventive services that you may require over the next 5-10 years are detailed below  Some tests may not apply to you based off risk factors and/or age  Screening tests ordered at today's visit but not completed yet may show as past due  Also, please note that scanned in results may not display below  Preventive Screenings:  Service Recommendations Previous Testing/Comments   Colorectal Cancer Screening  · Colonoscopy    · Fecal Occult Blood Test (FOBT)/Fecal Immunochemical Test (FIT)  · Fecal DNA/Cologuard Test  · Flexible Sigmoidoscopy Age: 54-65 years old   Colonoscopy: every 10 years (May be performed more frequently if at higher risk)  OR  FOBT/FIT: every 1 year  OR  Cologuard: every 3 years  OR  Sigmoidoscopy: every 5 years  Screening may be recommended earlier than age 48 if at higher risk for colorectal cancer  Also, an individualized decision between you and your healthcare provider will decide whether screening between the ages of 74-80 would be appropriate   Colonoscopy: Not on file  FOBT/FIT: Not on file  Cologuard: Not on file  Sigmoidoscopy: Not on file         Prostate Cancer Screening Individualized decision between patient and health care provider in men between ages of 53-78   Medicare will cover every 12 months beginning on the day after your 50th birthday PSA: No results in last 5 years     Risks and Benefits Discussed     Hepatitis C Screening Once for adults born between [facility]  More frequently in patients at high risk for Hepatitis C Hep C Antibody: Not on file    Risks and Benefits Discussed   Diabetes Screening 1-2 times per year if you're at risk for diabetes or have pre-diabetes Fasting glucose: No results in last 5 years   A1C: 7 8    Screening Not Indicated  History Diabetes   Cholesterol Screening Once every 5 years if you don't have a lipid disorder  May order more often based on risk factors  Lipid panel: 02/19/2019    Screening Not Indicated  History Lipid Disorder      Other Preventive Screenings Covered by Medicare:  1  Abdominal Aortic Aneurysm (AAA) Screening: covered once if your at risk  You're considered to be at risk if you have a family history of AAA or a male between the age of 73-68 who smoking at least 100 cigarettes in your lifetime  2  Lung Cancer Screening: covers low dose CT scan once per year if you meet all of the following conditions: (1) Age 50-69; (2) No signs or symptoms of lung cancer; (3) Current smoker or have quit smoking within the last 15 years; (4) You have a tobacco smoking history of at least 30 pack years (packs per day x number of years you smoked); (5) You get a written order from a healthcare provider  3  Glaucoma Screening: covered annually if you're considered high risk: (1) You have diabetes OR (2) Family history of glaucoma OR (3)  aged 48 and older OR (3)  American aged 72 and older  3  Osteoporosis Screening: covered every 2 years if you meet one of the following conditions: (1) Have a vertebral abnormality; (2) On glucocorticoid therapy for more than 3 months; (3) Have primary hyperparathyroidism; (4) On osteoporosis medications and need to assess response to drug therapy  5  HIV Screening: covered annually if you're between the age of 12-76  Also covered annually if you are younger than 13 and older than 72 with risk factors for HIV infection  For pregnant patients, it is covered up to 3 times per pregnancy      Immunizations:  Immunization Recommendations   Influenza Vaccine Annual influenza vaccination during flu season is recommended for all persons aged >= 6 months who do not have contraindications   Pneumococcal Vaccine (Prevnar and Pneumovax)  * Prevnar = PCV13  * Pneumovax = PPSV23 Adults 25-60 years old: 1-3 doses may be recommended based on certain risk factors  Adults 72 years old: Prevnar (PCV13) vaccine recommended followed by Pneumovax (PPSV23) vaccine  If already received PPSV23 since turning 65, then PCV13 recommended at least one year after PPSV23 dose  Hepatitis B Vaccine 3 dose series if at intermediate or high risk (ex: diabetes, end stage renal disease, liver disease)   Tetanus (Td) Vaccine - COST NOT COVERED BY MEDICARE PART B Following completion of primary series, a booster dose should be given every 10 years to maintain immunity against tetanus  Td may also be given as tetanus wound prophylaxis  Tdap Vaccine - COST NOT COVERED BY MEDICARE PART B Recommended at least once for all adults  For pregnant patients, recommended with each pregnancy  Shingles Vaccine (Shingrix) - COST NOT COVERED BY MEDICARE PART B  2 shot series recommended in those aged 48 and above     Health Maintenance Due:      Topic Date Due    Hepatitis C Screening  1950    CRC Screening: Colonoscopy  1950     Immunizations Due:      Topic Date Due    HEPATITIS B VACCINES (1 of 3 - Risk 3-dose series) 11/17/1970    DTaP,Tdap,and Td Vaccines (1 - Tdap) 11/17/1972    Pneumococcal Vaccine: 65+ Years (1 of 2 - PCV13) 11/17/2016    INFLUENZA VACCINE  07/01/2019     Advance Directives   What are advance directives? Advance directives are legal documents that state your wishes and plans for medical care  These plans are made ahead of time in case you lose your ability to make decisions for yourself  Advance directives can apply to any medical decision, such as the treatments you want, and if you want to donate organs  What are the types of advance directives? There are many types of advance directives, and each state has rules about how to use them  You may choose a combination of any of the following:  · Living will: This is a written record of the treatment you want   You can also choose which treatments you do not want, which to limit, and which to stop at a certain time  This includes surgery, medicine, IV fluid, and tube feedings  · Durable power of  for healthcare [facility]): This is a written record that states who you want to make healthcare choices for you when you are unable to make them for yourself  This person, called a proxy, is usually a family member or a friend  You may choose more than 1 proxy  · Do not resuscitate (DNR) order:  A DNR order is used in case your heart stops beating or you stop breathing  It is a request not to have certain forms of treatment, such as CPR  A DNR order may be included in other types of advance directives  · Medical directive: This covers the care that you want if you are in a coma, near death, or unable to make decisions for yourself  You can list the treatments you want for each condition  Treatment may include pain medicine, surgery, blood transfusions, dialysis, IV or tube feedings, and a ventilator (breathing machine)  · Values history: This document has questions about your views, beliefs, and how you feel and think about life  This information can help others choose the care that you would choose  Why are advance directives important? An advance directive helps you control your care  Although spoken wishes may be used, it is better to have your wishes written down  Spoken wishes can be misunderstood, or not followed  Treatments may be given even if you do not want them  An advance directive may make it easier for your family to make difficult choices about your care  © Copyright [employer] 2018 Information is for End User's use only and may not be sold, redistributed or otherwise used for commercial purposes   All illustrations and images included in CareNotes® are the copyrighted property of [employer]  or [employer]

## 2019-09-11 NOTE — PROGRESS NOTES
Assessment and Plan:     1  Welcome to Medicare preventive visit  - POCT ECG    2  Need for hepatitis C screening test  - Hepatitis C antibody; Future    3  Screening for prostate cancer  - PSA, Total Screen; Future      Regular exercise is encouraged  Diabetic/heart healthy diet is encouraged  No concerns with current alcohol use  No fall risk concern  Preventive health issues were discussed with patient, and age appropriate screening tests were ordered as noted in patient's After Visit Summary  Personalized health advice and appropriate referrals for health education or preventive services given if needed, as noted in patient's After Visit Summary  History of Present Illness:     Patient presents for Welcome to Medicare visit  Patient Care Team:  Ollie Reilly DO as PCP - General (Internal Medicine)     Review of Systems:     Review of Systems   Constitutional: Negative for activity change, appetite change and fatigue  Respiratory: Negative for apnea, cough, chest tightness, shortness of breath and wheezing  Cardiovascular: Negative for chest pain, palpitations and leg swelling  Gastrointestinal: Negative for abdominal distention, abdominal pain, blood in stool, constipation, diarrhea, nausea and vomiting  Musculoskeletal: Negative for arthralgias, back pain, gait problem, joint swelling and myalgias  Skin: Negative for rash and wound  Neurological: Positive for dizziness  Negative for weakness, light-headedness, numbness and headaches  Psychiatric/Behavioral: Negative for behavioral problems, confusion, hallucinations, sleep disturbance and suicidal ideas  The patient is not nervous/anxious         Problem List:     Patient Active Problem List   Diagnosis    History of lacunar cerebrovascular accident (CVA)    Type 2 diabetes mellitus with stage 2 chronic kidney disease, with long-term current use of insulin ([facility] [address] )    Mixed hyperlipidemia      Past Medical and Surgical History: Past Medical History:   Diagnosis Date    CVA (cerebral vascular accident) ([facility] [address] ) 02/18/2019    Bilateral pontine lacunar infarct    Hyperlipidemia     Type 2 diabetes mellitus ([facility] )      Past Surgical History:   Procedure Laterality Date    ESOPHAGOGASTRODUODENOSCOPY N/A 3/4/2019    Procedure: ESOPHAGOGASTRODUODENOSCOPY (EGD); Surgeon: Jaky Manzo DO;  Location: [facility]; Service: Gastroenterology      Family History:     History reviewed  No pertinent family history  Social History:     Social History     Socioeconomic History    Marital status: /Civil Union     Spouse name: None    Number of children: None    Years of education: None    Highest education level: None   Occupational History    None   Social Needs    Financial resource strain: None    Food insecurity:     Worry: None     Inability: None    Transportation needs:     Medical: None     Non-medical: None   Tobacco Use    Smoking status: Never Smoker    Smokeless tobacco: Never Used   Substance and Sexual Activity    Alcohol use: Yes     Frequency: Monthly or less     Drinks per session: 1 or 2     Binge frequency: Never    Drug use: Never    Sexual activity: Yes     Partners: Female   Lifestyle    Physical activity:     Days per week: 0 days     Minutes per session: 0 min    Stress:  Only a little   Relationships    Social connections:     Talks on phone: None     Gets together: None     Attends L[employer] day service: None     Active member of club or organization: None     Attends meetings of clubs or organizations: None     Relationship status: None    Intimate partner violence:     Fear of current or ex partner: None     Emotionally abused: None     Physically abused: None     Forced sexual activity: None   Other Topics Concern    None   Social History Narrative    None      Medications and Allergies:     Current Outpatient Medications   Medication Sig Dispense Refill    acetaminophen (TYLENOL) 325 mg tablet Take 2 tablets (650 mg total) by mouth every 4 (four) hours as needed for headaches 30 tablet 0    Alcohol Swabs PADS Use daily to inject insulin 100 each 0    atorvastatin (LIPITOR) 80 mg tablet Take 1 tablet (80 mg total) by mouth every evening 90 tablet 1    clopidogrel (PLAVIX) 75 mg tablet Take 1 tablet (75 mg total) by mouth daily 90 tablet 1    Insulin Pen Needle 31G X 5 MM MISC Use to inject insulin daily 100 each 1    metFORMIN (GLUCOPHAGE) 1000 MG tablet Take 1 tablet (1,000 mg total) by mouth 2 (two) times a day with meals 180 tablet 1    metoprolol tartrate (LOPRESSOR) 25 mg tablet Take 0 5 tablets (12 5 mg total) by mouth every 12 (twelve) hours 45 tablet 1    pantoprazole (PROTONIX) 40 mg tablet Take 1 tablet (40 mg total) by mouth 2 (two) times a day before meals 180 tablet 1    sucralfate (CARAFATE) 1 g tablet Take 1 tablet (1 g total) by mouth 4 (four) times a day 90 tablet 0    insulin detemir (LEVEMIR FLEXTOUCH) 100 Units/mL injection pen Inject 20 Units under the skin daily in the early morning (Patient not taking: Reported on 9/11/2019) 15 mL 3     No current facility-administered medications for this visit  No Known Allergies   Immunizations: There is no immunization history on file for this patient  Health Maintenance:         Topic Date Due    Hepatitis C Screening  1950    CRC Screening: Colonoscopy  1950         Topic Date Due    HEPATITIS B VACCINES (1 of 3 - Risk 3-dose series) 11/17/1970    DTaP,Tdap,and Td Vaccines (1 - Tdap) 11/17/1972    Pneumococcal Vaccine: 65+ Years (1 of 2 - PCV13) 11/17/2016    INFLUENZA VACCINE  07/01/2019      Medicare Screening Tests and Risk Assessments:     Cele Hernandez is here for his Welcome to Medicare visit  Health Risk Assessment:   Patient rates overall health as good  Patient feels that their physical health rating is same  Eyesight was rated as same  Hearing was rated as same   Patient feels that their emotional and mental health rating is same  Pain experienced in the last 7 days has been some  Patient's pain rating has been 6/10  Patient states that he has experienced no weight loss or gain in last 6 months  Depression Screening:   PHQ-2 Score: 0      Fall Risk Screening: In the past year, patient has experienced: no history of falling in past year      Home Safety:  Patient does not have trouble with stairs inside or outside of their home  Patient has working smoke alarms and has no working carbon monoxide detector  Home safety hazards include: none  Nutrition:   Current diet is Diabetic and Low Cholesterol  Medications:   Patient is currently taking over-the-counter supplements  OTC medications include: see medication list  Patient is able to manage medications  Activities of Daily Living (ADLs)/Instrumental Activities of Daily Living (IADLs):   Walk and transfer into and out of bed and chair?: Yes  Dress and groom yourself?: Yes    Bathe or shower yourself?: Yes    Feed yourself? Yes  Do your laundry/housekeeping?: Yes  Manage your money, pay your bills and track your expenses?: Yes  Make your own meals?: Yes    Do your own shopping?: Yes    Durable Medical Equipment Suppliers  None    Previous Hospitalizations:   Any hospitalizations or ED visits within the last 12 months?: Yes    How many hospitalizations have you had in the last year?: 1-2    Advance Care Planning:   Living will: Yes    Durable POA for healthcare:  Yes    Advanced directive: Yes    Five wishes given: Yes      Cognitive Screening:   Provider or family/friend/caregiver concerned regarding cognition?: No    PREVENTIVE SCREENINGS      Cardiovascular Screening:    General: Screening Not Indicated and History Lipid Disorder      Diabetes Screening:     General: Screening Not Indicated and History Diabetes      Colorectal Cancer Screening:     General: Screening Current      Prostate Cancer Screening:    General: Risks and Benefits Discussed Due for: PSA      Osteoporosis Screening:    General: Screening Not Indicated      Abdominal Aortic Aneurysm (AAA) Screening:    Risk factors include: age between 73-69 yo        General: Screening Not Indicated      Lung Cancer Screening:     General: Screening Not Indicated      Hepatitis C Screening:    General: Risks and Benefits Discussed    Hep C Screening Accepted: Yes        Preventive Screening Comments: Going to try to track down his previous colonoscopy  Other Counseling Topics:   Regular weightbearing exercise  Visual Acuity Screening    Right eye Left eye Both eyes   Without correction: 18/20 18/20 18/20   With correction: 20/20 20/20 20/20        Physical Exam:     /72 (BP Location: Left arm, Patient Position: Sitting, Cuff Size: Standard)   Pulse 96   Wt 68 kg (150 lb) Comment: w/ shoes denied off  SpO2 96%   BMI 24 21 kg/m²     Physical Exam   Constitutional: He is oriented to person, place, and time  He appears well-developed and well-nourished  No distress  Eyes: Conjunctivae are normal  Right eye exhibits no discharge  Left eye exhibits no discharge  No scleral icterus  Neck: Neck supple  No JVD present  No thyromegaly present  Cardiovascular: Normal rate, regular rhythm, normal heart sounds and intact distal pulses  Exam reveals no gallop and no friction rub  No murmur heard  Pulmonary/Chest: Effort normal and breath sounds normal  No respiratory distress  He has no wheezes  He has no rales  He exhibits no tenderness  Abdominal: Soft  Bowel sounds are normal  He exhibits no distension and no mass  There is no tenderness  There is no rebound and no guarding  Musculoskeletal: Normal range of motion  He exhibits no edema  Lymphadenopathy:     He has no cervical adenopathy  Neurological: He is alert and oriented to person, place, and time  Skin: Skin is warm and dry  He is not diaphoretic  Psychiatric: He has a normal mood and affect   His behavior is normal  Vitals reviewed      Marco Antonio Kline DO

## 2019-09-11 NOTE — ASSESSMENT & PLAN NOTE
Lab Results   Component Value Date    HGBA1C 7 8 (A) 09/11/2019     A1C improved today even off insulin  Will switch to extended release metformin due to possible side effects from regular metformin  Will try to add jardiance, but depending on cost may have to consider another option  A1C goal <7 5%

## 2019-09-30 DIAGNOSIS — K20.90 ESOPHAGITIS: ICD-10-CM

## 2019-09-30 DIAGNOSIS — Z86.73 HISTORY OF LACUNAR CEREBROVASCULAR ACCIDENT (CVA): Chronic | ICD-10-CM

## 2019-09-30 DIAGNOSIS — E11.9 DIABETES MELLITUS TYPE 2 IN NONOBESE (HCC): ICD-10-CM

## 2019-09-30 RX ORDER — PANTOPRAZOLE SODIUM 40 MG/1
40 TABLET, DELAYED RELEASE ORAL
Qty: 180 TABLET | Refills: 1 | Status: SHIPPED | OUTPATIENT
Start: 2019-09-30 | End: 2020-04-14 | Stop reason: CLARIF

## 2019-09-30 RX ORDER — CLOPIDOGREL BISULFATE 75 MG/1
TABLET ORAL
Qty: 90 TABLET | Refills: 1 | Status: SHIPPED | OUTPATIENT
Start: 2019-09-30 | End: 2020-04-14 | Stop reason: CLARIF

## 2019-10-01 DIAGNOSIS — Z86.73 HISTORY OF LACUNAR CEREBROVASCULAR ACCIDENT (CVA): Chronic | ICD-10-CM

## 2019-10-01 DIAGNOSIS — E11.9 DIABETES MELLITUS TYPE 2 IN NONOBESE (HCC): ICD-10-CM

## 2019-10-01 RX ORDER — CLOPIDOGREL BISULFATE 75 MG/1
TABLET ORAL
Qty: 90 TABLET | Refills: 1 | Status: SHIPPED | OUTPATIENT
Start: 2019-10-01 | End: 2020-09-16 | Stop reason: SDUPTHER

## 2019-10-02 DIAGNOSIS — I47.2 NONSUSTAINED VENTRICULAR TACHYCARDIA (HCC): ICD-10-CM

## 2019-10-02 DIAGNOSIS — E78.2 MIXED HYPERLIPIDEMIA: ICD-10-CM

## 2019-10-02 RX ORDER — ATORVASTATIN CALCIUM 80 MG/1
80 TABLET, FILM COATED ORAL EVERY EVENING
Qty: 90 TABLET | Refills: 1 | Status: SHIPPED | OUTPATIENT
Start: 2019-10-02 | End: 2020-04-15 | Stop reason: SDUPTHER

## 2019-11-13 NOTE — ED PROVIDER NOTES
History  Chief Complaint   Patient presents with    Hiccups     patient is c/o hiccups x 2 weeks     36-year-old male patient presents emergency department for evaluation of chest discomfort and hiccups  According to the patient over the last several days he has been unable to eat because every time he tries to eat the regurgitates the food that he is trying to conceive  The patient has had a hiccup like spasms of his abdomen and his diaphragm which are also concerning to him  The patient states he feels dehydrated because he can't drink fluids easily  Currently the patient is lying in bed, in minimal distress, having frequent abdominal spasms/hiccups  Because the patient has been seen for this here previously CT scan of the chest abdomen pelvis will be done to assess for any mass which may be irritating the diaphragm causing him is discomfort  History provided by:  Patient   used: No    Medical Problem   Location:  Epigastric pain and spasm  Severity:  Moderate  Onset quality:  Gradual  Timing:  Constant  Progression:  Worsening  Chronicity:  New  Context: For two weeks and worsening  Associated symptoms: abdominal pain and chest pain    Associated symptoms: no headaches, no loss of consciousness, no rash, no shortness of breath and no wheezing    Abdominal pain:     Location:  Generalized    Quality: not bloating, not dull, no pressure, not sharp and not tearing      Severity:  Mild    Onset quality:  Gradual    Timing:  Constant    Progression:  Worsening    Chronicity:  New      Prior to Admission Medications   Prescriptions Last Dose Informant Patient Reported?  Taking?   acetaminophen (TYLENOL) 325 mg tablet 3/1/2019 at Unknown time  No Yes   Sig: Take 2 tablets (650 mg total) by mouth every 4 (four) hours as needed for headaches   aspirin (ECOTRIN LOW STRENGTH) 81 mg EC tablet 3/1/2019 at Unknown time  No Yes   Sig: Take 1 tablet (81 mg total) by mouth daily   atorvastatin CNA assisting pt over to [facility] LIPITOR) 80 mg tablet 3/1/2019 at Unknown time  No Yes   Sig: Take 1 tablet (80 mg total) by mouth every evening   baclofen 10 mg tablet 3/1/2019 at Unknown time  No Yes   Sig: Take 1 tablet (10 mg total) by mouth 3 (three) times a day as needed for muscle spasms   clopidogrel (PLAVIX) 75 mg tablet 3/1/2019 at Unknown time  No Yes   Sig: Take 1 tablet (75 mg total) by mouth daily   metFORMIN (GLUCOPHAGE) 1000 MG tablet 3/1/2019 at Unknown time  No Yes   Sig: Take 0 5 tablets (500 mg total) by mouth 2 (two) times a day with meals   metoprolol tartrate (LOPRESSOR) 25 mg tablet 3/1/2019 at Unknown time  No Yes   Sig: Take 0 5 tablets (12 5 mg total) by mouth every 12 (twelve) hours      Facility-Administered Medications: None       Past Medical History:   Diagnosis Date    Diabetes mellitus ([facility] [address] )        History reviewed  No pertinent surgical history  History reviewed  No pertinent family history  I have reviewed and agree with the history as documented  Social History     Tobacco Use    Smoking status: Never Smoker    Smokeless tobacco: Never Used   Substance Use Topics    Alcohol use: Never     Frequency: Never    Drug use: Never        Review of Systems   Respiratory: Negative for shortness of breath and wheezing  Cardiovascular: Positive for chest pain  Gastrointestinal: Positive for abdominal pain  Skin: Negative for rash  Neurological: Negative for loss of consciousness and headaches  All other systems reviewed and are negative  Physical Exam  Physical Exam   Constitutional: He is oriented to person, place, and time  He appears well-developed and well-nourished  No distress  HENT:   Head: Normocephalic and atraumatic  Right Ear: External ear normal    Left Ear: External ear normal    Eyes: Conjunctivae and EOM are normal  Right eye exhibits no discharge  Left eye exhibits no discharge  No scleral icterus  Neck: Normal range of motion  Neck supple  No JVD present   No tracheal deviation present  No thyromegaly present  Cardiovascular: Normal rate and regular rhythm  Pulmonary/Chest: Effort normal and breath sounds normal  No stridor  No respiratory distress  He has no wheezes  He has no rales  Abdominal: Soft  Bowel sounds are normal  He exhibits no distension  There is no tenderness  Musculoskeletal: Normal range of motion  He exhibits no edema, tenderness or deformity  Neurological: He is alert and oriented to person, place, and time  No cranial nerve deficit  Coordination normal    Skin: Skin is warm and dry  He is not diaphoretic  Psychiatric: He has a normal mood and affect  His behavior is normal    Nursing note and vitals reviewed        Vital Signs  ED Triage Vitals   Temperature Pulse Respirations Blood Pressure SpO2   03/01/19 1403 03/01/19 1403 03/01/19 1404 03/01/19 1403 03/01/19 1403   97 8 °F (36 6 °C) 93 20 149/84 98 %      Temp Source Heart Rate Source Patient Position - Orthostatic VS BP Location FiO2 (%)   03/01/19 1403 03/01/19 1403 03/01/19 1403 03/01/19 1403 --   Oral Monitor Sitting Left arm       Pain Score       03/01/19 1918       No Pain           Vitals:    03/01/19 1626 03/01/19 1730 03/01/19 2100 03/01/19 2222   BP: 139/84 147/76 124/70 106/66   Pulse: 82 95 94 94   Patient Position - Orthostatic VS: Lying Lying Sitting Lying       Visual Acuity      ED Medications  Medications   pantoprazole (PROTONIX) 80 mg in sodium chloride 0 9 % 100 mL infusion (8 mg/hr Intravenous New Bag 3/2/19 0500)   sodium chloride 0 9 % infusion (90 mL/hr Intravenous New Bag 3/1/19 2157)   acetaminophen (TYLENOL) tablet 650 mg (has no administration in time range)   atorvastatin (LIPITOR) tablet 80 mg (80 mg Oral Not Given 3/1/19 2210)   baclofen tablet 10 mg (has no administration in time range)   metoprolol tartrate (LOPRESSOR) tablet 12 5 mg (12 5 mg Oral Not Given 3/1/19 2211)   insulin lispro (HumaLOG) 100 units/mL subcutaneous injection 1-5 Units (1 Units Subcutaneous Not Given 3/2/19 0644)   ondansetron (ZOFRAN) injection 4 mg (has no administration in time range)   metoprolol (LOPRESSOR) injection 5 mg (has no administration in time range)   metoclopramide (REGLAN) injection 10 mg (10 mg Intravenous Given 3/1/19 1650)   diphenhydrAMINE (BENADRYL) injection 50 mg (50 mg Intravenous Given 3/1/19 1648)   iohexol (OMNIPAQUE) 350 MG/ML injection (MULTI-DOSE) 100 mL (100 mL Intravenous Given 3/1/19 1749)   diphenhydrAMINE (BENADRYL) injection 50 mg (50 mg Intravenous Given 3/1/19 1814)   ondansetron (ZOFRAN) injection 4 mg (4 mg Intravenous Given 3/1/19 1814)   pantoprazole (PROTONIX) injection 40 mg (40 mg Intravenous Given 3/1/19 1817)       Diagnostic Studies  Results Reviewed     Procedure Component Value Units Date/Time    Magnesium [[id number]]  (Normal) Collected:  03/02/19 0542    Lab Status:  Final result Specimen:  Blood from Hand, Left Updated:  03/02/19 0607     Magnesium 1 9 mg/dL     Phosphorus [[id number]]  (Normal) Collected:  03/02/19 0542    Lab Status:  Final result Specimen:  Blood from Hand, Left Updated:  03/02/19 0607     Phosphorus 4 0 mg/dL     Basic metabolic panel [[id number]] Collected:  03/02/19 0542    Lab Status:  Final result Specimen:  Blood from Hand, Left Updated:  03/02/19 2553     Sodium 142 mmol/L      Potassium 3 9 mmol/L      Chloride 106 mmol/L      CO2 29 mmol/L      ANION GAP 7 mmol/L      BUN 18 mg/dL      Creatinine 1 14 mg/dL      Glucose 117 mg/dL      Calcium 9 0 mg/dL      eGFR 77 ml/min/1 73sq m     Narrative:       National Kidney Disease Education Program recommendations are as follows:  GFR calculation is accurate only with a steady state creatinine  Chronic Kidney disease less than 60 ml/min/1 73 sq  meters  Kidney failure less than 15 ml/min/1 73 sq  meters      Hemoglobin and hematocrit, blood [[id number]]  (Abnormal) Collected:  03/02/19 0553    Lab Status:  Final result Specimen:  Blood from Hand, Right Updated: 03/02/19 0605     Hemoglobin 11 8 g/dL      Hematocrit 36 1 %     Hemoglobin and hematocrit, blood [[id number]]     Lab Status:  No result Specimen:  Blood     Troponin I [[id number]]  (Normal) Collected:  03/01/19 1917    Lab Status:  Final result Specimen:  Blood from Arm, Left Updated:  03/01/19 1944     Troponin I <0 02 ng/mL     Protime-INR [[id number]]  (Normal) Collected:  03/01/19 1731    Lab Status:  Final result Specimen:  Blood from Arm, Right Updated:  03/01/19 1747     Protime 13 7 seconds      INR 1 06    Troponin I [[id number]]  (Normal) Collected:  03/01/19 1646    Lab Status:  Final result Specimen:  Blood from Arm, Right Updated:  03/01/19 1710     Troponin I <0 02 ng/mL     Comprehensive metabolic panel [[id number]]  (Abnormal) Collected:  03/01/19 1646    Lab Status:  Final result Specimen:  Blood from Arm, Right Updated:  03/01/19 1708     Sodium 139 mmol/L      Potassium 4 6 mmol/L      Chloride 99 mmol/L      CO2 28 mmol/L      ANION GAP 12 mmol/L      BUN 18 mg/dL      Creatinine 1 19 mg/dL      Glucose 237 mg/dL      Calcium 10 4 mg/dL      AST 18 U/L      ALT 27 U/L      Alkaline Phosphatase 90 U/L      Total Protein 8 3 g/dL      Albumin 3 5 g/dL      Total Bilirubin 0 40 mg/dL      eGFR 73 ml/min/1 73sq m     Narrative:       National Kidney Disease Education Program recommendations are as follows:  GFR calculation is accurate only with a steady state creatinine  Chronic Kidney disease less than 60 ml/min/1 73 sq  meters  Kidney failure less than 15 ml/min/1 73 sq  meters      CBC and differential [[id number]]  (Abnormal) Collected:  03/01/19 1646    Lab Status:  Final result Specimen:  Blood from Arm, Right Updated:  03/01/19 1653     WBC 10 07 Thousand/uL      RBC 4 62 Million/uL      Hemoglobin 13 6 g/dL      Hematocrit 41 2 %      MCV 89 fL      MCH 29 4 pg      MCHC 33 0 g/dL      RDW 12 0 %      MPV 10 8 fL      Platelets 535 Thousands/uL      nRBC 0 /100 WBCs      Neutrophils Relative 78 %      Immat GRANS % 0 %      Lymphocytes Relative 16 %      Monocytes Relative 6 %      Eosinophils Relative 0 %      Basophils Relative 0 %      Neutrophils Absolute 7 76 Thousands/µL      Immature Grans Absolute 0 03 Thousand/uL      Lymphocytes Absolute 1 65 Thousands/µL      Monocytes Absolute 0 58 Thousand/µL      Eosinophils Absolute 0 02 Thousand/µL      Basophils Absolute 0 03 Thousands/µL                  CT chest abdomen pelvis w contrast   Final Result by Marko Cam MD (03/01 1812)      Prominent gastric distention  Moderate circumferential thickening of the duodenum #2/55, #601/74 in keeping with a nonspecific duodenitis  Ulcer disease is not excluded  Duodenal malignancy is also part of the differential diagnosis  Nonspecific circumferential thickening of the distal esophagus  The study was marked in [facility] for immediate notification  Workstation performed: [id number]                    Procedures  Procedures       Phone Contacts  ED Phone Contact    ED Course  ED Course as of Mar 02 0723   Fri Mar 01, 2019   0412 I placed a 16 gauge IV in the right [facility] and LUKE Dang placed an 18 in the left  MDM    Disposition  Final diagnoses:   Duodenitis     Time reflects when diagnosis was documented in both MDM as applicable and the Disposition within this note     Time User Action Codes Description Comment    3/1/2019  6:40 PM Nonnie Duran Add [K29 80] Duodenitis     3/1/2019  7:32 PM Billings Nip Add [K92 0] Coffee ground emesis       ED Disposition     ED Disposition Condition Date/Time Comment    Admit Stable Fri Mar 1, 2019  6:40 PM Case was discussed with Dr Gildardo Dean and the patient's admission status was agreed to be Admission Status: inpatient status to the service of Dr Gildardo Dean           Follow-up Information    None         Current Discharge Medication List      CONTINUE these medications which have NOT CHANGED    Details   acetaminophen (TYLENOL) 325 mg tablet Take 2 tablets (650 mg total) by mouth every 4 (four) hours as needed for headaches  Qty: 30 tablet, Refills: 0    Associated Diagnoses: Dizziness      aspirin (ECOTRIN LOW STRENGTH) 81 mg EC tablet Take 1 tablet (81 mg total) by mouth daily  Qty: 30 tablet, Refills: 0    Associated Diagnoses: Dizziness      atorvastatin (LIPITOR) 80 mg tablet Take 1 tablet (80 mg total) by mouth every evening  Qty: 30 tablet, Refills: 0    Associated Diagnoses: Dizziness      baclofen 10 mg tablet Take 1 tablet (10 mg total) by mouth 3 (three) times a day as needed for muscle spasms  Qty: 21 tablet, Refills: 0    Associated Diagnoses: Dizziness      clopidogrel (PLAVIX) 75 mg tablet Take 1 tablet (75 mg total) by mouth daily  Qty: 30 tablet, Refills: 0    Associated Diagnoses: Dizziness      metFORMIN (GLUCOPHAGE) 1000 MG tablet Take 0 5 tablets (500 mg total) by mouth 2 (two) times a day with meals  Qty: 60 tablet, Refills: 0    Associated Diagnoses: Dizziness      metoprolol tartrate (LOPRESSOR) 25 mg tablet Take 0 5 tablets (12 5 mg total) by mouth every 12 (twelve) hours  Qty: 30 tablet, Refills: 0    Associated Diagnoses: Dizziness           No discharge procedures on file      ED Provider  Electronically Signed by           Corina Matamoros DO  03/02/19 7232

## 2020-01-03 DIAGNOSIS — I47.2 NONSUSTAINED VENTRICULAR TACHYCARDIA (HCC): ICD-10-CM

## 2020-03-15 DIAGNOSIS — E11.22 TYPE 2 DIABETES MELLITUS WITH STAGE 2 CHRONIC KIDNEY DISEASE, WITH LONG-TERM CURRENT USE OF INSULIN (HCC): Chronic | ICD-10-CM

## 2020-03-15 DIAGNOSIS — Z79.4 TYPE 2 DIABETES MELLITUS WITH STAGE 2 CHRONIC KIDNEY DISEASE, WITH LONG-TERM CURRENT USE OF INSULIN (HCC): Chronic | ICD-10-CM

## 2020-03-15 DIAGNOSIS — N18.2 TYPE 2 DIABETES MELLITUS WITH STAGE 2 CHRONIC KIDNEY DISEASE, WITH LONG-TERM CURRENT USE OF INSULIN (HCC): Chronic | ICD-10-CM

## 2020-03-15 PROCEDURE — 3066F NEPHROPATHY DOC TX: CPT | Performed by: PHYSICIAN ASSISTANT

## 2020-03-15 RX ORDER — METFORMIN HYDROCHLORIDE 750 MG/1
1500 TABLET, EXTENDED RELEASE ORAL
Qty: 180 TABLET | Refills: 1 | Status: SHIPPED | OUTPATIENT
Start: 2020-03-15 | End: 2020-09-16 | Stop reason: SDUPTHER

## 2020-04-14 VITALS — WEIGHT: 186 LBS | BODY MASS INDEX: 30.99 KG/M2 | HEIGHT: 65 IN

## 2020-04-15 ENCOUNTER — TELEPHONE (OUTPATIENT)
Dept: INTERNAL MEDICINE CLINIC | Facility: CLINIC | Age: 70
End: 2020-04-15

## 2020-04-15 ENCOUNTER — TELEMEDICINE (OUTPATIENT)
Dept: INTERNAL MEDICINE CLINIC | Facility: CLINIC | Age: 70
End: 2020-04-15
Payer: COMMERCIAL

## 2020-04-15 DIAGNOSIS — E78.2 MIXED HYPERLIPIDEMIA: Chronic | ICD-10-CM

## 2020-04-15 DIAGNOSIS — N18.2 TYPE 2 DIABETES MELLITUS WITH STAGE 2 CHRONIC KIDNEY DISEASE, WITH LONG-TERM CURRENT USE OF INSULIN (HCC): Primary | Chronic | ICD-10-CM

## 2020-04-15 DIAGNOSIS — Z79.4 TYPE 2 DIABETES MELLITUS WITH STAGE 2 CHRONIC KIDNEY DISEASE, WITH LONG-TERM CURRENT USE OF INSULIN (HCC): Primary | Chronic | ICD-10-CM

## 2020-04-15 DIAGNOSIS — Z11.59 NEED FOR HEPATITIS C SCREENING TEST: ICD-10-CM

## 2020-04-15 DIAGNOSIS — Z12.5 SCREENING FOR PROSTATE CANCER: ICD-10-CM

## 2020-04-15 DIAGNOSIS — Z86.73 HISTORY OF LACUNAR CEREBROVASCULAR ACCIDENT (CVA): Chronic | ICD-10-CM

## 2020-04-15 DIAGNOSIS — E11.22 TYPE 2 DIABETES MELLITUS WITH STAGE 2 CHRONIC KIDNEY DISEASE, WITH LONG-TERM CURRENT USE OF INSULIN (HCC): Primary | Chronic | ICD-10-CM

## 2020-04-15 PROCEDURE — 1101F PT FALLS ASSESS-DOCD LE1/YR: CPT | Performed by: INTERNAL MEDICINE

## 2020-04-15 PROCEDURE — 3288F FALL RISK ASSESSMENT DOCD: CPT | Performed by: INTERNAL MEDICINE

## 2020-04-15 PROCEDURE — 99214 OFFICE O/P EST MOD 30 MIN: CPT | Performed by: INTERNAL MEDICINE

## 2020-04-15 PROCEDURE — 1160F RVW MEDS BY RX/DR IN RCRD: CPT | Performed by: INTERNAL MEDICINE

## 2020-04-15 RX ORDER — ATORVASTATIN CALCIUM 40 MG/1
40 TABLET, FILM COATED ORAL EVERY EVENING
Qty: 90 TABLET | Refills: 1 | Status: SHIPPED | OUTPATIENT
Start: 2020-04-15 | End: 2020-09-16 | Stop reason: SDUPTHER

## 2020-04-29 ENCOUNTER — OFFICE VISIT (OUTPATIENT)
Dept: INTERNAL MEDICINE CLINIC | Facility: CLINIC | Age: 70
End: 2020-04-29
Payer: COMMERCIAL

## 2020-04-29 VITALS
TEMPERATURE: 99.5 F | DIASTOLIC BLOOD PRESSURE: 82 MMHG | BODY MASS INDEX: 26.26 KG/M2 | WEIGHT: 157.6 LBS | HEIGHT: 65 IN | HEART RATE: 104 BPM | SYSTOLIC BLOOD PRESSURE: 142 MMHG | OXYGEN SATURATION: 98 %

## 2020-04-29 DIAGNOSIS — Z86.73 HISTORY OF LACUNAR CEREBROVASCULAR ACCIDENT (CVA): Chronic | ICD-10-CM

## 2020-04-29 DIAGNOSIS — M54.50 ACUTE RIGHT-SIDED LOW BACK PAIN WITHOUT SCIATICA: ICD-10-CM

## 2020-04-29 DIAGNOSIS — E78.2 MIXED HYPERLIPIDEMIA: Chronic | ICD-10-CM

## 2020-04-29 DIAGNOSIS — M77.8 TENDINITIS OF RIGHT SHOULDER: ICD-10-CM

## 2020-04-29 DIAGNOSIS — N18.2 TYPE 2 DIABETES MELLITUS WITH STAGE 2 CHRONIC KIDNEY DISEASE, WITH LONG-TERM CURRENT USE OF INSULIN (HCC): Primary | Chronic | ICD-10-CM

## 2020-04-29 DIAGNOSIS — E11.22 TYPE 2 DIABETES MELLITUS WITH STAGE 2 CHRONIC KIDNEY DISEASE, WITH LONG-TERM CURRENT USE OF INSULIN (HCC): Primary | Chronic | ICD-10-CM

## 2020-04-29 DIAGNOSIS — Z79.4 TYPE 2 DIABETES MELLITUS WITH STAGE 2 CHRONIC KIDNEY DISEASE, WITH LONG-TERM CURRENT USE OF INSULIN (HCC): Primary | Chronic | ICD-10-CM

## 2020-04-29 PROCEDURE — 3066F NEPHROPATHY DOC TX: CPT | Performed by: PHYSICIAN ASSISTANT

## 2020-04-29 PROCEDURE — 3008F BODY MASS INDEX DOCD: CPT | Performed by: PHYSICIAN ASSISTANT

## 2020-04-29 PROCEDURE — 99214 OFFICE O/P EST MOD 30 MIN: CPT | Performed by: PHYSICIAN ASSISTANT

## 2020-04-29 PROCEDURE — 1036F TOBACCO NON-USER: CPT | Performed by: PHYSICIAN ASSISTANT

## 2020-04-29 PROCEDURE — 1160F RVW MEDS BY RX/DR IN RCRD: CPT | Performed by: PHYSICIAN ASSISTANT

## 2020-04-29 PROCEDURE — 2022F DILAT RTA XM EVC RTNOPTHY: CPT | Performed by: PHYSICIAN ASSISTANT

## 2020-04-29 RX ORDER — CYCLOBENZAPRINE HCL 10 MG
10 TABLET ORAL 3 TIMES DAILY PRN
Qty: 30 TABLET | Refills: 0 | Status: SHIPPED | OUTPATIENT
Start: 2020-04-29 | End: 2021-04-29

## 2020-05-04 ENCOUNTER — TELEPHONE (OUTPATIENT)
Dept: GASTROENTEROLOGY | Facility: CLINIC | Age: 70
End: 2020-05-04

## 2020-09-15 ENCOUNTER — TELEPHONE (OUTPATIENT)
Dept: INTERNAL MEDICINE CLINIC | Facility: CLINIC | Age: 70
End: 2020-09-15

## 2020-09-16 ENCOUNTER — APPOINTMENT (OUTPATIENT)
Dept: LAB | Facility: CLINIC | Age: 70
End: 2020-09-16
Payer: COMMERCIAL

## 2020-09-16 ENCOUNTER — OFFICE VISIT (OUTPATIENT)
Dept: INTERNAL MEDICINE CLINIC | Facility: CLINIC | Age: 70
End: 2020-09-16
Payer: COMMERCIAL

## 2020-09-16 VITALS
OXYGEN SATURATION: 97 % | TEMPERATURE: 97.9 F | BODY MASS INDEX: 25.89 KG/M2 | SYSTOLIC BLOOD PRESSURE: 122 MMHG | HEART RATE: 103 BPM | DIASTOLIC BLOOD PRESSURE: 72 MMHG | WEIGHT: 155.6 LBS

## 2020-09-16 DIAGNOSIS — Z00.00 MEDICARE ANNUAL WELLNESS VISIT, INITIAL: ICD-10-CM

## 2020-09-16 DIAGNOSIS — E11.22 TYPE 2 DIABETES MELLITUS WITH STAGE 2 CHRONIC KIDNEY DISEASE, WITH LONG-TERM CURRENT USE OF INSULIN (HCC): Chronic | ICD-10-CM

## 2020-09-16 DIAGNOSIS — Z86.73 HISTORY OF LACUNAR CEREBROVASCULAR ACCIDENT (CVA): Chronic | ICD-10-CM

## 2020-09-16 DIAGNOSIS — E11.22 TYPE 2 DIABETES MELLITUS WITH STAGE 2 CHRONIC KIDNEY DISEASE, WITH LONG-TERM CURRENT USE OF INSULIN (HCC): Primary | Chronic | ICD-10-CM

## 2020-09-16 DIAGNOSIS — N18.2 TYPE 2 DIABETES MELLITUS WITH STAGE 2 CHRONIC KIDNEY DISEASE, WITH LONG-TERM CURRENT USE OF INSULIN (HCC): Primary | Chronic | ICD-10-CM

## 2020-09-16 DIAGNOSIS — Z12.5 SCREENING FOR PROSTATE CANCER: ICD-10-CM

## 2020-09-16 DIAGNOSIS — E78.2 MIXED HYPERLIPIDEMIA: Chronic | ICD-10-CM

## 2020-09-16 DIAGNOSIS — K20.90 ESOPHAGITIS: ICD-10-CM

## 2020-09-16 DIAGNOSIS — Z11.59 NEED FOR HEPATITIS C SCREENING TEST: ICD-10-CM

## 2020-09-16 DIAGNOSIS — Z79.4 TYPE 2 DIABETES MELLITUS WITH STAGE 2 CHRONIC KIDNEY DISEASE, WITH LONG-TERM CURRENT USE OF INSULIN (HCC): Primary | Chronic | ICD-10-CM

## 2020-09-16 DIAGNOSIS — N18.2 TYPE 2 DIABETES MELLITUS WITH STAGE 2 CHRONIC KIDNEY DISEASE, WITH LONG-TERM CURRENT USE OF INSULIN (HCC): Chronic | ICD-10-CM

## 2020-09-16 DIAGNOSIS — Z79.4 TYPE 2 DIABETES MELLITUS WITH STAGE 2 CHRONIC KIDNEY DISEASE, WITH LONG-TERM CURRENT USE OF INSULIN (HCC): Chronic | ICD-10-CM

## 2020-09-16 LAB
ALBUMIN SERPL BCP-MCNC: 3.8 G/DL (ref 3.5–5)
ALP SERPL-CCNC: 109 U/L (ref 46–116)
ALT SERPL W P-5'-P-CCNC: 31 U/L (ref 12–78)
ANION GAP SERPL CALCULATED.3IONS-SCNC: 10 MMOL/L (ref 4–13)
AST SERPL W P-5'-P-CCNC: 22 U/L (ref 5–45)
BILIRUB SERPL-MCNC: 0.64 MG/DL (ref 0.2–1)
BUN SERPL-MCNC: 10 MG/DL (ref 5–25)
CALCIUM SERPL-MCNC: 9.8 MG/DL (ref 8.3–10.1)
CHLORIDE SERPL-SCNC: 106 MMOL/L (ref 100–108)
CHOLEST SERPL-MCNC: 115 MG/DL (ref 50–200)
CO2 SERPL-SCNC: 24 MMOL/L (ref 21–32)
CREAT SERPL-MCNC: 0.92 MG/DL (ref 0.6–1.3)
CREAT UR-MCNC: 139 MG/DL
ERYTHROCYTE [DISTWIDTH] IN BLOOD BY AUTOMATED COUNT: 12.1 % (ref 11.6–15.1)
EST. AVERAGE GLUCOSE BLD GHB EST-MCNC: 180 MG/DL
GFR SERPL CREATININE-BSD FRML MDRD: 98 ML/MIN/1.73SQ M
GLUCOSE P FAST SERPL-MCNC: 195 MG/DL (ref 65–99)
HBA1C MFR BLD: 7.9 %
HCT VFR BLD AUTO: 45.7 % (ref 36.5–49.3)
HCV AB SER QL: NORMAL
HDLC SERPL-MCNC: 56 MG/DL
HGB BLD-MCNC: 14.9 G/DL (ref 12–17)
LDLC SERPL CALC-MCNC: 46 MG/DL (ref 0–100)
MCH RBC QN AUTO: 29.5 PG (ref 26.8–34.3)
MCHC RBC AUTO-ENTMCNC: 32.6 G/DL (ref 31.4–37.4)
MCV RBC AUTO: 91 FL (ref 82–98)
MICROALBUMIN UR-MCNC: 7.5 MG/L (ref 0–20)
MICROALBUMIN/CREAT 24H UR: 5 MG/G CREATININE (ref 0–30)
NONHDLC SERPL-MCNC: 59 MG/DL
PLATELET # BLD AUTO: 197 THOUSANDS/UL (ref 149–390)
PMV BLD AUTO: 10.3 FL (ref 8.9–12.7)
POTASSIUM SERPL-SCNC: 4 MMOL/L (ref 3.5–5.3)
PROT SERPL-MCNC: 7.8 G/DL (ref 6.4–8.2)
PSA SERPL-MCNC: 1.3 NG/ML (ref 0–4)
RBC # BLD AUTO: 5.05 MILLION/UL (ref 3.88–5.62)
SODIUM SERPL-SCNC: 140 MMOL/L (ref 136–145)
TRIGL SERPL-MCNC: 65 MG/DL
WBC # BLD AUTO: 5.95 THOUSAND/UL (ref 4.31–10.16)

## 2020-09-16 PROCEDURE — 3051F HG A1C>EQUAL 7.0%<8.0%: CPT | Performed by: INTERNAL MEDICINE

## 2020-09-16 PROCEDURE — 86803 HEPATITIS C AB TEST: CPT

## 2020-09-16 PROCEDURE — 82570 ASSAY OF URINE CREATININE: CPT | Performed by: INTERNAL MEDICINE

## 2020-09-16 PROCEDURE — 3725F SCREEN DEPRESSION PERFORMED: CPT | Performed by: INTERNAL MEDICINE

## 2020-09-16 PROCEDURE — 85027 COMPLETE CBC AUTOMATED: CPT

## 2020-09-16 PROCEDURE — 1160F RVW MEDS BY RX/DR IN RCRD: CPT | Performed by: INTERNAL MEDICINE

## 2020-09-16 PROCEDURE — 1036F TOBACCO NON-USER: CPT | Performed by: INTERNAL MEDICINE

## 2020-09-16 PROCEDURE — 83036 HEMOGLOBIN GLYCOSYLATED A1C: CPT

## 2020-09-16 PROCEDURE — G0438 PPPS, INITIAL VISIT: HCPCS | Performed by: INTERNAL MEDICINE

## 2020-09-16 PROCEDURE — 80061 LIPID PANEL: CPT

## 2020-09-16 PROCEDURE — 82043 UR ALBUMIN QUANTITATIVE: CPT | Performed by: INTERNAL MEDICINE

## 2020-09-16 PROCEDURE — 99214 OFFICE O/P EST MOD 30 MIN: CPT | Performed by: INTERNAL MEDICINE

## 2020-09-16 PROCEDURE — 3061F NEG MICROALBUMINURIA REV: CPT | Performed by: INTERNAL MEDICINE

## 2020-09-16 PROCEDURE — G0103 PSA SCREENING: HCPCS

## 2020-09-16 PROCEDURE — 3066F NEPHROPATHY DOC TX: CPT | Performed by: INTERNAL MEDICINE

## 2020-09-16 PROCEDURE — 80053 COMPREHEN METABOLIC PANEL: CPT

## 2020-09-16 PROCEDURE — 1170F FXNL STATUS ASSESSED: CPT | Performed by: INTERNAL MEDICINE

## 2020-09-16 PROCEDURE — 36415 COLL VENOUS BLD VENIPUNCTURE: CPT

## 2020-09-16 PROCEDURE — 1125F AMNT PAIN NOTED PAIN PRSNT: CPT | Performed by: INTERNAL MEDICINE

## 2020-09-16 RX ORDER — CLOPIDOGREL BISULFATE 75 MG/1
75 TABLET ORAL DAILY
Qty: 90 TABLET | Refills: 3 | Status: SHIPPED | OUTPATIENT
Start: 2020-09-16 | End: 2021-04-27 | Stop reason: SDUPTHER

## 2020-09-16 RX ORDER — CHLORHEXIDINE GLUCONATE 0.12 MG/ML
15 RINSE ORAL DAILY
Qty: 1893 ML | Refills: 3 | Status: SHIPPED | OUTPATIENT
Start: 2020-09-16 | End: 2021-04-27 | Stop reason: SDUPTHER

## 2020-09-16 RX ORDER — PANTOPRAZOLE SODIUM 40 MG/1
40 TABLET, DELAYED RELEASE ORAL DAILY
Qty: 90 TABLET | Refills: 3 | Status: SHIPPED | OUTPATIENT
Start: 2020-09-16 | End: 2021-04-27 | Stop reason: SDUPTHER

## 2020-09-16 RX ORDER — CHLORHEXIDINE GLUCONATE 0.12 MG/ML
15 RINSE ORAL AS NEEDED
Qty: 120 ML | Status: CANCELLED | OUTPATIENT
Start: 2020-09-16

## 2020-09-16 RX ORDER — METFORMIN HYDROCHLORIDE 750 MG/1
1500 TABLET, EXTENDED RELEASE ORAL
Qty: 180 TABLET | Refills: 3 | Status: SHIPPED | OUTPATIENT
Start: 2020-09-16 | End: 2021-04-27 | Stop reason: SDUPTHER

## 2020-09-16 RX ORDER — CHLORHEXIDINE GLUCONATE 0.12 MG/ML
15 RINSE ORAL AS NEEDED
COMMUNITY
End: 2020-09-16 | Stop reason: CLARIF

## 2020-09-16 RX ORDER — ATORVASTATIN CALCIUM 40 MG/1
40 TABLET, FILM COATED ORAL EVERY EVENING
Qty: 90 TABLET | Refills: 3 | Status: SHIPPED | OUTPATIENT
Start: 2020-09-16 | End: 2021-04-27 | Stop reason: SDUPTHER

## 2020-09-16 NOTE — PROGRESS NOTES
Assessment and Plan:     1  Medicare annual wellness visit, initial        Preventive health issues were discussed with patient, and age appropriate screening tests were ordered as noted in patient's After Visit Summary  Personalized health advice and appropriate referrals for health education or preventive services given if needed, as noted in patient's After Visit Summary  History of Present Illness:     Patient presents for Medicare Annual Wellness visit    Patient Care Team:  Ceci Hidalgo DO as PCP - General (Internal Medicine)     Problem List:     Patient Active Problem List   Diagnosis    History of lacunar cerebrovascular accident (CVA)    Type 2 diabetes mellitus with stage 2 chronic kidney disease, with long-term current use of insulin ([facility] )    Mixed hyperlipidemia      Past Medical and Surgical History:     Past Medical History:   Diagnosis Date    CVA (cerebral vascular accident) ([facility] ) 02/18/2019    Bilateral pontine lacunar infarct    Esophagitis     Hyperlipidemia     Non-sustained ventricular tachycardia ([facility] )     Type 2 diabetes mellitus ([facility] )      Past Surgical History:   Procedure Laterality Date    ESOPHAGOGASTRODUODENOSCOPY N/A 3/4/2019    Procedure: ESOPHAGOGASTRODUODENOSCOPY (EGD); Surgeon: Karena Nicole DO;  Location: [facility]; Service: Gastroenterology      Family History:     History reviewed  No pertinent family history     Social History:     E-Cigarette/Vaping    E-Cigarette Use Never User      E-Cigarette/Vaping Substances    Nicotine No     THC No     CBD No     Flavoring No     Other No     Unknown No      Social History     Socioeconomic History    Marital status: /Civil Union     Spouse name: None    Number of children: None    Years of education: None    Highest education level: None   Occupational History    None   Social Needs    Financial resource strain: None    Food insecurity     Worry: None     Inability: None    Transportation needs     Medical: None     Non-medical: None   Tobacco Use    Smoking status: Never Smoker    Smokeless tobacco: Never Used   Substance and Sexual Activity    Alcohol use: Yes     Frequency: Monthly or less     Drinks per session: 1 or 2     Binge frequency: Never    Drug use: Never    Sexual activity: Yes     Partners: Female   Lifestyle    Physical activity     Days per week: 0 days     Minutes per session: 0 min    Stress: Only a little   Relationships    Social connections     Talks on phone: None     Gets together: None     Attends [employer] service: None     Active member of club or organization: None     Attends meetings of clubs or organizations: None     Relationship status: None    Intimate partner violence     Fear of current or ex partner: None     Emotionally abused: None     Physically abused: None     Forced sexual activity: None   Other Topics Concern    None   Social History Narrative    None      Medications and Allergies:     Current Outpatient Medications   Medication Sig Dispense Refill    Alcohol Swabs PADS Use daily to inject insulin 100 each 0    atorvastatin (LIPITOR) 40 mg tablet Take 1 tablet (40 mg total) by mouth every evening 90 tablet 1    clopidogrel (PLAVIX) 75 mg tablet TAKE 1 TABLET BY MOUTH EVERY DAY 90 tablet 1    cyclobenzaprine (FLEXERIL) 10 mg tablet Take 1 tablet (10 mg total) by mouth 3 (three) times a day as needed for muscle spasms 30 tablet 0    Insulin Pen Needle 31G X 5 MM MISC Use to inject insulin daily 100 each 1    metFORMIN (GLUCOPHAGE-XR) 750 mg 24 hr tablet TAKE 2 TABLETS (1,500 MG TOTAL) BY MOUTH DAILY WITH DINNER 180 tablet 1    metoprolol tartrate (LOPRESSOR) 25 mg tablet TAKE 1/2 TABLET BY MOUTH EVERY 12 HOURS 90 tablet 0    pantoprazole (PROTONIX) 40 mg tablet Take 1 tablet (40 mg total) by mouth daily 90 tablet 3     No current facility-administered medications for this visit  No Known Allergies   Immunizations:        There is no immunization history on file for this patient  Health Maintenance:         Topic Date Due    Hepatitis C Screening  1950         Topic Date Due    DTaP,Tdap,and Td Vaccines (1 - Tdap) 11/17/1972    Pneumococcal Vaccine: 65+ Years (1 of 1 - PPSV23) 11/17/2016    Influenza Vaccine  07/01/2020      Medicare Health Risk Assessment:     /72 (BP Location: Left arm, Patient Position: Sitting, Cuff Size: Standard)   Pulse 103   Temp 97 9 °F (36 6 °C) (Temporal) Comment: NO NSAIDS  Wt 70 6 kg (155 lb 9 6 oz) Comment: w/ shoes denied off  SpO2 97%   BMI 25 89 kg/m²      Zaid Elkins is here for his Initial Wellness visit  Last Medicare Wellness visit information reviewed, patient interviewed and updates made to the record today  Health Risk Assessment:   Patient rates overall health as very good  Patient feels that their physical health rating is much better  Eyesight was rated as same  Hearing was rated as same  Patient feels that their emotional and mental health rating is much better  Pain experienced in the last 7 days has been some  Patient's pain rating has been 2/10  Patient states that he has experienced no weight loss or gain in last 6 months  Depression Screening:   PHQ-2 Score: 0      Fall Risk Screening: In the past year, patient has experienced: no history of falling in past year      Home Safety:  Patient does not have trouble with stairs inside or outside of their home  Patient has no working smoke alarms and has no working carbon monoxide detector  Home safety hazards include: none  Nutrition:   Current diet is Regular, Diabetic and No Added Salt  Medications:   Patient is currently taking over-the-counter supplements  OTC medications include: see medication list  Patient is able to manage medications       Activities of Daily Living (ADLs)/Instrumental Activities of Daily Living (IADLs):   Walk and transfer into and out of bed and chair?: Yes  Dress and groom yourself?: Yes Bathe or shower yourself?: Yes    Feed yourself? Yes  Do your laundry/housekeeping?: Yes  Manage your money, pay your bills and track your expenses?: Yes  Make your own meals?: Yes    Do your own shopping?: Yes    Durable Medical Equipment Suppliers  none    Previous Hospitalizations:   Any hospitalizations or ED visits within the last 12 months?: No      Advance Care Planning:   Living will: No    Durable POA for healthcare: No    Advanced directive: No    Five wishes given: Yes      Cognitive Screening:   Provider or family/friend/caregiver concerned regarding cognition?: No    PREVENTIVE SCREENINGS      Cardiovascular Screening:    General: Screening Not Indicated and History Lipid Disorder      Diabetes Screening:     General: Screening Not Indicated and History Diabetes      Colorectal Cancer Screening:     General: Screening Current      Prostate Cancer Screening:    General: Risks and Benefits Discussed    Due for: PSA      Osteoporosis Screening:    General: Screening Not Indicated      Abdominal Aortic Aneurysm (AAA) Screening:    Risk factors include: age between 73-67 yo        Lung Cancer Screening:     General: Screening Not Indicated      Hepatitis C Screening:    General: Risks and Benefits Discussed    Hep C Screening Accepted: Yes      Other Counseling Topics:   Car/seat belt/driving safety, skin self-exam, sunscreen and regular weightbearing exercise         Marletta Reasoner, DO

## 2020-09-16 NOTE — PATIENT INSTRUCTIONS

## 2020-09-16 NOTE — PROGRESS NOTES
[place]    NAME: Elva Santamaria  AGE: 77 y o  SEX: male  : 1950     DATE: 2020     Assessment and Plan:     1  Type 2 diabetes mellitus with stage 2 chronic kidney disease, with long-term current use of insulin ([facility] )    Most recent A1c was 7 8 on 2019  Check up-to-date labs  Continue medications as prescribed  Recommend patient follow-up with an ophthalmologist   Discussed importance of diabetic eye care  - Microalbumin / creatinine urine ratio  - metFORMIN (GLUCOPHAGE-XR) 750 mg 24 hr tablet; Take 2 tablets (1,500 mg total) by mouth daily with dinner  Dispense: 180 tablet; Refill: 3    2  History of lacunar cerebrovascular accident (CVA)    Continue statin and clopidogrel for secondary stroke prevention  - clopidogrel (PLAVIX) 75 mg tablet; Take 1 tablet (75 mg total) by mouth daily  Dispense: 90 tablet; Refill: 3    3  Mixed hyperlipidemia    Continue atorvastatin  Check up-to-date lipid panel     - atorvastatin (LIPITOR) 40 mg tablet; Take 1 tablet (40 mg total) by mouth every evening  Dispense: 90 tablet; Refill: 3    4  Esophagitis    Doing well on pantoprazole  Will continue  - pantoprazole (PROTONIX) 40 mg tablet; Take 1 tablet (40 mg total) by mouth daily  Dispense: 90 tablet; Refill: 3    Return in about 6 months (around 3/16/2021) for Follow-up  Chief Complaint:     Chief Complaint   Patient presents with    Medicare Wellness Visit    Follow-up     5 month      History of Present Illness:     Patient presents for routine follow-up  History of type 2 diabetes with stage 2 chronic kidney disease, history of CVA, hyperlipidemia, esophagitis/GERD  Never got labs that were previously ordered back in April  Needs some refills on his medications  Does not check his blood sugars regularly  Last time he checked it was around 150  Most recent A1c was 7 8 on 2019  No complaints that he currently has  Review of Systems:     Review of Systems   Constitutional: Negative for activity change, appetite change and fatigue  Respiratory: Negative for apnea, cough, chest tightness, shortness of breath and wheezing  Cardiovascular: Negative for chest pain, palpitations and leg swelling  Gastrointestinal: Negative for abdominal distention, abdominal pain, blood in stool, constipation, diarrhea, nausea and vomiting  Musculoskeletal: Negative for arthralgias, back pain, gait problem, joint swelling and myalgias  Skin: Negative for rash and wound  Neurological: Negative for dizziness, weakness, light-headedness, numbness and headaches  Psychiatric/Behavioral: Negative for behavioral problems, confusion, hallucinations, sleep disturbance and suicidal ideas  The patient is not nervous/anxious  Problem List:     Patient Active Problem List   Diagnosis    History of lacunar cerebrovascular accident (CVA)    Type 2 diabetes mellitus with stage 2 chronic kidney disease, with long-term current use of insulin (HCC)    Mixed hyperlipidemia      Objective:     /72 (BP Location: Left arm, Patient Position: Sitting, Cuff Size: Standard)   Pulse 103   Temp 97 9 °F (36 6 °C) (Temporal) Comment: NO NSAIDS  Wt 70 6 kg (155 lb 9 6 oz) Comment: w/ shoes denied off  SpO2 97%   BMI 25 89 kg/m²     Physical Exam  Vitals signs reviewed  Constitutional:       General: He is not in acute distress  Appearance: He is well-developed  He is not diaphoretic  Eyes:      General: No scleral icterus  Right eye: No discharge  Left eye: No discharge  Conjunctiva/sclera: Conjunctivae normal    Neck:      Musculoskeletal: Neck supple  Thyroid: No thyromegaly  Vascular: No JVD  Cardiovascular:      Rate and Rhythm: Normal rate and regular rhythm  Pulses: no weak pulses          Dorsalis pedis pulses are 2+ on the right side and 2+ on the left side          Posterior tibial pulses are 2+ on the right side and 2+ on the left side  Heart sounds: Normal heart sounds  No murmur  Pulmonary:      Effort: Pulmonary effort is normal  No respiratory distress  Breath sounds: Normal breath sounds  No wheezing or rales  Abdominal:      General: Bowel sounds are normal  There is no distension  Palpations: Abdomen is soft  Tenderness: There is no abdominal tenderness  Musculoskeletal:      Right lower leg: No edema  Left lower leg: No edema  Feet:      Right foot:      Skin integrity: No ulcer, skin breakdown, erythema, warmth, callus or dry skin  Left foot:      Skin integrity: No ulcer, skin breakdown, erythema, warmth, callus or dry skin  Lymphadenopathy:      Cervical: No cervical adenopathy  Skin:     General: Skin is warm and dry  Neurological:      Mental Status: He is alert  Psychiatric:         Mood and Affect: Mood normal          Behavior: Behavior normal      Diabetic Foot Exam  Patient's shoes and socks removed  Right Foot/Ankle   Right Foot Inspection  Skin Exam: skin normal and skin intact no dry skin, no warmth, no callus, no erythema, no maceration, no abnormal color, no pre-ulcer, no ulcer and no callus                          Toe Exam: ROM and strength within normal limits  Sensory     Proprioception: intact   Monofilament testing: intact  Vascular  Capillary refills: < 3 seconds  The right DP pulse is 2+  The right PT pulse is 2+  Left Foot/Ankle  Left Foot Inspection  Skin Exam: skin normal and skin intactno dry skin, no warmth, no erythema, no maceration, normal color, no pre-ulcer, no ulcer and no callus                         Toe Exam: ROM and strength within normal limits                   Sensory     Proprioception: intact  Monofilament: intact  Vascular  Capillary refills: < 3 seconds  The left DP pulse is 2+  The left PT pulse is 2+  Assign Risk Category:  No deformity present; No loss of protective sensation;  No weak pulses Risk: 0     Tejal Lizama DO  [facility]

## 2020-09-17 ENCOUNTER — TELEPHONE (OUTPATIENT)
Dept: INTERNAL MEDICINE CLINIC | Facility: CLINIC | Age: 70
End: 2020-09-17

## 2020-09-17 DIAGNOSIS — N18.2 TYPE 2 DIABETES MELLITUS WITH STAGE 2 CHRONIC KIDNEY DISEASE, WITH LONG-TERM CURRENT USE OF INSULIN (HCC): Primary | Chronic | ICD-10-CM

## 2020-09-17 DIAGNOSIS — E11.22 TYPE 2 DIABETES MELLITUS WITH STAGE 2 CHRONIC KIDNEY DISEASE, WITH LONG-TERM CURRENT USE OF INSULIN (HCC): Primary | Chronic | ICD-10-CM

## 2020-09-17 DIAGNOSIS — Z79.4 TYPE 2 DIABETES MELLITUS WITH STAGE 2 CHRONIC KIDNEY DISEASE, WITH LONG-TERM CURRENT USE OF INSULIN (HCC): Primary | Chronic | ICD-10-CM

## 2020-09-17 RX ORDER — DAPAGLIFLOZIN 10 MG/1
10 TABLET, FILM COATED ORAL DAILY
Qty: 30 TABLET | Refills: 5 | Status: SHIPPED | OUTPATIENT
Start: 2020-09-17 | End: 2020-09-20

## 2020-09-17 NOTE — TELEPHONE ENCOUNTER
----- Message from Donnell Mendoza DO sent at 9/17/2020  8:12 AM EDT -----  Call patient and let him know diabetes is not at goal  A1c up to 7 9%  Would like to see it a little lower  Kidney function and liver function normal  PSA and CBC and lipid panel was normal  Would recommend adding another oral medication for diabetes to take along with metformin

## 2020-09-20 DIAGNOSIS — Z79.4 TYPE 2 DIABETES MELLITUS WITH STAGE 2 CHRONIC KIDNEY DISEASE, WITH LONG-TERM CURRENT USE OF INSULIN (HCC): Chronic | ICD-10-CM

## 2020-09-20 DIAGNOSIS — N18.2 TYPE 2 DIABETES MELLITUS WITH STAGE 2 CHRONIC KIDNEY DISEASE, WITH LONG-TERM CURRENT USE OF INSULIN (HCC): Chronic | ICD-10-CM

## 2020-09-20 DIAGNOSIS — E11.22 TYPE 2 DIABETES MELLITUS WITH STAGE 2 CHRONIC KIDNEY DISEASE, WITH LONG-TERM CURRENT USE OF INSULIN (HCC): Chronic | ICD-10-CM

## 2020-09-20 RX ORDER — DAPAGLIFLOZIN 10 MG/1
TABLET, FILM COATED ORAL
Qty: 30 TABLET | Refills: 5 | OUTPATIENT
Start: 2020-09-20

## 2020-12-09 ENCOUNTER — VBI (OUTPATIENT)
Dept: ADMINISTRATIVE | Facility: OTHER | Age: 70
End: 2020-12-09

## 2021-04-27 DIAGNOSIS — Z86.73 HISTORY OF LACUNAR CEREBROVASCULAR ACCIDENT (CVA): Chronic | ICD-10-CM

## 2021-04-27 DIAGNOSIS — K20.90 ESOPHAGITIS: ICD-10-CM

## 2021-04-27 DIAGNOSIS — Z79.4 TYPE 2 DIABETES MELLITUS WITH STAGE 2 CHRONIC KIDNEY DISEASE, WITH LONG-TERM CURRENT USE OF INSULIN (HCC): Chronic | ICD-10-CM

## 2021-04-27 DIAGNOSIS — N18.2 TYPE 2 DIABETES MELLITUS WITH STAGE 2 CHRONIC KIDNEY DISEASE, WITH LONG-TERM CURRENT USE OF INSULIN (HCC): Chronic | ICD-10-CM

## 2021-04-27 DIAGNOSIS — E11.22 TYPE 2 DIABETES MELLITUS WITH STAGE 2 CHRONIC KIDNEY DISEASE, WITH LONG-TERM CURRENT USE OF INSULIN (HCC): Chronic | ICD-10-CM

## 2021-04-27 DIAGNOSIS — E78.2 MIXED HYPERLIPIDEMIA: Chronic | ICD-10-CM

## 2021-04-27 PROCEDURE — 3066F NEPHROPATHY DOC TX: CPT | Performed by: PHYSICIAN ASSISTANT

## 2021-04-27 RX ORDER — METFORMIN HYDROCHLORIDE 750 MG/1
1500 TABLET, EXTENDED RELEASE ORAL
Qty: 180 TABLET | Refills: 3 | Status: SHIPPED | OUTPATIENT
Start: 2021-04-27 | End: 2021-06-10 | Stop reason: SDUPTHER

## 2021-04-27 RX ORDER — CLOPIDOGREL BISULFATE 75 MG/1
75 TABLET ORAL DAILY
Qty: 90 TABLET | Refills: 3 | Status: SHIPPED | OUTPATIENT
Start: 2021-04-27 | End: 2021-08-04 | Stop reason: SDUPTHER

## 2021-04-27 RX ORDER — ATORVASTATIN CALCIUM 40 MG/1
40 TABLET, FILM COATED ORAL EVERY EVENING
Qty: 90 TABLET | Refills: 3 | Status: SHIPPED | OUTPATIENT
Start: 2021-04-27 | End: 2021-08-04 | Stop reason: SDUPTHER

## 2021-04-27 RX ORDER — CHLORHEXIDINE GLUCONATE 0.12 MG/ML
15 RINSE ORAL DAILY
Qty: 1893 ML | Refills: 3 | Status: SHIPPED | OUTPATIENT
Start: 2021-04-27

## 2021-04-27 RX ORDER — PANTOPRAZOLE SODIUM 40 MG/1
40 TABLET, DELAYED RELEASE ORAL DAILY
Qty: 90 TABLET | Refills: 3 | Status: SHIPPED | OUTPATIENT
Start: 2021-04-27 | End: 2021-08-04 | Stop reason: SDUPTHER

## 2021-04-27 NOTE — TELEPHONE ENCOUNTER
atorvastatin (LIPITOR) 40 mg tablet  chlorhexidine (PERIDEX) 0 12 % solution  clopidogrel (PLAVIX) 75 mg tablet  metFORMIN (GLUCOPHAGE-XR) 750 mg 24 hr tablet  pantoprazole (PROTONIX) 40 mg tablet    [facility] PHONE [phone]

## 2021-04-29 ENCOUNTER — OFFICE VISIT (OUTPATIENT)
Dept: INTERNAL MEDICINE CLINIC | Facility: CLINIC | Age: 71
End: 2021-04-29
Payer: COMMERCIAL

## 2021-04-29 VITALS
HEIGHT: 65 IN | OXYGEN SATURATION: 97 % | TEMPERATURE: 96.5 F | WEIGHT: 152 LBS | SYSTOLIC BLOOD PRESSURE: 136 MMHG | HEART RATE: 102 BPM | BODY MASS INDEX: 25.33 KG/M2 | DIASTOLIC BLOOD PRESSURE: 82 MMHG

## 2021-04-29 DIAGNOSIS — Z86.73 HISTORY OF LACUNAR CEREBROVASCULAR ACCIDENT (CVA): Chronic | ICD-10-CM

## 2021-04-29 DIAGNOSIS — R21 RASH: Primary | ICD-10-CM

## 2021-04-29 DIAGNOSIS — Z79.4 TYPE 2 DIABETES MELLITUS WITH STAGE 2 CHRONIC KIDNEY DISEASE, WITH LONG-TERM CURRENT USE OF INSULIN (HCC): Chronic | ICD-10-CM

## 2021-04-29 DIAGNOSIS — E11.22 TYPE 2 DIABETES MELLITUS WITH STAGE 2 CHRONIC KIDNEY DISEASE, WITH LONG-TERM CURRENT USE OF INSULIN (HCC): Chronic | ICD-10-CM

## 2021-04-29 DIAGNOSIS — E78.2 MIXED HYPERLIPIDEMIA: Chronic | ICD-10-CM

## 2021-04-29 DIAGNOSIS — I47.2 NONSUSTAINED VENTRICULAR TACHYCARDIA (HCC): ICD-10-CM

## 2021-04-29 DIAGNOSIS — N18.2 TYPE 2 DIABETES MELLITUS WITH STAGE 2 CHRONIC KIDNEY DISEASE, WITH LONG-TERM CURRENT USE OF INSULIN (HCC): Chronic | ICD-10-CM

## 2021-04-29 PROCEDURE — 1036F TOBACCO NON-USER: CPT | Performed by: PHYSICIAN ASSISTANT

## 2021-04-29 PROCEDURE — 1160F RVW MEDS BY RX/DR IN RCRD: CPT | Performed by: PHYSICIAN ASSISTANT

## 2021-04-29 PROCEDURE — 99214 OFFICE O/P EST MOD 30 MIN: CPT | Performed by: PHYSICIAN ASSISTANT

## 2021-04-29 PROCEDURE — 3008F BODY MASS INDEX DOCD: CPT | Performed by: PHYSICIAN ASSISTANT

## 2021-04-29 RX ORDER — CLOTRIMAZOLE AND BETAMETHASONE DIPROPIONATE 10; .64 MG/G; MG/G
CREAM TOPICAL 2 TIMES DAILY
Qty: 30 G | Refills: 2 | Status: SHIPPED | OUTPATIENT
Start: 2021-04-29

## 2021-04-29 NOTE — PROGRESS NOTES
Assessment/Plan: itchy rash on his back scaly patches fungal versus eczematous treating with Lotrisone derm referral   Momentary episodes of spinning dizziness when he bends forward  Unable to reproduce now he is reassured  Not  orthostatic   three-month follow-up  He will take his diabetic medications as directed and follow-up     Diagnoses and all orders for this visit:    Rash  -     clotrimazole-betamethasone (LOTRISONE) 1-0 05 % cream; Apply topically 2 (two) times a day  -     Cancel: CBC and differential; Future  -     Cancel: Comprehensive metabolic panel; Future  -     Cancel: Hemoglobin A1C; Future    Type 2 diabetes mellitus with stage 2 chronic kidney disease, with long-term current use of insulin (HCC)  -     sitaGLIPtin (JANUVIA) 100 mg tablet; Take 1 tablet (100 mg total) by mouth daily  -     Ambulatory referral to Dermatology; Future  -     CBC and differential; Future  -     Comprehensive metabolic panel; Future  -     Hemoglobin A1C; Future    Nonsustained ventricular tachycardia (HCC)  -     metoprolol tartrate (LOPRESSOR) 25 mg tablet; Take 0 5 tablets (12 5 mg total) by mouth every 12 (twelve) hours  -     CBC and differential; Future  -     Comprehensive metabolic panel; Future  -     Hemoglobin A1C; Future    Mixed hyperlipidemia  -     CBC and differential; Future  -     Comprehensive metabolic panel; Future  -     Hemoglobin A1C; Future    History of lacunar cerebrovascular accident (CVA)        No problem-specific Assessment & Plan notes found for this encounter  BMI Counseling: Body mass index is 25 29 kg/m²  The BMI is above normal  Nutrition recommendations include decreasing portion sizes  Exercise recommendations include exercising 3-5 times per week  Subjective:      Patient ID: Claudia Abdi is a 72 y o  male  He has had itchy patches on his back for a month  Some areas of dry scaly skin  No other areas of rash or itchiness    He complains of momentary feelings of dizziness when he bends his head forward and sits up  No nausea or tinnitus no trouble with his walking  He has had a previous CVA  He notes momentary episodes of double vision when he looks all the way to the right  No headaches otherwise she has been normally active and well  Diabetic not that well controlled not taking his medication as directed      The following portions of the patient's history were reviewed and updated as appropriate:   He has a past medical history of CVA (cerebral vascular accident) ([facility] ) (02/18/2019), Esophagitis, Hyperlipidemia, Non-sustained ventricular tachycardia ([facility] ), and Type 2 diabetes mellitus ([facility] )  ,  does not have any pertinent problems on file  ,   has a past surgical history that includes Esophagogastroduodenoscopy (N/A, 3/4/2019)  ,  family history is not on file  ,   reports that he has never smoked  He has never used smokeless tobacco  He reports current alcohol use  He reports that he does not use drugs  ,  has No Known Allergies     Current Outpatient Medications   Medication Sig Dispense Refill    Alcohol Swabs PADS Use daily to inject insulin 100 each 0    atorvastatin (LIPITOR) 40 mg tablet Take 1 tablet (40 mg total) by mouth every evening 90 tablet 3    chlorhexidine (PERIDEX) 0 12 % solution Apply 15 mL to the mouth or throat daily 1893 mL 3    clopidogrel (PLAVIX) 75 mg tablet Take 1 tablet (75 mg total) by mouth daily 90 tablet 3    Insulin Pen Needle 31G X 5 MM MISC Use to inject insulin daily 100 each 1    metFORMIN (GLUCOPHAGE-XR) 750 mg 24 hr tablet Take 2 tablets (1,500 mg total) by mouth daily with dinner 180 tablet 3    metoprolol tartrate (LOPRESSOR) 25 mg tablet Take 0 5 tablets (12 5 mg total) by mouth every 12 (twelve) hours 90 tablet 0    pantoprazole (PROTONIX) 40 mg tablet Take 1 tablet (40 mg total) by mouth daily 90 tablet 3    sitaGLIPtin (JANUVIA) 100 mg tablet Take 1 tablet (100 mg total) by mouth daily 90 tablet 3    clotrimazole-betamethasone (LOTRISONE) 1-0 05 % cream Apply topically 2 (two) times a day 30 g 2     No current facility-administered medications for this visit  Review of Systems   Constitutional: Negative for chills and fever  HENT: Negative for ear pain and sore throat  Eyes: Positive for visual disturbance  Negative for pain  Respiratory: Negative for cough and shortness of breath  Cardiovascular: Negative for chest pain and palpitations  Gastrointestinal: Negative for abdominal pain and vomiting  Genitourinary: Negative for dysuria and hematuria  Musculoskeletal: Negative for arthralgias and back pain  Skin: Positive for rash  Negative for color change  Neurological: Positive for dizziness  Negative for seizures and syncope  All other systems reviewed and are negative  Objective:  Vitals:    04/29/21 1322   BP: 136/82   BP Location: Left arm   Patient Position: Sitting   Cuff Size: Large   Pulse: 102   Temp: (!) 96 5 °F (35 8 °C)   TempSrc: Temporal   SpO2: 97%   Weight: 68 9 kg (152 lb)   Height: 5' 5" (1 651 m)     Body mass index is 25 29 kg/m²  Physical Exam  Vitals signs reviewed  Constitutional:       Appearance: He is well-developed  HENT:      Head: Normocephalic  Right Ear: Tympanic membrane and external ear normal       Left Ear: Tympanic membrane and external ear normal       Nose: Nose normal       Mouth/Throat:      Mouth: Mucous membranes are moist    Eyes:      Extraocular Movements: Extraocular movements intact  Conjunctiva/sclera: Conjunctivae normal       Pupils: Pupils are equal, round, and reactive to light  Neck:      Musculoskeletal: Normal range of motion and neck supple  Thyroid: No thyromegaly  Cardiovascular:      Rate and Rhythm: Normal rate and regular rhythm  Pulses: Normal pulses  Heart sounds: Normal heart sounds  No murmur  Pulmonary:      Effort: Pulmonary effort is normal  No respiratory distress  Breath sounds: Normal breath sounds  No stridor  No wheezing, rhonchi or rales  Abdominal:      General: Abdomen is flat  Bowel sounds are normal       Palpations: Abdomen is soft  Musculoskeletal: Normal range of motion  General: No swelling  Lymphadenopathy:      Cervical: No cervical adenopathy  Skin:     General: Skin is warm and dry  Comments:  Irregular patches of slightly hyperpigmented scaly areas scattered over his back from 2-3 cm  Macular   Neurological:      Mental Status: He is alert and oriented to person, place, and time  Cranial Nerves: No cranial nerve deficit  Coordination: Coordination abnormal       Gait: Gait abnormal ( slightly ataxic)  Deep Tendon Reflexes: Reflexes are normal and symmetric  Reflexes normal    Psychiatric:         Mood and Affect: Mood normal          Thought Content:  Thought content normal          Judgment: Judgment normal

## 2021-05-12 ENCOUNTER — OFFICE VISIT (OUTPATIENT)
Dept: DERMATOLOGY | Facility: CLINIC | Age: 71
End: 2021-05-12
Payer: COMMERCIAL

## 2021-05-12 DIAGNOSIS — L81.0 POSTINFLAMMATORY HYPERPIGMENTATION: ICD-10-CM

## 2021-05-12 DIAGNOSIS — N18.2 TYPE 2 DIABETES MELLITUS WITH STAGE 2 CHRONIC KIDNEY DISEASE, WITH LONG-TERM CURRENT USE OF INSULIN (HCC): Chronic | ICD-10-CM

## 2021-05-12 DIAGNOSIS — Z13.89 SCREENING FOR SKIN CONDITION: ICD-10-CM

## 2021-05-12 DIAGNOSIS — E11.22 TYPE 2 DIABETES MELLITUS WITH STAGE 2 CHRONIC KIDNEY DISEASE, WITH LONG-TERM CURRENT USE OF INSULIN (HCC): Chronic | ICD-10-CM

## 2021-05-12 DIAGNOSIS — Z79.4 TYPE 2 DIABETES MELLITUS WITH STAGE 2 CHRONIC KIDNEY DISEASE, WITH LONG-TERM CURRENT USE OF INSULIN (HCC): Chronic | ICD-10-CM

## 2021-05-12 DIAGNOSIS — L30.0 NUMMULAR DERMATITIS: Primary | ICD-10-CM

## 2021-05-12 PROCEDURE — 1036F TOBACCO NON-USER: CPT | Performed by: DERMATOLOGY

## 2021-05-12 PROCEDURE — 1160F RVW MEDS BY RX/DR IN RCRD: CPT | Performed by: DERMATOLOGY

## 2021-05-12 PROCEDURE — 3066F NEPHROPATHY DOC TX: CPT | Performed by: DERMATOLOGY

## 2021-05-12 PROCEDURE — 99203 OFFICE O/P NEW LOW 30 MIN: CPT | Performed by: DERMATOLOGY

## 2021-05-12 NOTE — PATIENT INSTRUCTIONS
Present since the rash is resolved the topical corticosteroid probably help this plus the change in weather to warmer more humid weather  This may recur next winter at present suggested patient use moisturizing cream such as Cetaphil mild soap like Dove and also a home humidifier next winter to prevent dryness and follow-up if any further problems arise    The pigmentation from this rash should resolve  Nothing else of concern noted exam follow-up as needed

## 2021-05-12 NOTE — PROGRESS NOTES
[facility] DERMATOLOGY  [address] [place] [id number]  [phone]  [phone]     MRN: [id number] : 1950  Encounter: [id number]  Patient Information: Julia Bronson  Chief complaint:  Itchy rash on back    History of present illness:  67-year-old male presents for concerns regarding a rash on his back that is quite itching patient has had this for several months to see Virgen Keys on  and patient was given Lotrisone cream and patient notes the rash has improved and no longer itching no other concerns noted  Past Medical History:   Diagnosis Date    CVA (cerebral vascular accident) ([facility] ) 2019    Bilateral pontine lacunar infarct    Esophagitis     Hyperlipidemia     Non-sustained ventricular tachycardia ([facility] )     Type 2 diabetes mellitus ([facility] )      Past Surgical History:   Procedure Laterality Date    ESOPHAGOGASTRODUODENOSCOPY N/A 3/4/2019    Procedure: ESOPHAGOGASTRODUODENOSCOPY (EGD); Surgeon: Shikha Camacho DO;  Location: [facility]; Service: Gastroenterology     Social History   Social History     Substance and Sexual Activity   Alcohol Use Yes    Frequency: Monthly or less    Drinks per session: 1 or 2    Binge frequency: Never     Social History     Substance and Sexual Activity   Drug Use Never     Social History     Tobacco Use   Smoking Status Never Smoker   Smokeless Tobacco Never Used     History reviewed  No pertinent family history  Meds/Allergies   No Known Allergies    Meds:  Prior to Admission medications    Medication Sig Start Date End Date Taking?  Authorizing Provider   Alcohol Swabs PADS Use daily to inject insulin 19  Yes Siva Greene DO   atorvastatin (LIPITOR) 40 mg tablet Take 1 tablet (40 mg total) by mouth every evening 21  Yes Siva Greene DO   chlorhexidine (PERIDEX) 0 12 % solution Apply 15 mL to the mouth or throat daily 21  Yes Siva Greene DO   clopidogrel (PLAVIX) 75 mg tablet Take 1 tablet (75 mg total) by mouth daily 4/27/21  Yes Siva Greene, DO   clotrimazole-betamethasone (LOTRISONE) 1-0 05 % cream Apply topically 2 (two) times a day 4/29/21  Yes Mainor Rojas PA-C   Insulin Pen Needle 31G X 5 MM MISC Use to inject insulin daily 4/1/19  Yes Siva Greene, DO   metFORMIN (GLUCOPHAGE-XR) 750 mg 24 hr tablet Take 2 tablets (1,500 mg total) by mouth daily with dinner 4/27/21  Yes Siva Greene, DO   metoprolol tartrate (LOPRESSOR) 25 mg tablet Take 0 5 tablets (12 5 mg total) by mouth every 12 (twelve) hours 4/29/21  Yes Mainor Rojas PA-C   pantoprazole (PROTONIX) 40 mg tablet Take 1 tablet (40 mg total) by mouth daily 4/27/21  Yes Siva Greene,    sitaGLIPtin (JANUVIA) 100 mg tablet Take 1 tablet (100 mg total) by mouth daily 4/29/21  Yes Mainor Rojas PA-C       Subjective:     Review of Systems:    General: negative for - chills, fatigue, fever,  weight gain or weight loss  Psychological: negative for - anxiety, behavioral disorder, concentration difficulties, decreased libido, depression, irritability, memory difficulties, mood swings, sleep disturbances or suicidal ideation  ENT: negative for - hearing difficulties , nasal congestion, nasal discharge, oral lesions, sinus pain, sneezing, sore throat  Allergy and Immunology: negative for - hives, insect bite sensitivity,  Hematological and Lymphatic: negative for - bleeding problems, blood clots,bruising, swollen lymph nodes  Endocrine: negative for - hair pattern changes, hot flashes, malaise/lethargy, mood swings, palpitations, polydipsia/polyuria, skin changes, temperature intolerance or unexpected weight change  Respiratory: negative for - cough, hemoptysis, orthopnea, shortness of breath, or wheezing  Cardiovascular: negative for - chest pain, dyspnea on exertion, edema,  Gastrointestinal: negative for - abdominal pain, nausea/vomiting  Genito-Urinary: negative for - dysuria, incontinence, irregular/heavy menses or urinary frequency/urgency  Musculoskeletal: negative for - gait disturbance, joint pain, joint stiffness, joint swelling, muscle pain, muscular weakness  Dermatological:  As in HPI  Neurological: negative for confusion, dizziness, headaches, impaired coordination/balance, memory loss, numbness/tingling, seizures, speech problems, tremors or weakness       Objective: There were no vitals taken for this visit  Physical Exam:    General Appearance:    Alert, cooperative, no distress   Head:    Normocephalic, without obvious abnormality, atraumatic           Skin:   A full skin exam was performed including scalp, head scalp, eyes, ears, nose, lips, neck, chest, axilla, abdomen, back, buttocks, bilateral upper extremities, bilateral lower extremities, hands, feet, fingers, toes, fingernails, and toenails postinflammatory hyper pigmentation noted on the back no sign definitive rash overall dry skin noted     Assessment:     1  Nummular dermatitis     2  Type 2 diabetes mellitus with stage 2 chronic kidney disease, with long-term current use of insulin ([facility] )  Ambulatory referral to Dermatology   3  Postinflammatory hyperpigmentation     4  Screening for skin condition           Plan:   Present since the rash is resolved the topical corticosteroid probably help this plus the change in weather to warmer more humid weather  This may recur next winter at present suggested patient use moisturizing cream such as Cetaphil mild soap like Dove and also a home humidifier next winter to prevent dryness and follow-up if any further problems arise    The pigmentation from this rash should resolve  Nothing else of concern noted exam follow-up as needed    Ranulfo Rudd MD  5/12/2021,12:19 PM    Portions of the record may have been created with voice recognition software   Occasional wrong word or "sound a like" substitutions may have occurred due to the inherent limitations of voice recognition software   Read the chart carefully and recognize, using context, where substitutions have occurred

## 2021-06-09 ENCOUNTER — VBI (OUTPATIENT)
Dept: ADMINISTRATIVE | Facility: OTHER | Age: 71
End: 2021-06-09

## 2021-06-10 DIAGNOSIS — E11.22 TYPE 2 DIABETES MELLITUS WITH STAGE 2 CHRONIC KIDNEY DISEASE, WITH LONG-TERM CURRENT USE OF INSULIN (HCC): Chronic | ICD-10-CM

## 2021-06-10 DIAGNOSIS — Z79.4 TYPE 2 DIABETES MELLITUS WITH STAGE 2 CHRONIC KIDNEY DISEASE, WITH LONG-TERM CURRENT USE OF INSULIN (HCC): Chronic | ICD-10-CM

## 2021-06-10 DIAGNOSIS — N18.2 TYPE 2 DIABETES MELLITUS WITH STAGE 2 CHRONIC KIDNEY DISEASE, WITH LONG-TERM CURRENT USE OF INSULIN (HCC): Chronic | ICD-10-CM

## 2021-06-10 RX ORDER — METFORMIN HYDROCHLORIDE 750 MG/1
1500 TABLET, EXTENDED RELEASE ORAL
Qty: 180 TABLET | Refills: 3 | Status: SHIPPED | OUTPATIENT
Start: 2021-06-10 | End: 2021-08-04 | Stop reason: SDUPTHER

## 2021-07-21 DIAGNOSIS — I47.2 NONSUSTAINED VENTRICULAR TACHYCARDIA (HCC): ICD-10-CM

## 2021-08-04 DIAGNOSIS — Z79.4 TYPE 2 DIABETES MELLITUS WITH STAGE 2 CHRONIC KIDNEY DISEASE, WITH LONG-TERM CURRENT USE OF INSULIN (HCC): Chronic | ICD-10-CM

## 2021-08-04 DIAGNOSIS — N18.2 TYPE 2 DIABETES MELLITUS WITH STAGE 2 CHRONIC KIDNEY DISEASE, WITH LONG-TERM CURRENT USE OF INSULIN (HCC): Chronic | ICD-10-CM

## 2021-08-04 DIAGNOSIS — K20.90 ESOPHAGITIS: ICD-10-CM

## 2021-08-04 DIAGNOSIS — Z86.73 HISTORY OF LACUNAR CEREBROVASCULAR ACCIDENT (CVA): Chronic | ICD-10-CM

## 2021-08-04 DIAGNOSIS — E78.2 MIXED HYPERLIPIDEMIA: Chronic | ICD-10-CM

## 2021-08-04 DIAGNOSIS — E11.22 TYPE 2 DIABETES MELLITUS WITH STAGE 2 CHRONIC KIDNEY DISEASE, WITH LONG-TERM CURRENT USE OF INSULIN (HCC): Chronic | ICD-10-CM

## 2021-08-04 DIAGNOSIS — I47.2 NONSUSTAINED VENTRICULAR TACHYCARDIA (HCC): ICD-10-CM

## 2021-08-04 RX ORDER — PANTOPRAZOLE SODIUM 40 MG/1
40 TABLET, DELAYED RELEASE ORAL DAILY
Qty: 90 TABLET | Refills: 3 | Status: SHIPPED | OUTPATIENT
Start: 2021-08-04 | End: 2021-11-02 | Stop reason: SDUPTHER

## 2021-08-04 RX ORDER — ATORVASTATIN CALCIUM 40 MG/1
40 TABLET, FILM COATED ORAL EVERY EVENING
Qty: 90 TABLET | Refills: 3 | Status: SHIPPED | OUTPATIENT
Start: 2021-08-04 | End: 2021-11-02 | Stop reason: SDUPTHER

## 2021-08-05 RX ORDER — METFORMIN HYDROCHLORIDE 750 MG/1
1500 TABLET, EXTENDED RELEASE ORAL
Qty: 180 TABLET | Refills: 3 | Status: SHIPPED | OUTPATIENT
Start: 2021-08-05 | End: 2021-11-02 | Stop reason: SDUPTHER

## 2021-08-05 RX ORDER — CLOPIDOGREL BISULFATE 75 MG/1
75 TABLET ORAL DAILY
Qty: 90 TABLET | Refills: 3 | Status: SHIPPED | OUTPATIENT
Start: 2021-08-05 | End: 2021-11-02 | Stop reason: SDUPTHER

## 2021-08-18 ENCOUNTER — VBI (OUTPATIENT)
Dept: ADMINISTRATIVE | Facility: OTHER | Age: 71
End: 2021-08-18

## 2021-09-15 ENCOUNTER — VBI (OUTPATIENT)
Dept: ADMINISTRATIVE | Facility: OTHER | Age: 71
End: 2021-09-15

## 2021-09-15 NOTE — TELEPHONE ENCOUNTER
09/15/21 10:44 AM     See documentation in the [employer] SmartForm       [facility]

## 2021-09-17 ENCOUNTER — OFFICE VISIT (OUTPATIENT)
Dept: INTERNAL MEDICINE CLINIC | Facility: CLINIC | Age: 71
End: 2021-09-17
Payer: COMMERCIAL

## 2021-09-17 ENCOUNTER — APPOINTMENT (OUTPATIENT)
Dept: LAB | Facility: CLINIC | Age: 71
End: 2021-09-17
Payer: COMMERCIAL

## 2021-09-17 VITALS
SYSTOLIC BLOOD PRESSURE: 120 MMHG | HEART RATE: 75 BPM | HEIGHT: 65 IN | BODY MASS INDEX: 24.83 KG/M2 | WEIGHT: 149 LBS | TEMPERATURE: 97.9 F | OXYGEN SATURATION: 98 % | DIASTOLIC BLOOD PRESSURE: 74 MMHG

## 2021-09-17 DIAGNOSIS — Z86.73 HISTORY OF LACUNAR CEREBROVASCULAR ACCIDENT (CVA): Chronic | ICD-10-CM

## 2021-09-17 DIAGNOSIS — E78.2 MIXED DIABETIC HYPERLIPIDEMIA ASSOCIATED WITH TYPE 2 DIABETES MELLITUS (HCC): ICD-10-CM

## 2021-09-17 DIAGNOSIS — E11.65 TYPE 2 DIABETES MELLITUS WITH HYPERGLYCEMIA, WITHOUT LONG-TERM CURRENT USE OF INSULIN (HCC): Primary | ICD-10-CM

## 2021-09-17 DIAGNOSIS — E11.65 TYPE 2 DIABETES MELLITUS WITH HYPERGLYCEMIA, WITHOUT LONG-TERM CURRENT USE OF INSULIN (HCC): ICD-10-CM

## 2021-09-17 DIAGNOSIS — E11.69 MIXED DIABETIC HYPERLIPIDEMIA ASSOCIATED WITH TYPE 2 DIABETES MELLITUS (HCC): ICD-10-CM

## 2021-09-17 DIAGNOSIS — N48.9 PENILE DISORDER: ICD-10-CM

## 2021-09-17 DIAGNOSIS — Z12.5 SCREENING FOR PROSTATE CANCER: ICD-10-CM

## 2021-09-17 DIAGNOSIS — Z86.010 PERSONAL HISTORY OF COLONIC POLYPS: ICD-10-CM

## 2021-09-17 DIAGNOSIS — Z00.00 MEDICARE ANNUAL WELLNESS VISIT, SUBSEQUENT: ICD-10-CM

## 2021-09-17 DIAGNOSIS — Z23 ENCOUNTER FOR IMMUNIZATION: ICD-10-CM

## 2021-09-17 LAB
ALBUMIN SERPL BCP-MCNC: 3.6 G/DL (ref 3.5–5)
ALP SERPL-CCNC: 105 U/L (ref 46–116)
ALT SERPL W P-5'-P-CCNC: 24 U/L (ref 12–78)
ANION GAP SERPL CALCULATED.3IONS-SCNC: 3 MMOL/L (ref 4–13)
AST SERPL W P-5'-P-CCNC: 16 U/L (ref 5–45)
BILIRUB SERPL-MCNC: 0.58 MG/DL (ref 0.2–1)
BUN SERPL-MCNC: 10 MG/DL (ref 5–25)
CALCIUM SERPL-MCNC: 10.1 MG/DL (ref 8.3–10.1)
CHLORIDE SERPL-SCNC: 108 MMOL/L (ref 100–108)
CHOLEST SERPL-MCNC: 102 MG/DL (ref 50–200)
CO2 SERPL-SCNC: 29 MMOL/L (ref 21–32)
CREAT SERPL-MCNC: 0.98 MG/DL (ref 0.6–1.3)
CREAT UR-MCNC: 249 MG/DL
ERYTHROCYTE [DISTWIDTH] IN BLOOD BY AUTOMATED COUNT: 12.4 % (ref 11.6–15.1)
EST. AVERAGE GLUCOSE BLD GHB EST-MCNC: 146 MG/DL
GFR SERPL CREATININE-BSD FRML MDRD: 91 ML/MIN/1.73SQ M
GLUCOSE SERPL-MCNC: 115 MG/DL (ref 65–140)
HBA1C MFR BLD: 6.7 %
HCT VFR BLD AUTO: 47.5 % (ref 36.5–49.3)
HDLC SERPL-MCNC: 50 MG/DL
HGB BLD-MCNC: 14.8 G/DL (ref 12–17)
LDLC SERPL CALC-MCNC: 35 MG/DL (ref 0–100)
MCH RBC QN AUTO: 29.2 PG (ref 26.8–34.3)
MCHC RBC AUTO-ENTMCNC: 31.2 G/DL (ref 31.4–37.4)
MCV RBC AUTO: 94 FL (ref 82–98)
MICROALBUMIN UR-MCNC: 8.6 MG/L (ref 0–20)
MICROALBUMIN/CREAT 24H UR: 3 MG/G CREATININE (ref 0–30)
NONHDLC SERPL-MCNC: 52 MG/DL
PLATELET # BLD AUTO: 207 THOUSANDS/UL (ref 149–390)
PMV BLD AUTO: 10.6 FL (ref 8.9–12.7)
POTASSIUM SERPL-SCNC: 4.5 MMOL/L (ref 3.5–5.3)
PROT SERPL-MCNC: 7.9 G/DL (ref 6.4–8.2)
PSA SERPL-MCNC: 1.2 NG/ML (ref 0–4)
RBC # BLD AUTO: 5.06 MILLION/UL (ref 3.88–5.62)
SODIUM SERPL-SCNC: 140 MMOL/L (ref 136–145)
TRIGL SERPL-MCNC: 83 MG/DL
WBC # BLD AUTO: 6.58 THOUSAND/UL (ref 4.31–10.16)

## 2021-09-17 PROCEDURE — 3008F BODY MASS INDEX DOCD: CPT | Performed by: INTERNAL MEDICINE

## 2021-09-17 PROCEDURE — 3061F NEG MICROALBUMINURIA REV: CPT | Performed by: INTERNAL MEDICINE

## 2021-09-17 PROCEDURE — 1101F PT FALLS ASSESS-DOCD LE1/YR: CPT | Performed by: INTERNAL MEDICINE

## 2021-09-17 PROCEDURE — 1036F TOBACCO NON-USER: CPT | Performed by: INTERNAL MEDICINE

## 2021-09-17 PROCEDURE — 82570 ASSAY OF URINE CREATININE: CPT

## 2021-09-17 PROCEDURE — 36415 COLL VENOUS BLD VENIPUNCTURE: CPT

## 2021-09-17 PROCEDURE — 82043 UR ALBUMIN QUANTITATIVE: CPT

## 2021-09-17 PROCEDURE — 4040F PNEUMOC VAC/ADMIN/RCVD: CPT | Performed by: INTERNAL MEDICINE

## 2021-09-17 PROCEDURE — 80061 LIPID PANEL: CPT

## 2021-09-17 PROCEDURE — 3044F HG A1C LEVEL LT 7.0%: CPT | Performed by: INTERNAL MEDICINE

## 2021-09-17 PROCEDURE — 1170F FXNL STATUS ASSESSED: CPT | Performed by: INTERNAL MEDICINE

## 2021-09-17 PROCEDURE — G0009 ADMIN PNEUMOCOCCAL VACCINE: HCPCS

## 2021-09-17 PROCEDURE — 1160F RVW MEDS BY RX/DR IN RCRD: CPT | Performed by: INTERNAL MEDICINE

## 2021-09-17 PROCEDURE — 1125F AMNT PAIN NOTED PAIN PRSNT: CPT | Performed by: INTERNAL MEDICINE

## 2021-09-17 PROCEDURE — 80053 COMPREHEN METABOLIC PANEL: CPT

## 2021-09-17 PROCEDURE — 99214 OFFICE O/P EST MOD 30 MIN: CPT | Performed by: INTERNAL MEDICINE

## 2021-09-17 PROCEDURE — G0103 PSA SCREENING: HCPCS

## 2021-09-17 PROCEDURE — G0439 PPPS, SUBSEQ VISIT: HCPCS | Performed by: INTERNAL MEDICINE

## 2021-09-17 PROCEDURE — 3725F SCREEN DEPRESSION PERFORMED: CPT | Performed by: INTERNAL MEDICINE

## 2021-09-17 PROCEDURE — 3288F FALL RISK ASSESSMENT DOCD: CPT | Performed by: INTERNAL MEDICINE

## 2021-09-17 PROCEDURE — 90732 PPSV23 VACC 2 YRS+ SUBQ/IM: CPT

## 2021-09-17 PROCEDURE — 85027 COMPLETE CBC AUTOMATED: CPT

## 2021-09-17 PROCEDURE — 83036 HEMOGLOBIN GLYCOSYLATED A1C: CPT

## 2021-09-17 NOTE — PROGRESS NOTES
Assessment and Plan:     1  Medicare annual wellness visit, subsequent    2  Personal history of colonic polyps    Overdue for surveillance colonoscopy  Previous colonoscopy in [place]     - Ambulatory referral to Gastroenterology; Future    3  Encounter for immunization  - PNEUMOCOCCAL POLYSACCHARIDE VACCINE 23-VALENT =>3YO SQ IM    4  Screening for prostate cancer  - PSA, Total Screen; Future     Depression Screening and Follow-up Plan:   Patient was screened for depression during today's encounter  They screened negative with a PHQ-2 score of 0  Preventive health issues were discussed with patient, and age appropriate screening tests were ordered as noted in patient's After Visit Summary  Personalized health advice and appropriate referrals for health education or preventive services given if needed, as noted in patient's After Visit Summary  History of Present Illness:     Patient presents for Medicare Annual Wellness visit    Patient Care Team:  Daja Taylor DO as PCP - General (Internal Medicine)     Problem List:     Patient Active Problem List   Diagnosis    History of lacunar cerebrovascular accident (CVA)    Type 2 diabetes mellitus with stage 2 chronic kidney disease, with long-term current use of insulin ([facility] )    Mixed hyperlipidemia      Past Medical and Surgical History:     Past Medical History:   Diagnosis Date    CVA (cerebral vascular accident) ([address] ) 02/18/2019    Bilateral pontine lacunar infarct    Esophagitis     Hyperlipidemia     Non-sustained ventricular tachycardia ([address] )     Type 2 diabetes mellitus ([facility] )      Past Surgical History:   Procedure Laterality Date    ESOPHAGOGASTRODUODENOSCOPY N/A 3/4/2019    Procedure: ESOPHAGOGASTRODUODENOSCOPY (EGD); Surgeon: Leah Tiwari DO;  Location: [facility]; Service: Gastroenterology      Family History:     History reviewed  No pertinent family history        Social History:     Socioeconomic History    Marital status: /Civil [employer]     Spouse name: None    Number of children: None    Years of education: None    Highest education level: None   Occupational History    None   Tobacco Use    Smoking status: Never Smoker    Smokeless tobacco: Never Used   Vaping Use    Vaping Use: Never used   Substance and Sexual Activity    Alcohol use: Yes    Drug use: Never    Sexual activity: Yes     Partners: Female   Other Topics Concern    None   Social History Narrative    None      Medications and Allergies:     Current Outpatient Medications   Medication Sig Dispense Refill    Alcohol Swabs PADS Use daily to inject insulin 100 each 0    atorvastatin (LIPITOR) 40 mg tablet Take 1 tablet (40 mg total) by mouth every evening 90 tablet 3    chlorhexidine (PERIDEX) 0 12 % solution Apply 15 mL to the mouth or throat daily 1893 mL 3    clopidogrel (PLAVIX) 75 mg tablet Take 1 tablet (75 mg total) by mouth daily 90 tablet 3    clotrimazole-betamethasone (LOTRISONE) 1-0 05 % cream Apply topically 2 (two) times a day 30 g 2    Insulin Pen Needle 31G X 5 MM MISC Use to inject insulin daily 100 each 1    metFORMIN (GLUCOPHAGE-XR) 750 mg 24 hr tablet Take 2 tablets (1,500 mg total) by mouth daily with dinner 180 tablet 3    metoprolol tartrate (LOPRESSOR) 25 mg tablet Take 0 5 tablets (12 5 mg total) by mouth every 12 (twelve) hours 90 tablet 3    pantoprazole (PROTONIX) 40 mg tablet Take 1 tablet (40 mg total) by mouth daily 90 tablet 3    sitaGLIPtin (JANUVIA) 100 mg tablet Take 1 tablet (100 mg total) by mouth daily 90 tablet 3     No current facility-administered medications for this visit  No Known Allergies   Immunizations: There is no immunization history on file for this patient     Health Maintenance:         Topic Date Due    Colorectal Cancer Screening  04/03/2024    Hepatitis C Screening  Completed         Topic Date Due    Pneumococcal Vaccine: 65+ Years (1 of 2 - PPSV23) Never done    COVID-19 Vaccine (1) Never done    DTaP,Tdap,and Td Vaccines (1 - Tdap) Never done    Influenza Vaccine (1) 09/01/2021      Medicare Health Risk Assessment:     /74 (BP Location: Left arm, Patient Position: Sitting)   Pulse 75   Temp 97 9 °F (36 6 °C) (Tympanic)   Ht 5' 5" (1 651 m)   Wt 67 6 kg (149 lb)   SpO2 98%   BMI 24 79 kg/m²      Tamara Engel is here for his Subsequent Wellness visit  Health Risk Assessment:   Patient rates overall health as very good  Patient feels that their physical health rating is much better  Patient is very satisfied with their life  Eyesight was rated as same  Hearing was rated as same  Patient feels that their emotional and mental health rating is much better  Patients states they are never, rarely angry  Patient states they are never, rarely unusually tired/fatigued  Pain experienced in the last 7 days has been none  Depression Screening:   PHQ-2 Score: 0      Home Safety:  Patient does not have trouble with stairs inside or outside of their home  Patient has working smoke alarms and has working carbon monoxide detector  Nutrition:   Current diet is Regular  Medications:   Patient is not currently taking any over-the-counter supplements  Patient is able to manage medications  Activities of Daily Living (ADLs)/Instrumental Activities of Daily Living (IADLs):   Walk and transfer into and out of bed and chair?: Yes  Dress and groom yourself?: Yes    Bathe or shower yourself?: Yes    Feed yourself?  Yes  Do your laundry/housekeeping?: Yes  Manage your money, pay your bills and track your expenses?: Yes  Make your own meals?: Yes    Do your own shopping?: Yes    Previous Hospitalizations:   Any hospitalizations or ED visits within the last 12 months?: No      Advance Care Planning:   Living will: No    Advanced directive: No    Advanced directive counseling given: No    End of Life Decisions reviewed with patient: No      PREVENTIVE SCREENINGS      Cardiovascular Screening:    General: Screening Not Indicated and History Lipid Disorder      Diabetes Screening:     General: Screening Not Indicated and History Diabetes      Colorectal Cancer Screening:     General: Screening Current      Abdominal Aortic Aneurysm (AAA) Screening:    Risk factors include: age between 73-69 yo        Lung Cancer Screening:     General: Screening Not Indicated      Hepatitis C Screening:    General: Screening Current      Siva Greene DO

## 2021-09-17 NOTE — PATIENT INSTRUCTIONS
Medicare Preventive Visit Patient Instructions  Thank you for completing your Welcome to Medicare Visit or Medicare Annual Wellness Visit today  Your next wellness visit will be due in one year (9/18/2022)  The screening/preventive services that you may require over the next 5-10 years are detailed below  Some tests may not apply to you based off risk factors and/or age  Screening tests ordered at today's visit but not completed yet may show as past due  Also, please note that scanned in results may not display below  Preventive Screenings:  Service Recommendations Previous Testing/Comments   Colorectal Cancer Screening  · Colonoscopy    · Fecal Occult Blood Test (FOBT)/Fecal Immunochemical Test (FIT)  · Fecal DNA/Cologuard Test  · Flexible Sigmoidoscopy Age: 54-65 years old   Colonoscopy: every 10 years (May be performed more frequently if at higher risk)  OR  FOBT/FIT: every 1 year  OR  Cologuard: every 3 years  OR  Sigmoidoscopy: every 5 years  Screening may be recommended earlier than age 48 if at higher risk for colorectal cancer  Also, an individualized decision between you and your healthcare provider will decide whether screening between the ages of 74-80 would be appropriate   Colonoscopy: 04/03/2014  FOBT/FIT: Not on file  Cologuard: Not on file  Sigmoidoscopy: Not on file    Recommend surveillance colonoscopy due to history of colon polyps     Prostate Cancer Screening Individualized decision between patient and health care provider in men between ages of 53-78   Medicare will cover every 12 months beginning on the day after your 50th birthday PSA: 1 3 ng/mL     Screening due today     Hepatitis C Screening Once for adults born between 1945 and 1965  More frequently in patients at high risk for Hepatitis C Hep C Antibody: 09/16/2020    Screening Current   Diabetes Screening 1-2 times per year if you're at risk for diabetes or have pre-diabetes Fasting glucose: 195 mg/dL   A1C: 7 9 %    Screening Not Indicated  History Diabetes   Cholesterol Screening Once every 5 years if you don't have a lipid disorder  May order more often based on risk factors  Lipid panel: 09/16/2020    Screening Not Indicated  History Lipid Disorder      Other Preventive Screenings Covered by Medicare:  1  Abdominal Aortic Aneurysm (AAA) Screening: covered once if your at risk  You're considered to be at risk if you have a family history of AAA or a male between the age of 73-68 who smoking at least 100 cigarettes in your lifetime  2  Lung Cancer Screening: covers low dose CT scan once per year if you meet all of the following conditions: (1) Age 50-69; (2) No signs or symptoms of lung cancer; (3) Current smoker or have quit smoking within the last 15 years; (4) You have a tobacco smoking history of at least 30 pack years (packs per day x number of years you smoked); (5) You get a written order from a healthcare provider  3  Glaucoma Screening: covered annually if you're considered high risk: (1) You have diabetes OR (2) Family history of glaucoma OR (3)  aged 48 and older OR (3)  American aged 72 and older  3  Osteoporosis Screening: covered every 2 years if you meet one of the following conditions: (1) Have a vertebral abnormality; (2) On glucocorticoid therapy for more than 3 months; (3) Have primary hyperparathyroidism; (4) On osteoporosis medications and need to assess response to drug therapy  5  HIV Screening: covered annually if you're between the age of 12-76  Also covered annually if you are younger than 13 and older than 72 with risk factors for HIV infection  For pregnant patients, it is covered up to 3 times per pregnancy      Immunizations:  Immunization Recommendations   Influenza Vaccine Annual influenza vaccination during flu season is recommended for all persons aged >= 6 months who do not have contraindications   Pneumococcal Vaccine (Prevnar and Pneumovax)  * Prevnar = PCV13  * Pneumovax = PPSV23 Adults 25-60 years old: 1-3 doses may be recommended based on certain risk factors  Adults 72 years old: Prevnar (PCV13) vaccine recommended followed by Pneumovax (PPSV23) vaccine  If already received PPSV23 since turning 65, then PCV13 recommended at least one year after PPSV23 dose  Hepatitis B Vaccine 3 dose series if at intermediate or high risk (ex: diabetes, end stage renal disease, liver disease)   Tetanus (Td) Vaccine - COST NOT COVERED BY MEDICARE PART B Following completion of primary series, a booster dose should be given every 10 years to maintain immunity against tetanus  Td may also be given as tetanus wound prophylaxis  Tdap Vaccine - COST NOT COVERED BY MEDICARE PART B Recommended at least once for all adults  For pregnant patients, recommended with each pregnancy  Shingles Vaccine (Shingrix) - COST NOT COVERED BY MEDICARE PART B  2 shot series recommended in those aged 48 and above     Health Maintenance Due:      Topic Date Due    Colorectal Cancer Screening  04/03/2024    Hepatitis C Screening  Completed     Immunizations Due:      Topic Date Due    Pneumococcal Vaccine: 65+ Years (1 of 2 - PPSV23) Never done    DTaP,Tdap,and Td Vaccines (1 - Tdap) Never done    Influenza Vaccine (1) 09/01/2021     Advance Directives   What are advance directives? Advance directives are legal documents that state your wishes and plans for medical care  These plans are made ahead of time in case you lose your ability to make decisions for yourself  Advance directives can apply to any medical decision, such as the treatments you want, and if you want to donate organs  What are the types of advance directives? There are many types of advance directives, and each state has rules about how to use them  You may choose a combination of any of the following:  · Living will: This is a written record of the treatment you want   You can also choose which treatments you do not want, which to limit, and which to stop at a certain time  This includes surgery, medicine, IV fluid, and tube feedings  · Durable power of  for healthcare [place] [facility]): This is a written record that states who you want to make healthcare choices for you when you are unable to make them for yourself  This person, called a proxy, is usually a family member or a friend  You may choose more than 1 proxy  · Do not resuscitate (DNR) order:  A DNR order is used in case your heart stops beating or you stop breathing  It is a request not to have certain forms of treatment, such as CPR  A DNR order may be included in other types of advance directives  · Medical directive: This covers the care that you want if you are in a coma, near death, or unable to make decisions for yourself  You can list the treatments you want for each condition  Treatment may include pain medicine, surgery, blood transfusions, dialysis, IV or tube feedings, and a ventilator (breathing machine)  · Values history: This document has questions about your views, beliefs, and how you feel and think about life  This information can help others choose the care that you would choose  Why are advance directives important? An advance directive helps you control your care  Although spoken wishes may be used, it is better to have your wishes written down  Spoken wishes can be misunderstood, or not followed  Treatments may be given even if you do not want them  An advance directive may make it easier for your family to make difficult choices about your care  © Copyright [employer] 2018 Information is for End User's use only and may not be sold, redistributed or otherwise used for commercial purposes   All illustrations and images included in CareNotes® are the copyrighted property of [employer]  or [employer]

## 2021-09-19 ENCOUNTER — TELEPHONE (OUTPATIENT)
Dept: OTHER | Facility: OTHER | Age: 71
End: 2021-09-19

## 2021-09-19 NOTE — PROGRESS NOTES
Josselinemobrigido    NAME: Dean Cochran  AGE: 72 y o  SEX: male  : 1950     DATE: 2021     Assessment and Plan:     1  Type 2 diabetes mellitus with hyperglycemia, without long-term current use of insulin (HCC)    A1c was 7 9% in 2020  Continue current oral hypoglycemics  Will check updated labs to evaluate diabetic control  Needs to see eye doctor as well  - Ambulatory referral to Optometry; Future  - Hemoglobin A1C; Future  - Comprehensive metabolic panel; Future  - CBC; Future  - Microalbumin / creatinine urine ratio; Future    2  Mixed diabetic hyperlipidemia associated with type 2 diabetes mellitus (HCC)    Check lipids  Continue high intensity statin  - Lipid panel; Future    3  History of lacunar cerebrovascular accident (CVA)    Continue plavix and statin for secondary stroke prevention  BP is controlled  4  Penile disorder  - Ambulatory referral to Urology; Future        Return in about 6 months (around 3/17/2022) for Follow-up  Chief Complaint:     Chief Complaint   Patient presents with    AWV     need referral to dentist and eye doctor, wants to check PSA        History of Present Illness:     Mirnademetra King presents for follow-up  Due to updated lab work  Diabetes not controlled by prior labs  Does not check sugars at home  Most recent A1c was 7 9% on 2020  He is having some penile problems and would like to see urology  Review of Systems:     Review of Systems   Constitutional: Negative for activity change, appetite change and fatigue  Respiratory: Negative for apnea, cough, chest tightness, shortness of breath and wheezing  Cardiovascular: Negative for chest pain, palpitations and leg swelling  Gastrointestinal: Negative for abdominal distention, abdominal pain, blood in stool, constipation, diarrhea, nausea and vomiting  Musculoskeletal: Negative for gait problem     Neurological: Negative for dizziness, weakness, light-headedness, numbness and headaches  Psychiatric/Behavioral: Negative for behavioral problems, confusion, hallucinations, sleep disturbance and suicidal ideas  The patient is not nervous/anxious  Objective:     /74 (BP Location: Left arm, Patient Position: Sitting)   Pulse 75   Temp 97 9 °F (36 6 °C) (Tympanic)   Ht 5' 5" (1 651 m)   Wt 67 6 kg (149 lb)   SpO2 98%   BMI 24 79 kg/m²     Physical Exam  Constitutional:       General: He is not in acute distress  Appearance: He is not ill-appearing  Cardiovascular:      Rate and Rhythm: Normal rate and regular rhythm  Pulses: no weak pulses          Dorsalis pedis pulses are 2+ on the right side and 2+ on the left side  Posterior tibial pulses are 2+ on the right side and 2+ on the left side  Heart sounds: No murmur heard  Pulmonary:      Effort: Pulmonary effort is normal  No respiratory distress  Breath sounds: No wheezing  Abdominal:      General: Bowel sounds are normal  There is no distension  Tenderness: There is no abdominal tenderness  Feet:      Right foot:      Skin integrity: No ulcer, skin breakdown, erythema, warmth, callus or dry skin  Left foot:      Skin integrity: No ulcer, skin breakdown, erythema, warmth, callus or dry skin  Neurological:      Mental Status: He is alert  Diabetic Foot Exam  Patient's shoes and socks removed  Right Foot/Ankle   Right Foot Inspection  Skin Exam: skin normal and skin intact no dry skin, no warmth, no callus, no erythema, no maceration, no abnormal color, no pre-ulcer, no ulcer and no callus                          Toe Exam: ROM and strength within normal limits  Sensory     Proprioception: intact   Monofilament testing: intact  Vascular  Capillary refills: < 3 seconds  The right DP pulse is 2+  The right PT pulse is 2+       Left Foot/Ankle  Left Foot Inspection  Skin Exam: skin normal and skin intactno dry skin, no warmth, no erythema, no maceration, normal color, no pre-ulcer, no ulcer and no callus                         Toe Exam: ROM and strength within normal limits                   Sensory     Proprioception: intact  Monofilament: intact  Vascular  Capillary refills: < 3 seconds  The left DP pulse is 2+  The left PT pulse is 2+  Assign Risk Category:  No deformity present; No loss of protective sensation;  No weak pulses       Risk: 0     Duong Russell DO  MEDICAL [address]

## 2021-09-19 NOTE — TELEPHONE ENCOUNTER
----- Message from Benja Pantoja DO sent at 9/19/2021  9:36 AM EDT -----  Labs look great   Diabetes is improved with A1c going down to 6 7%

## 2021-09-22 ENCOUNTER — TELEPHONE (OUTPATIENT)
Dept: INTERNAL MEDICINE CLINIC | Facility: CLINIC | Age: 71
End: 2021-09-22

## 2021-09-22 DIAGNOSIS — Z86.73 HISTORY OF LACUNAR CEREBROVASCULAR ACCIDENT (CVA): ICD-10-CM

## 2021-09-22 DIAGNOSIS — N48.9 PENILE DISORDER: Primary | ICD-10-CM

## 2021-09-22 NOTE — TELEPHONE ENCOUNTER
PT request a referral for Dr Nadja Westfall, Neurologist at [address]  The dr Warren Ruiz suggested previous does not take his ins  I did not see a referral for a neurologist in pts chart but he stated that he and Dr Ruiz discuss this      Pls call pt when/if this can be done - [phone]

## 2021-10-01 ENCOUNTER — OFFICE VISIT (OUTPATIENT)
Dept: UROLOGY | Facility: CLINIC | Age: 71
End: 2021-10-01
Payer: COMMERCIAL

## 2021-10-01 VITALS
BODY MASS INDEX: 24.83 KG/M2 | SYSTOLIC BLOOD PRESSURE: 130 MMHG | WEIGHT: 149 LBS | HEIGHT: 65 IN | DIASTOLIC BLOOD PRESSURE: 82 MMHG

## 2021-10-01 DIAGNOSIS — N48.9 PENILE DISORDER: ICD-10-CM

## 2021-10-01 PROCEDURE — 1036F TOBACCO NON-USER: CPT | Performed by: UROLOGY

## 2021-10-01 PROCEDURE — 99203 OFFICE O/P NEW LOW 30 MIN: CPT | Performed by: UROLOGY

## 2021-10-01 PROCEDURE — 1160F RVW MEDS BY RX/DR IN RCRD: CPT | Performed by: UROLOGY

## 2021-10-01 PROCEDURE — 3008F BODY MASS INDEX DOCD: CPT | Performed by: UROLOGY

## 2021-10-06 ENCOUNTER — TELEPHONE (OUTPATIENT)
Dept: INTERNAL MEDICINE CLINIC | Facility: CLINIC | Age: 71
End: 2021-10-06

## 2021-10-06 ENCOUNTER — VBI (OUTPATIENT)
Dept: ADMINISTRATIVE | Facility: OTHER | Age: 71
End: 2021-10-06

## 2021-11-01 DIAGNOSIS — N18.2 TYPE 2 DIABETES MELLITUS WITH STAGE 2 CHRONIC KIDNEY DISEASE, WITH LONG-TERM CURRENT USE OF INSULIN (HCC): Chronic | ICD-10-CM

## 2021-11-01 DIAGNOSIS — Z79.4 TYPE 2 DIABETES MELLITUS WITH STAGE 2 CHRONIC KIDNEY DISEASE, WITH LONG-TERM CURRENT USE OF INSULIN (HCC): Chronic | ICD-10-CM

## 2021-11-01 DIAGNOSIS — E11.22 TYPE 2 DIABETES MELLITUS WITH STAGE 2 CHRONIC KIDNEY DISEASE, WITH LONG-TERM CURRENT USE OF INSULIN (HCC): Chronic | ICD-10-CM

## 2021-11-01 PROCEDURE — 3066F NEPHROPATHY DOC TX: CPT | Performed by: UROLOGY

## 2021-11-01 RX ORDER — SITAGLIPTIN 100 MG/1
TABLET, FILM COATED ORAL
Qty: 90 TABLET | Refills: 3 | Status: SHIPPED | OUTPATIENT
Start: 2021-11-01

## 2021-11-02 DIAGNOSIS — Z79.4 TYPE 2 DIABETES MELLITUS WITH STAGE 2 CHRONIC KIDNEY DISEASE, WITH LONG-TERM CURRENT USE OF INSULIN (HCC): Chronic | ICD-10-CM

## 2021-11-02 DIAGNOSIS — E11.22 TYPE 2 DIABETES MELLITUS WITH STAGE 2 CHRONIC KIDNEY DISEASE, WITH LONG-TERM CURRENT USE OF INSULIN (HCC): Chronic | ICD-10-CM

## 2021-11-02 DIAGNOSIS — K20.90 ESOPHAGITIS: ICD-10-CM

## 2021-11-02 DIAGNOSIS — E78.2 MIXED HYPERLIPIDEMIA: Chronic | ICD-10-CM

## 2021-11-02 DIAGNOSIS — Z86.73 HISTORY OF LACUNAR CEREBROVASCULAR ACCIDENT (CVA): Chronic | ICD-10-CM

## 2021-11-02 DIAGNOSIS — N18.2 TYPE 2 DIABETES MELLITUS WITH STAGE 2 CHRONIC KIDNEY DISEASE, WITH LONG-TERM CURRENT USE OF INSULIN (HCC): Chronic | ICD-10-CM

## 2021-11-02 DIAGNOSIS — I47.2 NONSUSTAINED VENTRICULAR TACHYCARDIA (HCC): ICD-10-CM

## 2021-11-02 RX ORDER — METFORMIN HYDROCHLORIDE 750 MG/1
1500 TABLET, EXTENDED RELEASE ORAL
Qty: 180 TABLET | Refills: 3 | Status: SHIPPED | OUTPATIENT
Start: 2021-11-02

## 2021-11-02 RX ORDER — PANTOPRAZOLE SODIUM 40 MG/1
40 TABLET, DELAYED RELEASE ORAL DAILY
Qty: 90 TABLET | Refills: 3 | Status: SHIPPED | OUTPATIENT
Start: 2021-11-02

## 2021-11-02 RX ORDER — CLOPIDOGREL BISULFATE 75 MG/1
75 TABLET ORAL DAILY
Qty: 90 TABLET | Refills: 3 | Status: SHIPPED | OUTPATIENT
Start: 2021-11-02

## 2021-11-02 RX ORDER — ATORVASTATIN CALCIUM 40 MG/1
40 TABLET, FILM COATED ORAL EVERY EVENING
Qty: 90 TABLET | Refills: 3 | Status: SHIPPED | OUTPATIENT
Start: 2021-11-02

## 2021-11-16 ENCOUNTER — TELEPHONE (OUTPATIENT)
Dept: INTERNAL MEDICINE CLINIC | Facility: CLINIC | Age: 71
End: 2021-11-16

## 2021-11-16 DIAGNOSIS — N48.9 PENILE DISORDER: Primary | ICD-10-CM

## 2022-05-25 ENCOUNTER — HOSPITAL ENCOUNTER (EMERGENCY)
Facility: HOSPITAL | Age: 72
Discharge: HOME | End: 2022-05-25
Attending: EMERGENCY MEDICINE
Payer: COMMERCIAL

## 2022-05-25 ENCOUNTER — APPOINTMENT (EMERGENCY)
Dept: RADIOLOGY | Facility: HOSPITAL | Age: 72
End: 2022-05-25
Payer: COMMERCIAL

## 2022-05-25 VITALS
RESPIRATION RATE: 16 BRPM | SYSTOLIC BLOOD PRESSURE: 165 MMHG | BODY MASS INDEX: 21.31 KG/M2 | DIASTOLIC BLOOD PRESSURE: 84 MMHG | WEIGHT: 128.09 LBS | TEMPERATURE: 98.9 F | OXYGEN SATURATION: 98 % | HEART RATE: 108 BPM

## 2022-05-25 DIAGNOSIS — R06.6 HICCUPS: Primary | ICD-10-CM

## 2022-05-25 DIAGNOSIS — Z87.19 HISTORY OF GASTROESOPHAGEAL REFLUX (GERD): ICD-10-CM

## 2022-05-25 PROCEDURE — 99284 EMERGENCY DEPT VISIT MOD MDM: CPT | Performed by: EMERGENCY MEDICINE

## 2022-05-25 PROCEDURE — 99283 EMERGENCY DEPT VISIT LOW MDM: CPT

## 2022-05-25 PROCEDURE — 71046 X-RAY EXAM CHEST 2 VIEWS: CPT

## 2022-05-25 RX ORDER — MAGNESIUM HYDROXIDE/ALUMINUM HYDROXICE/SIMETHICONE 120; 1200; 1200 MG/30ML; MG/30ML; MG/30ML
30 SUSPENSION ORAL ONCE
Status: COMPLETED | OUTPATIENT
Start: 2022-05-25 | End: 2022-05-25

## 2022-05-25 RX ORDER — PANTOPRAZOLE SODIUM 40 MG/1
TABLET, DELAYED RELEASE ORAL
Qty: 60 TABLET | Refills: 0 | Status: SHIPPED | OUTPATIENT
Start: 2022-05-25 | End: 2022-06-03

## 2022-05-25 RX ORDER — LIDOCAINE HYDROCHLORIDE 20 MG/ML
15 SOLUTION OROPHARYNGEAL ONCE
Status: COMPLETED | OUTPATIENT
Start: 2022-05-25 | End: 2022-05-25

## 2022-05-25 RX ORDER — FAMOTIDINE 40 MG/1
40 TABLET, FILM COATED ORAL 2 TIMES DAILY
Qty: 60 TABLET | Refills: 0 | Status: SHIPPED | OUTPATIENT
Start: 2022-05-25

## 2022-05-25 RX ADMIN — ALUMINUM HYDROXIDE, MAGNESIUM HYDROXIDE, AND SIMETHICONE 30 ML: 200; 200; 20 SUSPENSION ORAL at 08:04

## 2022-05-25 RX ADMIN — LIDOCAINE HYDROCHLORIDE 15 ML: 20 SOLUTION ORAL; TOPICAL at 08:05

## 2022-05-25 NOTE — DISCHARGE INSTRUCTIONS
Increase the antacid medications as per the instructions  Avoid lying flat at night to try and help decrease your symptoms  Follow-up with your PCP and GI doctor for further evaluation  Return if you develop new or changing symptoms, or for any other concerns

## 2022-05-25 NOTE — ED PROVIDER NOTES
History  Chief Complaint   Patient presents with    Hiccups     Pt states that he has had hiccups for 4 days  Interrupts his sleep  Gets worse when he lays down  Denies chest pain or SOB, no "burning feeling" in stomach  HPI    Prior to Admission Medications   Prescriptions Last Dose Informant Patient Reported? Taking? Alcohol Swabs PADS  Self No No   Sig: Use daily to inject insulin   Insulin Pen Needle 31G X 5 MM MISC  Self No No   Sig: Use to inject insulin daily   Januvia 100 MG tablet   No No   Sig: TAKE 1 TABLET BY MOUTH EVERY DAY   atorvastatin (LIPITOR) 40 mg tablet   No No   Sig: Take 1 tablet (40 mg total) by mouth every evening   chlorhexidine (PERIDEX) 0 12 % solution  Self No No   Sig: Apply 15 mL to the mouth or throat daily   clopidogrel (PLAVIX) 75 mg tablet   No No   Sig: Take 1 tablet (75 mg total) by mouth daily   clotrimazole-betamethasone (LOTRISONE) 1-0 05 % cream  Self No No   Sig: Apply topically 2 (two) times a day   metFORMIN (GLUCOPHAGE-XR) 750 mg 24 hr tablet   No No   Sig: Take 2 tablets (1,500 mg total) by mouth daily with dinner   metoprolol tartrate (LOPRESSOR) 25 mg tablet   No No   Sig: Take 0 5 tablets (12 5 mg total) by mouth every 12 (twelve) hours   pantoprazole (PROTONIX) 40 mg tablet   No No   Sig: Take 1 tablet (40 mg total) by mouth daily      Facility-Administered Medications: None       Past Medical History:   Diagnosis Date    CVA (cerebral vascular accident) ([facility] ) 02/18/2019    Bilateral pontine lacunar infarct    Esophagitis     Hyperlipidemia     Non-sustained ventricular tachycardia (HCC)     Type 2 diabetes mellitus ([facility] )        Past Surgical History:   Procedure Laterality Date    ESOPHAGOGASTRODUODENOSCOPY N/A 3/4/2019    Procedure: ESOPHAGOGASTRODUODENOSCOPY (EGD); Surgeon: Maricarmen Alatorre DO;  Location: [facility]; Service: Gastroenterology       No family history on file    I have reviewed and agree with the history as documented  E-Cigarette/Vaping    E-Cigarette Use Never User      E-Cigarette/Vaping Substances    Nicotine No     THC No     CBD No     Flavoring No     Other No     Unknown No      Social History     Tobacco Use    Smoking status: Never Smoker    Smokeless tobacco: Never Used   Vaping Use    Vaping Use: Never used   Substance Use Topics    Alcohol use: Yes    Drug use: Never       Review of Systems    Physical Exam  Physical Exam  Vitals and nursing note reviewed  Constitutional:       General: He is not in acute distress  Appearance: He is well-developed  Comments: No hiccups while I am with him   HENT:      Head: Normocephalic and atraumatic  Eyes:      Conjunctiva/sclera: Conjunctivae normal       Pupils: Pupils are equal, round, and reactive to light  Neck:      Thyroid: No thyromegaly  Trachea: No tracheal deviation  Cardiovascular:      Rate and Rhythm: Normal rate and regular rhythm  Heart sounds: Normal heart sounds  Comments: Tachycardic in triage, HR high 90s on my evaluation  Pulmonary:      Effort: Pulmonary effort is normal  No respiratory distress  Breath sounds: Normal breath sounds  Abdominal:      General: There is no distension  Palpations: Abdomen is soft  Tenderness: There is no abdominal tenderness  Musculoskeletal:      Cervical back: Normal range of motion  Right lower leg: No edema  Left lower leg: No edema  Skin:     General: Skin is warm and dry  Neurological:      Mental Status: He is alert and oriented to person, place, and time  GCS: GCS eye subscore is 4  GCS verbal subscore is 5  GCS motor subscore is 6     Psychiatric:         Behavior: Behavior normal          Vital Signs  ED Triage Vitals [05/25/22 0725]   Temperature Pulse Respirations Blood Pressure SpO2   98 9 °F (37 2 °C) (!) 108 16 165/84 98 %      Temp Source Heart Rate Source Patient Position - Orthostatic VS BP Location FiO2 (%)   Oral Monitor Lying Right arm --      Pain Score       No Pain           Vitals:    05/25/22 0725   BP: 165/84   Pulse: (!) 108   Patient Position - Orthostatic VS: Lying         Visual Acuity      ED Medications  Medications   Lidocaine Viscous HCl (XYLOCAINE) 2 % mucosal solution 15 mL (15 mL Swish & Swallow Given 5/25/22 0805)   aluminum-magnesium hydroxide-simethicone (MYLANTA) oral suspension 30 mL (30 mL Oral Given 5/25/22 0804)       Diagnostic Studies  Results Reviewed     None                 XR chest 2 views    (Results Pending)              Procedures  Procedures         ED Course               Identification of Seniors at [address] [place] [address] Most Recent Value   (ISAR) Identification of Seniors at Risk    Before the illness or injury that brought you to the Emergency, did you need someone to help you on a regular basis? 0 Filed at: 05/25/2022 0729   In the last 24 hours, have you needed more help than usual? 0 Filed at: 05/25/2022 6222   Have you been hospitalized for one or more nights during the past 6 months? 0 Filed at: 05/25/2022 5660   In general, do you see well? 0 Filed at: 05/25/2022 2974   In general, do you have serious problems with your memory? 0 Filed at: 05/25/2022 4372   Do you take more than three different medications every day? 1 Filed at: 05/25/2022 8463   ISAR Score 1 Filed at: 05/25/2022 8188                      SBIRT 20yo+    Flowsheet Row Most Recent Value   SBIRT (23 yo +)    In order to provide better care to our patients, we are screening all of our patients for alcohol and drug use  Would it be okay to ask you these screening questions? No Filed at: 05/25/2022 0809                    MDM  Number of Diagnoses or Management Options  Hiccups: new and requires workup  History of gastroesophageal reflux (GERD): new and requires workup  Diagnosis management comments: This is a 61-year-old male who presents here today for evaluation of hiccups    He says for the past 4-5 days he has been having them, initially intermittently both day and night, however the past two nights feels like they have been constant, and unable to sleep  He has mild symptoms during the day  He denies chest pain, shortness of breath, palpitations, fevers, URI symptoms, abdominal pain, nausea, vomiting, diarrhea  He has no recent worsening of reflux symptoms, discomfort with eating, but frequent burping, sour taste in his mouth, nighttime coughing  He feels like he has been losing weight "over time" but is uncertain of how much weight he has been losing and says it has been over several years  There is no rapid weight loss, appetite changes, early satiety  He denies lower extremity edema, changes in medications  He says he did have intractable hiccups several years ago which was related to vomiting and stroke  According to notes, he had recently been diagnosed with a jenaro infarct, and came in with vomiting and possible hematemesis, as well as hiccups  He was discharged home after an ED visit on 02/26 and was admitted to the hospital on 03/01 with hematemesis, showing linear esophagitis/esophageal ulcerations on EGD  Hiccups were presumed to be post stroke for which he was started on gabapentin  He denies any recent dizziness, which is how he presented with his initial stroke, focal weakness or numbness, ataxia, vision changes, other neurologic symptoms  He denies falls or head trauma  He has no other complaints  He has not taken or done anything for his symptoms  He did have a CT scan of his second visit on 3/1/19 to ensure there are no masses causing diaphragmatic irritation which showed gastric distension and probable duodenitis  Review of systems:  Otherwise negative unless stated above    He is well-appearing, in no acute distress  He was mildly tachycardic in triage but heart rate is high 90s on my evaluation  He does not hiccup at all while I am in the room with him  Exam is otherwise unremarkable  Concerns with symptoms when he is lying flat is for worsening or exacerbation of his underlying reflux  He has not had any recent neurologic symptoms or any current knee to suggest new stroke for which she needs any further evaluation  We will get a chest x-ray evaluate for any intrathoracic abnormalities contributing to symptoms, though he is not having any complaints of chest pain or shortness of breath  We will give him medication for his reflux year  Chest x-ray was reviewed by myself, is unremarkable  The patient has still not been noted to have hiccups while in the emergency department  I discussed with him continued management at home, need for follow-up with PCP and GI, and indications for return, and he expresses understanding with this plan  Amount and/or Complexity of Data Reviewed  Tests in the radiology section of CPT®: ordered and reviewed  Decide to obtain previous medical records or to obtain history from someone other than the patient: yes  Review and summarize past medical records: yes  Independent visualization of images, tracings, or specimens: yes        Disposition  Final diagnoses:   Hiccups   History of gastroesophageal reflux (GERD)     Time reflects when diagnosis was documented in both MDM as applicable and the Disposition within this note     Time User Action Codes Description Comment    5/25/2022  8:25 AM Ellen Jimenez Add [R06 6] Hiccups     5/25/2022  8:25 AM Ellen Jimenez Add [Z87 19] History of gastroesophageal reflux (GERD)       ED Disposition     ED Disposition   Discharge    Condition   Fair    Date/Time   Wed May 25, 2022 [facility] discharge to home/self care           Follow-up Information     Follow up With Specialties Details Why Contact Info Additional [facility] Gastroenterology Schedule an appointment as soon as possible for a visit  to follow up on your symptoms [address]  [place] [address]  Brendon Fox Denis [address] [facility] [facility], [address], [facility] [address], [address] [place], [place], [address] [facility]    Fito Mendoza,  Internal Medicine Schedule an appointment as soon as possible for a visit  to follow up on your symptoms [address] [facility] [address] [place] [address]  [phone]             Patient's Medications   Discharge Prescriptions    FAMOTIDINE (PEPCID) 40 MG TABLET    Take 1 tablet (40 mg total) by mouth in the morning and 1 tablet (40 mg total) in the evening  Start Date: 5/25/2022 End Date: --       Order Dose: 40 mg       Quantity: 60 tablet    Refills: 0    PANTOPRAZOLE (PROTONIX) 40 MG TABLET    Take one tablet twice a day for 14 days, then once daily thereafter       Start Date: 5/25/2022 End Date: --       Order Dose: --       Quantity: 60 tablet    Refills: 0       No discharge procedures on file      PDMP Review     None          ED Provider  Electronically Signed by           Gabino Martínez MD  05/25/22 2924

## 2022-06-03 ENCOUNTER — OFFICE VISIT (OUTPATIENT)
Dept: GASTROENTEROLOGY | Facility: CLINIC | Age: 72
End: 2022-06-03
Payer: COMMERCIAL

## 2022-06-03 VITALS
HEIGHT: 65 IN | BODY MASS INDEX: 21.49 KG/M2 | OXYGEN SATURATION: 96 % | WEIGHT: 129 LBS | DIASTOLIC BLOOD PRESSURE: 84 MMHG | SYSTOLIC BLOOD PRESSURE: 130 MMHG | HEART RATE: 91 BPM

## 2022-06-03 DIAGNOSIS — Z86.010 HISTORY OF COLON POLYPS: ICD-10-CM

## 2022-06-03 DIAGNOSIS — R06.6 INTRACTABLE HICCOUGHS: ICD-10-CM

## 2022-06-03 DIAGNOSIS — K21.9 GASTROESOPHAGEAL REFLUX DISEASE, UNSPECIFIED WHETHER ESOPHAGITIS PRESENT: Primary | ICD-10-CM

## 2022-06-03 DIAGNOSIS — R63.4 WEIGHT LOSS, ABNORMAL: ICD-10-CM

## 2022-06-03 PROCEDURE — 1036F TOBACCO NON-USER: CPT | Performed by: PHYSICIAN ASSISTANT

## 2022-06-03 PROCEDURE — 3008F BODY MASS INDEX DOCD: CPT | Performed by: PHYSICIAN ASSISTANT

## 2022-06-03 PROCEDURE — 99203 OFFICE O/P NEW LOW 30 MIN: CPT | Performed by: PHYSICIAN ASSISTANT

## 2022-06-03 PROCEDURE — 1160F RVW MEDS BY RX/DR IN RCRD: CPT | Performed by: PHYSICIAN ASSISTANT

## 2022-06-03 NOTE — PATIENT INSTRUCTIONS
Scheduled date of EGD/colonoscopy (as of today):8/4/22  Physician performing EGD/colonoscopy:Fadumo  Location of EGD/colonoscopy:[facility] bowel prep reviewed with patient:miralax/dulcolax  Instructions reviewed with patient by:Ca FERRARA  Clearances:   none

## 2022-06-03 NOTE — PROGRESS NOTES
[facility] - Outpatient Consultation  Daly Nails 80 y o  male MRN: [id number]  Encounter: [id number]          ASSESSMENT AND PLAN:      1  Gastroesophageal reflux disease, unspecified whether esophagitis present  2  Intractable hiccoughs  Started 3 weeks ago  Went to the ER - CXR was negative  He is on Pantoprazole 40mg once daily for many years  Famotidine 20mg BID was added with improvement in this symptom  Will continue and plan EGD    3  History of colon polyps  His last colonoscopy was in 2014 with a small polyp removed  He is due routinely for a colonoscopy    4  Weight loss, abnormal  He reports significant weight loss over the past year  He has a documented 20lb loss since September of 2021  Will plan Colonoscopy    If EGD and colonoscopy are normal he should have a CT CAP    ______________________________________________________________________    HPI:  52-year-old male presents for evaluation of intractable hiccups  He reports that this started approximately 3 weeks ago  He went to the emergency room 1 week ago for evaluation  Chest x-ray was normal   He was started on famotidine 20 mg twice daily in addition to his pantoprazole 40 mg once daily which she is already taking  He notes improvement in symptoms  He denies any dysphagia, nausea or vomiting  He reports that he suffered a stroke approximately 3 years ago  Shortly after this he developed nausea, vomiting and hematemesis  He had hiccups at this time as well  He had an upper endoscopy with Dr Obi Mckinnon that showed severe esophagitis  He has become concerned recently because he has lost a significant amount of weight over the past year  I can observe a documented 20 lb weight loss since September  He denies any diarrhea or constipation  He denies any rectal bleeding  He denies any rectal bleeding  His last colonoscopy was in 2014 with a small polyp removed        REVIEW OF SYSTEMS:    CONSTITUTIONAL: Denies any fever, chills, rigors, and weight loss  HEENT: No earache or tinnitus  Denies hearing loss or visual disturbances  CARDIOVASCULAR: No chest pain or palpitations  RESPIRATORY: Denies any cough, hemoptysis, shortness of breath or dyspnea on exertion  GASTROINTESTINAL: As noted in the History of Present Illness  GENITOURINARY: No problems with urination  Denies any hematuria or dysuria  NEUROLOGIC: No dizziness or vertigo, denies headaches  MUSCULOSKELETAL: Denies any muscle or joint pain  SKIN: Denies skin rashes or itching  ENDOCRINE: Denies excessive thirst  Denies intolerance to heat or cold  PSYCHOSOCIAL: Denies depression or anxiety  Denies any recent memory loss  Historical Information   Past Medical History:   Diagnosis Date    CVA (cerebral vascular accident) ([facility] ) 02/18/2019    Bilateral pontine lacunar infarct    Esophagitis     Hyperlipidemia     Non-sustained ventricular tachycardia (HCC)     Type 2 diabetes mellitus ([facility] )      Past Surgical History:   Procedure Laterality Date    ESOPHAGOGASTRODUODENOSCOPY N/A 3/4/2019    Procedure: ESOPHAGOGASTRODUODENOSCOPY (EGD); Surgeon: Maria Dolores Lehman DO;  Location: [facility]; Service: Gastroenterology     Social History   Social History     Substance and Sexual Activity   Alcohol Use Yes     Social History     Substance and Sexual Activity   Drug Use Never     Social History     Tobacco Use   Smoking Status Never Smoker   Smokeless Tobacco Never Used     History reviewed  No pertinent family history      Meds/Allergies       Current Outpatient Medications:     Alcohol Swabs PADS    atorvastatin (LIPITOR) 40 mg tablet    chlorhexidine (PERIDEX) 0 12 % solution    clopidogrel (PLAVIX) 75 mg tablet    clotrimazole-betamethasone (LOTRISONE) 1-0 05 % cream    famotidine (PEPCID) 40 MG tablet    Januvia 100 MG tablet    metFORMIN (GLUCOPHAGE-XR) 750 mg 24 hr tablet    metoprolol tartrate (LOPRESSOR) 25 mg tablet   pantoprazole (PROTONIX) 40 mg tablet    No Known Allergies        Objective     Blood pressure 130/84, pulse 91, height 5' 5" (1 651 m), weight 58 5 kg (129 lb), SpO2 96 %  Body mass index is 21 47 kg/m²  PHYSICAL EXAM:      General Appearance:   Alert, cooperative, no distress   HEENT:   Normocephalic, atraumatic, anicteric      Neck:  Supple, symmetrical, trachea midline   Lungs:   Clear to auscultation bilaterally; no rales, rhonchi or wheezing; respirations unlabored    Heart[de-identified]   Regular rate and rhythm; no murmur, rub, or gallop  Abdomen:   Soft, non-tender, non-distended; normal bowel sounds; no masses, no organomegaly    Genitalia:   Deferred    Rectal:   Deferred    Extremities:  No cyanosis, clubbing or edema    Pulses:  2+ and symmetric    Skin:  No jaundice, rashes, or lesions    Lymph nodes:  No palpable cervical lymphadenopathy        Lab Results:   No visits with results within 1 Day(s) from this visit     Latest known visit with results is:   Appointment on 09/17/2021   Component Date Value    Hemoglobin A1C 09/17/2021 6 7 (A)    EAG 09/17/2021 146     PSA 09/17/2021 1 2     Sodium 09/17/2021 140     Potassium 09/17/2021 4 5     Chloride 09/17/2021 108     CO2 09/17/2021 29     ANION GAP 09/17/2021 3 (A)    BUN 09/17/2021 10     Creatinine 09/17/2021 0 98     Glucose 09/17/2021 115     Calcium 09/17/2021 10 1     AST 09/17/2021 16     ALT 09/17/2021 24     Alkaline Phosphatase 09/17/2021 105     Total Protein 09/17/2021 7 9     Albumin 09/17/2021 3 6     Total Bilirubin 09/17/2021 0 58     eGFR 09/17/2021 91     WBC 09/17/2021 6 58     RBC 09/17/2021 5 06     Hemoglobin 09/17/2021 14 8     Hematocrit 09/17/2021 47 5     MCV 09/17/2021 94     MCH 09/17/2021 29 2     MCHC 09/17/2021 31 2 (A)    RDW 09/17/2021 12 4     Platelets 58/78/6479 207     MPV 09/17/2021 10 6     Cholesterol 09/17/2021 102     Triglycerides 09/17/2021 83     HDL, Direct 09/17/2021 50     LDL Calculated 09/17/2021 35     Non-HDL-Chol (CHOL-HDL) 09/17/2021 52     Creatinine, Ur 09/17/2021 249 0     Microalbum  ,U,Random 09/17/2021 8 6     Microalb Creat Ratio 09/17/2021 3          Radiology Results:   XR chest 2 views    Result Date: 5/25/2022  Narrative: CHEST INDICATION:   hiccups  COMPARISON:  None EXAM PERFORMED/VIEWS:  XR CHEST PA & LATERAL FINDINGS: Cardiomediastinal silhouette appears unremarkable  The lungs are clear  No pneumothorax or pleural effusion  Osseous structures appear within normal limits for patient age  Impression: No acute cardiopulmonary disease   Workstation performed: [id number]

## 2022-08-03 RX ORDER — SODIUM CHLORIDE, SODIUM LACTATE, POTASSIUM CHLORIDE, CALCIUM CHLORIDE 600; 310; 30; 20 MG/100ML; MG/100ML; MG/100ML; MG/100ML
125 INJECTION, SOLUTION INTRAVENOUS CONTINUOUS
Status: CANCELLED | OUTPATIENT
Start: 2022-08-03

## 2022-08-04 ENCOUNTER — HOSPITAL ENCOUNTER (OUTPATIENT)
Dept: GASTROENTEROLOGY | Facility: HOSPITAL | Age: 72
Setting detail: OUTPATIENT SURGERY
Discharge: HOME | End: 2022-08-04
Admitting: PHYSICIAN ASSISTANT
Payer: COMMERCIAL

## 2022-08-04 ENCOUNTER — ANESTHESIA (OUTPATIENT)
Dept: GASTROENTEROLOGY | Facility: HOSPITAL | Age: 72
End: 2022-08-04

## 2022-08-04 ENCOUNTER — ANESTHESIA EVENT (OUTPATIENT)
Dept: GASTROENTEROLOGY | Facility: HOSPITAL | Age: 72
End: 2022-08-04

## 2022-08-04 VITALS
SYSTOLIC BLOOD PRESSURE: 139 MMHG | DIASTOLIC BLOOD PRESSURE: 78 MMHG | HEIGHT: 65 IN | RESPIRATION RATE: 18 BRPM | HEART RATE: 112 BPM | OXYGEN SATURATION: 97 % | TEMPERATURE: 97.9 F | BODY MASS INDEX: 22.08 KG/M2 | WEIGHT: 132.5 LBS

## 2022-08-04 DIAGNOSIS — K21.9 GASTROESOPHAGEAL REFLUX DISEASE, UNSPECIFIED WHETHER ESOPHAGITIS PRESENT: ICD-10-CM

## 2022-08-04 DIAGNOSIS — R63.4 WEIGHT LOSS, ABNORMAL: ICD-10-CM

## 2022-08-04 DIAGNOSIS — Z86.010 HISTORY OF COLON POLYPS: ICD-10-CM

## 2022-08-04 LAB — GLUCOSE SERPL-MCNC: 101 MG/DL (ref 65–140)

## 2022-08-04 PROCEDURE — 82948 REAGENT STRIP/BLOOD GLUCOSE: CPT

## 2022-08-04 PROCEDURE — 88305 TISSUE EXAM BY PATHOLOGIST: CPT | Performed by: SPECIALIST

## 2022-08-04 PROCEDURE — 45378 DIAGNOSTIC COLONOSCOPY: CPT | Performed by: INTERNAL MEDICINE

## 2022-08-04 PROCEDURE — 43239 EGD BIOPSY SINGLE/MULTIPLE: CPT | Performed by: INTERNAL MEDICINE

## 2022-08-04 RX ORDER — SODIUM CHLORIDE, SODIUM LACTATE, POTASSIUM CHLORIDE, CALCIUM CHLORIDE 600; 310; 30; 20 MG/100ML; MG/100ML; MG/100ML; MG/100ML
125 INJECTION, SOLUTION INTRAVENOUS CONTINUOUS
Status: DISCONTINUED | OUTPATIENT
Start: 2022-08-04 | End: 2022-08-08 | Stop reason: HOSPADM

## 2022-08-04 RX ORDER — LIDOCAINE HYDROCHLORIDE 10 MG/ML
INJECTION, SOLUTION EPIDURAL; INFILTRATION; INTRACAUDAL; PERINEURAL AS NEEDED
Status: DISCONTINUED | OUTPATIENT
Start: 2022-08-04 | End: 2022-08-04

## 2022-08-04 RX ORDER — PROPOFOL 10 MG/ML
INJECTION, EMULSION INTRAVENOUS AS NEEDED
Status: DISCONTINUED | OUTPATIENT
Start: 2022-08-04 | End: 2022-08-04

## 2022-08-04 RX ORDER — GLYCOPYRROLATE 0.2 MG/ML
INJECTION INTRAMUSCULAR; INTRAVENOUS AS NEEDED
Status: DISCONTINUED | OUTPATIENT
Start: 2022-08-04 | End: 2022-08-04

## 2022-08-04 RX ADMIN — PROPOFOL 30 MG: 10 INJECTION, EMULSION INTRAVENOUS at 07:38

## 2022-08-04 RX ADMIN — PROPOFOL 30 MG: 10 INJECTION, EMULSION INTRAVENOUS at 07:24

## 2022-08-04 RX ADMIN — SODIUM CHLORIDE, SODIUM LACTATE, POTASSIUM CHLORIDE, AND CALCIUM CHLORIDE: .6; .31; .03; .02 INJECTION, SOLUTION INTRAVENOUS at 06:59

## 2022-08-04 RX ADMIN — PROPOFOL 30 MG: 10 INJECTION, EMULSION INTRAVENOUS at 07:33

## 2022-08-04 RX ADMIN — PROPOFOL 30 MG: 10 INJECTION, EMULSION INTRAVENOUS at 07:26

## 2022-08-04 RX ADMIN — GLYCOPYRROLATE 0.1 MG: 0.2 INJECTION, SOLUTION INTRAMUSCULAR; INTRAVENOUS at 07:47

## 2022-08-04 RX ADMIN — PROPOFOL 30 MG: 10 INJECTION, EMULSION INTRAVENOUS at 07:29

## 2022-08-04 RX ADMIN — PROPOFOL 130 MG: 10 INJECTION, EMULSION INTRAVENOUS at 07:21

## 2022-08-04 RX ADMIN — LIDOCAINE HYDROCHLORIDE 50 MG: 10 INJECTION, SOLUTION EPIDURAL; INFILTRATION; INTRACAUDAL; PERINEURAL at 07:21

## 2022-08-04 NOTE — ANESTHESIA POSTPROCEDURE EVALUATION
Post-Op Assessment Note    CV Status:  Stable    Pain management: adequate     Mental Status:  Somnolent   Hydration Status:  Euvolemic   PONV Controlled:  Controlled   Airway Patency:  Patent      Post Op Vitals Reviewed: Yes      Staff: CRNA         No complications documented      /88 (08/04/22 0754)    Temp 97 9 °F (36 6 °C) (08/04/22 0754)    Pulse (!) 114 (08/04/22 0754)   Resp 18 (08/04/22 0754)    SpO2 100 % (08/04/22 0754)

## 2022-08-04 NOTE — ANESTHESIA PREPROCEDURE EVALUATION
Procedure:  COLONOSCOPY  EGD    1  Gastroesophageal reflux disease, unspecified whether esophagitis present  2  Intractable hiccoughs  Started 3 weeks ago  Went to the ER - CXR was negative  He is on Pantoprazole 40mg once daily for many years  Famotidine 20mg BID was added with improvement in this symptom  Will continue and plan EGD     3  History of colon polyps  His last colonoscopy was in 2014 with a small polyp removed  He is due routinely for a colonoscopy     4  Weight loss, abnormal  He reports significant weight loss over the past year  He has a documented 20lb loss since September of 2021  Will plan Colonoscopy     If EGD and colonoscopy are normal he should have a CT CAP     ______________________________________________________________________     HPI:  71-year-old male presents for evaluation of intractable hiccups  He reports that this started approximately 3 weeks ago  He went to the emergency room 1 week ago for evaluation  Chest x-ray was normal   He was started on famotidine 20 mg twice daily in addition to his pantoprazole 40 mg once daily which she is already taking  He notes improvement in symptoms  He denies any dysphagia, nausea or vomiting  He reports that he suffered a stroke approximately 3 years ago  Shortly after this he developed nausea, vomiting and hematemesis  He had hiccups at this time as well  He had an upper endoscopy with Dr Candace Sal that showed severe esophagitis  He has become concerned recently because he has lost a significant amount of weight over the past year  I can observe a documented 20 lb weight loss since September  He denies any diarrhea or constipation  He denies any rectal bleeding  He denies any rectal bleeding    His last colonoscopy was in 2014 with a small polyp removed          Relevant Problems   CARDIO   (+) Mixed hyperlipidemia      ENDO   (+) Type 2 diabetes mellitus with stage 2 chronic kidney disease, with long-term current use of insulin ([facility] )      NEURO/PSYCH   (+) History of lacunar cerebrovascular accident (CVA)        Physical Exam    Airway    Mallampati score: III  TM Distance: >3 FB  Neck ROM: full     Dental       Cardiovascular  Cardiovascular exam normal    Pulmonary  Pulmonary exam normal     Other Findings       History Comments History Comments   CVA [facility]cere[facility] vascular accident) ([facility] ) Bilateral pontine lacunar infarct Hyperlipidemia    Type 2 diabetes mellitus ([facility] )  Non-sustained ventricular tachycardia (HCC)    Esophagitis          Surgical History     Current as of 08/04/22 0654  ESOPHAGOGASTRODUODENOSCOPY      Substance History     Current as of 08/04/22 0654  Smoking Status: Never Smoker   Smokeless Tobacco Status: Never Used   Alcohol use: Yes, unspecified volume   Drug use: Never     ECHO SUMMARY 2019     LEFT VENTRICLE:  Systolic function was normal  Ejection fraction was estimated in the range of 55 % to 65 %  There were no regional wall motion abnormalities      ATRIAL SEPTUM:  No defect or patent foramen ovale was identified  Contrast injection was performed  There was no right-to-left shunt, with provocative maneuvers to increase right atrial pressure      MITRAL VALVE:  There was mild regurgitation      TRICUSPID VALVE:  There was mild to moderate regurgitation  Estimated peak PA pressure was 35 mmHg  Anesthesia Plan  ASA Score- 3     Anesthesia Type- IV sedation with anesthesia with ASA Monitors  Additional Monitors:   Airway Plan:           Plan Factors-    Chart reviewed  EKG reviewed  Imaging results reviewed  Existing labs reviewed  Patient summary reviewed  Patient is not a current smoker  Induction- intravenous  Postoperative Plan-     Informed Consent- Anesthetic plan and risks discussed with patient  I personally reviewed this patient with the CRNA  Discussed and agreed on the Anesthesia Plan with the CRNA  Brittany Beth

## 2022-08-04 NOTE — PERIOPERATIVE NURSING NOTE
Pt was in the bathroom, was able to evacuate more stool and air  Assisted back to his bay where his wife assisted him to dress    DC stable condition

## 2022-08-04 NOTE — H&P
History and Physical -  [facility]  Sandoval Milligan 80 y o  male MRN: [id number]      HPI: Sandoval Milligan is a 80y o  year old male who presents for evaluation of hiccups, gastroesophageal reflux disease, history of polyps, weight loss      REVIEW OF SYSTEMS: Per the HPI, and otherwise unremarkable  Historical Information   Past Medical History:   Diagnosis Date    Colon polyp     CVA (cerebral vascular accident) ([place] [address] ) 02/18/2019    Bilateral pontine lacunar infarct    Diabetes mellitus ([place] [address] )     Esophagitis     Hyperlipidemia     Hypertension     Non-sustained ventricular tachycardia (HCC)     Stroke ([place] [address] )     Type 2 diabetes mellitus ([place] [address] )      Past Surgical History:   Procedure Laterality Date    COLONOSCOPY      ESOPHAGOGASTRODUODENOSCOPY N/A 03/04/2019    Procedure: ESOPHAGOGASTRODUODENOSCOPY (EGD); Surgeon: Soila Dior DO;  Location: [facility]; Service: Gastroenterology     Social History   Social History     Substance and Sexual Activity   Alcohol Use Never     Social History     Substance and Sexual Activity   Drug Use Never     Social History     Tobacco Use   Smoking Status Never Smoker   Smokeless Tobacco Never Used     History reviewed  No pertinent family history  Meds/Allergies     (Not in a hospital admission)      No Known Allergies    Objective     Blood pressure 161/83, pulse 104, temperature 98 3 °F (36 8 °C), temperature source Temporal, resp  rate 14, height 5' 5" (1 651 m), weight 60 1 kg (132 lb 7 9 oz), SpO2 99 %  PHYSICAL EXAM    Gen: NAD  CV: RRR  CHEST: Clear  ABD: soft, NT/ND  EXT: no edema      ASSESSMENT/PLAN:  This is a 80y o  year old male here for EGD, colonoscopy, and he is stable and optimized for his procedure

## 2022-08-15 ENCOUNTER — TELEPHONE (OUTPATIENT)
Dept: GASTROENTEROLOGY | Facility: CLINIC | Age: 72
End: 2022-08-15

## 2022-08-15 NOTE — TELEPHONE ENCOUNTER
----- Message from Petr Ramos DO sent at 8/15/2022  4:01 PM EDT -----  Please call the patient with the biopsy results  Biopsies the stomach were benign and negative for Helicobacter pylori or for cancer

## 2022-08-26 ENCOUNTER — TELEPHONE (OUTPATIENT)
Dept: INTERNAL MEDICINE CLINIC | Facility: CLINIC | Age: 72
End: 2022-08-26

## 2022-10-20 ENCOUNTER — APPOINTMENT (OUTPATIENT)
Dept: LAB | Facility: CLINIC | Age: 72
End: 2022-10-20
Payer: COMMERCIAL

## 2022-10-20 ENCOUNTER — OFFICE VISIT (OUTPATIENT)
Dept: INTERNAL MEDICINE CLINIC | Facility: CLINIC | Age: 72
End: 2022-10-20
Payer: COMMERCIAL

## 2022-10-20 VITALS
HEIGHT: 65 IN | HEART RATE: 80 BPM | TEMPERATURE: 96.9 F | BODY MASS INDEX: 21.49 KG/M2 | RESPIRATION RATE: 20 BRPM | SYSTOLIC BLOOD PRESSURE: 120 MMHG | DIASTOLIC BLOOD PRESSURE: 70 MMHG | WEIGHT: 129 LBS | OXYGEN SATURATION: 98 %

## 2022-10-20 DIAGNOSIS — E78.2 MIXED HYPERLIPIDEMIA DUE TO TYPE 2 DIABETES MELLITUS (HCC): Chronic | ICD-10-CM

## 2022-10-20 DIAGNOSIS — E11.22 TYPE 2 DIABETES MELLITUS WITH STAGE 2 CHRONIC KIDNEY DISEASE, WITHOUT LONG-TERM CURRENT USE OF INSULIN (HCC): ICD-10-CM

## 2022-10-20 DIAGNOSIS — Z12.5 SCREENING FOR PROSTATE CANCER: ICD-10-CM

## 2022-10-20 DIAGNOSIS — B30.9 ACUTE VIRAL CONJUNCTIVITIS OF RIGHT EYE: ICD-10-CM

## 2022-10-20 DIAGNOSIS — Z86.73 HISTORY OF LACUNAR CEREBROVASCULAR ACCIDENT (CVA): Chronic | ICD-10-CM

## 2022-10-20 DIAGNOSIS — E11.22 TYPE 2 DIABETES MELLITUS WITH STAGE 2 CHRONIC KIDNEY DISEASE, WITHOUT LONG-TERM CURRENT USE OF INSULIN (HCC): Primary | ICD-10-CM

## 2022-10-20 DIAGNOSIS — E11.69 MIXED HYPERLIPIDEMIA DUE TO TYPE 2 DIABETES MELLITUS (HCC): Chronic | ICD-10-CM

## 2022-10-20 DIAGNOSIS — Z00.00 MEDICARE ANNUAL WELLNESS VISIT, SUBSEQUENT: ICD-10-CM

## 2022-10-20 DIAGNOSIS — Z23 ENCOUNTER FOR IMMUNIZATION: ICD-10-CM

## 2022-10-20 DIAGNOSIS — N18.2 TYPE 2 DIABETES MELLITUS WITH STAGE 2 CHRONIC KIDNEY DISEASE, WITHOUT LONG-TERM CURRENT USE OF INSULIN (HCC): Primary | ICD-10-CM

## 2022-10-20 DIAGNOSIS — N18.2 TYPE 2 DIABETES MELLITUS WITH STAGE 2 CHRONIC KIDNEY DISEASE, WITHOUT LONG-TERM CURRENT USE OF INSULIN (HCC): ICD-10-CM

## 2022-10-20 LAB
ALBUMIN SERPL BCP-MCNC: 3.4 G/DL (ref 3.5–5)
ALP SERPL-CCNC: 83 U/L (ref 46–116)
ALT SERPL W P-5'-P-CCNC: 17 U/L (ref 12–78)
ANION GAP SERPL CALCULATED.3IONS-SCNC: 5 MMOL/L (ref 4–13)
AST SERPL W P-5'-P-CCNC: 14 U/L (ref 5–45)
BILIRUB SERPL-MCNC: 0.57 MG/DL (ref 0.2–1)
BUN SERPL-MCNC: 10 MG/DL (ref 5–25)
CALCIUM ALBUM COR SERPL-MCNC: 10.2 MG/DL (ref 8.3–10.1)
CALCIUM SERPL-MCNC: 9.7 MG/DL (ref 8.3–10.1)
CHLORIDE SERPL-SCNC: 105 MMOL/L (ref 96–108)
CHOLEST SERPL-MCNC: 95 MG/DL
CO2 SERPL-SCNC: 27 MMOL/L (ref 21–32)
CREAT SERPL-MCNC: 0.92 MG/DL (ref 0.6–1.3)
ERYTHROCYTE [DISTWIDTH] IN BLOOD BY AUTOMATED COUNT: 12.2 % (ref 11.6–15.1)
EST. AVERAGE GLUCOSE BLD GHB EST-MCNC: 131 MG/DL
GFR SERPL CREATININE-BSD FRML MDRD: 83 ML/MIN/1.73SQ M
GLUCOSE P FAST SERPL-MCNC: 108 MG/DL (ref 65–99)
HBA1C MFR BLD: 6.2 %
HCT VFR BLD AUTO: 43.2 % (ref 36.5–49.3)
HDLC SERPL-MCNC: 46 MG/DL
HGB BLD-MCNC: 13.5 G/DL (ref 12–17)
LDLC SERPL CALC-MCNC: 36 MG/DL (ref 0–100)
LEFT EYE DIABETIC RETINOPATHY: ABNORMAL
LEFT EYE IMAGE QUALITY: ABNORMAL
LEFT EYE MACULAR EDEMA: ABNORMAL
LEFT EYE OTHER RETINOPATHY: ABNORMAL
MCH RBC QN AUTO: 29.4 PG (ref 26.8–34.3)
MCHC RBC AUTO-ENTMCNC: 31.3 G/DL (ref 31.4–37.4)
MCV RBC AUTO: 94 FL (ref 82–98)
PLATELET # BLD AUTO: 205 THOUSANDS/UL (ref 149–390)
PMV BLD AUTO: 10.9 FL (ref 8.9–12.7)
POTASSIUM SERPL-SCNC: 3.9 MMOL/L (ref 3.5–5.3)
PROT SERPL-MCNC: 7.7 G/DL (ref 6.4–8.4)
PSA SERPL-MCNC: 1.5 NG/ML (ref 0–4)
RBC # BLD AUTO: 4.59 MILLION/UL (ref 3.88–5.62)
RIGHT EYE DIABETIC RETINOPATHY: ABNORMAL
RIGHT EYE IMAGE QUALITY: ABNORMAL
RIGHT EYE MACULAR EDEMA: ABNORMAL
RIGHT EYE OTHER RETINOPATHY: ABNORMAL
SEVERITY (EYE EXAM): ABNORMAL
SODIUM SERPL-SCNC: 137 MMOL/L (ref 135–147)
TRIGL SERPL-MCNC: 67 MG/DL
WBC # BLD AUTO: 7.1 THOUSAND/UL (ref 4.31–10.16)

## 2022-10-20 PROCEDURE — G0444 DEPRESSION SCREEN ANNUAL: HCPCS | Performed by: INTERNAL MEDICINE

## 2022-10-20 PROCEDURE — 80053 COMPREHEN METABOLIC PANEL: CPT

## 2022-10-20 PROCEDURE — G0008 ADMIN INFLUENZA VIRUS VAC: HCPCS | Performed by: INTERNAL MEDICINE

## 2022-10-20 PROCEDURE — G0439 PPPS, SUBSEQ VISIT: HCPCS | Performed by: INTERNAL MEDICINE

## 2022-10-20 PROCEDURE — 90662 IIV NO PRSV INCREASED AG IM: CPT | Performed by: INTERNAL MEDICINE

## 2022-10-20 PROCEDURE — 80061 LIPID PANEL: CPT

## 2022-10-20 PROCEDURE — 90677 PCV20 VACCINE IM: CPT | Performed by: INTERNAL MEDICINE

## 2022-10-20 PROCEDURE — G0009 ADMIN PNEUMOCOCCAL VACCINE: HCPCS | Performed by: INTERNAL MEDICINE

## 2022-10-20 PROCEDURE — G0103 PSA SCREENING: HCPCS

## 2022-10-20 PROCEDURE — 36415 COLL VENOUS BLD VENIPUNCTURE: CPT

## 2022-10-20 PROCEDURE — 92250 FUNDUS PHOTOGRAPHY W/I&R: CPT | Performed by: INTERNAL MEDICINE

## 2022-10-20 PROCEDURE — 99214 OFFICE O/P EST MOD 30 MIN: CPT | Performed by: INTERNAL MEDICINE

## 2022-10-20 PROCEDURE — 83036 HEMOGLOBIN GLYCOSYLATED A1C: CPT

## 2022-10-20 PROCEDURE — G0442 ANNUAL ALCOHOL SCREEN 15 MIN: HCPCS | Performed by: INTERNAL MEDICINE

## 2022-10-20 PROCEDURE — 85027 COMPLETE CBC AUTOMATED: CPT

## 2022-10-20 NOTE — PROGRESS NOTES
Assessment and Plan:     1  Type 2 diabetes mellitus with stage 2 chronic kidney disease, without long-term current use of insulin ([address] )    Need to check updated labs  May adjust his medications with the weight he has lost  Will await results of labs  Due for eye exam today to evaluate for retinopathy     - IRIS Diabetic eye exam  - Hemoglobin A1C; Future  - Microalbumin / creatinine urine ratio; Future    2  Mixed hyperlipidemia due to type 2 diabetes mellitus [facility])    Lab Results   Component Value Date    LDLCALC 35 09/17/2021      Well below goal  Check updated labs  Continue high intensity statin  - Comprehensive metabolic panel; Future  - CBC and Platelet; Future  - Lipid Panel with Direct LDL reflex; Future    3  History of lacunar cerebrovascular accident (CVA)    Continue secondary stroke prevention  BP is at goal  Continue plavix and statin  4  Acute viral conjunctivitis of right eye    Discussed that vast majority of pink eye is not bacterial in origin and antibiotic eye drops are not needed  Discussed using lubricating eye drops  Avoid itching/touching your eye  5  Medicare annual wellness visit, subsequent    6  Encounter for immunization  - Pneumococcal Conjugate Vaccine 20-valent (PCV20)  - influenza vaccine, high-dose, PF 0 7 mL (FLUZONE HIGH-DOSE)    7  Screening for prostate cancer  - PSA, Total Screen; Future       Depression Screening and Follow-up Plan: Patient was screened for depression during today's encounter  They screened negative with a PHQ-2 score of 0  Preventive health issues were discussed with patient, and age appropriate screening tests were ordered as noted in patient's After Visit Summary  Personalized health advice and appropriate referrals for health education or preventive services given if needed, as noted in patient's After Visit Summary  History of Present Illness:     Patient presents for a Medicare Wellness Visit    Jenaro Tsang presents for follow up   Last seen 1 year ago  In the past year he has lost 20 lbs  He just doesn't feel hungry all the time  Sometimes only eats 1-2 meals per day  Had EGD or colonoscopy recently which looked ok and biopsies were benign  Not checking his sugars at home  He is due for updated labs  On januvia and metformin  His right eye has been a little red/irritated the past 3 days  Clear drainage  Denies allergy symptoms  Patient Care Team:  Yue Garcia DO as PCP - General (Internal Medicine)     Review of Systems:     Review of Systems   Constitutional: Positive for appetite change and unexpected weight change (mild weight loss)  Negative for diaphoresis, fatigue and fever  Eyes: Positive for redness  Respiratory: Negative  Cardiovascular: Negative  Gastrointestinal: Negative  Musculoskeletal: Negative  Problem List:     Patient Active Problem List   Diagnosis   • History of lacunar cerebrovascular accident (CVA)   • Type 2 diabetes mellitus with stage 2 chronic kidney disease, with long-term current use of insulin (HCC)   • Mixed hyperlipidemia due to type 2 diabetes mellitus [facility])      Past Medical and Surgical History:     Past Medical History:   Diagnosis Date   • Colon polyp    • CVA (cerebral vascular accident) ([facility] ) 02/18/2019    Bilateral pontine lacunar infarct   • Diabetes mellitus ([place] [address] )    • Esophagitis    • Hyperlipidemia    • Hypertension    • Non-sustained ventricular tachycardia    • Stroke [facility])    • Type 2 diabetes mellitus ([place] [address] )      Past Surgical History:   Procedure Laterality Date   • COLONOSCOPY     • ESOPHAGOGASTRODUODENOSCOPY N/A 03/04/2019    Procedure: ESOPHAGOGASTRODUODENOSCOPY (EGD); Surgeon: Danica Bahena DO;  Location: [facility]; Service: Gastroenterology      Family History:     History reviewed  No pertinent family history     Social History:     Social History     Socioeconomic History   • Marital status: /Civil Union     Spouse name: None   • Number of children: None • Years of education: None   • Highest education level: None   Occupational History   • None   Tobacco Use   • Smoking status: Never Smoker   • Smokeless tobacco: Never Used   Vaping Use   • Vaping Use: Never used   Substance and Sexual Activity   • Alcohol use: Never   • Drug use: Never   • Sexual activity: Yes     Partners: Female   Other Topics Concern   • None   Social History Narrative   • None     Social Determinants of Health     Financial Resource Strain: Low Risk    • Difficulty of Paying Living Expenses: Not hard at all   Food Insecurity: Not on file   Transportation Needs: No Transportation Needs   • Lack of Transportation (Medical): No   • Lack of Transportation (Non-Medical): No   Physical Activity: Not on file   Stress: Not on file   Social Connections: Not on file   Intimate Partner Violence: Not on file   Housing Stability: Not on file      Medications and Allergies:     Current Outpatient Medications   Medication Sig Dispense Refill   • Alcohol Swabs PADS Use daily to inject insulin 100 each 0   • atorvastatin (LIPITOR) 40 mg tablet Take 1 tablet (40 mg total) by mouth every evening 90 tablet 3   • chlorhexidine (PERIDEX) 0 12 % solution Apply 15 mL to the mouth or throat daily 1893 mL 3   • clopidogrel (PLAVIX) 75 mg tablet Take 1 tablet (75 mg total) by mouth daily 90 tablet 3   • clotrimazole-betamethasone (LOTRISONE) 1-0 05 % cream Apply topically 2 (two) times a day 30 g 2   • famotidine (PEPCID) 40 MG tablet Take 1 tablet (40 mg total) by mouth in the morning and 1 tablet (40 mg total) in the evening   60 tablet 0   • Januvia 100 MG tablet TAKE 1 TABLET BY MOUTH EVERY DAY 90 tablet 3   • metFORMIN (GLUCOPHAGE-XR) 750 mg 24 hr tablet Take 2 tablets (1,500 mg total) by mouth daily with dinner 180 tablet 3   • metoprolol tartrate (LOPRESSOR) 25 mg tablet Take 0 5 tablets (12 5 mg total) by mouth every 12 (twelve) hours 90 tablet 3   • pantoprazole (PROTONIX) 40 mg tablet Take 1 tablet (40 mg total) by mouth daily 90 tablet 3     No current facility-administered medications for this visit  No Known Allergies   Immunizations:     Immunization History   Administered Date(s) Administered   • COVID-19 MODERNA VACC 0 5 ML IM 03/28/2021, 04/28/2021   • Influenza, high dose seasonal 0 7 mL 10/20/2022   • Pneumococcal Conjugate Vaccine 20-valent (Pcv20), Polysace 10/20/2022   • Pneumococcal Polysaccharide PPV23 09/17/2021      Health Maintenance:         Topic Date Due   • Colorectal Cancer Screening  08/03/2025   • Hepatitis C Screening  Completed         Topic Date Due   • COVID-19 Vaccine (3 - Booster for [facility] series) 09/28/2021      Medicare Screening Tests and Risk Assessments:     Kris Weiss is here for his Subsequent Wellness visit  Last Medicare Wellness visit information reviewed, patient interviewed and updates made to the record today  Health Risk Assessment:   Patient rates overall health as very good  Patient feels that their physical health rating is much better  Patient is very satisfied with their life  Eyesight was rated as same  Hearing was rated as same  Patient feels that their emotional and mental health rating is much better  Patients states they are never, rarely angry  Patient states they are never, rarely unusually tired/fatigued  Pain experienced in the last 7 days has been none  Depression Screening:   PHQ-2 Score: 0      Fall Risk Screening: In the past year, patient has experienced: no history of falling in past year      Home Safety:  Patient does not have trouble with stairs inside or outside of their home  Patient has working smoke alarms and has working carbon monoxide detector  Home safety hazards include: none  Nutrition:   Current diet is Regular  Medications:   Patient is not currently taking any over-the-counter supplements  Patient is able to manage medications       Activities of Daily Living (ADLs)/Instrumental Activities of Daily Living (IADLs): Walk and transfer into and out of bed and chair?: Yes  Dress and groom yourself?: Yes    Bathe or shower yourself?: Yes    Feed yourself? Yes  Do your laundry/housekeeping?: Yes  Manage your money, pay your bills and track your expenses?: Yes  Make your own meals?: Yes    Do your own shopping?: Yes    Durable Medical Equipment Suppliers  none    Previous Hospitalizations:   Any hospitalizations or ED visits within the last 12 months?: No      Advance Care Planning:   Living will: No    Durable POA for healthcare: No    Advanced directive: No    Five wishes given: Yes      Cognitive Screening:   Provider or family/friend/caregiver concerned regarding cognition?: No    PREVENTIVE SCREENINGS      Cardiovascular Screening:    General: Screening Not Indicated and History Lipid Disorder      Diabetes Screening:     General: Screening Not Indicated and History Diabetes      Colorectal Cancer Screening:     General: Screening Current      Prostate Cancer Screening:    General: Risks and Benefits Discussed    Due for: PSA      Osteoporosis Screening:    General: Patient Declines and Screening Not Indicated      Abdominal Aortic Aneurysm (AAA) Screening:    Risk factors include: age between 73-67 yo        General: Screening Not Indicated      Lung Cancer Screening:     General: Screening Not Indicated      Hepatitis C Screening:    General: Screening Current    Screening, Brief Intervention, and Referral to Treatment (SBIRT)    Screening  Typical number of drinks in a day: 0  Typical number of drinks in a week: 0  Interpretation: Low risk drinking behavior      AUDIT-C Screenin) How often did you have a drink containing alcohol in the past year? never  2) How many drinks did you have on a typical day when you were drinking in the past year? 0  3) How often did you have 6 or more drinks on one occasion in the past year? never    AUDIT-C Score: 0  Interpretation: Score 0-3 (male): Negative screen for alcohol misuse    Brief Intervention  Alcohol & drug use screenings were reviewed  No concerns regarding substance use disorder identified  Other Counseling Topics:   Car/seat belt/driving safety, skin self-exam, sunscreen and regular weightbearing exercise  Physical Exam:     /70 (BP Location: Left arm, Patient Position: Sitting, Cuff Size: Standard)   Pulse 80   Temp (!) 96 9 °F (36 1 °C) (Tympanic)   Resp 20   Ht 5' 5" (1 651 m)   Wt 58 5 kg (129 lb)   SpO2 98%   BMI 21 47 kg/m²     Physical Exam  Constitutional:       General: He is not in acute distress  Appearance: He is not ill-appearing  Cardiovascular:      Rate and Rhythm: Normal rate and regular rhythm  Pulses: no weak pulses          Dorsalis pedis pulses are 2+ on the right side and 2+ on the left side  Posterior tibial pulses are 2+ on the right side and 2+ on the left side  Heart sounds: No murmur heard  Pulmonary:      Effort: Pulmonary effort is normal  No respiratory distress  Breath sounds: No wheezing  Abdominal:      General: Bowel sounds are normal  There is no distension  Tenderness: There is no abdominal tenderness  Musculoskeletal:      Right lower leg: No edema  Left lower leg: No edema  Feet:      Right foot:      Skin integrity: No ulcer, skin breakdown, erythema, warmth, callus or dry skin  Left foot:      Skin integrity: No ulcer, skin breakdown, erythema, warmth, callus or dry skin  Neurological:      Mental Status: He is alert  Diabetic Foot Exam  Patient's shoes and socks removed  Right Foot/Ankle   Right Foot Inspection  Skin Exam: skin normal and skin intact  No dry skin, no warmth, no callus, no erythema, no maceration, no abnormal color, no pre-ulcer, no ulcer and no callus  Toe Exam: ROM and strength within normal limits       Sensory   Proprioception: intact  Monofilament testing: intact    Vascular  Capillary refills: < 3 seconds  The right DP pulse is 2+  The right PT pulse is 2+  Left Foot/Ankle  Left Foot Inspection  Skin Exam: skin normal and skin intact  No dry skin, no warmth, no erythema, no maceration, normal color, no pre-ulcer, no ulcer and no callus  Toe Exam: ROM and strength within normal limits  Sensory   Proprioception: intact  Monofilament testing: intact    Vascular  Capillary refills: < 3 seconds  The left DP pulse is 2+  The left PT pulse is 2+       Assign Risk Category  No deformity present  No loss of protective sensation  No weak pulses  Risk: 0    Siva Greene,

## 2022-10-20 NOTE — PATIENT INSTRUCTIONS
Medicare Preventive Visit Patient Instructions  Thank you for completing your Welcome to Medicare Visit or Medicare Annual Wellness Visit today  Your next wellness visit will be due in one year (10/21/2023)  The screening/preventive services that you may require over the next 5-10 years are detailed below  Some tests may not apply to you based off risk factors and/or age  Screening tests ordered at today's visit but not completed yet may show as past due  Also, please note that scanned in results may not display below  Preventive Screenings:  Service Recommendations Previous Testing/Comments   Colorectal Cancer Screening  Colonoscopy    Fecal Occult Blood Test (FOBT)/Fecal Immunochemical Test (FIT)  Fecal DNA/Cologuard Test  Flexible Sigmoidoscopy Age: 39-70 years old   Colonoscopy: every 10 years (May be performed more frequently if at higher risk)  OR  FOBT/FIT: every 1 year  OR  Cologuard: every 3 years  OR  Sigmoidoscopy: every 5 years  Screening may be recommended earlier than age 39 if at higher risk for colorectal cancer  Also, an individualized decision between you and your healthcare provider will decide whether screening between the ages of 74-80 would be appropriate   Colonoscopy: 08/04/2022  FOBT/FIT: Not on file  Cologuard: Not on file  Sigmoidoscopy: Not on file    Screening Current     Prostate Cancer Screening Individualized decision between patient and health care provider in men between ages of 53-78   Medicare will cover every 12 months beginning on the day after your 50th birthday PSA: 1 2 ng/mL           Hepatitis C Screening Once for adults born between 1945 and 1965  More frequently in patients at high risk for Hepatitis C Hep C Antibody: 09/16/2020    Screening Current   Diabetes Screening 1-2 times per year if you're at risk for diabetes or have pre-diabetes Fasting glucose: 195 mg/dL (9/16/2020)  A1C: 6 7 % (9/17/2021)  Screening Not Indicated  History Diabetes   Cholesterol Screening Once every 5 years if you don't have a lipid disorder  May order more often based on risk factors  Lipid panel: 09/17/2021  Screening Not Indicated  History Lipid Disorder      Other Preventive Screenings Covered by Medicare:  Abdominal Aortic Aneurysm (AAA) Screening: covered once if your at risk  You're considered to be at risk if you have a family history of AAA or a male between the age of 73-68 who smoking at least 100 cigarettes in your lifetime  Lung Cancer Screening: covers low dose CT scan once per year if you meet all of the following conditions: (1) Age 50-69; (2) No signs or symptoms of lung cancer; (3) Current smoker or have quit smoking within the last 15 years; (4) You have a tobacco smoking history of at least 20 pack years (packs per day x number of years you smoked); (5) You get a written order from a healthcare provider  Glaucoma Screening: covered annually if you're considered high risk: (1) You have diabetes OR (2) Family history of glaucoma OR (3)  aged 48 and older OR (3)  American aged 72 and older  Osteoporosis Screening: covered every 2 years if you meet one of the following conditions: (1) Have a vertebral abnormality; (2) On glucocorticoid therapy for more than 3 months; (3) Have primary hyperparathyroidism; (4) On osteoporosis medications and need to assess response to drug therapy  HIV Screening: covered annually if you're between the age of 12-76  Also covered annually if you are younger than 13 and older than 72 with risk factors for HIV infection  For pregnant patients, it is covered up to 3 times per pregnancy      Immunizations:  Immunization Recommendations   Influenza Vaccine Annual influenza vaccination during flu season is recommended for all persons aged >= 6 months who do not have contraindications   Pneumococcal Vaccine   * Pneumococcal conjugate vaccine = PCV13 (Prevnar 13), PCV15 (Vaxneuvance), PCV20 (Prevnar 20)  * Pneumococcal polysaccharide vaccine = PPSV23 (Pneumovax) Adults 2364 years old: 1-3 doses may be recommended based on certain risk factors  Adults 72 years old: 1-2 doses may be recommended based off what pneumonia vaccine you previously received   Hepatitis B Vaccine 3 dose series if at intermediate or high risk (ex: diabetes, end stage renal disease, liver disease)   Tetanus (Td) Vaccine - COST NOT COVERED BY MEDICARE PART B Following completion of primary series, a booster dose should be given every 10 years to maintain immunity against tetanus  Td may also be given as tetanus wound prophylaxis  Tdap Vaccine - COST NOT COVERED BY MEDICARE PART B Recommended at least once for all adults  For pregnant patients, recommended with each pregnancy  Shingles Vaccine (Shingrix) - COST NOT COVERED BY MEDICARE PART B  2 shot series recommended in those aged 48 and above     Health Maintenance Due:      Topic Date Due    Colorectal Cancer Screening  08/03/2025    Hepatitis C Screening  Completed     Immunizations Due:      Topic Date Due    COVID-19 Vaccine (3 - Booster for Moderna series) 09/28/2021    Influenza Vaccine (1) Never done    Pneumococcal Vaccine: 65+ Years (2 - PCV) 09/17/2022     Advance Directives   What are advance directives? Advance directives are legal documents that state your wishes and plans for medical care  These plans are made ahead of time in case you lose your ability to make decisions for yourself  Advance directives can apply to any medical decision, such as the treatments you want, and if you want to donate organs  What are the types of advance directives? There are many types of advance directives, and each state has rules about how to use them  You may choose a combination of any of the following:  Living will: This is a written record of the treatment you want  You can also choose which treatments you do not want, which to limit, and which to stop at a certain time   This includes surgery, medicine, IV fluid, and tube feedings  Durable power of  for healthcare [place] [facility]): This is a written record that states who you want to make healthcare choices for you when you are unable to make them for yourself  This person, called a proxy, is usually a family member or a friend  You may choose more than 1 proxy  Do not resuscitate (DNR) order:  A DNR order is used in case your heart stops beating or you stop breathing  It is a request not to have certain forms of treatment, such as CPR  A DNR order may be included in other types of advance directives  Medical directive: This covers the care that you want if you are in a coma, near death, or unable to make decisions for yourself  You can list the treatments you want for each condition  Treatment may include pain medicine, surgery, blood transfusions, dialysis, IV or tube feedings, and a ventilator (breathing machine)  Values history: This document has questions about your views, beliefs, and how you feel and think about life  This information can help others choose the care that you would choose  Why are advance directives important? An advance directive helps you control your care  Although spoken wishes may be used, it is better to have your wishes written down  Spoken wishes can be misunderstood, or not followed  Treatments may be given even if you do not want them  An advance directive may make it easier for your family to make difficult choices about your care  © Copyright [employer] 2018 Information is for End User's use only and may not be sold, redistributed or otherwise used for commercial purposes   All illustrations and images included in CareNotes® are the copyrighted property of A D A M , Inc  or [address] [place] [address] [email or web address]

## 2022-10-21 ENCOUNTER — TELEPHONE (OUTPATIENT)
Dept: INTERNAL MEDICINE CLINIC | Facility: CLINIC | Age: 72
End: 2022-10-21

## 2022-10-21 ENCOUNTER — APPOINTMENT (OUTPATIENT)
Dept: LAB | Facility: CLINIC | Age: 72
End: 2022-10-21
Payer: COMMERCIAL

## 2022-10-21 LAB
CREAT UR-MCNC: 194 MG/DL
MICROALBUMIN UR-MCNC: 8.2 MG/L (ref 0–20)
MICROALBUMIN/CREAT 24H UR: 4 MG/G CREATININE (ref 0–30)

## 2022-10-21 PROCEDURE — 82570 ASSAY OF URINE CREATININE: CPT

## 2022-10-21 PROCEDURE — 82043 UR ALBUMIN QUANTITATIVE: CPT

## 2022-10-21 NOTE — TELEPHONE ENCOUNTER
----- Message from Manasa Weiner DO sent at 10/21/2022  8:25 AM EDT -----  Please let patient know there was no evidence of diabetic retinopathy on his eye exam from our office

## 2022-10-21 NOTE — TELEPHONE ENCOUNTER
----- Message from Oswald Seay DO sent at 10/21/2022  4:45 AM EDT -----  Labs look good  PSA is normal  Cholesterol is excellent at 95   A1c down to 6 2%

## 2022-11-05 DIAGNOSIS — K20.90 ESOPHAGITIS: ICD-10-CM

## 2022-11-05 RX ORDER — PANTOPRAZOLE SODIUM 40 MG/1
TABLET, DELAYED RELEASE ORAL
Qty: 90 TABLET | Refills: 3 | Status: SHIPPED | OUTPATIENT
Start: 2022-11-05

## 2022-11-08 DIAGNOSIS — E78.2 MIXED HYPERLIPIDEMIA: Chronic | ICD-10-CM

## 2022-11-08 RX ORDER — ATORVASTATIN CALCIUM 40 MG/1
TABLET, FILM COATED ORAL
Qty: 90 TABLET | Refills: 3 | Status: SHIPPED | OUTPATIENT
Start: 2022-11-08

## 2022-11-12 DIAGNOSIS — E11.22 TYPE 2 DIABETES MELLITUS WITH STAGE 2 CHRONIC KIDNEY DISEASE, WITH LONG-TERM CURRENT USE OF INSULIN (HCC): Chronic | ICD-10-CM

## 2022-11-12 DIAGNOSIS — N18.2 TYPE 2 DIABETES MELLITUS WITH STAGE 2 CHRONIC KIDNEY DISEASE, WITH LONG-TERM CURRENT USE OF INSULIN (HCC): Chronic | ICD-10-CM

## 2022-11-12 DIAGNOSIS — Z79.4 TYPE 2 DIABETES MELLITUS WITH STAGE 2 CHRONIC KIDNEY DISEASE, WITH LONG-TERM CURRENT USE OF INSULIN (HCC): Chronic | ICD-10-CM

## 2022-11-12 RX ORDER — SITAGLIPTIN 100 MG/1
TABLET, FILM COATED ORAL
Qty: 90 TABLET | Refills: 3 | Status: SHIPPED | OUTPATIENT
Start: 2022-11-12

## 2022-11-15 DIAGNOSIS — Z79.4 TYPE 2 DIABETES MELLITUS WITH STAGE 2 CHRONIC KIDNEY DISEASE, WITH LONG-TERM CURRENT USE OF INSULIN (HCC): Chronic | ICD-10-CM

## 2022-11-15 DIAGNOSIS — E11.22 TYPE 2 DIABETES MELLITUS WITH STAGE 2 CHRONIC KIDNEY DISEASE, WITH LONG-TERM CURRENT USE OF INSULIN (HCC): Chronic | ICD-10-CM

## 2022-11-15 DIAGNOSIS — N18.2 TYPE 2 DIABETES MELLITUS WITH STAGE 2 CHRONIC KIDNEY DISEASE, WITH LONG-TERM CURRENT USE OF INSULIN (HCC): Chronic | ICD-10-CM

## 2022-11-15 RX ORDER — METFORMIN HYDROCHLORIDE 750 MG/1
1500 TABLET, EXTENDED RELEASE ORAL
Qty: 180 TABLET | Refills: 3 | Status: SHIPPED | OUTPATIENT
Start: 2022-11-15

## 2022-12-06 DIAGNOSIS — Z86.73 HISTORY OF LACUNAR CEREBROVASCULAR ACCIDENT (CVA): Chronic | ICD-10-CM

## 2022-12-06 RX ORDER — CLOPIDOGREL BISULFATE 75 MG/1
TABLET ORAL
Qty: 90 TABLET | Refills: 3 | Status: SHIPPED | OUTPATIENT
Start: 2022-12-06

## 2022-12-14 ENCOUNTER — VBI (OUTPATIENT)
Dept: ADMINISTRATIVE | Facility: OTHER | Age: 72
End: 2022-12-14

## 2023-01-20 DIAGNOSIS — I47.29 NONSUSTAINED VENTRICULAR TACHYCARDIA: ICD-10-CM

## 2023-03-11 ENCOUNTER — HOSPITAL ENCOUNTER (EMERGENCY)
Facility: HOSPITAL | Age: 73
Discharge: HOME | End: 2023-03-11
Attending: EMERGENCY MEDICINE

## 2023-03-11 ENCOUNTER — APPOINTMENT (EMERGENCY)
Dept: RADIOLOGY | Facility: HOSPITAL | Age: 73
End: 2023-03-11

## 2023-03-11 VITALS
RESPIRATION RATE: 22 BRPM | OXYGEN SATURATION: 96 % | BODY MASS INDEX: 21.49 KG/M2 | HEART RATE: 96 BPM | HEIGHT: 65 IN | SYSTOLIC BLOOD PRESSURE: 134 MMHG | WEIGHT: 129 LBS | DIASTOLIC BLOOD PRESSURE: 67 MMHG | TEMPERATURE: 97.9 F

## 2023-03-11 DIAGNOSIS — R53.83 FATIGUE: Primary | ICD-10-CM

## 2023-03-11 DIAGNOSIS — R06.6 HICCUPS: ICD-10-CM

## 2023-03-11 LAB
ALBUMIN SERPL BCP-MCNC: 3.7 G/DL (ref 3.5–5)
ALP SERPL-CCNC: 79 U/L (ref 34–104)
ALT SERPL W P-5'-P-CCNC: 17 U/L (ref 7–52)
ANION GAP SERPL CALCULATED.3IONS-SCNC: 10 MMOL/L (ref 4–13)
AST SERPL W P-5'-P-CCNC: 22 U/L (ref 13–39)
ATRIAL RATE: 112 BPM
BASOPHILS # BLD AUTO: 0.02 THOUSANDS/ÂΜL (ref 0–0.1)
BASOPHILS NFR BLD AUTO: 0 % (ref 0–1)
BILIRUB DIRECT SERPL-MCNC: 0.53 MG/DL (ref 0–0.2)
BILIRUB SERPL-MCNC: 1.35 MG/DL (ref 0.2–1)
BUN SERPL-MCNC: 9 MG/DL (ref 5–25)
CALCIUM SERPL-MCNC: 9.3 MG/DL (ref 8.4–10.2)
CARDIAC TROPONIN I PNL SERPL HS: 5 NG/L
CHLORIDE SERPL-SCNC: 98 MMOL/L (ref 96–108)
CO2 SERPL-SCNC: 26 MMOL/L (ref 21–32)
CREAT SERPL-MCNC: 0.89 MG/DL (ref 0.6–1.3)
EOSINOPHIL # BLD AUTO: 0.01 THOUSAND/ÂΜL (ref 0–0.61)
EOSINOPHIL NFR BLD AUTO: 0 % (ref 0–6)
ERYTHROCYTE [DISTWIDTH] IN BLOOD BY AUTOMATED COUNT: 12.7 % (ref 11.6–15.1)
FLUAV RNA RESP QL NAA+PROBE: NEGATIVE
FLUBV RNA RESP QL NAA+PROBE: NEGATIVE
GFR SERPL CREATININE-BSD FRML MDRD: 86 ML/MIN/1.73SQ M
GLUCOSE SERPL-MCNC: 204 MG/DL (ref 65–140)
HCT VFR BLD AUTO: 38.6 % (ref 36.5–49.3)
HGB BLD-MCNC: 12.7 G/DL (ref 12–17)
IMM GRANULOCYTES # BLD AUTO: 0.04 THOUSAND/UL (ref 0–0.2)
IMM GRANULOCYTES NFR BLD AUTO: 0 % (ref 0–2)
LYMPHOCYTES # BLD AUTO: 0.72 THOUSANDS/ÂΜL (ref 0.6–4.47)
LYMPHOCYTES NFR BLD AUTO: 7 % (ref 14–44)
MCH RBC QN AUTO: 28.1 PG (ref 26.8–34.3)
MCHC RBC AUTO-ENTMCNC: 32.9 G/DL (ref 31.4–37.4)
MCV RBC AUTO: 85 FL (ref 82–98)
MONOCYTES # BLD AUTO: 0.74 THOUSAND/ÂΜL (ref 0.17–1.22)
MONOCYTES NFR BLD AUTO: 7 % (ref 4–12)
NEUTROPHILS # BLD AUTO: 8.79 THOUSANDS/ÂΜL (ref 1.85–7.62)
NEUTS SEG NFR BLD AUTO: 86 % (ref 43–75)
NRBC BLD AUTO-RTO: 0 /100 WBCS
P AXIS: 90 DEGREES
PLATELET # BLD AUTO: 175 THOUSANDS/UL (ref 149–390)
PMV BLD AUTO: 10.4 FL (ref 8.9–12.7)
POTASSIUM SERPL-SCNC: 3.7 MMOL/L (ref 3.5–5.3)
PR INTERVAL: 130 MS
PROT SERPL-MCNC: 7.7 G/DL (ref 6.4–8.4)
QRS AXIS: 66 DEGREES
QRSD INTERVAL: 70 MS
QT INTERVAL: 318 MS
QTC INTERVAL: 434 MS
RBC # BLD AUTO: 4.52 MILLION/UL (ref 3.88–5.62)
RSV RNA RESP QL NAA+PROBE: NEGATIVE
SARS-COV-2 RNA RESP QL NAA+PROBE: NEGATIVE
SODIUM SERPL-SCNC: 134 MMOL/L (ref 135–147)
T WAVE AXIS: 76 DEGREES
TSH SERPL DL<=0.05 MIU/L-ACNC: 0.61 UIU/ML (ref 0.45–4.5)
VENTRICULAR RATE: 112 BPM
WBC # BLD AUTO: 10.32 THOUSAND/UL (ref 4.31–10.16)

## 2023-03-11 RX ORDER — MAGNESIUM HYDROXIDE/ALUMINUM HYDROXICE/SIMETHICONE 120; 1200; 1200 MG/30ML; MG/30ML; MG/30ML
30 SUSPENSION ORAL ONCE
Status: COMPLETED | OUTPATIENT
Start: 2023-03-11 | End: 2023-03-11

## 2023-03-11 RX ORDER — METOCLOPRAMIDE HYDROCHLORIDE 5 MG/ML
10 INJECTION INTRAMUSCULAR; INTRAVENOUS ONCE
Status: COMPLETED | OUTPATIENT
Start: 2023-03-11 | End: 2023-03-11

## 2023-03-11 RX ADMIN — ALUMINUM HYDROXIDE, MAGNESIUM HYDROXIDE, AND DIMETHICONE 30 ML: 200; 20; 200 SUSPENSION ORAL at 09:58

## 2023-03-11 RX ADMIN — METOCLOPRAMIDE 10 MG: 5 INJECTION, SOLUTION INTRAMUSCULAR; INTRAVENOUS at 09:58

## 2023-03-11 RX ADMIN — SODIUM CHLORIDE 1000 ML: 0.9 INJECTION, SOLUTION INTRAVENOUS at 09:49

## 2023-03-11 NOTE — Clinical Note
Antelmo Sun accompanied Therese Aschoff to the emergency department on 3/11/2023  Return date if applicable: 79/50/8998        If you have any questions or concerns, please don't hesitate to call        Amirah Crawford MD

## 2023-03-11 NOTE — Clinical Note
Amalia Yousif accompanied Laron Aguilar to the emergency department on 3/11/2023  Return date if applicable: 73/85/8141        If you have any questions or concerns, please don't hesitate to call        Judith Barrios MD

## 2023-03-11 NOTE — ED PROVIDER NOTES
History  Chief Complaint   Patient presents with   • Medical Problem     C/o hiccups for about 3 days, also c/o weakness  71-year-old male presents to the emergency department with multiple complaints  Primary reason the patient is coming to the emergency room is that he has had hiccups for the past 3 or 4 days  He has also had a mild cough without other associated URI symptoms  He has had problems with this in the past including a previous emergency department visit in May 2022  Otherwise, the patient is complaining of decreased appetite, weight loss, and generalized weakness  All of these problems have been chronic since stroke the patient suffered in 2019  He denies fevers  Estimates he has lost about 50 or 60 pounds but over the span of 3 years or more  Denies any acute chest pain, shortness of breath, abdominal pain, nausea or vomiting  Has been seen and evaluated by GI and treated for acid reflux in the past   He is a non-insulin-dependent diabetic  History provided by:  Patient   used: No    Medical Problem  Associated symptoms: fatigue    Associated symptoms: no abdominal pain, no chest pain, no fever, no nausea, no shortness of breath and no vomiting        Prior to Admission Medications   Prescriptions Last Dose Informant Patient Reported? Taking?    Alcohol Swabs PADS   No No   Sig: Use daily to inject insulin   Januvia 100 MG tablet   No No   Sig: TAKE 1 TABLET BY MOUTH EVERY DAY   atorvastatin (LIPITOR) 40 mg tablet   No No   Sig: TAKE 1 TABLET BY MOUTH EVERY DAY IN THE EVENING   chlorhexidine (PERIDEX) 0 12 % solution   No No   Sig: Apply 15 mL to the mouth or throat daily   clopidogrel (PLAVIX) 75 mg tablet   No No   Sig: TAKE 1 TABLET BY MOUTH EVERY DAY   clotrimazole-betamethasone (LOTRISONE) 1-0 05 % cream   No No   Sig: Apply topically 2 (two) times a day   famotidine (PEPCID) 40 MG tablet   No No   Sig: Take 1 tablet (40 mg total) by mouth in the morning and 1 tablet (40 mg total) in the evening  metFORMIN (GLUCOPHAGE-XR) 750 mg 24 hr tablet   No No   Sig: TAKE 2 TABLETS (1,500 MG TOTAL) BY MOUTH DAILY WITH DINNER   metoprolol tartrate (LOPRESSOR) 25 mg tablet   No No   Sig: Take 0 5 tablets (12 5 mg total) by mouth every 12 (twelve) hours   pantoprazole (PROTONIX) 40 mg tablet   No No   Sig: TAKE 1 TABLET BY MOUTH EVERY DAY      Facility-Administered Medications: None       Past Medical History:   Diagnosis Date   • Colon polyp    • CVA (cerebral vascular accident) ([facility] ) 02/18/2019    Bilateral pontine lacunar infarct   • Diabetes mellitus (HCC)    • Esophagitis    • Hyperlipidemia    • Hypertension    • Non-sustained ventricular tachycardia    • Stroke [facility])    • Type 2 diabetes mellitus ([place] [address] )        Past Surgical History:   Procedure Laterality Date   • COLONOSCOPY     • ESOPHAGOGASTRODUODENOSCOPY N/A 03/04/2019    Procedure: ESOPHAGOGASTRODUODENOSCOPY (EGD); Surgeon: Janet Branch DO;  Location: [facility]; Service: Gastroenterology       History reviewed  No pertinent family history  I have reviewed and agree with the history as documented  E-Cigarette/Vaping   • E-Cigarette Use Never User      E-Cigarette/Vaping Substances   • Nicotine No    • THC No    • CBD No    • Flavoring No    • Other No    • Unknown No      Social History     Tobacco Use   • Smoking status: Never   • Smokeless tobacco: Never   Vaping Use   • Vaping Use: Never used   Substance Use Topics   • Alcohol use: Never   • Drug use: Never       Review of Systems   Constitutional: Positive for activity change, appetite change and fatigue  Negative for chills and fever  Respiratory: Negative for chest tightness and shortness of breath  Cardiovascular: Negative for chest pain  Gastrointestinal: Negative for abdominal pain, constipation, nausea and vomiting  Physical Exam  Physical Exam  Vitals and nursing note reviewed  Constitutional:       General: He is not in acute distress  Appearance: He is well-developed  He is not ill-appearing  Comments: He appears thin but not pathologically underweight  No acute distress  HENT:      Head: Normocephalic and atraumatic  Eyes:      Conjunctiva/sclera: Conjunctivae normal    Cardiovascular:      Rate and Rhythm: Normal rate and regular rhythm  Heart sounds: No murmur heard  Pulmonary:      Effort: Pulmonary effort is normal  No respiratory distress  Breath sounds: Normal breath sounds  Abdominal:      Palpations: Abdomen is soft  Tenderness: There is no abdominal tenderness  Musculoskeletal:         General: No swelling  Cervical back: Neck supple  Skin:     General: Skin is warm and dry  Capillary Refill: Capillary refill takes less than 2 seconds  Neurological:      Mental Status: He is alert     Psychiatric:         Mood and Affect: Mood normal          Vital Signs  ED Triage Vitals [03/11/23 0913]   Temperature Pulse Respirations Blood Pressure SpO2   97 9 °F (36 6 °C) (!) 115 18 145/82 97 %      Temp Source Heart Rate Source Patient Position - Orthostatic VS BP Location FiO2 (%)   Temporal Monitor Sitting Left arm --      Pain Score       --           Vitals:    03/11/23 0913 03/11/23 1000   BP: 145/82 131/71   Pulse: (!) 115 (!) 106   Patient Position - Orthostatic VS: Sitting          Visual Acuity      ED Medications  Medications   sodium chloride 0 9 % bolus 1,000 mL (1,000 mL Intravenous New Bag 3/11/23 0949)   metoclopramide (REGLAN) injection 10 mg (10 mg Intravenous Given 3/11/23 0958)   aluminum-magnesium hydroxide-simethicone (MYLANTA) oral suspension 30 mL (30 mL Oral Given 3/11/23 0958)       Diagnostic Studies  Results Reviewed     Procedure Component Value Units Date/Time    FLU/RSV/COVID - if FLU/RSV clinically relevant [[id number]]  (Normal) Collected: 03/11/23 0946    Lab Status: Final result Specimen: Nares from Nose Updated: 03/11/23 1034     SARS-CoV-2 Negative     INFLUENZA A PCR Negative     INFLUENZA B PCR Negative     RSV PCR Negative    Narrative:      FOR PEDIATRIC PATIENTS - copy/paste COVID Guidelines URL to browser: [email or web address]    SARS-CoV-2 assay is a Nucleic Acid Amplification assay intended for the  qualitative detection of nucleic acid from SARS-CoV-2 in nasopharyngeal  swabs  Results are for the presumptive identification of SARS-CoV-2 RNA  Positive results are indicative of infection with SARS-CoV-2, the virus  causing COVID-19, but do not rule out bacterial infection or co-infection  with other viruses  Laboratories within the [place] and its  territories are required to report all positive results to the appropriate  public health authorities  Negative results do not preclude SARS-CoV-2  infection and should not be used as the sole basis for treatment or other  patient management decisions  Negative results must be combined with  clinical observations, patient history, and epidemiological information  This test has not been FDA cleared or approved  This test has been authorized by FDA under an Emergency Use Authorization  (EUA)  This test is only authorized for the duration of time the  declaration that circumstances exist justifying the authorization of the  emergency use of an in vitro diagnostic tests for detection of SARS-CoV-2  virus and/or diagnosis of COVID-19 infection under section 564(b)(1) of  the Act, 21 U  S C  373QDQ-9(F)(5), unless the authorization is terminated  or revoked sooner  The test has been validated but independent review by FDA  and CLIA is pending  Test performed using [employer] GeneXpert: This RT-PCR assay targets N2,  a region unique to SARS-CoV-2  A conserved region in the E-gene was chosen  for pan-Sarbecovirus detection which includes SARS-CoV-2  According to CMS-2020-01-R, this platform meets the definition of high-Instant AV technology      TSH [[id number]] (Normal) Collected: 03/11/23 0946    Lab Status: Final result Specimen: Blood from Arm, Right Updated: 03/11/23 1030     TSH 3RD GENERATON 0 606 uIU/mL     Narrative:      Patients undergoing fluorescein dye angiography may retain small amounts of fluorescein in the body for 48-72 hours post procedure  Samples containing fluorescein can produce falsely depressed TSH values  If the patient had this procedure,a specimen should be resubmitted post fluorescein clearance        HS Troponin 0hr (reflex protocol) [[id number]]  (Normal) Collected: 03/11/23 0946    Lab Status: Final result Specimen: Blood from Arm, Right Updated: 03/11/23 1021     hs TnI 0hr 5 ng/L     HS Troponin I 2hr [[id number]]     Lab Status: No result Specimen: Blood     Basic metabolic panel [[id number]]  (Abnormal) Collected: 03/11/23 0946    Lab Status: Final result Specimen: Blood from Arm, Right Updated: 03/11/23 1014     Sodium 134 mmol/L      Potassium 3 7 mmol/L      Chloride 98 mmol/L      CO2 26 mmol/L      ANION GAP 10 mmol/L      BUN 9 mg/dL      Creatinine 0 89 mg/dL      Glucose 204 mg/dL      Calcium 9 3 mg/dL      eGFR 86 ml/min/1 73sq m     Narrative:      [facility] guidelines for Chronic Kidney Disease (CKD):   •  Stage 1 with normal or high GFR (GFR > 90 mL/min/1 73 square meters)  •  Stage 2 Mild CKD (GFR = 60-89 mL/min/1 73 square meters)  •  Stage 3A Moderate CKD (GFR = 45-59 mL/min/1 73 square meters)  •  Stage 3B Moderate CKD (GFR = 30-44 mL/min/1 73 square meters)  •  Stage 4 Severe CKD (GFR = 15-29 mL/min/1 73 square meters)  •  Stage 5 End Stage CKD (GFR <15 mL/min/1 73 square meters)  Note: GFR calculation is accurate only with a steady state creatinine    Hepatic function panel [[id number]]  (Abnormal) Collected: 03/11/23 0946    Lab Status: Final result Specimen: Blood from Arm, Right Updated: 03/11/23 1014     Total Bilirubin 1 35 mg/dL      Bilirubin, Direct 0 53 mg/dL      Alkaline Phosphatase 79 U/L      AST 22 U/L      ALT 17 U/L      Total Protein 7 7 g/dL      Albumin 3 7 g/dL     CBC and differential [[id number]]  (Abnormal) Collected: 03/11/23 0946    Lab Status: Final result Specimen: Blood from Arm, Right Updated: 03/11/23 0957     WBC 10 32 Thousand/uL      RBC 4 52 Million/uL      Hemoglobin 12 7 g/dL      Hematocrit 38 6 %      MCV 85 fL      MCH 28 1 pg      MCHC 32 9 g/dL      RDW 12 7 %      MPV 10 4 fL      Platelets 408 Thousands/uL      nRBC 0 /100 WBCs      Neutrophils Relative 86 %      Immat GRANS % 0 %      Lymphocytes Relative 7 %      Monocytes Relative 7 %      Eosinophils Relative 0 %      Basophils Relative 0 %      Neutrophils Absolute 8 79 Thousands/µL      Immature Grans Absolute 0 04 Thousand/uL      Lymphocytes Absolute 0 72 Thousands/µL      Monocytes Absolute 0 74 Thousand/µL      Eosinophils Absolute 0 01 Thousand/µL      Basophils Absolute 0 02 Thousands/µL     UA (URINE) with reflex to Scope [[id number]]     Lab Status: No result Specimen: Urine                  XR chest 2 views    (Results Pending)              Procedures  Procedures         ED Course               Identification of Seniors at [address] [place] [address] Most Recent Value   (ISAR) Identification of Seniors at Risk    Before the illness or injury that brought you to the Emergency, did you need someone to help you on a regular basis? 0 Filed at: 03/11/2023 0913   In the last 24 hours, have you needed more help than usual? 0 Filed at: 03/11/2023 2626   Have you been hospitalized for one or more nights during the past 6 months? 0 Filed at: 03/11/2023 0913   In general, do you see well? 0 Filed at: 03/11/2023 0913   In general, do you have serious problems with your memory? 0 Filed at: 03/11/2023 0971   Do you take more than three different medications every day?  1 Filed at: 03/11/2023 0913   ISAR Score 1 Filed at: 03/11/2023 0913                                    Medical Decision Making  71-year-old male with history of generalized weakness, decreased appetite, and weight loss for several years now  Here in the ED today for intractable hiccups x3 to 4 days  Does have a mild cough  Will check electrolytes and blood counts to assess for significant metabolic derangements or anemia given reported decreased appetite and weight loss  Check x-ray to rule out pneumonia given report of cough  We will try metoclopramide for hiccups  Reassess  Patient reassessed  He has not had any hiccups since arriving in the emergency department  His labs appear generally baseline  He is not complaining of chest pain, abdominal pain, vomiting, or shortness of breath  He has an appointment with his PCP on Monday  We will plan to discharge with outpatient follow-up  Discussed return precautions  Fatigue: acute illness or injury  Hiccups: acute illness or injury  Amount and/or Complexity of Data Reviewed  Labs: ordered  Decision-making details documented in ED Course  Radiology: ordered and independent interpretation performed  Details: Chest x-ray independently interpreted by me  Negative for pneumonia or pneumothorax  Risk  OTC drugs  Prescription drug management  Disposition  Final diagnoses:   Fatigue   Hiccups     Time reflects when diagnosis was documented in both MDM as applicable and the Disposition within this note     Time User Action Codes Description Comment    3/11/2023 11:15 AM Tray Torres Add [R53 83] Fatigue     3/11/2023 11:16 AM Tray Torres Add [R06 6] Hiccups       ED Disposition     ED Disposition   Discharge    Condition   Stable    Date/Time   Sat Mar 11, 2023 11:15 AM    Comment   Mayte Baez discharge to home/self care                 Follow-up Information     Follow up With Specialties Details Why Contact Randi Madden DO Internal Medicine   [address] [place] [address] [id number] [phone]            Patient's Medications   Discharge Prescriptions No medications on file       No discharge procedures on file      PDMP Review     None          ED Provider  Electronically Signed by           Arabella Polk MD  03/11/23 6674

## 2023-03-15 ENCOUNTER — APPOINTMENT (EMERGENCY)
Dept: CT IMAGING | Facility: HOSPITAL | Age: 73
End: 2023-03-15

## 2023-03-15 ENCOUNTER — HOSPITAL ENCOUNTER (INPATIENT)
Facility: HOSPITAL | Age: 73
LOS: 3 days | Discharge: HOME | End: 2023-03-19
Attending: EMERGENCY MEDICINE | Admitting: STUDENT IN AN ORGANIZED HEALTH CARE EDUCATION/TRAINING PROGRAM

## 2023-03-15 DIAGNOSIS — R06.6 HICCUPS: ICD-10-CM

## 2023-03-15 DIAGNOSIS — K90.49 FOOD INTOLERANCE IN ADULT: ICD-10-CM

## 2023-03-15 DIAGNOSIS — J18.9 PNEUMONIA: Primary | ICD-10-CM

## 2023-03-15 DIAGNOSIS — R00.0 TACHYCARDIA: ICD-10-CM

## 2023-03-15 DIAGNOSIS — D72.829 LEUKOCYTOSIS: ICD-10-CM

## 2023-03-15 DIAGNOSIS — R26.2 AMBULATORY DYSFUNCTION: ICD-10-CM

## 2023-03-15 LAB
ALBUMIN SERPL BCP-MCNC: 3.5 G/DL (ref 3.5–5)
ALP SERPL-CCNC: 96 U/L (ref 34–104)
ALT SERPL W P-5'-P-CCNC: 45 U/L (ref 7–52)
ANION GAP SERPL CALCULATED.3IONS-SCNC: 11 MMOL/L (ref 4–13)
AST SERPL W P-5'-P-CCNC: 44 U/L (ref 13–39)
BASOPHILS # BLD AUTO: 0.03 THOUSANDS/ÂΜL (ref 0–0.1)
BASOPHILS NFR BLD AUTO: 0 % (ref 0–1)
BILIRUB SERPL-MCNC: 1 MG/DL (ref 0.2–1)
BUN SERPL-MCNC: 11 MG/DL (ref 5–25)
CALCIUM SERPL-MCNC: 10.2 MG/DL (ref 8.4–10.2)
CARDIAC TROPONIN I PNL SERPL HS: 7 NG/L
CHLORIDE SERPL-SCNC: 99 MMOL/L (ref 96–108)
CO2 SERPL-SCNC: 25 MMOL/L (ref 21–32)
CREAT SERPL-MCNC: 0.82 MG/DL (ref 0.6–1.3)
EOSINOPHIL # BLD AUTO: 0.02 THOUSAND/ÂΜL (ref 0–0.61)
EOSINOPHIL NFR BLD AUTO: 0 % (ref 0–6)
ERYTHROCYTE [DISTWIDTH] IN BLOOD BY AUTOMATED COUNT: 13.6 % (ref 11.6–15.1)
GFR SERPL CREATININE-BSD FRML MDRD: 88 ML/MIN/1.73SQ M
GLUCOSE SERPL-MCNC: 217 MG/DL (ref 65–140)
HCT VFR BLD AUTO: 38.7 % (ref 36.5–49.3)
HGB BLD-MCNC: 13 G/DL (ref 12–17)
IMM GRANULOCYTES # BLD AUTO: 0.1 THOUSAND/UL (ref 0–0.2)
IMM GRANULOCYTES NFR BLD AUTO: 1 % (ref 0–2)
LIPASE SERPL-CCNC: 48 U/L (ref 11–82)
LYMPHOCYTES # BLD AUTO: 1.47 THOUSANDS/ÂΜL (ref 0.6–4.47)
LYMPHOCYTES NFR BLD AUTO: 8 % (ref 14–44)
MCH RBC QN AUTO: 28.3 PG (ref 26.8–34.3)
MCHC RBC AUTO-ENTMCNC: 33.6 G/DL (ref 31.4–37.4)
MCV RBC AUTO: 84 FL (ref 82–98)
MONOCYTES # BLD AUTO: 1.45 THOUSAND/ÂΜL (ref 0.17–1.22)
MONOCYTES NFR BLD AUTO: 8 % (ref 4–12)
NEUTROPHILS # BLD AUTO: 14.41 THOUSANDS/ÂΜL (ref 1.85–7.62)
NEUTS SEG NFR BLD AUTO: 83 % (ref 43–75)
NRBC BLD AUTO-RTO: 0 /100 WBCS
PLATELET # BLD AUTO: 315 THOUSANDS/UL (ref 149–390)
PMV BLD AUTO: 10.3 FL (ref 8.9–12.7)
POTASSIUM SERPL-SCNC: 4.2 MMOL/L (ref 3.5–5.3)
PROT SERPL-MCNC: 8.2 G/DL (ref 6.4–8.4)
RBC # BLD AUTO: 4.6 MILLION/UL (ref 3.88–5.62)
SODIUM SERPL-SCNC: 135 MMOL/L (ref 135–147)
WBC # BLD AUTO: 17.48 THOUSAND/UL (ref 4.31–10.16)

## 2023-03-15 RX ORDER — MAGNESIUM HYDROXIDE/ALUMINUM HYDROXICE/SIMETHICONE 120; 1200; 1200 MG/30ML; MG/30ML; MG/30ML
30 SUSPENSION ORAL ONCE
Status: COMPLETED | OUTPATIENT
Start: 2023-03-15 | End: 2023-03-15

## 2023-03-15 RX ORDER — METOCLOPRAMIDE HYDROCHLORIDE 5 MG/ML
10 INJECTION INTRAMUSCULAR; INTRAVENOUS ONCE
Status: COMPLETED | OUTPATIENT
Start: 2023-03-15 | End: 2023-03-15

## 2023-03-15 RX ORDER — PANTOPRAZOLE SODIUM 40 MG/10ML
40 INJECTION, POWDER, LYOPHILIZED, FOR SOLUTION INTRAVENOUS ONCE
Status: COMPLETED | OUTPATIENT
Start: 2023-03-15 | End: 2023-03-15

## 2023-03-15 RX ORDER — ONDANSETRON 2 MG/ML
4 INJECTION INTRAMUSCULAR; INTRAVENOUS ONCE
Status: DISCONTINUED | OUTPATIENT
Start: 2023-03-15 | End: 2023-03-15

## 2023-03-15 RX ADMIN — PANTOPRAZOLE SODIUM 40 MG: 40 INJECTION, POWDER, FOR SOLUTION INTRAVENOUS at 23:50

## 2023-03-15 RX ADMIN — ALUMINUM HYDROXIDE, MAGNESIUM HYDROXIDE, AND DIMETHICONE 30 ML: 200; 20; 200 SUSPENSION ORAL at 23:50

## 2023-03-15 RX ADMIN — METOCLOPRAMIDE 10 MG: 5 INJECTION, SOLUTION INTRAMUSCULAR; INTRAVENOUS at 23:47

## 2023-03-15 RX ADMIN — SODIUM CHLORIDE 500 ML: 0.9 INJECTION, SOLUTION INTRAVENOUS at 23:47

## 2023-03-15 RX ADMIN — IOHEXOL 100 ML: 350 INJECTION, SOLUTION INTRAVENOUS at 23:32

## 2023-03-16 PROBLEM — A41.9 SEPSIS (HCC): Status: ACTIVE | Noted: 2023-03-16

## 2023-03-16 PROBLEM — R06.6 INTRACTABLE HICCUPS: Status: ACTIVE | Noted: 2023-03-16

## 2023-03-16 PROBLEM — J18.9 PNEUMONIA: Status: ACTIVE | Noted: 2023-03-16

## 2023-03-16 LAB
2HR DELTA HS TROPONIN: -1 NG/L
ATRIAL RATE: 116 BPM
ATRIAL RATE: 119 BPM
CARDIAC TROPONIN I PNL SERPL HS: 6 NG/L
FLUAV RNA RESP QL NAA+PROBE: NEGATIVE
FLUBV RNA RESP QL NAA+PROBE: NEGATIVE
GLUCOSE SERPL-MCNC: 130 MG/DL (ref 65–140)
GLUCOSE SERPL-MCNC: 175 MG/DL (ref 65–140)
GLUCOSE SERPL-MCNC: 263 MG/DL (ref 65–140)
GLUCOSE SERPL-MCNC: 283 MG/DL (ref 65–140)
L PNEUMO1 AG UR QL IA.RAPID: NEGATIVE
LACTATE SERPL-SCNC: 1.6 MMOL/L (ref 0.5–2)
P AXIS: 61 DEGREES
P AXIS: 67 DEGREES
PR INTERVAL: 124 MS
PR INTERVAL: 124 MS
PROCALCITONIN SERPL-MCNC: 0.4 NG/ML
QRS AXIS: 15 DEGREES
QRS AXIS: 30 DEGREES
QRSD INTERVAL: 66 MS
QRSD INTERVAL: 68 MS
QT INTERVAL: 308 MS
QT INTERVAL: 312 MS
QTC INTERVAL: 428 MS
QTC INTERVAL: 438 MS
RSV RNA RESP QL NAA+PROBE: NEGATIVE
S PNEUM AG UR QL: NEGATIVE
SARS-COV-2 RNA RESP QL NAA+PROBE: NEGATIVE
T WAVE AXIS: 37 DEGREES
T WAVE AXIS: 38 DEGREES
VENTRICULAR RATE: 116 BPM
VENTRICULAR RATE: 119 BPM

## 2023-03-16 RX ORDER — CLOPIDOGREL BISULFATE 75 MG/1
75 TABLET ORAL DAILY
Status: DISCONTINUED | OUTPATIENT
Start: 2023-03-16 | End: 2023-03-19 | Stop reason: HOSPADM

## 2023-03-16 RX ORDER — DOXYCYCLINE HYCLATE 100 MG/1
100 CAPSULE ORAL EVERY 12 HOURS
Status: DISCONTINUED | OUTPATIENT
Start: 2023-03-16 | End: 2023-03-17

## 2023-03-16 RX ORDER — METRONIDAZOLE 500 MG/100ML
500 INJECTION, SOLUTION INTRAVENOUS ONCE
Status: COMPLETED | OUTPATIENT
Start: 2023-03-16 | End: 2023-03-16

## 2023-03-16 RX ORDER — BACLOFEN 10 MG/1
5 TABLET ORAL ONCE
Status: COMPLETED | OUTPATIENT
Start: 2023-03-16 | End: 2023-03-16

## 2023-03-16 RX ORDER — SODIUM CHLORIDE 9 MG/ML
50 INJECTION, SOLUTION INTRAVENOUS CONTINUOUS
Status: DISCONTINUED | OUTPATIENT
Start: 2023-03-16 | End: 2023-03-16

## 2023-03-16 RX ORDER — GUAIFENESIN 600 MG/1
600 TABLET, EXTENDED RELEASE ORAL EVERY 12 HOURS SCHEDULED
Status: DISCONTINUED | OUTPATIENT
Start: 2023-03-16 | End: 2023-03-19 | Stop reason: HOSPADM

## 2023-03-16 RX ORDER — INSULIN LISPRO 100 [IU]/ML
1-5 INJECTION, SOLUTION INTRAVENOUS; SUBCUTANEOUS
Status: DISCONTINUED | OUTPATIENT
Start: 2023-03-16 | End: 2023-03-19 | Stop reason: HOSPADM

## 2023-03-16 RX ORDER — METOCLOPRAMIDE HYDROCHLORIDE 5 MG/ML
5 INJECTION INTRAMUSCULAR; INTRAVENOUS EVERY 8 HOURS PRN
Status: DISCONTINUED | OUTPATIENT
Start: 2023-03-16 | End: 2023-03-17

## 2023-03-16 RX ORDER — ATORVASTATIN CALCIUM 40 MG/1
40 TABLET, FILM COATED ORAL EVERY EVENING
Status: DISCONTINUED | OUTPATIENT
Start: 2023-03-16 | End: 2023-03-19 | Stop reason: HOSPADM

## 2023-03-16 RX ORDER — POLYETHYLENE GLYCOL 3350 17 G/17G
17 POWDER, FOR SOLUTION ORAL DAILY PRN
Status: DISCONTINUED | OUTPATIENT
Start: 2023-03-16 | End: 2023-03-19 | Stop reason: HOSPADM

## 2023-03-16 RX ORDER — MAGNESIUM HYDROXIDE/ALUMINUM HYDROXICE/SIMETHICONE 120; 1200; 1200 MG/30ML; MG/30ML; MG/30ML
30 SUSPENSION ORAL EVERY 4 HOURS PRN
Status: DISCONTINUED | OUTPATIENT
Start: 2023-03-16 | End: 2023-03-19 | Stop reason: HOSPADM

## 2023-03-16 RX ORDER — SODIUM CHLORIDE 9 MG/ML
75 INJECTION, SOLUTION INTRAVENOUS CONTINUOUS
Status: DISCONTINUED | OUTPATIENT
Start: 2023-03-16 | End: 2023-03-17

## 2023-03-16 RX ORDER — PANTOPRAZOLE SODIUM 40 MG/1
40 TABLET, DELAYED RELEASE ORAL DAILY
Status: DISCONTINUED | OUTPATIENT
Start: 2023-03-16 | End: 2023-03-19 | Stop reason: HOSPADM

## 2023-03-16 RX ORDER — ACETAMINOPHEN 325 MG/1
650 TABLET ORAL EVERY 6 HOURS PRN
Status: DISCONTINUED | OUTPATIENT
Start: 2023-03-16 | End: 2023-03-19 | Stop reason: HOSPADM

## 2023-03-16 RX ORDER — DOCUSATE SODIUM 100 MG/1
100 CAPSULE, LIQUID FILLED ORAL 2 TIMES DAILY
Status: DISCONTINUED | OUTPATIENT
Start: 2023-03-16 | End: 2023-03-19 | Stop reason: HOSPADM

## 2023-03-16 RX ORDER — BACLOFEN 10 MG/1
5 TABLET ORAL 3 TIMES DAILY
Status: DISCONTINUED | OUTPATIENT
Start: 2023-03-16 | End: 2023-03-17

## 2023-03-16 RX ORDER — MAGNESIUM HYDROXIDE/ALUMINUM HYDROXICE/SIMETHICONE 120; 1200; 1200 MG/30ML; MG/30ML; MG/30ML
30 SUSPENSION ORAL EVERY 6 HOURS PRN
Status: DISCONTINUED | OUTPATIENT
Start: 2023-03-16 | End: 2023-03-16

## 2023-03-16 RX ORDER — ENOXAPARIN SODIUM 100 MG/ML
40 INJECTION SUBCUTANEOUS DAILY
Status: DISCONTINUED | OUTPATIENT
Start: 2023-03-16 | End: 2023-03-19 | Stop reason: HOSPADM

## 2023-03-16 RX ADMIN — CEFTRIAXONE SODIUM 1000 MG: 10 INJECTION, POWDER, FOR SOLUTION INTRAVENOUS at 01:03

## 2023-03-16 RX ADMIN — CLOPIDOGREL BISULFATE 75 MG: 75 TABLET ORAL at 09:13

## 2023-03-16 RX ADMIN — SODIUM CHLORIDE 50 ML/HR: 0.9 INJECTION, SOLUTION INTRAVENOUS at 03:49

## 2023-03-16 RX ADMIN — CEFTRIAXONE SODIUM 1000 MG: 10 INJECTION, POWDER, FOR SOLUTION INTRAVENOUS at 23:25

## 2023-03-16 RX ADMIN — PANTOPRAZOLE SODIUM 40 MG: 40 TABLET, DELAYED RELEASE ORAL at 09:13

## 2023-03-16 RX ADMIN — METRONIDAZOLE 500 MG: 500 INJECTION, SOLUTION INTRAVENOUS at 01:33

## 2023-03-16 RX ADMIN — ATORVASTATIN CALCIUM 40 MG: 40 TABLET, FILM COATED ORAL at 18:45

## 2023-03-16 RX ADMIN — DOXYCYCLINE 100 MG: 100 CAPSULE ORAL at 18:44

## 2023-03-16 RX ADMIN — DOXYCYCLINE 100 MG: 100 CAPSULE ORAL at 06:49

## 2023-03-16 RX ADMIN — INSULIN LISPRO 2 UNITS: 100 INJECTION, SOLUTION INTRAVENOUS; SUBCUTANEOUS at 09:14

## 2023-03-16 RX ADMIN — BACLOFEN 5 MG: 10 TABLET ORAL at 23:25

## 2023-03-16 RX ADMIN — Medication 12.5 MG: at 09:13

## 2023-03-16 RX ADMIN — INSULIN LISPRO 2 UNITS: 100 INJECTION, SOLUTION INTRAVENOUS; SUBCUTANEOUS at 12:54

## 2023-03-16 RX ADMIN — DOCUSATE SODIUM 100 MG: 100 CAPSULE, LIQUID FILLED ORAL at 09:13

## 2023-03-16 RX ADMIN — BACLOFEN 5 MG: 10 TABLET ORAL at 18:44

## 2023-03-16 RX ADMIN — Medication 12.5 MG: at 23:25

## 2023-03-16 RX ADMIN — GUAIFENESIN 600 MG: 600 TABLET ORAL at 23:25

## 2023-03-16 RX ADMIN — BACLOFEN 5 MG: 10 TABLET ORAL at 10:24

## 2023-03-16 RX ADMIN — DOCUSATE SODIUM 100 MG: 100 CAPSULE, LIQUID FILLED ORAL at 18:44

## 2023-03-16 RX ADMIN — INSULIN LISPRO 1 UNITS: 100 INJECTION, SOLUTION INTRAVENOUS; SUBCUTANEOUS at 23:30

## 2023-03-16 RX ADMIN — SODIUM CHLORIDE 75 ML/HR: 0.9 INJECTION, SOLUTION INTRAVENOUS at 23:25

## 2023-03-16 RX ADMIN — SODIUM CHLORIDE 75 ML/HR: 0.9 INJECTION, SOLUTION INTRAVENOUS at 09:10

## 2023-03-16 RX ADMIN — ACETAMINOPHEN 650 MG: 325 TABLET, FILM COATED ORAL at 09:12

## 2023-03-16 RX ADMIN — GUAIFENESIN 600 MG: 600 TABLET ORAL at 12:56

## 2023-03-16 RX ADMIN — BACLOFEN 5 MG: 10 TABLET ORAL at 03:35

## 2023-03-16 RX ADMIN — ENOXAPARIN SODIUM 40 MG: 40 INJECTION SUBCUTANEOUS at 09:13

## 2023-03-16 NOTE — CASE MANAGEMENT
Case Management Assessment & Discharge Planning Note    Patient name Susan Faith  Location [address] [id number] MRN [id number]  : 1950 Date 3/16/2023       Current Admission Date: 3/15/2023  Current Admission Diagnosis:Sepsis [facility])   Patient Active Problem List    Diagnosis Date Noted   • Sepsis ([facility] ) 2023   • Pneumonia 2023   • Intractable hiccups 2023   • Mixed hyperlipidemia due to type 2 diabetes mellitus ([facility] ) 2019   • History of lacunar cerebrovascular accident (CVA) 2019   • Type 2 diabetes mellitus with stage 2 chronic kidney disease, with long-term current use of insulin ([place] [id number] ) 2019      LOS (days): 0  Geometric Mean LOS (GMLOS) (days): 5 00  Days to GMLOS:4 5     OBJECTIVE:    Risk of Unplanned Readmission Score: 10 07         Current admission status: Inpatient       Preferred Pharmacy:   [address] [place] [address]  Phone: [phone] Fax: [phone]    Primary Care Provider: Josias Bloom DO    Primary Insurance: Jany CARRANZA  Secondary Insurance:     ASSESSMENT:  Valeriano Kaba Proxies    There are no active Health Care Proxies on file  Advance Directives  Does patient have a [address]?: Yes  Does patient have Advance Directives?: Yes  Advance Directives: Living will, Power of  for health care  Primary Contact: wife Smiley Medina         Readmission Root Cause  30 Day Readmission: No    Patient Information  Admitted from[de-identified] Home  Mental Status: Alert  During Assessment patient was accompanied by: Spouse  Assessment information provided by[de-identified] Spouse  Primary Caregiver: Self  Support Systems: Spouse/significant other  [place] of Residence: [place] [id number] do you live in?:  [address] entry access options   Select all that apply : No steps to enter home  Type of Current Residence: 2 story home  Upon entering residence, is there a bedroom on the main floor (no further steps)?: No  A bedroom is located on the following floor levels of residence (select all that apply):: 2nd Floor  Upon entering residence, is there a bathroom on the main floor (no further steps)?: Yes  Number of steps to 2nd floor from main floor: One Flight  In the last 12 months, was there a time when you were not able to pay the mortgage or rent on time?: No  In the last 12 months, how many places have you lived?: 1  In the last 12 months, was there a time when you did not have a steady place to sleep or slept in a shelter (including now)?: No  Homeless/housing insecurity resource given?: No  Living Arrangements: Lives w/ Spouse/significant other  Is patient a ?: No    Activities of Daily Living Prior to Admission  Functional Status: Independent  Completes ADLs independently?: Yes  Ambulates independently?: Yes  Does patient use assisted devices?: No  Does patient currently own DME?: Yes  What DME does the patient currently own?: [employer]  Does patient have a history of Outpatient Therapy (PT/OT)?: No  Does the patient have a history of Short-Term Rehab?: No  Does patient have a history of HHC?: No  Does patient currently have [facility]?: No         Patient Information Continued  Income Source: Pension/nursing home  Does patient have prescription coverage?: Yes  Within the past 12 months, you worried that your food would run out before you got the money to buy more : Never true  Within the past 12 months, the food you bought just didn't last and you didn't have money to get more : Never true  Food insecurity resource given?: No  Does patient receive dialysis treatments?: No  Does patient have a history of substance abuse?: No  Does patient have a history of Mental Health Diagnosis?: No    PHQ 2/9 Screening   Reviewed PHQ 2/9 Depression Screening Score?: No    Means of Transportation  Means of Transport to Appts[de-identified] Drives Self  In the past 12 months, has lack of transportation kept you from medical appointments or from getting medications?: No  In the past 12 months, has lack of transportation kept you from meetings, work, or from getting things needed for daily living?: No  Was application for public transport provided?: No        DISCHARGE DETAILS:    Discharge planning discussed with[de-identified] patient  Freedom of Choice: Yes  Comments - Freedom of Choice: VNA pended with the understanding that this can be cancelled at discharge if pt feels it is not needed  CM contacted family/caregiver?: Yes  Were Treatment Team discharge recommendations reviewed with patient/caregiver?: Yes  Did patient/caregiver verbalize understanding of patient care needs?: Yes  Were patient/caregiver advised of the risks associated with not following Treatment Team discharge recommendations?: Yes    Contacts  Patient Contacts: Mitchell Wise  Relationship to Patient[de-identified] Family  Contact Method:  In Person  Reason/Outcome: Continuity of Care, Discharge [address]         Is the patient interested in [facility] at discharge?: Yes  [address] requested[de-identified] Nursing, Physical [address] Name[de-identified] [facility] Provider[de-identified] PCP  Home Health Services Needed[de-identified] Evaluate Functional Status and Safety, Gait/ADL Training, Strengthening/Theraputic Exercises to Improve Function  Homebound Criteria Met[de-identified] Requires the Assistance of Another Person for Safe Ambulation or to Leave the Home  Supporting Clincal Findings[de-identified] Fatigues Easliy in [employer], Limited Endurance         Other Referral/Resources/Interventions Provided:  Interventions: [facility]  Referral Comments: VNA pended with the understanding that this can be cancelled at discharge if pt feels it is not needed    Would you like to participate in our [address] service program?  : No - Declined    [facility]  Discharge Destination Plan[de-identified] Home with [place] at Discharge : Family

## 2023-03-16 NOTE — ASSESSMENT & PLAN NOTE
· Ongoing for almost a week  · Some relief with Reglan and Mylanta in ED  · We will continue as needed IV Reglan  · Protonix daily  · Will give a dose of baclofen now to also help with hiccups

## 2023-03-16 NOTE — ED PROVIDER NOTES
History  Chief Complaint   Patient presents with   • Rapid Heart Rate     C/o hiccups; tachycardic for EMS  69-year-old male history of diabetes, hypertension, stroke presenting with hiccups  Patient reports intermittent hiccups for about the past couple weeks increasing in intensity and frequency in the past couple of days  Patient recently seen for similar with resolution after GI medications and Reglan  Patient reports recurrence and worsening  Reports mild chest comfort with hiccups but denies any shortness of breath  Denies any exertional component or any association with diaphoresis, lightheadedness/syncope, nausea vomiting, radiation  Denies any abdominal pain  Denies any other complaints  Chart reviewed  Past Medical History:  No date: Colon polyp  02/18/2019: CVA (cerebral vascular accident) [facility])      Comment:  Bilateral pontine lacunar infarct  No date: Diabetes mellitus ([facility] )  No date: Esophagitis  No date: Hyperlipidemia  No date: Hypertension  No date: Non-sustained ventricular tachycardia  No date: Stroke [facility])  No date: Type 2 diabetes mellitus ([place] [address] )  Family History: non-contributory  Social History            Prior to Admission Medications   Prescriptions Last Dose Informant Patient Reported? Taking?    Alcohol Swabs PADS   No No   Sig: Use daily to inject insulin   Januvia 100 MG tablet   No No   Sig: TAKE 1 TABLET BY MOUTH EVERY DAY   atorvastatin (LIPITOR) 40 mg tablet   No No   Sig: TAKE 1 TABLET BY MOUTH EVERY DAY IN THE EVENING   chlorhexidine (PERIDEX) 0 12 % solution   No No   Sig: Apply 15 mL to the mouth or throat daily   clopidogrel (PLAVIX) 75 mg tablet   No No   Sig: TAKE 1 TABLET BY MOUTH EVERY DAY   clotrimazole-betamethasone (LOTRISONE) 1-0 05 % cream Not Taking  No No   Sig: Apply topically 2 (two) times a day   Patient not taking: Reported on 3/16/2023   famotidine (PEPCID) 40 MG tablet   No No   Sig: Take 1 tablet (40 mg total) by mouth in the morning and 1 tablet (40 mg total) in the evening  metFORMIN (GLUCOPHAGE-XR) 750 mg 24 hr tablet   No No   Sig: TAKE 2 TABLETS (1,500 MG TOTAL) BY MOUTH DAILY WITH DINNER   metoprolol tartrate (LOPRESSOR) 25 mg tablet   No No   Sig: Take 0 5 tablets (12 5 mg total) by mouth every 12 (twelve) hours   pantoprazole (PROTONIX) 40 mg tablet   No No   Sig: TAKE 1 TABLET BY MOUTH EVERY DAY      Facility-Administered Medications: None       Past Medical History:   Diagnosis Date   • Colon polyp    • CVA (cerebral vascular accident) ([facility] ) 02/18/2019    Bilateral pontine lacunar infarct   • Diabetes mellitus (HCC)    • Esophagitis    • Hyperlipidemia    • Hypertension    • Non-sustained ventricular tachycardia    • Stroke [facility])    • Type 2 diabetes mellitus ([place] [address] )        Past Surgical History:   Procedure Laterality Date   • COLONOSCOPY     • ESOPHAGOGASTRODUODENOSCOPY N/A 03/04/2019    Procedure: ESOPHAGOGASTRODUODENOSCOPY (EGD); Surgeon: Rosa M Lopez DO;  Location: [facility]; Service: Gastroenterology       History reviewed  No pertinent family history  I have reviewed and agree with the history as documented  E-Cigarette/Vaping   • E-Cigarette Use Never User      E-Cigarette/Vaping Substances   • Nicotine No    • THC No    • CBD No    • Flavoring No    • Other No    • Unknown No      Social History     Tobacco Use   • Smoking status: Never   • Smokeless tobacco: Never   Vaping Use   • Vaping Use: Never used   Substance Use Topics   • Alcohol use: Never   • Drug use: Never       Review of Systems   Constitutional: Negative for appetite change, chills, diaphoresis, fever and unexpected weight change  HENT: Negative for congestion and rhinorrhea  Eyes: Negative for photophobia and visual disturbance  Respiratory: Negative for cough, chest tightness and shortness of breath  Cardiovascular: Positive for chest pain  Negative for palpitations and leg swelling     Gastrointestinal: Negative for abdominal distention, abdominal pain, blood in stool, constipation, diarrhea, nausea and vomiting  Genitourinary: Negative for dysuria and hematuria  Musculoskeletal: Negative for back pain, joint swelling, neck pain and neck stiffness  Skin: Negative for color change, pallor, rash and wound  Neurological: Negative for dizziness, syncope, weakness, light-headedness and headaches  Psychiatric/Behavioral: Negative for agitation  All other systems reviewed and are negative  Physical Exam  Physical Exam  Vitals and nursing note reviewed  Constitutional:       General: He is not in acute distress  Appearance: Normal appearance  He is well-developed  He is ill-appearing  He is not toxic-appearing or diaphoretic  HENT:      Head: Normocephalic and atraumatic  Nose: Nose normal  No congestion or rhinorrhea  Mouth/Throat:      Mouth: Mucous membranes are moist       Pharynx: Oropharynx is clear  No oropharyngeal exudate or posterior oropharyngeal erythema  Eyes:      General: No scleral icterus  Right eye: No discharge  Left eye: No discharge  Extraocular Movements: Extraocular movements intact  Conjunctiva/sclera: Conjunctivae normal       Pupils: Pupils are equal, round, and reactive to light  Neck:      Vascular: No JVD  Trachea: No tracheal deviation  Comments: Supple  Normal range of motion  Cardiovascular:      Rate and Rhythm: Regular rhythm  Tachycardia present  Heart sounds: Normal heart sounds  No murmur heard  No friction rub  No gallop  Comments: Tachycardia to 110s and regular rhythm  Pulmonary:      Effort: Pulmonary effort is normal  No respiratory distress  Breath sounds: Normal breath sounds  No stridor  No wheezing or rales  Comments: Clear to auscultation bilaterally  Chest:      Chest wall: No tenderness  Abdominal:      General: Bowel sounds are normal  There is no distension  Palpations: Abdomen is soft  Tenderness:  There is no abdominal tenderness  There is no right CVA tenderness, left CVA tenderness, guarding or rebound  Comments: Soft, nontender, nondistended  Normal bowel sounds throughout   Musculoskeletal:         General: No swelling, tenderness, deformity or signs of injury  Normal range of motion  Cervical back: Normal range of motion and neck supple  No rigidity  No muscular tenderness  Right lower leg: No edema  Left lower leg: No edema  Lymphadenopathy:      Cervical: No cervical adenopathy  Skin:     General: Skin is warm and dry  Coloration: Skin is not pale  Findings: No erythema or rash  Neurological:      General: No focal deficit present  Mental Status: He is alert  Mental status is at baseline  Sensory: No sensory deficit  Motor: No weakness or abnormal muscle tone  Coordination: Coordination normal       Gait: Gait normal       Comments: Alert  Strength and sensation grossly intact  Ambulatory without difficulty at baseline  Psychiatric:         Behavior: Behavior normal          Thought Content:  Thought content normal          Vital Signs  ED Triage Vitals   Temperature Pulse Respirations Blood Pressure SpO2   03/15/23 2250 03/15/23 2250 03/15/23 2250 03/15/23 2250 03/15/23 2250   99 3 °F (37 4 °C) (!) 116 21 156/86 96 %      Temp Source Heart Rate Source Patient Position - Orthostatic VS BP Location FiO2 (%)   03/16/23 2305 03/16/23 0600 03/16/23 0600 03/16/23 0600 --   Oral Monitor Lying Right arm       Pain Score       03/15/23 2250       No Pain           Vitals:    03/18/23 0718 03/18/23 0952 03/18/23 1326 03/18/23 2139   BP: 117/72 120/79 102/68 126/73   Pulse: 86 102 95 101   Patient Position - Orthostatic VS:             Visual Acuity      ED Medications  Medications   pantoprazole (PROTONIX) EC tablet 40 mg (40 mg Oral Given 3/18/23 0952)   atorvastatin (LIPITOR) tablet 40 mg (40 mg Oral Given 3/18/23 1734)   clopidogrel (PLAVIX) tablet 75 mg (75 mg Oral Given 3/18/23 0952)   metoprolol tartrate (LOPRESSOR) partial tablet 12 5 mg (12 5 mg Oral Given 3/18/23 2139)   docusate sodium (COLACE) capsule 100 mg (100 mg Oral Given 3/18/23 1734)   polyethylene glycol (MIRALAX) packet 17 g (has no administration in time range)   acetaminophen (TYLENOL) tablet 650 mg (650 mg Oral Given 3/17/23 0904)   enoxaparin (LOVENOX) subcutaneous injection 40 mg (40 mg Subcutaneous Given 3/18/23 0952)   ceftriaxone (ROCEPHIN) 1 g/50 mL in dextrose IVPB (1,000 mg Intravenous New Bag 3/17/23 2327)   insulin lispro (HumaLOG) 100 units/mL subcutaneous injection 1-5 Units (1 Units Subcutaneous Given 3/18/23 1735)   insulin lispro (HumaLOG) 100 units/mL subcutaneous injection 1-5 Units (1 Units Subcutaneous Given 3/18/23 2139)   aluminum-magnesium hydroxide-simethicone (MYLANTA) oral suspension 30 mL (30 mL Oral Given 3/18/23 1844)   guaiFENesin (MUCINEX) 12 hr tablet 600 mg (600 mg Oral Given 3/18/23 2139)   gabapentin (NEURONTIN) capsule 100 mg (100 mg Oral Given 3/18/23 2139)   metoclopramide (REGLAN) injection 5 mg (5 mg Intravenous Given 3/18/23 1734)   sodium chloride 0 9 % bolus 500 mL (0 mL Intravenous Stopped 3/16/23 0045)   metoclopramide (REGLAN) injection 10 mg (10 mg Intravenous Given 3/15/23 2347)   aluminum-magnesium hydroxide-simethicone (MYLANTA) oral suspension 30 mL (30 mL Oral Given 3/15/23 2350)   pantoprazole (PROTONIX) injection 40 mg (40 mg Intravenous Given 3/15/23 2350)   iohexol (OMNIPAQUE) 350 MG/ML injection (SINGLE-DOSE) 100 mL (100 mL Intravenous Given 3/15/23 2332)   ceftriaxone (ROCEPHIN) 1 g/50 mL in dextrose IVPB (0 mg Intravenous Stopped 3/16/23 0133)   metroNIDAZOLE (FLAGYL) IVPB (premix) 500 mg 100 mL (0 mg Intravenous Stopped 3/16/23 0203)   baclofen tablet 5 mg (5 mg Oral Given 3/16/23 0335)   baclofen tablet 5 mg (5 mg Oral Given 3/16/23 1024)       Diagnostic Studies  Results Reviewed     Procedure Component Value Units Date/Time    Blood culture #1 [[id number]] Collected: 03/16/23 0204    Lab Status: Preliminary result Specimen: Blood from Arm, Left Updated: 03/18/23 0801     Blood Culture No Growth at 48 hrs  Blood culture #2 [[id number]] Collected: 03/16/23 0205    Lab Status: Preliminary result Specimen: Blood from Arm, Left Updated: 03/18/23 0801     Blood Culture No Growth at 48 hrs  Strep Pneumoniae, Urine [[id number]]  (Normal) Collected: 03/16/23 0322    Lab Status: Final result Specimen: Urine, Other Updated: 03/16/23 0817     Strep pneumoniae antigen, urine Negative    Sputum culture and Gram stain [[id number]]     Lab Status: No result Specimen: Sputum     COVID/FLU/RSV [[id number]]  (Normal) Collected: 03/16/23 0218    Lab Status: Final result Specimen: Nares from Nose Updated: 03/16/23 0302     SARS-CoV-2 Negative     INFLUENZA A PCR Negative     INFLUENZA B PCR Negative     RSV PCR Negative    Narrative:      FOR PEDIATRIC PATIENTS - copy/paste COVID Guidelines URL to browser: [email or web address]  [employer]    SARS-CoV-2 assay is a Nucleic Acid Amplification assay intended for the  qualitative detection of nucleic acid from SARS-CoV-2 in nasopharyngeal  swabs  Results are for the presumptive identification of SARS-CoV-2 RNA  Positive results are indicative of infection with SARS-CoV-2, the virus  causing COVID-19, but do not rule out bacterial infection or co-infection  with other viruses  Laboratories within the [place] and its  territories are required to report all positive results to the appropriate  public health authorities  Negative results do not preclude SARS-CoV-2  infection and should not be used as the sole basis for treatment or other  patient management decisions  Negative results must be combined with  clinical observations, patient history, and epidemiological information  This test has not been FDA cleared or approved      This test has been authorized by FDA under an Emergency Use Authorization  (EUA)  This test is only authorized for the duration of time the  declaration that circumstances exist justifying the authorization of the  emergency use of an in vitro diagnostic tests for detection of SARS-CoV-2  virus and/or diagnosis of COVID-19 infection under section 564(b)(1) of  the Act, 21 U  S C  751HNH-9(S)(0), unless the authorization is terminated  or revoked sooner  The test has been validated but independent review by FDA  and CLIA is pending  Test performed using [employer] GeneXpert: This RT-PCR assay targets N2,  a region unique to SARS-CoV-2  A conserved region in the E-gene was chosen  for pan-Sarbecovirus detection which includes SARS-CoV-2  According to CMS-2020-01-R, this platform meets the definition of high-throughput technology  Lactic acid [[id number]]  (Normal) Collected: 03/16/23 0204    Lab Status: Final result Specimen: Blood from Arm, Left Updated: 03/16/23 0236     LACTIC ACID 1 6 mmol/L     Narrative:      Result may be elevated if tourniquet was used during collection      HS Troponin I 2hr [[id number]]  (Normal) Collected: 03/16/23 0132    Lab Status: Final result Specimen: Blood from Arm, Left Updated: 03/16/23 0204     hs TnI 2hr 6 ng/L      Delta 2hr hsTnI -1 ng/L     Procalcitonin [[id number]]  (Abnormal) Collected: 03/15/23 2323    Lab Status: Final result Specimen: Blood from Arm, Left Updated: 03/16/23 0106     Procalcitonin 0 40 ng/ml     HS Troponin 0hr (reflex protocol) [[id number]]  (Normal) Collected: 03/15/23 2323    Lab Status: Final result Specimen: Blood from Arm, Left Updated: 03/15/23 2354     hs TnI 0hr 7 ng/L     Lipase [[id number]]  (Normal) Collected: 03/15/23 2323    Lab Status: Final result Specimen: Blood from Arm, Left Updated: 03/15/23 2346     Lipase 48 u/L     Comprehensive metabolic panel [[id number]]  (Abnormal) Collected: 03/15/23 2323    Lab Status: Final result Specimen: Blood from Arm, Left Updated: 03/15/23 2346 Sodium 135 mmol/L      Potassium 4 2 mmol/L      Chloride 99 mmol/L      CO2 25 mmol/L      ANION GAP 11 mmol/L      BUN 11 mg/dL      Creatinine 0 82 mg/dL      Glucose 217 mg/dL      Calcium 10 2 mg/dL      AST 44 U/L      ALT 45 U/L      Alkaline Phosphatase 96 U/L      Total Protein 8 2 g/dL      Albumin 3 5 g/dL      Total Bilirubin 1 00 mg/dL      eGFR 88 ml/min/1 73sq m     Narrative:      National Kidney Disease Foundation guidelines for Chronic Kidney Disease (CKD):   •  Stage 1 with normal or high GFR (GFR > 90 mL/min/1 73 square meters)  •  Stage 2 Mild CKD (GFR = 60-89 mL/min/1 73 square meters)  •  Stage 3A Moderate CKD (GFR = 45-59 mL/min/1 73 square meters)  •  Stage 3B Moderate CKD (GFR = 30-44 mL/min/1 73 square meters)  •  Stage 4 Severe CKD (GFR = 15-29 mL/min/1 73 square meters)  •  Stage 5 End Stage CKD (GFR <15 mL/min/1 73 square meters)  Note: GFR calculation is accurate only with a steady state creatinine    CBC and differential [[id number]]  (Abnormal) Collected: 03/15/23 2323    Lab Status: Final result Specimen: Blood from Arm, Left Updated: 03/15/23 2329     WBC 17 48 Thousand/uL      RBC 4 60 Million/uL      Hemoglobin 13 0 g/dL      Hematocrit 38 7 %      MCV 84 fL      MCH 28 3 pg      MCHC 33 6 g/dL      RDW 13 6 %      MPV 10 3 fL      Platelets 986 Thousands/uL      nRBC 0 /100 WBCs      Neutrophils Relative 83 %      Immat GRANS % 1 %      Lymphocytes Relative 8 %      Monocytes Relative 8 %      Eosinophils Relative 0 %      Basophils Relative 0 %      Neutrophils Absolute 14 41 Thousands/µL      Immature Grans Absolute 0 10 Thousand/uL      Lymphocytes Absolute 1 47 Thousands/µL      Monocytes Absolute 1 45 Thousand/µL      Eosinophils Absolute 0 02 Thousand/µL      Basophils Absolute 0 03 Thousands/µL                  CT chest abdomen pelvis w contrast   Final Result by Lisa Patino MD (03/16 0030)      Left greater than right basilar consolidative opacities compatible with pneumonia, possibly in the setting of? Aspiration  Correlate clinically  Superimposed tree-in-bud nodularity in the right greater than left lower lobes compatible with infectious versus inflammatory small airways disease  No acute abnormality identified in the abdomen or pelvis  Above findings discussed with Dr Eligio Ferraro at 12:29 AM on 3/16/2023  Workstation performed: [id number]                    Procedures  Procedures         ED Course             HEART Risk Score    Flowsheet Row Most Recent Value   Heart Score Risk Calculator    History 0 Filed at: 03/16/2023 0100   ECG 0 Filed at: 03/16/2023 0100   Age 2 Filed at: 03/16/2023 0100   Risk Factors 2 Filed at: 03/16/2023 0100   Troponin 0 Filed at: 03/16/2023 0100   HEART Score 4 Filed at: 03/16/2023 0100                     Initial Sepsis Screening     Row Name 03/16/23 0030                Is the patient's history suggestive of a new or worsening infection? Yes (Proceed)  -CE        Suspected source of infection pneumonia  -CE        Indicate SIRS criteria Tachycardia > 90 bpm;Leukocytosis (WBC > 10263 IJL) OR Leukopenia (WBC <4000 IJL) OR Bandemia (WBC >10% bands)  -CE        Are two or more of the above signs & symptoms of infection both present and new to the patient? Yes (Proceed)  -CE        Assess for evidence of organ dysfunction: Are any of the below criteria present within 6 hours of suspected infection and SIRS criteria that are NOT considered to be chronic conditions? --              User Key  (r) = Recorded By, (t) = Taken By, (c) = Cosigned By    [address]  Name Provider Ad Mathias MD Physician                SBIRT 22yo+    Flowsheet Row Most Recent Value   SBIRT (25 yo +)    In order to provide better care to our patients, we are screening all of our patients for alcohol and drug use  Would it be okay to ask you these screening questions? Yes Filed at: 03/15/2023 3551   Initial Alcohol Screen: US AUDIT-C     1   How often do you have a drink containing alcohol? 1 Filed at: 03/15/2023 2255   2  How many drinks containing alcohol do you have on a typical day you are drinking? 1 Filed at: 03/15/2023 2255   3b  FEMALE Any Age, or MALE 65+: How often do you have 4 or more drinks on one occassion? 0 Filed at: 03/15/2023 2255   Audit-C Score 2 Filed at: 03/15/2023 2255   ÓSCAR: How many times in the past year have you    Used an illegal drug or used a prescription medication for non-medical reasons? Never Filed at: 03/15/2023 2255                    Medical Decision Making  25-year-old male history of diabetes, hypertension, stroke presenting with hiccups  Patient with multiple core abilities and high risk for more serious causes of hiccups  Plan for cardiac evaluation including EKG and troponin  Plan for CT imaging  Symptom management with oral and IV medications  Reassess  EKG sinus tachycardia without acute ST abnormality  Labs notable for white count of 17 and CT imaging notable for pneumonia  In setting of tachycardia and elevated white blood cell count with infectious source meeting SIRS, concerning for sepsis  IV antibiotics  Hiccups resolved at this point time  Plan for admission for further evaluation and management  Amount and/or Complexity of Data Reviewed  Labs: ordered  Radiology: ordered  Risk  OTC drugs  Prescription drug management  Decision regarding hospitalization            Disposition  Final diagnoses:   Pneumonia   Food intolerance in adult   Tachycardia   Hiccups   Leukocytosis     Time reflects when diagnosis was documented in both MDM as applicable and the Disposition within this note     Time User Action Codes Description Comment    3/16/2023 12:37 AM Turner Melisa Add [J18 9] Pneumonia     3/16/2023 12:37 AM Turner Melisa Add [K90 49] Food intolerance in adult     3/16/2023 12:37 AM Turner Melisa Add [R00 0] Tachycardia     3/16/2023 12:37 AM Turner Melisa Add [R06 6] Hiccups     3/18/2023 10:07 PM Ofelia Mcbride Add [V98 553] Leukocytosis       ED Disposition     ED Disposition   Admit    Condition   Stable    Date/Time   Thu Mar 16, 2023 12:37 AM    Comment   Case was discussed with [facility] and the patient's admission status was agreed to be Admission Status: inpatient status to the service of Dr Coco Lu   Follow-up Information     Follow up With Specialties Details Why Contact Info    [facility] [address] [facility]  Follow up [facility] [address]  Requested:  RN/PT [id number] [facility] [address] [facility]  [phone]            Current Discharge Medication List      CONTINUE these medications which have NOT CHANGED    Details   Alcohol Swabs PADS Use daily to inject insulin  Qty: 100 each, Refills: 0    Associated Diagnoses: Diabetes mellitus type 2 in nonobese (HCC)      atorvastatin (LIPITOR) 40 mg tablet TAKE 1 TABLET BY MOUTH EVERY DAY IN THE EVENING  Qty: 90 tablet, Refills: 3    Comments: DX Code Needed    Associated Diagnoses: Mixed hyperlipidemia      chlorhexidine (PERIDEX) 0 12 % solution Apply 15 mL to the mouth or throat daily  Qty: 1893 mL, Refills: 3    Associated Diagnoses: Esophagitis      clopidogrel (PLAVIX) 75 mg tablet TAKE 1 TABLET BY MOUTH EVERY DAY  Qty: 90 tablet, Refills: 3    Associated Diagnoses: History of lacunar cerebrovascular accident (CVA)      clotrimazole-betamethasone (LOTRISONE) 1-0 05 % cream Apply topically 2 (two) times a day  Qty: 30 g, Refills: 2    Associated Diagnoses: Rash      famotidine (PEPCID) 40 MG tablet Take 1 tablet (40 mg total) by mouth in the morning and 1 tablet (40 mg total) in the evening  Qty: 60 tablet, Refills: 0    Associated Diagnoses: Hiccups;  History of gastroesophageal reflux (GERD)      Januvia 100 MG tablet TAKE 1 TABLET BY MOUTH EVERY DAY  Qty: 90 tablet, Refills: 3    Associated Diagnoses: Type 2 diabetes mellitus with stage 2 chronic kidney disease, with long-term current use of insulin ([address] ) metFORMIN (GLUCOPHAGE-XR) 750 mg 24 hr tablet TAKE 2 TABLETS (1,500 MG TOTAL) BY MOUTH DAILY WITH DINNER  Qty: 180 tablet, Refills: 3    Associated Diagnoses: Type 2 diabetes mellitus with stage 2 chronic kidney disease, with long-term current use of insulin (HCC)      metoprolol tartrate (LOPRESSOR) 25 mg tablet Take 0 5 tablets (12 5 mg total) by mouth every 12 (twelve) hours  Qty: 90 tablet, Refills: 3    Associated Diagnoses: Nonsustained ventricular tachycardia      pantoprazole (PROTONIX) 40 mg tablet TAKE 1 TABLET BY MOUTH EVERY DAY  Qty: 90 tablet, Refills: 3    Associated Diagnoses: Esophagitis             No discharge procedures on file      PDMP Review     None          ED Provider  Electronically Signed by           Bonny Riedel, MD  03/18/23 0240

## 2023-03-16 NOTE — ASSESSMENT & PLAN NOTE
Lab Results   Component Value Date    HGBA1C 6 2 (H) 10/20/2022       No results for input(s): POCGLU in the last 72 hours      Blood Sugar Average: Last 72 hrs:  · Blood glucose checks 4 times daily, hypoglycemia protocol  · Hold home Januvia and metformin  · SSI

## 2023-03-16 NOTE — ASSESSMENT & PLAN NOTE
· Meeting criteria due to tachycardia, tachypnea, leukocytosis in setting of pneumonia  · Procalcitonin slightly elevated  · Lactic acid WNL, blood culture x2 pending  · Given IV fluid bolus in ED, will continue IV fluid hydration and IV antibiotic

## 2023-03-16 NOTE — ASSESSMENT & PLAN NOTE
· CT chest revealing patchy consolidation opacity left greater than right bases with tree-in-bud opacities in bilateral lower lobes, pneumonia versus aspiration  · Upon questioning patient and patient's wife patient with intractable hiccups, but denies any difficulty swallowing or choking  · We will check sputum culture and Gram stain, strep pneumoniae  · Despite no indication of choking issue, patient not hypoxic will place aspiration precautions  · Start IV ceftriaxone 1 g daily and add on doxycycline p o  100 mg every 12 hours due to patchy appearance

## 2023-03-16 NOTE — H&P
[facility]  H&P- Dejustin Plana 1950, 71 y o  male MRN: [id number]  Unit/Bed#: FT 05 Encounter: [id number]  Primary Care Provider: Emerson Hayes DO   Date and time admitted to hospital: 3/15/2023 10:49 PM    * Sepsis [facility])  Assessment & Plan  · Meeting criteria due to tachycardia, tachypnea, leukocytosis in setting of pneumonia  · Procalcitonin slightly elevated  · Lactic acid WNL, blood culture x2 pending  · Given IV fluid bolus in ED, will continue IV fluid hydration and IV antibiotic    Pneumonia  Assessment & Plan  · CT chest revealing patchy consolidation opacity left greater than right bases with tree-in-bud opacities in bilateral lower lobes, pneumonia versus aspiration  · Upon questioning patient and patient's wife patient with intractable hiccups, but denies any difficulty swallowing or choking  · We will check sputum culture and Gram stain, strep pneumoniae  · Despite no indication of choking issue, patient not hypoxic will place aspiration precautions  · Start IV ceftriaxone 1 g daily and add on doxycycline p o  100 mg every 12 hours due to patchy appearance    Intractable hiccups  Assessment & Plan  · Ongoing for almost a week  · Some relief with Reglan and Mylanta in ED  · We will continue as needed IV Reglan  · Protonix daily  · Will give a dose of baclofen now to also help with hiccups    Type 2 diabetes mellitus with stage 2 chronic kidney disease, with long-term current use of insulin ([facility] )  Assessment & Plan  Lab Results   Component Value Date    HGBA1C 6 2 (H) 10/20/2022       No results for input(s): POCGLU in the last 72 hours  Blood Sugar Average: Last 72 hrs:  · Blood glucose checks 4 times daily, hypoglycemia protocol  · Hold home Januvia and metformin  · SSI      VTE Pharmacologic Prophylaxis: VTE Score: 4 Moderate Risk (Score 3-4) - Pharmacological DVT Prophylaxis Ordered: enoxaparin (Lovenox)    Code Status: Level 1 - Full Code per pt  Discussion with family: Updated  (wife) at bedside  Anticipated Length of Stay: Patient will be admitted on an inpatient basis with an anticipated length of stay of greater than 2 midnights secondary to seer above  Total Time Spent on Date of Encounter in care of patient: 75 minutes This time was spent on one or more of the following: performing physical exam; counseling and coordination of care; obtaining or reviewing history; documenting in the medical record; reviewing/ordering tests, medications or procedures; communicating with other healthcare professionals and discussing with patient's family/caregivers  Chief Complaint:    Chief Complaint   Patient presents with   • Rapid Heart Rate     C/o hiccups; tachycardic for EMS  History of Present Illness:  Emilia Henderson is a 71 y o  male with a PMH of diabetes mellitus type 2, CVA who presents with complaint of intractable hiccups, intermittent shortness of breath due to hiccups x5 days  Reports he has a history of 1 episode of intractable hiccups in the past he reports right before he had a stroke  He reports he did have brief relief with Mylanta and IV Reglan in the ED, but they have returned again now  He reports due to the hiccups he feels slight short of breath at times  Also per patient's wife he has had a poor appetite and has not been eating well  Also has a new onset cough this past week  Denies any dysphagia or any choking episodes  Denies fevers or chills, chest pain, abdominal pain    Review of Systems:  Review of Systems   Constitutional: Negative for activity change, chills and fever  HENT: Negative for trouble swallowing  Respiratory: Positive for shortness of breath  Cardiovascular: Negative for chest pain  Gastrointestinal: Negative for abdominal pain         Past Medical and Surgical History:   Past Medical History:   Diagnosis Date   • Colon polyp    • [facility] 02/18/2019 Bilateral pontine lacunar infarct   • Diabetes mellitus (HCC)    • Esophagitis    • Hyperlipidemia    • Hypertension    • Non-sustained ventricular tachycardia    • Stroke [facility])    • Type 2 diabetes mellitus ([facility] )        Past Surgical History:   Procedure Laterality Date   • COLONOSCOPY     • ESOPHAGOGASTRODUODENOSCOPY N/A 03/04/2019    Procedure: ESOPHAGOGASTRODUODENOSCOPY (EGD); Surgeon: Petr Ramos DO;  Location: [facility]; Service: Gastroenterology       Meds/Allergies:  Prior to Admission medications    Medication Sig Start Date End Date Taking? Authorizing Provider   Alcohol Swabs PADS Use daily to inject insulin 4/1/19   Siva Greene, DO   atorvastatin (LIPITOR) 40 mg tablet TAKE 1 TABLET BY MOUTH EVERY DAY IN THE EVENING 11/8/22   Siva Greene, DO   chlorhexidine (PERIDEX) 0 12 % solution Apply 15 mL to the mouth or throat daily 4/27/21   Siva Greene, DO   clopidogrel (PLAVIX) 75 mg tablet TAKE 1 TABLET BY MOUTH EVERY DAY 12/6/22   Siva Greene, DO   clotrimazole-betamethasone (LOTRISONE) 1-0 05 % cream Apply topically 2 (two) times a day 4/29/21   Raúl Sousa PA-C   famotidine (PEPCID) 40 MG tablet Take 1 tablet (40 mg total) by mouth in the morning and 1 tablet (40 mg total) in the evening  5/25/22   Everette Jimenez MD   Januvia 100 MG tablet TAKE 1 TABLET BY MOUTH EVERY DAY 11/12/22   Sivacecilia Greene, DO   metFORMIN (GLUCOPHAGE-XR) 750 mg 24 hr tablet TAKE 2 TABLETS (1,500 MG TOTAL) BY MOUTH DAILY WITH DINNER 11/15/22   Siva Greene, DO   metoprolol tartrate (LOPRESSOR) 25 mg tablet Take 0 5 tablets (12 5 mg total) by mouth every 12 (twelve) hours 1/20/23   Siva Greene, DO   pantoprazole (PROTONIX) 40 mg tablet TAKE 1 TABLET BY MOUTH EVERY DAY 11/5/22   Siva Greene, DO     I have reviewed her medications per review of chart       Allergies: No Known Allergies    Social History:  Marital Status: /Civil Union     Patient Pre-hospital Living Situation: Home  Patient Pre-hospital Level of Mobility: walks  Patient Pre-hospital Diet Restrictions: diabetic  Substance Use History:   Social History     Substance and Sexual Activity   Alcohol Use Never     Social History     Tobacco Use   Smoking Status Never   Smokeless Tobacco Never     Social History     Substance and Sexual Activity   Drug Use Never       Family History:  No family history on file  Physical Exam:     Vitals:   Blood Pressure: 132/77 (03/16/23 0208)  Pulse: (!) 119 (03/16/23 0208)  Temperature: 99 3 °F (37 4 °C) (03/15/23 2250)  Respirations: (!) 24 (03/16/23 0208)  SpO2: 96 % (03/16/23 8305)    Physical Exam  Vitals and nursing note reviewed  Constitutional:       General: He is not in acute distress  Appearance: Normal appearance  He is not diaphoretic  Comments: Patient with intractable hiccups longterm through exam   HENT:      Head: Normocephalic  Cardiovascular:      Rate and Rhythm: Normal rate and regular rhythm  Heart sounds: Normal heart sounds  Pulmonary:      Effort: Pulmonary effort is normal  No respiratory distress  Breath sounds: Normal breath sounds  No stridor  No wheezing, rhonchi or rales  Abdominal:      General: Abdomen is flat  Bowel sounds are normal       Palpations: Abdomen is soft  Musculoskeletal:         General: No tenderness  Right lower leg: No edema  Left lower leg: No edema  Skin:     General: Skin is warm and dry  Neurological:      General: No focal deficit present  Mental Status: He is alert and oriented to person, place, and time     Psychiatric:         Mood and Affect: Mood normal          Behavior: Behavior normal           Additional Data:     Lab Results:  Results from last 7 days   Lab Units 03/15/23  2323   WBC Thousand/uL 17 48*   HEMOGLOBIN g/dL 13 0   HEMATOCRIT % 38 7   PLATELETS Thousands/uL 315   NEUTROS PCT % 83*   LYMPHS PCT % 8*   MONOS PCT % 8   EOS PCT % 0     Results from last 7 days   Lab Units 03/15/23  2323   SODIUM mmol/L 135   POTASSIUM mmol/L 4 2   CHLORIDE mmol/L 99   CO2 mmol/L 25   BUN mg/dL 11   CREATININE mg/dL 0 82   ANION GAP mmol/L 11   CALCIUM mg/dL 10 2   ALBUMIN g/dL 3 5   TOTAL BILIRUBIN mg/dL 1 00   ALK PHOS U/L 96   ALT U/L 45   AST U/L 44*   GLUCOSE RANDOM mg/dL 217*                 Results from last 7 days   Lab Units 03/16/23  0204 03/15/23  2323   LACTIC ACID mmol/L 1 6  --    PROCALCITONIN ng/ml  --  0 40*       Lines/Drains:  Invasive Devices     Peripheral Intravenous Line  Duration           Peripheral IV 03/15/23 Left Forearm <1 day                    Imaging: Reviewed radiology reports from this admission including: chest CT scan and abdominal/pelvic CT  CT chest abdomen pelvis w contrast   Final Result by Fatmata Han MD (03/16 0030)      Left greater than right basilar consolidative opacities compatible with pneumonia, possibly in the setting of? Aspiration  Correlate clinically  Superimposed tree-in-bud nodularity in the right greater than left lower lobes compatible with infectious versus inflammatory small airways disease  No acute abnormality identified in the abdomen or pelvis  Above findings discussed with Dr Clovia Ahumada at 12:29 AM on 3/16/2023  Workstation performed: [id number]             EKG and Other Studies Reviewed on Admission:   · EKG: Sinus tachycardia  ** Please Note: This note has been constructed using a voice recognition system   **

## 2023-03-17 LAB
ANION GAP SERPL CALCULATED.3IONS-SCNC: 5 MMOL/L (ref 4–13)
BASOPHILS # BLD AUTO: 0.04 THOUSANDS/ÂΜL (ref 0–0.1)
BASOPHILS NFR BLD AUTO: 0 % (ref 0–1)
BUN SERPL-MCNC: 12 MG/DL (ref 5–25)
CALCIUM SERPL-MCNC: 9.3 MG/DL (ref 8.4–10.2)
CHLORIDE SERPL-SCNC: 103 MMOL/L (ref 96–108)
CO2 SERPL-SCNC: 27 MMOL/L (ref 21–32)
CREAT SERPL-MCNC: 0.78 MG/DL (ref 0.6–1.3)
EOSINOPHIL # BLD AUTO: 0.18 THOUSAND/ÂΜL (ref 0–0.61)
EOSINOPHIL NFR BLD AUTO: 1 % (ref 0–6)
ERYTHROCYTE [DISTWIDTH] IN BLOOD BY AUTOMATED COUNT: 13.9 % (ref 11.6–15.1)
GFR SERPL CREATININE-BSD FRML MDRD: 90 ML/MIN/1.73SQ M
GLUCOSE SERPL-MCNC: 135 MG/DL (ref 65–140)
GLUCOSE SERPL-MCNC: 178 MG/DL (ref 65–140)
GLUCOSE SERPL-MCNC: 183 MG/DL (ref 65–140)
GLUCOSE SERPL-MCNC: 200 MG/DL (ref 65–140)
GLUCOSE SERPL-MCNC: 202 MG/DL (ref 65–140)
HCT VFR BLD AUTO: 33.9 % (ref 36.5–49.3)
HGB BLD-MCNC: 11.2 G/DL (ref 12–17)
IMM GRANULOCYTES # BLD AUTO: 0.14 THOUSAND/UL (ref 0–0.2)
IMM GRANULOCYTES NFR BLD AUTO: 1 % (ref 0–2)
LYMPHOCYTES # BLD AUTO: 1.72 THOUSANDS/ÂΜL (ref 0.6–4.47)
LYMPHOCYTES NFR BLD AUTO: 12 % (ref 14–44)
MCH RBC QN AUTO: 27.8 PG (ref 26.8–34.3)
MCHC RBC AUTO-ENTMCNC: 33 G/DL (ref 31.4–37.4)
MCV RBC AUTO: 84 FL (ref 82–98)
MONOCYTES # BLD AUTO: 0.87 THOUSAND/ÂΜL (ref 0.17–1.22)
MONOCYTES NFR BLD AUTO: 6 % (ref 4–12)
MRSA NOSE QL CULT: NORMAL
NEUTROPHILS # BLD AUTO: 11.4 THOUSANDS/ÂΜL (ref 1.85–7.62)
NEUTS SEG NFR BLD AUTO: 80 % (ref 43–75)
NRBC BLD AUTO-RTO: 0 /100 WBCS
PLATELET # BLD AUTO: 316 THOUSANDS/UL (ref 149–390)
PMV BLD AUTO: 10.2 FL (ref 8.9–12.7)
POTASSIUM SERPL-SCNC: 4.2 MMOL/L (ref 3.5–5.3)
PROCALCITONIN SERPL-MCNC: 0.33 NG/ML
RBC # BLD AUTO: 4.03 MILLION/UL (ref 3.88–5.62)
SODIUM SERPL-SCNC: 135 MMOL/L (ref 135–147)
WBC # BLD AUTO: 14.35 THOUSAND/UL (ref 4.31–10.16)

## 2023-03-17 RX ORDER — GABAPENTIN 100 MG/1
100 CAPSULE ORAL 3 TIMES DAILY
Status: DISCONTINUED | OUTPATIENT
Start: 2023-03-17 | End: 2023-03-19 | Stop reason: HOSPADM

## 2023-03-17 RX ORDER — METOCLOPRAMIDE HYDROCHLORIDE 5 MG/ML
5 INJECTION INTRAMUSCULAR; INTRAVENOUS EVERY 8 HOURS
Status: DISCONTINUED | OUTPATIENT
Start: 2023-03-17 | End: 2023-03-19 | Stop reason: HOSPADM

## 2023-03-17 RX ADMIN — PANTOPRAZOLE SODIUM 40 MG: 40 TABLET, DELAYED RELEASE ORAL at 08:15

## 2023-03-17 RX ADMIN — ENOXAPARIN SODIUM 40 MG: 40 INJECTION SUBCUTANEOUS at 08:15

## 2023-03-17 RX ADMIN — BACLOFEN 5 MG: 10 TABLET ORAL at 17:09

## 2023-03-17 RX ADMIN — DOXYCYCLINE 100 MG: 100 CAPSULE ORAL at 06:13

## 2023-03-17 RX ADMIN — INSULIN LISPRO 1 UNITS: 100 INJECTION, SOLUTION INTRAVENOUS; SUBCUTANEOUS at 17:10

## 2023-03-17 RX ADMIN — DOCUSATE SODIUM 100 MG: 100 CAPSULE, LIQUID FILLED ORAL at 08:15

## 2023-03-17 RX ADMIN — CLOPIDOGREL BISULFATE 75 MG: 75 TABLET ORAL at 08:14

## 2023-03-17 RX ADMIN — INSULIN LISPRO 1 UNITS: 100 INJECTION, SOLUTION INTRAVENOUS; SUBCUTANEOUS at 08:15

## 2023-03-17 RX ADMIN — Medication 12.5 MG: at 08:15

## 2023-03-17 RX ADMIN — DOXYCYCLINE 100 MG: 100 CAPSULE ORAL at 17:09

## 2023-03-17 RX ADMIN — ATORVASTATIN CALCIUM 40 MG: 40 TABLET, FILM COATED ORAL at 17:09

## 2023-03-17 RX ADMIN — ACETAMINOPHEN 650 MG: 325 TABLET, FILM COATED ORAL at 09:04

## 2023-03-17 RX ADMIN — INSULIN LISPRO 1 UNITS: 100 INJECTION, SOLUTION INTRAVENOUS; SUBCUTANEOUS at 23:20

## 2023-03-17 RX ADMIN — DOCUSATE SODIUM 100 MG: 100 CAPSULE, LIQUID FILLED ORAL at 17:09

## 2023-03-17 RX ADMIN — GUAIFENESIN 600 MG: 600 TABLET ORAL at 08:15

## 2023-03-17 RX ADMIN — GABAPENTIN 100 MG: 100 CAPSULE ORAL at 19:31

## 2023-03-17 RX ADMIN — CEFTRIAXONE SODIUM 1000 MG: 10 INJECTION, POWDER, FOR SOLUTION INTRAVENOUS at 23:27

## 2023-03-17 RX ADMIN — Medication 12.5 MG: at 23:20

## 2023-03-17 RX ADMIN — METOCLOPRAMIDE 5 MG: 5 INJECTION, SOLUTION INTRAMUSCULAR; INTRAVENOUS at 19:31

## 2023-03-17 RX ADMIN — GUAIFENESIN 600 MG: 600 TABLET ORAL at 23:22

## 2023-03-17 RX ADMIN — BACLOFEN 5 MG: 10 TABLET ORAL at 08:14

## 2023-03-17 NOTE — UTILIZATION REVIEW
Initial Clinical Review    Admission: Date/Time/Statement:   Admission Orders (From admission, onward)     Ordered        03/16/23 0138  INPATIENT ADMISSION  Once                      Orders Placed This Encounter   Procedures   • INPATIENT ADMISSION     Standing Status:   Standing     Number of Occurrences:   1     Order Specific Question:   Level of Care     Answer:   Med Surg [16]     Order Specific Question:   Estimated length of stay     Answer:   More than 2 Midnights     Order Specific Question:   Certification     Answer:   I certify that inpatient services are medically necessary for this patient for a duration of greater than two midnights  See H&P and MD Progress Notes for additional information about the patient's course of treatment  ED Arrival Information     Expected   -    Arrival   3/15/2023 22:48    Acuity   Urgent            Means of arrival   Ambulance    Escorted by   [facility]    Service   Hospitalist    Admission type   Emergency            Arrival complaint   hiccups           Chief Complaint   Patient presents with   • Rapid Heart Rate     C/o hiccups; tachycardic for EMS  Initial Presentation: 71 y o  male to ED via EMS from home  Present with a PMHX of diabetes mellitus type 2, CVA, HLD, HTN who presents with complaint of intractable hiccups, intermittent shortness of breath due to hiccups x5 days  Reports he has a history of 1 episode of intractable hiccups in the past he reports right before he had a stroke  Admitted to M/S with DX: Sepsis / pneumonia  on exam: Patient with intractable hiccups; Temp 99 3; Tachy -129 (Sustained); Tachypnic RR 21-53; WBC 17 48;  Neutro %83; AST 44; Gluc 217; Procal 0 40  CT = revealing patchy consolidation opacity left greater than right bases with tree-in-bud opacities in bilateral lower lobes, pneumonia versus aspiration  Given in ED IVF bolus 500 ml; reglan iv; protonix iv; rocephin iv; flagyl iv; IVF @ 50   PLAN: cont iv abx; cont ivf @ 75; Cardiopulmonary monitoring; Accuchecks with ssic; monitor labs; I/O; I/S; trend temps; f/u BCx, sputum cx    Date: 3/17/23    Day 2  Temp 101 5; Tachy -120; lungs diminished; procal 0 33;  WBC 14 35; Neutro % 80;   Plan: cont iv abx; cont ivf @ 75; Cardiopulmonary monitoring; Accuchecks with ssic; monitor labs; I/O; I/S; trend temps; f/u BCx, sputum cx        ED Triage Vitals   Temperature Pulse Respirations Blood Pressure SpO2   03/15/23 2250 03/15/23 2250 03/15/23 2250 03/15/23 2250 03/15/23 2250   99 3 °F (37 4 °C) (!) 116 21 156/86 96 %      Temp Source Heart Rate Source Patient Position - Orthostatic VS BP Location FiO2 (%)   03/16/23 2305 03/16/23 0600 03/16/23 0600 03/16/23 0600 --   Oral Monitor Lying Right arm       Pain Score       03/15/23 2250       No Pain          Wt Readings from Last 1 Encounters:   03/17/23 56 2 kg (123 lb 14 4 oz)     Additional Vital Signs:   Date/Time Temp Pulse Resp BP MAP (mmHg) SpO2 O2 Device Patient Position - Orthostatic VS   03/17/23 07:50:13 101 2 °F (38 4 °C) Abnormal  110 Abnormal  -- 134/76 95 96 % -- --   03/16/23 23:05:14 99 8 °F (37 7 °C) 112 Abnormal  19 154/87 109 95 % -- --   03/16/23 15:16:34 98 1 °F (36 7 °C) -- -- 121/83 96 -- -- --   03/16/23 15:15:50 98 1 °F (36 7 °C) 97 -- 121/83 96 96 % -- --   03/16/23 0933 -- 105 16 -- -- -- None (Room air) --   03/16/23 08:21:29 101 5 °F (38 6 °C) Abnormal   123 Abnormal  -- 124/84 97 94 % -- --   Temp: Tylenol given at 03/16/23 0821   03/16/23 08:21:13 101 5 °F (38 6 °C) Abnormal  129 Abnormal  -- 124/84 97 93 % -- --   03/16/23 0600 -- 115 Abnormal  20 120/78 93 96 % None (Room air) Lying   03/16/23 0208 -- 119 Abnormal  24 Abnormal  132/77 96 96 % -- --   03/16/23 0138 -- 118 Abnormal  29 Abnormal  126/71 93 96 % -- --   03/16/23 0108 -- 116 Abnormal  28 Abnormal  151/72 103 96 % -- --   03/16/23 0038 -- 115 Abnormal  23 Abnormal  132/72 95 97 % -- --   03/16/23 0008 -- 120 Abnormal  22 139/81 104 96 % -- --   03/15/23 2353 -- 123 Abnormal  30 Abnormal  144/88 110 95 % -- --   03/15/23 2308 -- 115 Abnormal  53 Abnormal  144/87 112 96 % -- --   03/15/23 2250 99 3 °F (37 4 °C) 116 Abnormal  21 156/86 -- 96 % None (Room air) --           EKG: Sinus tachycardia  Otherwise normal ECG  When compared with ECG of 15-MAR-2023 22:51, (unconfirmed)  No significant change was found    Pertinent Labs/Diagnostic Test Results:   CT chest abdomen pelvis w contrast   Final Result by Coral Mas MD (03/16 0030)      Left greater than right basilar consolidative opacities compatible with pneumonia, possibly in the setting of? Aspiration  Correlate clinically  Superimposed tree-in-bud nodularity in the right greater than left lower lobes compatible with infectious versus inflammatory small airways disease  No acute abnormality identified in the abdomen or pelvis  Above findings discussed with Dr Debbie Lopez at 12:29 AM on 3/16/2023        Workstation performed: [id number]           Results from last 7 days   Lab Units 03/16/23  0218 03/11/23  0946   SARS-COV-2  Negative Negative     Results from last 7 days   Lab Units 03/17/23  0538 03/15/23  2323 03/11/23  0946   WBC Thousand/uL 14 35* 17 48* 10 32*   HEMOGLOBIN g/dL 11 2* 13 0 12 7   HEMATOCRIT % 33 9* 38 7 38 6   PLATELETS Thousands/uL 316 315 175   NEUTROS ABS Thousands/µL 11 40* 14 41* 8 79*         Results from last 7 days   Lab Units 03/17/23  0538 03/15/23  2323 03/11/23  0946   SODIUM mmol/L 135 135 134*   POTASSIUM mmol/L 4 2 4 2 3 7   CHLORIDE mmol/L 103 99 98   CO2 mmol/L 27 25 26   ANION GAP mmol/L 5 11 10   BUN mg/dL 12 11 9   CREATININE mg/dL 0 78 0 82 0 89   EGFR ml/min/1 73sq m 90 88 86   CALCIUM mg/dL 9 3 10 2 9 3     Results from last 7 days   Lab Units 03/15/23  2323 03/11/23  0946   AST U/L 44* 22   ALT U/L 45 17   ALK PHOS U/L 96 79   TOTAL PROTEIN g/dL 8 2 7 7   ALBUMIN g/dL 3 5 3 7   TOTAL BILIRUBIN mg/dL 1 00 1 35*   BILIRUBIN DIRECT mg/dL  -- 0 53*     Results from last 7 days   Lab Units 03/17/23  1124 03/17/23  0647 03/16/23  2051 03/16/23  1527 03/16/23  1236 03/16/23  0913   POC GLUCOSE mg/dl 135 200* 175* 130 263* 283*     Results from last 7 days   Lab Units 03/17/23  0538 03/15/23  2323 03/11/23  0946   GLUCOSE RANDOM mg/dL 178* 217* 204*       Results from last 7 days   Lab Units 03/16/23  0132 03/15/23  2323 03/11/23  0946   HS TNI 0HR ng/L  --  7 5   HS TNI 2HR ng/L 6  --   --    HSTNI D2 ng/L -1  --   --        Results from last 7 days   Lab Units 03/11/23  0946   TSH 3RD GENERATON uIU/mL 0 606     Results from last 7 days   Lab Units 03/17/23  0538 03/15/23  2323   PROCALCITONIN ng/ml 0 33* 0 40*     Results from last 7 days   Lab Units 03/16/23  0204   LACTIC ACID mmol/L 1 6       Results from last 7 days   Lab Units 03/15/23  2323   LIPASE u/L 48       Results from last 7 days   Lab Units 03/16/23  1309 03/16/23  0322 03/16/23  0218 03/11/23  0946   STREP PNEUMONIAE ANTIGEN, URINE   --  Negative  --   --    LEGIONELLA URINARY ANTIGEN  Negative  --   --   --    INFLUENZA A PCR   --   --  Negative Negative   INFLUENZA B PCR   --   --  Negative Negative   RSV PCR   --   --  Negative Negative       Results from last 7 days   Lab Units 03/16/23  0205 03/16/23  0204   BLOOD CULTURE  No Growth at 24 hrs  No Growth at 24 hrs         ED Treatment:   Medication Administration from 03/15/2023 2248 to 03/16/2023 0753       Date/Time Order Dose Route Action     03/15/2023 2347 EDT sodium chloride 0 9 % bolus 500 mL 500 mL Intravenous New Bag     03/15/2023 2347 EDT metoclopramide (REGLAN) injection 10 mg 10 mg Intravenous Given     03/15/2023 2350 EDT aluminum-magnesium hydroxide-simethicone (MYLANTA) oral suspension 30 mL 30 mL Oral Given     03/15/2023 2350 EDT pantoprazole (PROTONIX) injection 40 mg 40 mg Intravenous Given     03/15/2023 2332 EDT iohexol (OMNIPAQUE) 350 MG/ML injection (SINGLE-DOSE) 100 mL 100 mL Intravenous Given     03/16/2023 0103 EDT ceftriaxone (ROCEPHIN) 1 g/50 mL in dextrose IVPB 1,000 mg Intravenous New Bag     03/16/2023 0133 EDT metroNIDAZOLE (FLAGYL) IVPB (premix) 500 mg 100 mL 500 mg Intravenous New Bag     03/16/2023 0335 EDT baclofen tablet 5 mg 5 mg Oral Given     03/16/2023 0349 EDT sodium chloride 0 9 % infusion 50 mL/hr Intravenous New Bag     03/16/2023 0649 EDT doxycycline hyclate (VIBRAMYCIN) capsule 100 mg 100 mg Oral Given          Present on Admission:  • Sepsis ([facility] )  • Pneumonia  • Intractable hiccups      Admitting Diagnosis: Rapid heart rate [R00 0]  Pneumonia [J18 9]  Tachycardia [R00 0]  Hiccups [R06 6]  Food intolerance in adult [K90 49]     Age/Sex: 71 y o  male     Admission Orders: SCD's; I/S; Consistent Carbohydrate Diet  Accu-checks ACHS  Cardiopulmonary monitoring    Scheduled Medications:  atorvastatin, 40 mg, Oral, QPM  baclofen, 5 mg, Oral, TID  cefTRIAXone, 1,000 mg, Intravenous, Q24H  clopidogrel, 75 mg, Oral, Daily  docusate sodium, 100 mg, Oral, BID  doxycycline hyclate, 100 mg, Oral, Q12H  enoxaparin, 40 mg, Subcutaneous, Daily  guaiFENesin, 600 mg, Oral, Q12H PINO  insulin lispro, 1-5 Units, Subcutaneous, TID AC  insulin lispro, 1-5 Units, Subcutaneous, HS  metoprolol tartrate, 12 5 mg, Oral, Q12H PINO  pantoprazole, 40 mg, Oral, Daily      Continuous IV Infusions:  sodium chloride, 75 mL/hr, Intravenous, Continuous      PRN Meds:  acetaminophen, 650 mg, Oral, Q6H PRN (3/16 rec'd x 1) (3/17 rec'd x 1)   aluminum-magnesium hydroxide-simethicone, 30 mL, Oral, Q4H PRN  metoclopramide, 5 mg, Intravenous, Q8H PRN  polyethylene glycol, 17 g, Oral, Daily PRN        Network Utilization Review Department  ATTENTION: Please call with any questions or concerns to [phone] and carefully listen to the prompts so that you are directed to the right person   All voicemails are confidential   [facility] all requests for admission clinical reviews, approved or denied determinations and any other requests to dedicated fax number below belonging to the campus where the patient is receiving treatment   List of dedicated fax numbers for the Facilities:  [address] [place] [address] DENIALS (Administrative/Medical Necessity) [phone]   [address] (Maternity/NICU/Pediatrics) [phone]   [address] [phone]   [address]ll[address] [phone]   [address] [phone]   15[address] [facility] 134 815 [facility] [phone]

## 2023-03-17 NOTE — PROGRESS NOTES
[address] [facility]  Progress Note Kim Rodriguez 1950, 71 y o  male MRN: [id number]  Unit/Bed#: -01 Encounter: [id number]  Primary Care Provider: Emerson Hayes DO   Date and time admitted to hospital: 3/15/2023 10:49 PM    * Sepsis [facility])  Assessment & Plan  · Meeting criteria due to tachycardia, tachypnea, leukocytosis in setting of pneumonia on admission   · Procalcitonin slightly elevated, tending down   · Lactic acid WNL, blood culture x2 negative thus far   · S/p IV fluid   · Monitor fever curves and WBC counts     Intractable hiccups  Assessment & Plan  · Ongoing for almost a week  · No relief from mylanta and baclofen   · Trial of gabapentin and reglan     Pneumonia  Assessment & Plan  · CT chest revealing patchy consolidation opacity left greater than right bases with tree-in-bud opacities in bilateral lower lobes, pneumonia versus aspiration  · Continue ceftriaxone   · Aspiration precautions     Type 2 diabetes mellitus with stage 2 chronic kidney disease, with long-term current use of insulin [facility])  Assessment & Plan  Lab Results   Component Value Date    HGBA1C 6 2 (H) 10/20/2022       Recent Labs     03/16/23  2051 03/17/23  0647 03/17/23  1124 03/17/23  1613   POCGLU 175* 200* 135 183*       Blood Sugar Average: Last 72 hrs:  · (P) 195 [id number] glucose checks 4 times daily, hypoglycemia protocol  · Hold home Januvia and metformin  · SSI      VTE Pharmacologic Prophylaxis:   Pharmacologic: Enoxaparin (Lovenox)  Mechanical VTE Prophylaxis in Place: Yes    Review of Systems:    Review of Systems   Constitutional: Negative for chills and fever  HENT: Negative for ear pain and sore throat  Eyes: Negative for pain and visual disturbance  Respiratory: Negative for cough and shortness of breath  Hiccups    Cardiovascular: Negative for chest pain and palpitations  Gastrointestinal: Negative for abdominal pain and vomiting     Genitourinary: Negative for dysuria and hematuria  Musculoskeletal: Negative for arthralgias and back pain  Skin: Negative for color change and rash  Neurological: Negative for seizures and syncope  All other systems reviewed and are negative  Past Medical and Surgical History:     Past Medical History:   Diagnosis Date   • Colon polyp    • CVA (cerebral vascular accident) ([facility] [place] [facility] ) 2019    Bilateral pontine lacunar infarct   • Diabetes mellitus ([place] [address] )    • Esophagitis    • Hyperlipidemia    • Hypertension    • Non-sustained ventricular tachycardia    • Stroke [facility])    • Type 2 diabetes mellitus ([place] [address] )        Past Surgical History:   Procedure Laterality Date   • COLONOSCOPY     • ESOPHAGOGASTRODUODENOSCOPY N/A 2019    Procedure: ESOPHAGOGASTRODUODENOSCOPY (EGD); Surgeon: Rosa M Lopez DO;  Location: [facility]; Service: Gastroenterology           Discussions with Specialists or Other Care Team Provider: CM, nursing     Education and Discussions with Family / Patient: patient    Time Spent for Care: 45 minutes  More than 50% of total time spent on counseling and coordination of care as described above  Current Length of Stay: 1 day(s)    Current Patient Status: Inpatient   Certification Statement: The patient will continue to require additional inpatient hospital stay due to iv antibiotics     Discharge Plan: likely within 24-48 hours     Code Status: Level 1 - Full Code      Subjective:   Feels better     Objective:     Vitals:   Temp (24hrs), Av 7 °F (37 6 °C), Min:98 1 °F (36 7 °C), Max:101 2 °F (38 4 °C)    Temp:  [98 1 °F (36 7 °C)-101 2 °F (38 4 °C)] 98 1 °F (36 7 °C)  HR:  [] 92  Resp:  [19] 19  BP: (134-154)/(76-87) 146/85  SpO2:  [95 %-99 %] 97 %  Body mass index is 20 62 kg/m²  Input and Output Summary (last 24 hours):        Intake/Output Summary (Last 24 hours) at 3/17/2023 1824  Last data filed at 3/17/2023 1027  Gross per 24 hour   Intake 1728 75 ml   Output 1675 ml   Net 53 75 ml       Physical Exam:     Physical Exam  Vitals and nursing note reviewed  Constitutional:       General: He is not in acute distress  Appearance: He is well-developed  He is not ill-appearing or diaphoretic  HENT:      Head: Normocephalic and atraumatic  Mouth/Throat:      Mouth: Mucous membranes are moist       Pharynx: Oropharynx is clear  Eyes:      General: No scleral icterus  Conjunctiva/sclera: Conjunctivae normal    Cardiovascular:      Rate and Rhythm: Normal rate and regular rhythm  Heart sounds: No murmur heard  Pulmonary:      Effort: Pulmonary effort is normal  No respiratory distress  Breath sounds: No wheezing or rales  Abdominal:      General: Bowel sounds are normal       Palpations: Abdomen is soft  Tenderness: There is no abdominal tenderness  Musculoskeletal:         General: No swelling  Cervical back: Normal range of motion and neck supple  Right lower leg: No edema  Left lower leg: No edema  Skin:     General: Skin is warm and dry  Capillary Refill: Capillary refill takes less than 2 seconds  Neurological:      General: No focal deficit present  Mental Status: He is alert and oriented to person, place, and time     Psychiatric:         Mood and Affect: Mood normal          Behavior: Behavior normal            Additional Data:     Labs:    Results from last 7 days   Lab Units 03/17/23  0538   WBC Thousand/uL 14 35*   HEMOGLOBIN g/dL 11 2*   HEMATOCRIT % 33 9*   PLATELETS Thousands/uL 316   NEUTROS PCT % 80*   LYMPHS PCT % 12*   MONOS PCT % 6   EOS PCT % 1     Results from last 7 days   Lab Units 03/17/23  0538 03/15/23  2323   SODIUM mmol/L 135 135   POTASSIUM mmol/L 4 2 4 2   CHLORIDE mmol/L 103 99   CO2 mmol/L 27 25   BUN mg/dL 12 11   CREATININE mg/dL 0 78 0 82   ANION GAP mmol/L 5 11   CALCIUM mg/dL 9 3 10 2   ALBUMIN g/dL  --  3 5   TOTAL BILIRUBIN mg/dL  --  1 00   ALK PHOS U/L  --  96   ALT U/L  --  45   AST U/L  -- 44*   GLUCOSE RANDOM mg/dL 178* 217*         Results from last 7 days   Lab Units 03/17/23  1613 03/17/23  1124 03/17/23  0647 03/16/23  2051 03/16/23  1527 03/16/23  1236 03/16/23  0913   POC GLUCOSE mg/dl 183* 135 200* 175* 130 263* 283*         Results from last 7 days   Lab Units 03/17/23  0538 03/16/23  0204 03/15/23  2323   LACTIC ACID mmol/L  --  1 6  --    PROCALCITONIN ng/ml 0 33*  --  0 40*           * I Have Reviewed All Lab Data Listed Above  * Additional Pertinent Lab Tests Reviewed: [place] Admission Reviewed    Imaging:    Imaging Reports Reviewed Today Include: none  Imaging Personally Reviewed by Myself Includes:  none    Recent Cultures (last 7 days):     Results from last 7 days   Lab Units 03/16/23  1309 03/16/23  0205 03/16/23  0204   BLOOD CULTURE   --  No Growth at 24 hrs  No Growth at 24 hrs     LEGIONELLA URINARY ANTIGEN  Negative  --   --        Last 24 Hours Medication List:   Current Facility-Administered Medications   Medication Dose Route Frequency Provider Last Rate   • acetaminophen  650 mg Oral Q6H PRN Jacobs Tiarra, PA-C     • aluminum-magnesium hydroxide-simethicone  30 mL Oral Q4H PRN Jacobs Tiarra, PA-C     • atorvastatin  40 mg Oral QPM Jacobs Tiarra, PA-C     • cefTRIAXone  1,000 mg Intravenous Q24H Reynaldo Mayen, PA-C 1,000 mg (03/16/23 2325)   • clopidogrel  75 mg Oral Daily Reynaldo Mayen PA-C     • docusate sodium  100 mg Oral BID Jacobs Tiarra, PA-C     • enoxaparin  40 mg Subcutaneous Daily Jacobs Tiarra, PA-C     • gabapentin  100 mg Oral TID Melissa Yepez MD     • guaiFENesin  600 mg Oral Q12H [address] Melissa Yepez MD     • insulin lispro  1-5 Units Subcutaneous TID AC Tisha Salas PA-C     • insulin lispro  1-5 Units Subcutaneous HS PATRICK Beyer-C     • metoclopramide  5 mg Intravenous Q8H Melissa Yepez MD     • metoprolol tartrate  12 5 mg Oral Q12H [address] Reynaldo Mayen PA-C     • pantoprazole  40 mg Oral Daily Reynaldo Mayen, CHRIS     • polyethylene glycol  17 g Oral Daily PRN Felicia Delgado PA-C          Today, Patient Was Seen By: Selma Desai MD    ** Please Note: Dictation voice to text software may have been used in the creation of this document   **

## 2023-03-17 NOTE — ASSESSMENT & PLAN NOTE
· Ongoing for almost a week  · No relief from mylanta and baclofen   · Trial of gabapentin and reglan

## 2023-03-17 NOTE — ASSESSMENT & PLAN NOTE
· Meeting criteria due to tachycardia, tachypnea, leukocytosis in setting of pneumonia on admission   · Procalcitonin slightly elevated, tending down   · Lactic acid WNL, blood culture x2 negative thus far   · S/p IV fluid   · Monitor fever curves and WBC counts

## 2023-03-17 NOTE — CASE MANAGEMENT
Case Management Assessment & Discharge Planning Note    Patient name Santiago White  Location [place] [place] [address] [id number] MRN [id number]  : 1950 Date 3/17/2023       Current Admission Date: 3/15/2023  Current Admission Diagnosis:Sepsis [facility])   Patient Active Problem List    Diagnosis Date Noted   • [facility]epsis ([facility] ) 2023   • Pneumonia 2023   • Intractable hiccups 2023   • Mixed hyperlipidemia due to type 2 diabetes mellitus ([facility] ) 2019   • History of lacunar cerebrovascular accident (CVA) 2019   • Type 2 diabetes mellitus with stage 2 chronic kidney disease, with long-term current use of insulin ([place] [address] ) 2019      LOS (days): 1  Geometric Mean LOS (GMLOS) (days): 5 00  Days to GMLOS:3 3     OBJECTIVE:    Risk of Unplanned Readmission Score: 11 18         Current admission status: Inpatient       Preferred Pharmacy:   [address] [place] [address]  Phone: [phone] Fax: [phone]    Primary Care Provider: Fay Scheuermann, DO    Primary Insurance: [facility]  Secondary Insurance:     ASSESSMENT:  Valeriano Kaba Proxies    There are no active Health Care Proxies on file  DISCHARGE DETAILS:    Discharge planning discussed with[de-identified] patient  Freedom of Choice: Yes  Comments - Freedom of Choice: CM met with patient to review [facility]    He chose [facility], but wants a call prior to coming out as he may decise to decline if home and feeling better- [facility] [facility] reserved and update coco [facility]AM

## 2023-03-17 NOTE — ASSESSMENT & PLAN NOTE
· CT chest revealing patchy consolidation opacity left greater than right bases with tree-in-bud opacities in bilateral lower lobes, pneumonia versus aspiration  · Continue ceftriaxone   · Aspiration precautions

## 2023-03-17 NOTE — PLAN OF CARE
Problem: Nutrition/Hydration-ADULT  Goal: Nutrient/Hydration intake appropriate for improving, restoring or maintaining nutritional needs  Description: Monitor and assess patient's nutrition/hydration status for malnutrition  Collaborate with interdisciplinary team and initiate plan and interventions as ordered  Monitor patient's weight and dietary intake as ordered or per policy  Utilize nutrition screening tool and intervene as necessary  Determine patient's food preferences and provide high-protein, high-caloric foods as appropriate       INTERVENTIONS:  - Monitor oral intake, urinary output, labs, and treatment plans  - Assess nutrition and hydration status and recommend course of action  - Evaluate amount of meals eaten  - Assist patient with eating if necessary   - Allow adequate time for meals  - Recommend/ encourage appropriate diets, oral nutritional supplements, and vitamin/mineral supplements  - Order, calculate, and assess calorie counts as needed  - Recommend, monitor, and adjust tube feedings and TPN/PPN based on assessed needs  - Assess need for intravenous fluids  - Provide specific nutrition/hydration education as appropriate  - Include patient/family/caregiver in decisions related to nutrition  Outcome: Progressing     Problem: PAIN - ADULT  Goal: Verbalizes/displays adequate comfort level or baseline comfort level  Description: Interventions:  - Encourage patient to monitor pain and request assistance  - Assess pain using appropriate pain scale  - Administer analgesics based on type and severity of pain and evaluate response  - Implement non-pharmacological measures as appropriate and evaluate response  - Consider cultural and social influences on pain and pain management  - Notify physician/advanced practitioner if interventions unsuccessful or patient reports new pain  Outcome: Progressing     Problem: INFECTION - ADULT  Goal: Absence or prevention of progression during hospitalization  Description: INTERVENTIONS:  - Assess and monitor for signs and symptoms of infection  - Monitor lab/diagnostic results  - Monitor all insertion sites, i e  indwelling lines, tubes, and drains  - Monitor endotracheal if appropriate and nasal secretions for changes in amount and color  - [place] appropriate cooling/warming therapies per order  - Administer medications as ordered  - Instruct and encourage patient and family to use good hand hygiene technique  - Identify and instruct in appropriate isolation precautions for identified infection/condition  Outcome: Progressing  Goal: Absence of fever/infection during neutropenic period  Description: INTERVENTIONS:  - Monitor WBC    Outcome: Progressing     Problem: SAFETY ADULT  Goal: Patient will remain free of falls  Description: INTERVENTIONS:  - Educate patient/family on patient safety including physical limitations  - Instruct patient to call for assistance with activity   - Consult OT/PT to assist with strengthening/mobility   - Keep Call bell within reach  - Keep bed low and locked with side rails adjusted as appropriate  - Keep care items and personal belongings within reach  - Initiate and maintain comfort rounds  - Make Fall Risk Sign visible to staff  - Offer Toileting every 2 Hours, in advance of need  - Initiate/Maintain bed alarm  - Obtain necessary fall risk management equipment: chair alarm  - Apply yellow socks and bracelet for high fall risk patients  - Consider moving patient to room near nurses station  Outcome: Progressing  Goal: Maintain or return to baseline ADL function  Description: INTERVENTIONS:  -  Assess patient's ability to carry out ADLs; assess patient's baseline for ADL function and identify physical deficits which impact ability to perform ADLs (bathing, care of mouth/teeth, toileting, grooming, dressing, etc )  - Assess/evaluate cause of self-care deficits   - Assess range of motion  - Assess patient's mobility; develop plan if impaired  - Assess patient's need for assistive devices and provide as appropriate  - Encourage maximum independence but intervene and supervise when necessary  - Involve family in performance of ADLs  - Assess for home care needs following discharge   - Consider OT consult to assist with ADL evaluation and planning for discharge  - Provide patient education as appropriate  Outcome: Progressing  Goal: Maintains/Returns to pre admission functional level  Description: INTERVENTIONS:  - Perform BMAT or MOVE assessment daily    - Set and communicate daily mobility goal to care team and patient/family/caregiver  - Collaborate with rehabilitation services on mobility goals if consulted  - Perform Range of Motion 3 times a day  - Reposition patient every 3 hours    - Dangle patient 3 times a day  - Stand patient 3 times a day  - Ambulate patient 3 times a day  - Out of bed to chair 3 times a day   - Out of bed for meals 3 times a day  - Out of bed for toileting  - Record patient progress and toleration of activity level   Outcome: Progressing     Problem: DISCHARGE PLANNING  Goal: Discharge to home or other facility with appropriate resources  Description: INTERVENTIONS:  - Identify barriers to discharge w/patient and caregiver  - Arrange for needed discharge resources and transportation as appropriate  - Identify discharge learning needs (meds, wound care, etc )  - Arrange for interpretive services to assist at discharge as needed  - Refer to Case Management Department for coordinating discharge planning if the patient needs post-hospital services based on physician/advanced practitioner order or complex needs related to functional status, cognitive ability, or social support system  Outcome: Progressing     Problem: Knowledge Deficit  Goal: Patient/family/caregiver demonstrates understanding of disease process, treatment plan, medications, and discharge instructions  Description: Complete learning assessment and assess knowledge base    Interventions:  - Provide teaching at level of understanding  - Provide teaching via preferred learning methods  Outcome: Progressing     Problem: METABOLIC, FLUID AND ELECTROLYTES - ADULT  Goal: Glucose maintained within target range  Description: INTERVENTIONS:  - Monitor Blood Glucose as ordered  - Assess for signs and symptoms of hyperglycemia and hypoglycemia  - Administer ordered medications to maintain glucose within target range  - Assess nutritional intake and initiate nutrition service referral as needed  Outcome: Progressing

## 2023-03-17 NOTE — ASSESSMENT & PLAN NOTE
Lab Results   Component Value Date    HGBA1C 6 2 (H) 10/20/2022       Recent Labs     03/16/23  2051 03/17/23  0647 03/17/23  1124 03/17/23  1613   POCGLU 175* 200* 135 183*       Blood Sugar Average: Last 72 hrs:  · (P) 195 [id number] glucose checks 4 times daily, hypoglycemia protocol  · Hold home Januvia and metformin  · SSI

## 2023-03-18 ENCOUNTER — HOME HEALTH ADMISSION (OUTPATIENT)
Dept: HOME HEALTH SERVICES | Facility: HOME HEALTHCARE | Age: 73
End: 2023-03-18

## 2023-03-18 LAB
ANION GAP SERPL CALCULATED.3IONS-SCNC: 7 MMOL/L (ref 4–13)
BASOPHILS # BLD AUTO: 0.03 THOUSANDS/ÂΜL (ref 0–0.1)
BASOPHILS NFR BLD AUTO: 0 % (ref 0–1)
BUN SERPL-MCNC: 10 MG/DL (ref 5–25)
CALCIUM SERPL-MCNC: 9.5 MG/DL (ref 8.4–10.2)
CHLORIDE SERPL-SCNC: 104 MMOL/L (ref 96–108)
CO2 SERPL-SCNC: 26 MMOL/L (ref 21–32)
CREAT SERPL-MCNC: 0.75 MG/DL (ref 0.6–1.3)
EOSINOPHIL # BLD AUTO: 0.19 THOUSAND/ÂΜL (ref 0–0.61)
EOSINOPHIL NFR BLD AUTO: 2 % (ref 0–6)
ERYTHROCYTE [DISTWIDTH] IN BLOOD BY AUTOMATED COUNT: 14.3 % (ref 11.6–15.1)
GFR SERPL CREATININE-BSD FRML MDRD: 92 ML/MIN/1.73SQ M
GLUCOSE SERPL-MCNC: 139 MG/DL (ref 65–140)
GLUCOSE SERPL-MCNC: 147 MG/DL (ref 65–140)
GLUCOSE SERPL-MCNC: 150 MG/DL (ref 65–140)
GLUCOSE SERPL-MCNC: 161 MG/DL (ref 65–140)
GLUCOSE SERPL-MCNC: 205 MG/DL (ref 65–140)
HCT VFR BLD AUTO: 35.7 % (ref 36.5–49.3)
HGB BLD-MCNC: 11.7 G/DL (ref 12–17)
IMM GRANULOCYTES # BLD AUTO: 0.07 THOUSAND/UL (ref 0–0.2)
IMM GRANULOCYTES NFR BLD AUTO: 1 % (ref 0–2)
LYMPHOCYTES # BLD AUTO: 1.92 THOUSANDS/ÂΜL (ref 0.6–4.47)
LYMPHOCYTES NFR BLD AUTO: 16 % (ref 14–44)
MCH RBC QN AUTO: 28 PG (ref 26.8–34.3)
MCHC RBC AUTO-ENTMCNC: 32.8 G/DL (ref 31.4–37.4)
MCV RBC AUTO: 85 FL (ref 82–98)
MONOCYTES # BLD AUTO: 0.73 THOUSAND/ÂΜL (ref 0.17–1.22)
MONOCYTES NFR BLD AUTO: 6 % (ref 4–12)
NEUTROPHILS # BLD AUTO: 9.18 THOUSANDS/ÂΜL (ref 1.85–7.62)
NEUTS SEG NFR BLD AUTO: 75 % (ref 43–75)
NRBC BLD AUTO-RTO: 0 /100 WBCS
PLATELET # BLD AUTO: 348 THOUSANDS/UL (ref 149–390)
PMV BLD AUTO: 10.3 FL (ref 8.9–12.7)
POTASSIUM SERPL-SCNC: 4.1 MMOL/L (ref 3.5–5.3)
PROCALCITONIN SERPL-MCNC: 0.24 NG/ML
RBC # BLD AUTO: 4.18 MILLION/UL (ref 3.88–5.62)
SODIUM SERPL-SCNC: 137 MMOL/L (ref 135–147)
WBC # BLD AUTO: 12.12 THOUSAND/UL (ref 4.31–10.16)

## 2023-03-18 RX ADMIN — PANTOPRAZOLE SODIUM 40 MG: 40 TABLET, DELAYED RELEASE ORAL at 09:52

## 2023-03-18 RX ADMIN — ALUMINUM HYDROXIDE, MAGNESIUM HYDROXIDE, AND DIMETHICONE 30 ML: 200; 20; 200 SUSPENSION ORAL at 18:44

## 2023-03-18 RX ADMIN — INSULIN LISPRO 1 UNITS: 100 INJECTION, SOLUTION INTRAVENOUS; SUBCUTANEOUS at 17:35

## 2023-03-18 RX ADMIN — ATORVASTATIN CALCIUM 40 MG: 40 TABLET, FILM COATED ORAL at 17:34

## 2023-03-18 RX ADMIN — GABAPENTIN 100 MG: 100 CAPSULE ORAL at 17:34

## 2023-03-18 RX ADMIN — CLOPIDOGREL BISULFATE 75 MG: 75 TABLET ORAL at 09:52

## 2023-03-18 RX ADMIN — Medication 12.5 MG: at 21:39

## 2023-03-18 RX ADMIN — Medication 12.5 MG: at 09:52

## 2023-03-18 RX ADMIN — GABAPENTIN 100 MG: 100 CAPSULE ORAL at 09:52

## 2023-03-18 RX ADMIN — METOCLOPRAMIDE 5 MG: 5 INJECTION, SOLUTION INTRAMUSCULAR; INTRAVENOUS at 17:34

## 2023-03-18 RX ADMIN — GUAIFENESIN 600 MG: 600 TABLET ORAL at 09:52

## 2023-03-18 RX ADMIN — METOCLOPRAMIDE 5 MG: 5 INJECTION, SOLUTION INTRAMUSCULAR; INTRAVENOUS at 09:52

## 2023-03-18 RX ADMIN — ENOXAPARIN SODIUM 40 MG: 40 INJECTION SUBCUTANEOUS at 09:52

## 2023-03-18 RX ADMIN — METOCLOPRAMIDE 5 MG: 5 INJECTION, SOLUTION INTRAMUSCULAR; INTRAVENOUS at 03:04

## 2023-03-18 RX ADMIN — DOCUSATE SODIUM 100 MG: 100 CAPSULE, LIQUID FILLED ORAL at 17:34

## 2023-03-18 RX ADMIN — DOCUSATE SODIUM 100 MG: 100 CAPSULE, LIQUID FILLED ORAL at 09:52

## 2023-03-18 RX ADMIN — GUAIFENESIN 600 MG: 600 TABLET ORAL at 21:39

## 2023-03-18 RX ADMIN — INSULIN LISPRO 1 UNITS: 100 INJECTION, SOLUTION INTRAVENOUS; SUBCUTANEOUS at 21:39

## 2023-03-18 RX ADMIN — CEFTRIAXONE SODIUM 1000 MG: 10 INJECTION, POWDER, FOR SOLUTION INTRAVENOUS at 23:50

## 2023-03-18 RX ADMIN — GABAPENTIN 100 MG: 100 CAPSULE ORAL at 21:39

## 2023-03-18 NOTE — ASSESSMENT & PLAN NOTE
Lab Results   Component Value Date    HGBA1C 6 2 (H) 10/20/2022       Recent Labs     03/17/23  1124 03/17/23  1613 03/17/23  2106 03/18/23  0630   POCGLU 135 183* 202* 139       Blood Sugar Average: Last 72 hrs:  · (P) 190Blood glucose checks 4 times daily, hypoglycemia protocol  · Hold home Januvia and metformin  · SSI

## 2023-03-18 NOTE — UTILIZATION REVIEW
Date:    3/18        Day 3: Has surpassed a 2nd midnight with active treatments and services  IP ADMISSION  3/16   With  Sepsis  Pneumonia    3/18     Continue DENISE  Now  Day  #  4  Monitor labs and temps  Blood cultures  Negative thus far  Feels  Improved

## 2023-03-18 NOTE — PROGRESS NOTES
[address] [facility]  Progress Note Laura Flannery 1950, 71 y o  male MRN: [id number]  Unit/Bed#: -01 Encounter: [id number]  Primary Care Provider: Henry Dueñas DO   Date and time admitted to hospital: 3/15/2023 10:49 PM    * Sepsis [facility])  Assessment & Plan  · Meeting criteria due to tachycardia, tachypnea, leukocytosis in setting of pneumonia on admission   · Procalcitonin slightly elevated, tending down   · Lactic acid WNL, blood culture x2 negative thus far   · S/p IV fluid   · Monitor fever curves and WBC counts     Intractable hiccups  Assessment & Plan  · Ongoing for almost a week  · No relief from mylanta and baclofen   · Trial of gabapentin and reglan, slightly better     Pneumonia  Assessment & Plan  · CT chest revealing patchy consolidation opacity left greater than right bases with tree-in-bud opacities in bilateral lower lobes, pneumonia versus aspiration  · Continue ceftriaxone D#4  · Aspiration precautions     Type 2 diabetes mellitus with stage 2 chronic kidney disease, with long-term current use of insulin [facility])  Assessment & Plan  Lab Results   Component Value Date    HGBA1C 6 2 (H) 10/20/2022       Recent Labs     03/17/23  1124 03/17/23  1613 03/17/23  2106 03/18/23  0630   POCGLU 135 183* 202* 139       Blood Sugar Average: Last 72 hrs:  · (P) 190Blood glucose checks 4 times daily, hypoglycemia protocol  · Hold home Januvia and metformin  · SSI      VTE Pharmacologic Prophylaxis:   Pharmacologic: Enoxaparin (Lovenox)  Mechanical VTE Prophylaxis in Place: Yes    Review of Systems:    Review of Systems   Constitutional: Negative for chills and fever  HENT: Negative for ear pain and sore throat  Eyes: Negative for pain and visual disturbance  Respiratory: Negative for cough and shortness of breath  Cardiovascular: Negative for chest pain and palpitations  Gastrointestinal: Negative for abdominal pain and vomiting     Genitourinary: Negative for dysuria and hematuria  Musculoskeletal: Negative for arthralgias and back pain  Skin: Negative for color change and rash  Neurological: Negative for seizures and syncope  All other systems reviewed and are negative  Past Medical and Surgical History:     Past Medical History:   Diagnosis Date   • Colon polyp    • CVA (cerebral vascular accident) ([facility] ) 2019    Bilateral pontine lacunar infarct   • Diabetes mellitus ([place] [address] )    • Esophagitis    • Hyperlipidemia    • Hypertension    • Non-sustained ventricular tachycardia    • Stroke [facility])    • Type 2 diabetes mellitus ([place] [address] )        Past Surgical History:   Procedure Laterality Date   • COLONOSCOPY     • ESOPHAGOGASTRODUODENOSCOPY N/A 2019    Procedure: ESOPHAGOGASTRODUODENOSCOPY (EGD); Surgeon: Karena Nicole DO;  Location: [facility]; Service: Gastroenterology       Patient Centered Rounds: I have performed bedside rounds with nursing staff today  Discussions with Specialists or Other Care Team Provider: Nursing    Education and Discussions with Family / Patient: patient     Time Spent for Care: 45 minutes  More than 50% of total time spent on counseling and coordination of care as described above  Current Length of Stay: 2 day(s)    Current Patient Status: Inpatient   Certification Statement: The patient will continue to require additional inpatient hospital stay due to pending clinical improvement     Discharge Plan: tomorrow     Code Status: Level 1 - Full Code      Subjective:   Feels better     Objective:     Vitals:   Temp (24hrs), Av 6 °F (37 °C), Min:98 1 °F (36 7 °C), Max:99 4 °F (37 4 °C)    Temp:  [98 1 °F (36 7 °C)-99 4 °F (37 4 °C)] 98 2 °F (36 8 °C)  HR:  [] 102  Resp:  [17] 17  BP: (117-153)/(72-85) 120/79  SpO2:  [95 %-99 %] 98 %  Body mass index is 20 62 kg/m²  Input and Output Summary (last 24 hours):        Intake/Output Summary (Last 24 hours) at 3/18/2023 1043  Last data filed at 3/18/2023 0900  Gross per 24 hour Intake 180 ml   Output 1175 ml   Net -995 ml       Physical Exam:     Physical Exam  Vitals and nursing note reviewed  Constitutional:       General: He is not in acute distress  Appearance: He is well-developed  He is not ill-appearing or diaphoretic  HENT:      Head: Normocephalic and atraumatic  Mouth/Throat:      Mouth: Mucous membranes are moist       Pharynx: Oropharynx is clear  Eyes:      General: No scleral icterus  Conjunctiva/sclera: Conjunctivae normal    Cardiovascular:      Rate and Rhythm: Normal rate and regular rhythm  Heart sounds: No murmur heard  Pulmonary:      Effort: Pulmonary effort is normal  No respiratory distress  Breath sounds: No wheezing or rales  Abdominal:      General: Bowel sounds are normal       Palpations: Abdomen is soft  Tenderness: There is no abdominal tenderness  Musculoskeletal:         General: No swelling  Cervical back: Neck supple  Skin:     General: Skin is warm and dry  Capillary Refill: Capillary refill takes less than 2 seconds  Neurological:      General: No focal deficit present  Mental Status: He is alert and oriented to person, place, and time     Psychiatric:         Mood and Affect: Mood normal          Behavior: Behavior normal            Additional Data:     Labs:    Results from last 7 days   Lab Units 03/18/23  0436   WBC Thousand/uL 12 12*   HEMOGLOBIN g/dL 11 7*   HEMATOCRIT % 35 7*   PLATELETS Thousands/uL 348   NEUTROS PCT % 75   LYMPHS PCT % 16   MONOS PCT % 6   EOS PCT % 2     Results from last 7 days   Lab Units 03/18/23  0436 03/17/23  0538 03/15/23  2323   SODIUM mmol/L 137   < > 135   POTASSIUM mmol/L 4 1   < > 4 2   CHLORIDE mmol/L 104   < > 99   CO2 mmol/L 26   < > 25   BUN mg/dL 10   < > 11   CREATININE mg/dL 0 75   < > 0 82   ANION GAP mmol/L 7   < > 11   CALCIUM mg/dL 9 5   < > 10 2   ALBUMIN g/dL  --   --  3 5   TOTAL BILIRUBIN mg/dL  --   --  1 00   ALK PHOS U/L  --   --  96   ALT U/L  --   --  45   AST U/L  --   --  44*   GLUCOSE RANDOM mg/dL 150*   < > 217*    < > = values in this interval not displayed  Results from last 7 days   Lab Units 03/18/23  0630 03/17/23  2106 03/17/23  1613 03/17/23  1124 03/17/23  0647 03/16/23  2051 03/16/23  1527 03/16/23  1236 03/16/23  0913   POC GLUCOSE mg/dl 139 202* 183* 135 200* 175* 130 263* 283*         Results from last 7 days   Lab Units 03/18/23  0436 03/17/23  0538 03/16/23  0204 03/15/23  2323   LACTIC ACID mmol/L  --   --  1 6  --    PROCALCITONIN ng/ml 0 24 0 33*  --  0 40*           * I Have Reviewed All Lab Data Listed Above  * Additional Pertinent Lab Tests Reviewed: [facility] Admission Reviewed    Imaging:    Imaging Reports Reviewed Today Include: none  Imaging Personally Reviewed by Myself Includes:  none    Recent Cultures (last 7 days):     Results from last 7 days   Lab Units 03/16/23  1309 03/16/23  0205 03/16/23  0204   BLOOD CULTURE   --  No Growth at 48 hrs  No Growth at 48 hrs     LEGIONELLA URINARY ANTIGEN  Negative  --   --        Last 24 Hours Medication List:   Current Facility-Administered Medications   Medication Dose Route Frequency Provider Last Rate   • acetaminophen  650 mg Oral Q6H PRN Karishma Key PA-C     • aluminum-magnesium hydroxide-simethicone  30 mL Oral Q4H PRN Karishma Key PA-C     • atorvastatin  40 mg Oral QPM Karishma Key PA-C     • cefTRIAXone  1,000 mg Intravenous Q24H Karishma Key PA-C 1,000 mg (03/17/23 2327)   • clopidogrel  75 mg Oral Daily Karishma Key PA-C     • docusate sodium  100 mg Oral BID Karishma Key PA-C     • enoxaparin  40 mg Subcutaneous Daily Karishma Key PA-C     • gabapentin  100 mg Oral TID Alvarado Armas MD     • guaiFENesin  600 mg Oral Q12H [address] Alvarado Armas MD     • insulin lispro  1-5 Units Subcutaneous TID AC Tisha Huffman PA-C     • insulin lispro  1-5 Units Subcutaneous HS Karishma Key PA-C     • metoclopramide  5 mg Intravenous Lynn Veras MD     • metoprolol tartrate  12 5 mg Oral Q12H [address] Angela Benedict PA-C     • pantoprazole  40 mg Oral Daily Angela Benedict PA-C     • polyethylene glycol  17 g Oral Daily PRN Angela Benedict PA-C          Today, Patient Was Seen By: Neymar Wright MD    ** Please Note: Dictation voice to text software may have been used in the creation of this document   **

## 2023-03-18 NOTE — ASSESSMENT & PLAN NOTE
· CT chest revealing patchy consolidation opacity left greater than right bases with tree-in-bud opacities in bilateral lower lobes, pneumonia versus aspiration  · Continue ceftriaxone D#4  · Aspiration precautions

## 2023-03-18 NOTE — ASSESSMENT & PLAN NOTE
· Ongoing for almost a week  · No relief from mylanta and baclofen   · Trial of gabapentin and reglan, slightly better

## 2023-03-18 NOTE — PLAN OF CARE
Problem: Nutrition/Hydration-ADULT  Goal: Nutrient/Hydration intake appropriate for improving, restoring or maintaining nutritional needs  Description: Monitor and assess patient's nutrition/hydration status for malnutrition  Collaborate with interdisciplinary team and initiate plan and interventions as ordered  Monitor patient's weight and dietary intake as ordered or per policy  Utilize nutrition screening tool and intervene as necessary  Determine patient's food preferences and provide high-protein, high-caloric foods as appropriate       INTERVENTIONS:  - Monitor oral intake, urinary output, labs, and treatment plans  - Assess nutrition and hydration status and recommend course of action  - Evaluate amount of meals eaten  - Assist patient with eating if necessary   - Allow adequate time for meals  - Recommend/ encourage appropriate diets, oral nutritional supplements, and vitamin/mineral supplements  - Order, calculate, and assess calorie counts as needed  - Recommend, monitor, and adjust tube feedings and TPN/PPN based on assessed needs  - Assess need for intravenous fluids  - Provide specific nutrition/hydration education as appropriate  - Include patient/family/caregiver in decisions related to nutrition  Outcome: Progressing     Problem: PAIN - ADULT  Goal: Verbalizes/displays adequate comfort level or baseline comfort level  Description: Interventions:  - Encourage patient to monitor pain and request assistance  - Assess pain using appropriate pain scale  - Administer analgesics based on type and severity of pain and evaluate response  - Implement non-pharmacological measures as appropriate and evaluate response  - Consider cultural and social influences on pain and pain management  - Notify physician/advanced practitioner if interventions unsuccessful or patient reports new pain  Outcome: Progressing     Problem: INFECTION - ADULT  Goal: Absence or prevention of progression during hospitalization  Description: INTERVENTIONS:  - Assess and monitor for signs and symptoms of infection  - Monitor lab/diagnostic results  - Monitor all insertion sites, i e  indwelling lines, tubes, and drains  - Monitor endotracheal if appropriate and nasal secretions for changes in amount and color  - [place] appropriate cooling/warming therapies per order  - Administer medications as ordered  - Instruct and encourage patient and family to use good hand hygiene technique  - Identify and instruct in appropriate isolation precautions for identified infection/condition  Outcome: Progressing  Goal: Absence of fever/infection during neutropenic period  Description: INTERVENTIONS:  - Monitor WBC    Outcome: Progressing     Problem: SAFETY ADULT  Goal: Patient will remain free of falls  Description: INTERVENTIONS:  - Educate patient/family on patient safety including physical limitations  - Instruct patient to call for assistance with activity   - Consult OT/PT to assist with strengthening/mobility   - Keep Call bell within reach  - Keep bed low and locked with side rails adjusted as appropriate  - Keep care items and personal belongings within reach  - Initiate and maintain comfort rounds  - Make Fall Risk Sign visible to staff  - Offer Toileting every 2 Hours, in advance of need  - Initiate/Maintain bedalarm  - Obtain necessary fall risk management equipment  - Apply yellow socks and bracelet for high fall risk patients  - Consider moving patient to room near nurses station  Outcome: Progressing  Goal: Maintain or return to baseline ADL function  Description: INTERVENTIONS:  -  Assess patient's ability to carry out ADLs; assess patient's baseline for ADL function and identify physical deficits which impact ability to perform ADLs (bathing, care of mouth/teeth, toileting, grooming, dressing, etc )  - Assess/evaluate cause of self-care deficits   - Assess range of motion  - Assess patient's mobility; develop plan if impaired  - Assess patient's need for assistive devices and provide as appropriate  - Encourage maximum independence but intervene and supervise when necessary  - Involve family in performance of ADLs  - Assess for home care needs following discharge   - Consider OT consult to assist with ADL evaluation and planning for discharge  - Provide patient education as appropriate  Outcome: Progressing  Goal: Maintains/Returns to pre admission functional level  Description: INTERVENTIONS:  - Perform BMAT or MOVE assessment daily    - Set and communicate daily mobility goal to care team and patient/family/caregiver  - Collaborate with rehabilitation services on mobility goals if consulted  - Perform Range of Motion 3 times a day  - Reposition patient every 2 hours    - Dangle patient 3 times a day  - Stand patient 3 times a day  - Ambulate patient 3 times a day  - Out of bed to chair 3 times a day   - Out of bed for meals 3 times a day  - Out of bed for toileting  - Record patient progress and toleration of activity level   Outcome: Progressing     Problem: DISCHARGE PLANNING  Goal: Discharge to home or other facility with appropriate resources  Description: INTERVENTIONS:  - Identify barriers to discharge w/patient and caregiver  - Arrange for needed discharge resources and transportation as appropriate  - Identify discharge learning needs (meds, wound care, etc )  - Arrange for interpretive services to assist at discharge as needed  - Refer to Case Management Department for coordinating discharge planning if the patient needs post-hospital services based on physician/advanced practitioner order or complex needs related to functional status, cognitive ability, or social support system  Outcome: Progressing     Problem: Knowledge Deficit  Goal: Patient/family/caregiver demonstrates understanding of disease process, treatment plan, medications, and discharge instructions  Description: Complete learning assessment and assess knowledge base    Interventions:  - Provide teaching at level of understanding  - Provide teaching via preferred learning methods  Outcome: Progressing     Problem: METABOLIC, FLUID AND ELECTROLYTES - ADULT  Goal: Glucose maintained within target range  Description: INTERVENTIONS:  - Monitor Blood Glucose as ordered  - Assess for signs and symptoms of hyperglycemia and hypoglycemia  - Administer ordered medications to maintain glucose within target range  - Assess nutritional intake and initiate nutrition service referral as needed  Outcome: Progressing

## 2023-03-19 VITALS
OXYGEN SATURATION: 96 % | WEIGHT: 123.9 LBS | HEIGHT: 65 IN | HEART RATE: 96 BPM | DIASTOLIC BLOOD PRESSURE: 80 MMHG | BODY MASS INDEX: 20.64 KG/M2 | TEMPERATURE: 97.5 F | SYSTOLIC BLOOD PRESSURE: 118 MMHG | RESPIRATION RATE: 20 BRPM

## 2023-03-19 LAB
BASOPHILS # BLD AUTO: 0.04 THOUSANDS/ÂΜL (ref 0–0.1)
BASOPHILS NFR BLD AUTO: 0 % (ref 0–1)
EOSINOPHIL # BLD AUTO: 0.19 THOUSAND/ÂΜL (ref 0–0.61)
EOSINOPHIL NFR BLD AUTO: 2 % (ref 0–6)
ERYTHROCYTE [DISTWIDTH] IN BLOOD BY AUTOMATED COUNT: 14.3 % (ref 11.6–15.1)
GLUCOSE SERPL-MCNC: 144 MG/DL (ref 65–140)
HCT VFR BLD AUTO: 37.2 % (ref 36.5–49.3)
HGB BLD-MCNC: 12.2 G/DL (ref 12–17)
IMM GRANULOCYTES # BLD AUTO: 0.1 THOUSAND/UL (ref 0–0.2)
IMM GRANULOCYTES NFR BLD AUTO: 1 % (ref 0–2)
LYMPHOCYTES # BLD AUTO: 2.37 THOUSANDS/ÂΜL (ref 0.6–4.47)
LYMPHOCYTES NFR BLD AUTO: 20 % (ref 14–44)
MCH RBC QN AUTO: 28.3 PG (ref 26.8–34.3)
MCHC RBC AUTO-ENTMCNC: 32.8 G/DL (ref 31.4–37.4)
MCV RBC AUTO: 86 FL (ref 82–98)
MONOCYTES # BLD AUTO: 0.72 THOUSAND/ÂΜL (ref 0.17–1.22)
MONOCYTES NFR BLD AUTO: 6 % (ref 4–12)
NEUTROPHILS # BLD AUTO: 8.71 THOUSANDS/ÂΜL (ref 1.85–7.62)
NEUTS SEG NFR BLD AUTO: 71 % (ref 43–75)
NRBC BLD AUTO-RTO: 0 /100 WBCS
PLATELET # BLD AUTO: 392 THOUSANDS/UL (ref 149–390)
PMV BLD AUTO: 10.3 FL (ref 8.9–12.7)
RBC # BLD AUTO: 4.31 MILLION/UL (ref 3.88–5.62)
WBC # BLD AUTO: 12.13 THOUSAND/UL (ref 4.31–10.16)

## 2023-03-19 RX ORDER — CEFDINIR 300 MG/1
300 CAPSULE ORAL EVERY 12 HOURS SCHEDULED
Qty: 6 CAPSULE | Refills: 0 | Status: SHIPPED | OUTPATIENT
Start: 2023-03-19 | End: 2023-03-23 | Stop reason: ALTCHOICE

## 2023-03-19 RX ADMIN — DOCUSATE SODIUM 100 MG: 100 CAPSULE, LIQUID FILLED ORAL at 09:12

## 2023-03-19 RX ADMIN — CLOPIDOGREL BISULFATE 75 MG: 75 TABLET ORAL at 09:12

## 2023-03-19 RX ADMIN — GABAPENTIN 100 MG: 100 CAPSULE ORAL at 09:12

## 2023-03-19 RX ADMIN — GUAIFENESIN 600 MG: 600 TABLET ORAL at 09:12

## 2023-03-19 RX ADMIN — Medication 12.5 MG: at 09:12

## 2023-03-19 RX ADMIN — PANTOPRAZOLE SODIUM 40 MG: 40 TABLET, DELAYED RELEASE ORAL at 09:12

## 2023-03-19 RX ADMIN — ENOXAPARIN SODIUM 40 MG: 40 INJECTION SUBCUTANEOUS at 09:14

## 2023-03-19 NOTE — PLAN OF CARE
Problem: PAIN - ADULT  Goal: Verbalizes/displays adequate comfort level or baseline comfort level  Description: Interventions:  - Encourage patient to monitor pain and request assistance  - Assess pain using appropriate pain scale  - Administer analgesics based on type and severity of pain and evaluate response  - Implement non-pharmacological measures as appropriate and evaluate response  - Consider cultural and social influences on pain and pain management  - Notify physician/advanced practitioner if interventions unsuccessful or patient reports new pain  Outcome: Progressing     Problem: INFECTION - ADULT  Goal: Absence or prevention of progression during hospitalization  Description: INTERVENTIONS:  - Assess and monitor for signs and symptoms of infection  - Monitor lab/diagnostic results  - Monitor all insertion sites, i e  indwelling lines, tubes, and drains  - Monitor endotracheal if appropriate and nasal secretions for changes in amount and color  - [place] appropriate cooling/warming therapies per order  - Administer medications as ordered  - Instruct and encourage patient and family to use good hand hygiene technique  - Identify and instruct in appropriate isolation precautions for identified infection/condition  Outcome: Progressing

## 2023-03-19 NOTE — PLAN OF CARE
Problem: Nutrition/Hydration-ADULT  Goal: Nutrient/Hydration intake appropriate for improving, restoring or maintaining nutritional needs  Description: Monitor and assess patient's nutrition/hydration status for malnutrition  Collaborate with interdisciplinary team and initiate plan and interventions as ordered  Monitor patient's weight and dietary intake as ordered or per policy  Utilize nutrition screening tool and intervene as necessary  Determine patient's food preferences and provide high-protein, high-caloric foods as appropriate       INTERVENTIONS:  - Monitor oral intake, urinary output, labs, and treatment plans  - Assess nutrition and hydration status and recommend course of action  - Evaluate amount of meals eaten  - Assist patient with eating if necessary   - Allow adequate time for meals  - Recommend/ encourage appropriate diets, oral nutritional supplements, and vitamin/mineral supplements  - Order, calculate, and assess calorie counts as needed  - Recommend, monitor, and adjust tube feedings and TPN/PPN based on assessed needs  - Assess need for intravenous fluids  - Provide specific nutrition/hydration education as appropriate  - Include patient/family/caregiver in decisions related to nutrition  Outcome: Progressing     Problem: PAIN - ADULT  Goal: Verbalizes/displays adequate comfort level or baseline comfort level  Description: Interventions:  - Encourage patient to monitor pain and request assistance  - Assess pain using appropriate pain scale  - Administer analgesics based on type and severity of pain and evaluate response  - Implement non-pharmacological measures as appropriate and evaluate response  - Consider cultural and social influences on pain and pain management  - Notify physician/advanced practitioner if interventions unsuccessful or patient reports new pain  Outcome: Progressing     Problem: INFECTION - ADULT  Goal: Absence or prevention of progression during hospitalization  Description: INTERVENTIONS:  - Assess and monitor for signs and symptoms of infection  - Monitor lab/diagnostic results  - Monitor all insertion sites, i e  indwelling lines, tubes, and drains  - Monitor endotracheal if appropriate and nasal secretions for changes in amount and color  - [place] appropriate cooling/warming therapies per order  - Administer medications as ordered  - Instruct and encourage patient and family to use good hand hygiene technique  - Identify and instruct in appropriate isolation precautions for identified infection/condition  Outcome: Progressing  Goal: Absence of fever/infection during neutropenic period  Description: INTERVENTIONS:  - Monitor WBC    Outcome: Progressing     Problem: SAFETY ADULT  Goal: Patient will remain free of falls  Description: INTERVENTIONS:  - Educate patient/family on patient safety including physical limitations  - Instruct patient to call for assistance with activity   - Consult OT/PT to assist with strengthening/mobility   - Keep Call bell within reach  - Keep bed low and locked with side rails adjusted as appropriate  - Keep care items and personal belongings within reach  - Initiate and maintain comfort rounds  - Make Fall Risk Sign visible to staff  - Offer Toileting every 3 Hours, in advance of need  - Initiate/Maintain bed alarm  - Obtain necessary fall risk management equipment  - Apply yellow socks and bracelet for high fall risk patients  - Consider moving patient to room near nurses station  Outcome: Progressing  Goal: Maintain or return to baseline ADL function  Description: INTERVENTIONS:  -  Assess patient's ability to carry out ADLs; assess patient's baseline for ADL function and identify physical deficits which impact ability to perform ADLs (bathing, care of mouth/teeth, toileting, grooming, dressing, etc )  - Assess/evaluate cause of self-care deficits   - Assess range of motion  - Assess patient's mobility; develop plan if impaired  - Assess patient's need for assistive devices and provide as appropriate  - Encourage maximum independence but intervene and supervise when necessary  - Involve family in performance of ADLs  - Assess for home care needs following discharge   - Consider OT consult to assist with ADL evaluation and planning for discharge  - Provide patient education as appropriate  Outcome: Progressing  Goal: Maintains/Returns to pre admission functional level  Description: INTERVENTIONS:  - Perform BMAT or MOVE assessment daily    - Set and communicate daily mobility goal to care team and patient/family/caregiver  - Collaborate with rehabilitation services on mobility goals if consulted  - Perform Range of Motion 3 times a day  - Reposition patient every 3 hours    - Dangle patient 3 times a day  - Stand patient 3 times a day  - Ambulate patient 3 times a day  - Out of bed to chair 3 times a day   - Out of bed for meals 3 times a day  - Out of bed for toileting  - Record patient progress and toleration of activity level   Outcome: Progressing     Problem: DISCHARGE PLANNING  Goal: Discharge to home or other facility with appropriate resources  Description: INTERVENTIONS:  - Identify barriers to discharge w/patient and caregiver  - Arrange for needed discharge resources and transportation as appropriate  - Identify discharge learning needs (meds, wound care, etc )  - Arrange for interpretive services to assist at discharge as needed  - Refer to Case Management Department for coordinating discharge planning if the patient needs post-hospital services based on physician/advanced practitioner order or complex needs related to functional status, cognitive ability, or social support system  Outcome: Progressing     Problem: Knowledge Deficit  Goal: Patient/family/caregiver demonstrates understanding of disease process, treatment plan, medications, and discharge instructions  Description: Complete learning assessment and assess knowledge base    Interventions:  - Provide teaching at level of understanding  - Provide teaching via preferred learning methods  Outcome: Progressing     Problem: METABOLIC, FLUID AND ELECTROLYTES - ADULT  Goal: Glucose maintained within target range  Description: INTERVENTIONS:  - Monitor Blood Glucose as ordered  - Assess for signs and symptoms of hyperglycemia and hypoglycemia  - Administer ordered medications to maintain glucose within target range  - Assess nutritional intake and initiate nutrition service referral as needed  Outcome: Progressing

## 2023-03-19 NOTE — DISCHARGE SUMMARY
[address] [facility]  Discharge- Ramonita Osorio 1950, 71 y o  male MRN: [id number]  Unit/Bed#: -01 Encounter: [id number]  Primary Care Provider: Samy Fulton DO   Date and time admitted to hospital: 3/15/2023 10:49 PM    * Sepsis [facility])  Assessment & Plan  · Meeting criteria due to tachycardia, tachypnea, leukocytosis in setting of pneumonia on admission   · Procalcitonin slightly elevated, tending down   · Lactic acid WNL, blood culture x2 negative thus far   · S/p IV fluid   · Resolved    Intractable hiccups  Assessment & Plan  · Ongoing for almost a week  · No relief from mylanta and baclofen   · Trial of gabapentin and reglan  · Improved    Pneumonia  Assessment & Plan  · CT chest revealing patchy consolidation opacity left greater than right bases with tree-in-bud opacities in bilateral lower lobes, pneumonia versus aspiration  · Transition to p o  antibiotics to complete course      Type 2 diabetes mellitus with stage 2 chronic kidney disease, with long-term current use of insulin [facility])  Assessment & Plan  Lab Results   Component Value Date    HGBA1C 6 2 (H) 10/20/2022       Recent Labs     03/18/23  1138 03/18/23  1622 03/18/23  2105 03/19/23  0708   POCGLU 147* 205* 161* 144*       Blood Sugar Average: Last 72 hrs:  · (P) 182 [id number] glucose checks 4 times daily, hypoglycemia protocol  · Continue home meds      Discharging Physician / Practitioner: Jeanine Cervantes MD  PCP: Samy Fulton DO  Admission Date:   Admission Orders (From admission, onward)     Ordered        03/16/23 0138  INPATIENT ADMISSION  Once                      Discharge Date: 03/19/23    Medical Problems     Resolved Problems  Date Reviewed: 3/19/2023   None         Consultations During Hospital Stay:  · none      Reason for Admission: Shortness of breath and cough    Hospital Course:     Ramonita Osorio is a 71 y o  male patient who originally presented to the hospital on 3/15/2023 due to pneumonia treated with IV antibiotic with significant improvement of symptoms  Patient remained hemodynamically stable and satting well on room air  Patient is medically stable for discharge with p o  antibiotic to complete total 7 days course  Please see above list of diagnoses and related plan for additional information  Condition at Discharge: stable     Discharge Day Visit / Exam:     Subjective: Feels much better  Vitals: Blood Pressure: 118/80 (03/19/23 0719)  Pulse: 96 (03/19/23 0719)  Temperature: 97 5 °F (36 4 °C) (03/19/23 0719)  Temp Source: Oral (03/16/23 2305)  Respirations: 20 (03/19/23 0719)  Height: 5' 5" (165 1 cm) (03/17/23 0904)  Weight - Scale: 56 2 kg (123 lb 14 4 oz) (03/17/23 0904)  SpO2: 96 % (03/19/23 0719)  Exam:   Physical Exam  Vitals and nursing note reviewed  Constitutional:       General: He is not in acute distress  Appearance: He is well-developed  He is not ill-appearing or diaphoretic  HENT:      Head: Normocephalic and atraumatic  Mouth/Throat:      Mouth: Mucous membranes are moist       Pharynx: Oropharynx is clear  Eyes:      General: No scleral icterus  Conjunctiva/sclera: Conjunctivae normal    Cardiovascular:      Rate and Rhythm: Normal rate and regular rhythm  Heart sounds: No murmur heard  Pulmonary:      Effort: Pulmonary effort is normal  No respiratory distress  Breath sounds: Normal breath sounds  No wheezing or rales  Abdominal:      General: Bowel sounds are normal       Palpations: Abdomen is soft  Tenderness: There is no abdominal tenderness  Musculoskeletal:         General: No swelling  Cervical back: Normal range of motion and neck supple  Right lower leg: No edema  Left lower leg: No edema  Skin:     General: Skin is warm and dry  Capillary Refill: Capillary refill takes less than 2 seconds  Neurological:      General: No focal deficit present        Mental Status: He is alert and oriented to person, place, and time  Psychiatric:         Mood and Affect: Mood normal          Behavior: Behavior normal          Discussion with Family: Wife was updated    Discharge instructions/Information to patient and family:   See after visit summary for information provided to patient and family  Provisions for Follow-Up Care:  See after visit summary for information related to follow-up care and any pertinent home health orders  Disposition:     Home        Planned Readmission: no     Discharge Statement:  I spent 45 minutes discharging the patient  This time was spent on the day of discharge  I had direct contact with the patient on the day of discharge  Greater than 50% of the total time was spent examining patient, answering all patient questions, arranging and discussing plan of care with patient as well as directly providing post-discharge instructions  Additional time then spent on discharge activities  Discharge Medications:  See after visit summary for reconciled discharge medications provided to patient and family        ** Please Note: This note has been constructed using a voice recognition system **

## 2023-03-19 NOTE — CASE MANAGEMENT
Case Management Progress Note    Patient name Beryl Anderson  Location [address] 428/-57 MRN [id number]  : 1950 Date 3/19/2023       LOS (days): 3  Geometric Mean LOS (GMLOS) (days): 5 00  Days to GMLOS:1 6        OBJECTIVE:        Current admission status: Inpatient  Preferred Pharmacy:   [address] [place] [id number]  Phone: [phone] Fax: [phone]    Primary Care Provider: Oswald Seay DO    Primary Insurance: Celeste Jaramillo [address] REP  Secondary Insurance:     PROGRESS NOTE:IMM reviewed with patient, patient agrees with discharge determination

## 2023-03-19 NOTE — SEPSIS NOTE
Sepsis Note   Charity Westbrook 71 y o  male MRN: [id number]  Unit/Bed#: -01 Encounter: [id number]       Initial Sepsis Screening     Row Name 03/16/23 0030                Is the patient's history suggestive of a new or worsening infection? Yes (Proceed)  -CE        Suspected source of infection pneumonia  -CE        Indicate SIRS criteria Tachycardia > 90 bpm;Leukocytosis (WBC > 00768 IJL) OR Leukopenia (WBC <4000 IJL) OR Bandemia (WBC >10% bands)  -CE        Are two or more of the above signs & symptoms of infection both present and new to the patient? Yes (Proceed)  -CE        Assess for evidence of organ dysfunction: Are any of the below criteria present within 6 hours of suspected infection and SIRS criteria that are NOT considered to be chronic conditions? --              User Key  (r) = Recorded By, (t) = Taken By, (c) = Cosigned By    [address] Name Provider Liam Givens MD Physician                    Body mass index is 20 62 kg/m²    Wt Readings from Last 1 Encounters:   03/17/23 56 2 kg (123 lb 14 4 oz)     IBW (Ideal Body Weight): 61 5 kg    Ideal body weight: 61 5 kg (135 lb 9 3 oz)

## 2023-03-19 NOTE — PLAN OF CARE
Problem: Nutrition/Hydration-ADULT  Goal: Nutrient/Hydration intake appropriate for improving, restoring or maintaining nutritional needs  Description: Monitor and assess patient's nutrition/hydration status for malnutrition  Collaborate with interdisciplinary team and initiate plan and interventions as ordered  Monitor patient's weight and dietary intake as ordered or per policy  Utilize nutrition screening tool and intervene as necessary  Determine patient's food preferences and provide high-protein, high-caloric foods as appropriate       INTERVENTIONS:  - Monitor oral intake, urinary output, labs, and treatment plans  - Assess nutrition and hydration status and recommend course of action  - Evaluate amount of meals eaten  - Assist patient with eating if necessary   - Allow adequate time for meals  - Recommend/ encourage appropriate diets, oral nutritional supplements, and vitamin/mineral supplements  - Order, calculate, and assess calorie counts as needed  - Recommend, monitor, and adjust tube feedings and TPN/PPN based on assessed needs  - Assess need for intravenous fluids  - Provide specific nutrition/hydration education as appropriate  - Include patient/family/caregiver in decisions related to nutrition  Outcome: Adequate for Discharge     Problem: PAIN - ADULT  Goal: Verbalizes/displays adequate comfort level or baseline comfort level  Description: Interventions:  - Encourage patient to monitor pain and request assistance  - Assess pain using appropriate pain scale  - Administer analgesics based on type and severity of pain and evaluate response  - Implement non-pharmacological measures as appropriate and evaluate response  - Consider cultural and social influences on pain and pain management  - Notify physician/advanced practitioner if interventions unsuccessful or patient reports new pain  Outcome: Adequate for Discharge     Problem: INFECTION - ADULT  Goal: Absence or prevention of progression during hospitalization  Description: INTERVENTIONS:  - Assess and monitor for signs and symptoms of infection  - Monitor lab/diagnostic results  - Monitor all insertion sites, i e  indwelling lines, tubes, and drains  - Monitor endotracheal if appropriate and nasal secretions for changes in amount and color  - [place] appropriate cooling/warming therapies per order  - Administer medications as ordered  - Instruct and encourage patient and family to use good hand hygiene technique  - Identify and instruct in appropriate isolation precautions for identified infection/condition  Outcome: Adequate for Discharge  Goal: Absence of fever/infection during neutropenic period  Description: INTERVENTIONS:  - Monitor WBC    Outcome: Adequate for Discharge     Problem: SAFETY ADULT  Goal: Patient will remain free of falls  Description: INTERVENTIONS:  - Educate patient/family on patient safety including physical limitations  - Instruct patient to call for assistance with activity   - Consult OT/PT to assist with strengthening/mobility   - Keep Call bell within reach  - Keep bed low and locked with side rails adjusted as appropriate  - Keep care items and personal belongings within reach  - Initiate and maintain comfort rounds  - Make Fall Risk Sign visible to staff  - Offer Toileting every  Hours, in advance of need  - Initiate/Maintain alarm  - Obtain necessary fall risk management equipment:   - Apply yellow socks and bracelet for high fall risk patients  - Consider moving patient to room near nurses station  Outcome: Adequate for Discharge  Goal: Maintain or return to baseline ADL function  Description: INTERVENTIONS:  -  Assess patient's ability to carry out ADLs; assess patient's baseline for ADL function and identify physical deficits which impact ability to perform ADLs (bathing, care of mouth/teeth, toileting, grooming, dressing, etc )  - Assess/evaluate cause of self-care deficits   - Assess range of motion  - Assess patient's mobility; develop plan if impaired  - Assess patient's need for assistive devices and provide as appropriate  - Encourage maximum independence but intervene and supervise when necessary  - Involve family in performance of ADLs  - Assess for home care needs following discharge   - Consider OT consult to assist with ADL evaluation and planning for discharge  - Provide patient education as appropriate  Outcome: Adequate for Discharge  Goal: Maintains/Returns to pre admission functional level  Description: INTERVENTIONS:  - Perform BMAT or MOVE assessment daily    - Set and communicate daily mobility goal to care team and patient/family/caregiver  - Collaborate with rehabilitation services on mobility goals if consulted  - Perform Range of Motion  times a day  - Reposition patient every  hours    - Dangle patient  times a day  - Stand patient  times a day  - Ambulate patient  times a day  - Out of bed to chair  times a day   - Out of bed for meals  times a day  - Out of bed for toileting  - Record patient progress and toleration of activity level   Outcome: Adequate for Discharge     Problem: DISCHARGE PLANNING  Goal: Discharge to home or other facility with appropriate resources  Description: INTERVENTIONS:  - Identify barriers to discharge w/patient and caregiver  - Arrange for needed discharge resources and transportation as appropriate  - Identify discharge learning needs (meds, wound care, etc )  - Arrange for interpretive services to assist at discharge as needed  - Refer to Case Management Department for coordinating discharge planning if the patient needs post-hospital services based on physician/advanced practitioner order or complex needs related to functional status, cognitive ability, or social support system  Outcome: Adequate for Discharge     Problem: Knowledge Deficit  Goal: Patient/family/caregiver demonstrates understanding of disease process, treatment plan, medications, and discharge instructions  Description: Complete learning assessment and assess knowledge base    Interventions:  - Provide teaching at level of understanding  - Provide teaching via preferred learning methods  Outcome: Adequate for Discharge     Problem: METABOLIC, FLUID AND ELECTROLYTES - ADULT  Goal: Glucose maintained within target range  Description: INTERVENTIONS:  - Monitor Blood Glucose as ordered  - Assess for signs and symptoms of hyperglycemia and hypoglycemia  - Administer ordered medications to maintain glucose within target range  - Assess nutritional intake and initiate nutrition service referral as needed  Outcome: Adequate for Discharge

## 2023-03-19 NOTE — CASE MANAGEMENT
Case Management Discharge Planning Note    Patient name Ken Potter  Location [address] 428/-46 MRN [id number]  : 1950 Date 3/19/2023       Current Admission Date: 3/15/2023  Current Admission Diagnosis:Sepsis [facility])   Patient Active Problem List    Diagnosis Date Noted   • Sepsis ([facility] ) 2023   • Pneumonia 2023   • Intractable hiccups 2023   • Mixed hyperlipidemia due to type 2 diabetes mellitus ([facility] ) 2019   • History of lacunar cerebrovascular accident (CVA) 2019   • Type 2 diabetes mellitus with stage 2 chronic kidney disease, with long-term current use of insulin ([place] [address] ) 2019      LOS (days): 3  Geometric Mean LOS (GMLOS) (days): 5 00  Days to GMLOS:1 6     OBJECTIVE:  Risk of Unplanned Readmission Score: 10 3         Current admission status: Inpatient   Preferred Pharmacy:   [address] [place] [address]  Phone: [phone] Fax: [phone]    Primary Care Provider: Alfredo Raza DO    Primary Insurance: [address] [place] [address]  Secondary Insurance:     DISCHARGE DETAILS:    Discharge planning discussed with[de-identified] patient  Freedom of Choice: Yes  Comments - Freedom of Choice: Pt has decided he does not want VNA services in the home    CM cx referral to [facility]  CM contacted family/caregiver?: No- see comments (his wife is on her way to transport home)  Were Treatment Team discharge recommendations reviewed with patient/caregiver?: Yes  Did patient/caregiver verbalize understanding of patient care needs?: Yes  Were patient/caregiver advised of the risks associated with not following Treatment Team discharge recommendations?: Yes         Requested  [address]         Is the patient interested in [facility] at discharge?: No    DME Referral Provided  Referral made for DME?: No    Other Referral/Resources/Interventions Provided:  Referral Comments: No anticipated d/c needs-refusing VNA services    Would you like to participate in our [address] service program?  : No - Declined    Treatment Team Recommendation: Home  Discharge Destination Plan[de-identified] Home  Transport at Discharge : Family                             IMM Given (Date):: 03/19/23  IMM Given to[de-identified] Patient

## 2023-03-19 NOTE — ASSESSMENT & PLAN NOTE
Lab Results   Component Value Date    HGBA1C 6 2 (H) 10/20/2022       Recent Labs     03/18/23  1138 03/18/23  1622 03/18/23  2105 03/19/23  0708   POCGLU 147* 205* 161* 144*       Blood Sugar Average: Last 72 hrs:  · (P) 182 [id number] glucose checks 4 times daily, hypoglycemia protocol  · Continue home meds

## 2023-03-19 NOTE — ASSESSMENT & PLAN NOTE
· Meeting criteria due to tachycardia, tachypnea, leukocytosis in setting of pneumonia on admission   · Procalcitonin slightly elevated, tending down   · Lactic acid WNL, blood culture x2 negative thus far   · S/p IV fluid   · Resolved

## 2023-03-19 NOTE — ASSESSMENT & PLAN NOTE
· CT chest revealing patchy consolidation opacity left greater than right bases with tree-in-bud opacities in bilateral lower lobes, pneumonia versus aspiration  · Transition to p o  antibiotics to complete course

## 2023-03-19 NOTE — ASSESSMENT & PLAN NOTE
· Ongoing for almost a week  · No relief from mylanta and baclofen   · Trial of gabapentin and reglan  · Improved

## 2023-03-20 ENCOUNTER — HOME CARE VISIT (OUTPATIENT)
Dept: HOME HEALTH SERVICES | Facility: HOME HEALTHCARE | Age: 73
End: 2023-03-20

## 2023-03-20 NOTE — UTILIZATION REVIEW
NOTIFICATION OF ADMISSION DISCHARGE   This is a Notification of Discharge from [facility]  Please be advised that this patient has been discharge from our facility  Below you will find the admission and discharge date and time including the patient’s disposition  UTILIZATION REVIEW CONTACT:  Greta Garcia  Utilization   Network Utilization Review Department  Phone: [phone] x carefully listen to the prompts  All voicemails are confidential   Email: [email or web address]     ADMISSION INFORMATION  PRESENTATION DATE: 3/15/2023 10:49 PM  OBERVATION ADMISSION DATE:   INPATIENT ADMISSION DATE: 3/16/23  1:38 AM   DISCHARGE DATE: 3/19/2023 10:41 AM   DISPOSITION:Home/Self Care    IMPORTANT INFORMATION:  Send all requests for admission clinical reviews, approved or denied determinations and any other requests to dedicated fax number below belonging to the campus where the patient is receiving treatment   List of dedicated fax numbers:  [address] DENIALS (Administrative/Medical Necessity) [phone]   [address] (Maternity/NICU/Pediatrics) [phone]   [facility] [phone]   [address] [phone]   [address] [phone]   220 [facility] [phone] 90 [facility] [phone]   [facility] [place] [address] [phone]   [facility]  [phone] 405[facility] [phone]   412 [facility] [address] [facility] [phone]

## 2023-03-21 ENCOUNTER — TRANSITIONAL CARE MANAGEMENT (OUTPATIENT)
Dept: INTERNAL MEDICINE CLINIC | Facility: CLINIC | Age: 73
End: 2023-03-21

## 2023-03-21 ENCOUNTER — HOME CARE VISIT (OUTPATIENT)
Dept: HOME HEALTH SERVICES | Facility: HOME HEALTHCARE | Age: 73
End: 2023-03-21

## 2023-03-21 LAB
BACTERIA BLD CULT: NORMAL
BACTERIA BLD CULT: NORMAL

## 2023-03-22 ENCOUNTER — HOME CARE VISIT (OUTPATIENT)
Dept: HOME HEALTH SERVICES | Facility: HOME HEALTHCARE | Age: 73
End: 2023-03-22

## 2023-03-22 VITALS
DIASTOLIC BLOOD PRESSURE: 64 MMHG | WEIGHT: 129 LBS | OXYGEN SATURATION: 98 % | RESPIRATION RATE: 20 BRPM | HEART RATE: 84 BPM | TEMPERATURE: 96.4 F | BODY MASS INDEX: 21.47 KG/M2 | SYSTOLIC BLOOD PRESSURE: 112 MMHG

## 2023-03-23 ENCOUNTER — HOME CARE VISIT (OUTPATIENT)
Dept: HOME HEALTH SERVICES | Facility: HOME HEALTHCARE | Age: 73
End: 2023-03-23

## 2023-03-23 ENCOUNTER — OFFICE VISIT (OUTPATIENT)
Dept: INTERNAL MEDICINE CLINIC | Facility: CLINIC | Age: 73
End: 2023-03-23

## 2023-03-23 ENCOUNTER — APPOINTMENT (OUTPATIENT)
Dept: LAB | Facility: CLINIC | Age: 73
End: 2023-03-23

## 2023-03-23 VITALS
SYSTOLIC BLOOD PRESSURE: 126 MMHG | DIASTOLIC BLOOD PRESSURE: 86 MMHG | BODY MASS INDEX: 20.53 KG/M2 | RESPIRATION RATE: 16 BRPM | HEIGHT: 65 IN | OXYGEN SATURATION: 98 % | WEIGHT: 123.2 LBS | TEMPERATURE: 98 F | HEART RATE: 85 BPM

## 2023-03-23 DIAGNOSIS — E78.2 MIXED HYPERLIPIDEMIA DUE TO TYPE 2 DIABETES MELLITUS (HCC): Chronic | ICD-10-CM

## 2023-03-23 DIAGNOSIS — E11.22 TYPE 2 DIABETES MELLITUS WITH STAGE 2 CHRONIC KIDNEY DISEASE, WITH LONG-TERM CURRENT USE OF INSULIN (HCC): Chronic | ICD-10-CM

## 2023-03-23 DIAGNOSIS — Z79.4 TYPE 2 DIABETES MELLITUS WITH STAGE 2 CHRONIC KIDNEY DISEASE, WITH LONG-TERM CURRENT USE OF INSULIN (HCC): Chronic | ICD-10-CM

## 2023-03-23 DIAGNOSIS — E11.69 MIXED HYPERLIPIDEMIA DUE TO TYPE 2 DIABETES MELLITUS (HCC): Chronic | ICD-10-CM

## 2023-03-23 DIAGNOSIS — N18.2 TYPE 2 DIABETES MELLITUS WITH STAGE 2 CHRONIC KIDNEY DISEASE, WITH LONG-TERM CURRENT USE OF INSULIN (HCC): Chronic | ICD-10-CM

## 2023-03-23 DIAGNOSIS — J18.9 PNEUMONIA DUE TO INFECTIOUS ORGANISM, UNSPECIFIED LATERALITY, UNSPECIFIED PART OF LUNG: Primary | ICD-10-CM

## 2023-03-23 PROBLEM — A41.9 SEPSIS (HCC): Status: RESOLVED | Noted: 2023-03-16 | Resolved: 2023-03-23

## 2023-03-23 PROBLEM — R06.6 INTRACTABLE HICCUPS: Status: RESOLVED | Noted: 2023-03-16 | Resolved: 2023-03-23

## 2023-03-23 LAB
ANION GAP SERPL CALCULATED.3IONS-SCNC: 4 MMOL/L (ref 4–13)
BUN SERPL-MCNC: 12 MG/DL (ref 5–25)
CALCIUM SERPL-MCNC: 9.9 MG/DL (ref 8.3–10.1)
CHLORIDE SERPL-SCNC: 108 MMOL/L (ref 96–108)
CHOLEST SERPL-MCNC: 83 MG/DL
CO2 SERPL-SCNC: 27 MMOL/L (ref 21–32)
CREAT SERPL-MCNC: 0.95 MG/DL (ref 0.6–1.3)
CREAT UR-MCNC: 249 MG/DL
ERYTHROCYTE [DISTWIDTH] IN BLOOD BY AUTOMATED COUNT: 14.8 % (ref 11.6–15.1)
GFR SERPL CREATININE-BSD FRML MDRD: 80 ML/MIN/1.73SQ M
GLUCOSE P FAST SERPL-MCNC: 131 MG/DL (ref 65–99)
HCT VFR BLD AUTO: 39.5 % (ref 36.5–49.3)
HDLC SERPL-MCNC: 32 MG/DL
HGB BLD-MCNC: 12.4 G/DL (ref 12–17)
LDLC SERPL CALC-MCNC: 36 MG/DL (ref 0–100)
MCH RBC QN AUTO: 28.8 PG (ref 26.8–34.3)
MCHC RBC AUTO-ENTMCNC: 31.4 G/DL (ref 31.4–37.4)
MCV RBC AUTO: 92 FL (ref 82–98)
MICROALBUMIN UR-MCNC: 11.1 MG/L (ref 0–20)
MICROALBUMIN/CREAT 24H UR: 4 MG/G CREATININE (ref 0–30)
PLATELET # BLD AUTO: 501 THOUSANDS/UL (ref 149–390)
PMV BLD AUTO: 10.4 FL (ref 8.9–12.7)
POTASSIUM SERPL-SCNC: 4.2 MMOL/L (ref 3.5–5.3)
RBC # BLD AUTO: 4.31 MILLION/UL (ref 3.88–5.62)
SODIUM SERPL-SCNC: 139 MMOL/L (ref 135–147)
TRIGL SERPL-MCNC: 75 MG/DL
WBC # BLD AUTO: 8.05 THOUSAND/UL (ref 4.31–10.16)

## 2023-03-23 NOTE — PROGRESS NOTES
Assessment & Plan     1  Pneumonia due to infectious organism, unspecified laterality, unspecified part of lung    ER records reviewed  Completed course of antibiotics  Discussed it will take some time to get his energy back  Will improve each week  Check x-ray in 6 weeks  - XR chest pa & lateral; Future    2  Type 2 diabetes mellitus with stage 2 chronic kidney disease, with long-term current use of insulin ([facility] )  3  Mixed hyperlipidemia due to type 2 diabetes mellitus ([facility] )    Check updated lab work  Most recent A1c was 6 2 % on 10/20/2022  Continue metformin, januvia, and statin as prescribed  Will call with results  - Hemoglobin A1C; Future  - Basic metabolic panel; Future  - Microalbumin / creatinine urine ratio; Future  - CBC and Platelet; Future  - Lipid Panel with Direct LDL reflex; Future        Subjective     Transitional Care Management Review:   Claudia Abdi is a 71 y o  male here for TCM follow up  During the TCM phone call patient stated:  TCM Call     Date and time call was made  3/21/2023 12:27 PM    Hospital care reviewed  Records reviewed    Patient was hospitial[facility] at  [facility]    Date of Admission  03/15/23    Date of discharge  03/19/23    Diagnosis  Pneumonia, "Sepsis"    Disposition  Home; Home health services    Current Symptoms  Cough    Cough Severity  Mild      TCM Call     Post hospital issues  None    Scheduled for follow up? Yes    Did you obtain your prescribed medications  --  cefdinir    Do you need help managing your prescriptions or medications  No    I have advised the patient to call PCP with any new or worsening symptoms  Bhavana Jaffe LPN    Are you recieving home care services  Yes    Types of home care services  Nurse visit; Home PT    Interperter language line needed  No    Counseling  Patient    Counseling topics  patient and family education; Importance of RX compliance        Recently hospitalized due to sepsis secondary to pneumonia   Admitted for 4 days  He is finished now with antibiotics  Feeling weak/fatigued  Taking it day by day  Still gets some cough with mucus at times  No shortness of breath, fever, chills  Review of Systems   Constitutional: Positive for fatigue  Negative for chills and fever  Respiratory: Positive for cough  Negative for chest tightness, shortness of breath and wheezing  Cardiovascular: Negative  Gastrointestinal: Negative  Neurological: Positive for weakness  Objective     /86 (BP Location: Left arm, Patient Position: Sitting, Cuff Size: Standard)   Pulse 85   Temp 98 °F (36 7 °C) (Tympanic)   Resp 16   Ht 5' 5" (1 651 m)   Wt 55 9 kg (123 lb 3 2 oz)   SpO2 98%   BMI 20 50 kg/m²      Physical Exam  Constitutional:       General: He is not in acute distress  Appearance: He is not ill-appearing  Cardiovascular:      Rate and Rhythm: Normal rate and regular rhythm  Pulses: no weak pulses          Dorsalis pedis pulses are 2+ on the right side and 2+ on the left side  Posterior tibial pulses are 2+ on the right side and 2+ on the left side  Heart sounds: No murmur heard  Pulmonary:      Effort: Pulmonary effort is normal  No respiratory distress  Breath sounds: No wheezing  Abdominal:      General: Bowel sounds are normal  There is no distension  Tenderness: There is no abdominal tenderness  Musculoskeletal:      Right lower leg: No edema  Left lower leg: No edema  Feet:      Right foot:      Skin integrity: No ulcer, skin breakdown, erythema, warmth, callus or dry skin  Left foot:      Skin integrity: No ulcer, skin breakdown, erythema, warmth, callus or dry skin  Neurological:      Mental Status: He is alert  Diabetic Foot Exam  Patient's shoes and socks removed  Right Foot/Ankle   Right Foot Inspection  Skin Exam: skin normal and skin intact   No dry skin, no warmth, no callus, no erythema, no maceration, no abnormal color, no pre-ulcer, no ulcer and no callus  Toe Exam: ROM and strength within normal limits  Sensory   Proprioception: intact  Monofilament testing: intact    Vascular  Capillary refills: < 3 seconds  The right DP pulse is 2+  The right PT pulse is 2+  Left Foot/Ankle  Left Foot Inspection  Skin Exam: skin normal and skin intact  No dry skin, no warmth, no erythema, no maceration, normal color, no pre-ulcer, no ulcer and no callus  Toe Exam: ROM and strength within normal limits  Sensory   Proprioception: intact  Monofilament testing: intact    Vascular  Capillary refills: < 3 seconds  The left DP pulse is 2+  The left PT pulse is 2+       Assign Risk Category  No deformity present  No loss of protective sensation  No weak pulses  Risk: 0        Medications have been reviewed by provider in current encounter    Ceci Hidalgo DO

## 2023-03-23 NOTE — PATIENT INSTRUCTIONS
Type 2 Diabetes Management for Adults   AMBULATORY CARE:   Type 2 diabetes  is a disease that affects how your body uses glucose (sugar)  Either your body cannot make enough insulin, or it cannot use the insulin correctly  It is important to keep diabetes controlled to prevent damage to your heart, blood vessels, and other organs  Management will help you feel well and enjoy your daily activities  Your diabetes care team providers can help you make a plan to fit diabetes care into your schedule  Your plan can change over time to fit your needs and your family's needs  Have someone call your local emergency number (911 in the [address]) if:   • You cannot be woken  • You have signs of diabetic ketoacidosis:     ? confusion, fatigue    ? vomiting    ? rapid heartbeat    ? fruity smelling breath    ? extreme thirst    ? dry mouth and skin    • You have any of the following signs of a heart attack:      ? Squeezing, pressure, or pain in your chest    ? You may  also have any of the following:     - Discomfort or pain in your back, neck, jaw, stomach, or arm    - Shortness of breath    - Nausea or vomiting    - Lightheadedness or a sudden cold sweat    • You have any of the following signs of a stroke:      ? Numbness or drooping on one side of your face     ? Weakness in an arm or leg    ? Confusion or difficulty speaking    ? Dizziness, a severe headache, or vision loss    Call your doctor or diabetes care team provider if:   • You have a sore or wound that will not heal     • You have a change in the amount you urinate  • Your blood sugar levels are higher than your target goals  • You often have lower blood sugar levels than your target goals  • Your skin is red, dry, warm, or swollen  • You have trouble coping with diabetes, or you feel anxious or depressed  • You have questions or concerns about your condition or care      What you need to know about high blood sugar levels:  High blood sugar levels may not cause any symptoms  You may feel more thirsty or urinate more often than usual  Over time, high blood sugar levels can damage your nerves, blood vessels, tissues, and organs  The following can increase your blood sugar levels:  • Large meals or large amounts of carbohydrates at one time    • Less physical activity    • Stress    • Illness    • A lower dose of diabetes medicine or insulin, or a late dose    What you need to know about low blood sugar levels:  Symptoms include feeling shaky, dizzy, irritable, or confused  You can prevent symptoms by keeping your blood sugar levels from going too low  • Treat a low blood sugar level right away:      ? Drink 4 ounces of juice or have 1 tube of glucose gel  ? Check your blood sugar level again 10 to 15 minutes later  ? When the level goes back to normal, eat a meal or snack to prevent another decrease  • Keep glucose gel, raisins, or hard candy with you at all times to treat a low blood sugar level  • Your blood sugar level can get too low if you take diabetes medicine or insulin and do not eat enough food  • If you use insulin, check your blood sugar level before you exercise  ? If your blood sugar level is below 100 mg/dL, eat 4 crackers or 2 ounces of raisins, or drink 4 ounces of juice  ? Check your level every 30 minutes if you exercise longer than 1 hour  ? You may need a snack during or after exercise  What you can do to manage your blood sugar levels:   • Check your blood sugar levels as directed and as needed  Several items are available to use to check your levels  You may need to check by testing a drop of blood in a glucose monitor  You may instead be given a continuous glucose monitoring (CGM) device  The device is worn at all times  The CGM checks your blood sugar level every 5 minutes  It sends results to an electronic device such as a smart phone  A CGM can be used with or without an insulin pump   You and your diabetes care team providers will decide on the best method for you  The goal for blood sugar levels before meals  is between 80 and 130 mg/dL and 2 hours after eating  is lower than 180 mg/dL  • Make healthy food choices  Work with a dietitian to develop a meal plan that works for you and your schedule  A dietitian can help you learn how to eat the right amount of carbohydrates during your meals and snacks  Carbohydrates can raise your blood sugar level if you eat too many at one time  Examples of foods that contain carbohydrates are breads, cereals, rice, pasta, and sweets  • Eat high-fiber foods as directed  Fiber helps improve blood sugar levels  Fiber also lowers your risk for heart disease and other problems diabetes can cause  Examples of high-fiber foods include vegetables, whole-grain bread, and beans such as washburn beans  Your dietitian can tell you how much fiber to have each day  • Get regular physical activity  Physical activity can help you get to your target blood sugar level goal and manage your weight  Get at least 150 minutes of moderate to vigorous aerobic physical activity each week  Do not miss more than 2 days in a row  Do not sit longer than 30 minutes at a time  Your healthcare provider can help you create an activity plan  The plan can include the best activities for you and can help you build your strength and endurance  • Maintain a healthy weight  Ask your team what a healthy weight is for you  A healthy weight can help you control diabetes and prevent heart disease  Ask your team to help you create a weight loss plan, if needed  Weight loss can help make a difference in managing diabetes  Your team will help you set a weight-loss goal, such as 10 to 15 pounds, or 5% of your extra weight  Together you and your team can set manageable weight loss goals  • Take your diabetes medicine or insulin as directed    You may need diabetes medicine, insulin, or both to help control your blood sugar levels  Your healthcare provider will teach you how and when to take your diabetes medicine or insulin  You will also be taught about side effects oral diabetes medicine can cause  Insulin may be injected or given through a pump or pen  You and your providers will decide on the best method for you:    ? An insulin pump  is an implanted device that gives your insulin 24 hours a day  An insulin pump prevents the need for multiple insulin injections in a day  ? An insulin pen  is a device prefilled with the right amount of insulin  ? You and your family members will be taught how to draw up and give insulin  if this is the best method for you  Your providers will also teach you how to dispose of needles and syringes  ? You will learn how much insulin you need  and when to give it  You will be taught when not to give insulin  You will also be taught what to do if your blood sugar level drops too low  This may happen if you take insulin and do not eat the right amount of carbohydrates  More ways to manage type 2 diabetes:   • Wear medical alert identification  Wear medical alert jewelry or carry a card that says you have diabetes  Ask your provider where to get these items  • Do not smoke  Nicotine and other chemicals in cigarettes and cigars can cause lung and blood vessel damage  It also makes it more difficult to manage your diabetes  Ask your provider for information if you currently smoke and need help to quit  Do not use e-cigarettes or smokeless tobacco in place of cigarettes or to help you quit  They still contain nicotine  • Check your feet each day for cuts, scratches, calluses, or other wounds  Look for redness and swelling, and feel for warmth  Wear shoes that fit well  Check your shoes for rocks or other objects that can hurt your feet  Do not walk barefoot or wear shoes without socks   Wear cotton socks to help keep your feet dry  • Ask about vaccines you may need  You have a higher risk for serious illness if you get the flu, pneumonia, COVID-19, or hepatitis  Ask your provider if you should get vaccines to prevent these or other diseases, and when to get the vaccines  • Talk to your provider if you become stressed about diabetes care  Sometimes being able to fit diabetes care into your life can cause increased stress  The stress can cause you not to take care of yourself properly  Your care team providers can help by offering tips about self-care  Your providers may suggest you talk to a mental health provider who can listen and offer help with self-care issues  • Have your A1c checked as directed  Your provider may check your A1c every 3 months, or 2 times each year if your diabetes is controlled  An A1c test shows the average amount of sugar in your blood over the past 2 to 3 months  Your provider will tell you what your A1c level should be  • Have screening tests as directed  Your provider may recommend screening for complications of diabetes and other conditions that may develop  Some screenings may begin right away and some may happen within the first 5 years of diagnosis:    ? Examples of diabetes complications  include kidney problems, high cholesterol, high blood pressure, blood vessel problems, eye problems, and sleep apnea  ? You may be screened for a low vitamin B level  if you take oral diabetes medicine for a long time  ? Women of childbearing years may be screened  for polycystic ovarian syndrome (PCOS)  Follow up with your doctor or diabetes care team providers as directed: You may need to have blood tests done before your follow-up visit  The test results will show if changes need to be made in your treatment or self-care  Talk to your provider if you cannot afford your medicine  Write down your questions so you remember to ask them during your visits    © Copyright [employer] 2022 Information is for End User's use only and may not be sold, redistributed or otherwise used for commercial purposes  The above information is an  only  It is not intended as medical advice for individual conditions or treatments  Talk to your doctor, nurse or pharmacist before following any medical regimen to see if it is safe and effective for you

## 2023-03-24 ENCOUNTER — TELEPHONE (OUTPATIENT)
Dept: INTERNAL MEDICINE CLINIC | Facility: CLINIC | Age: 73
End: 2023-03-24

## 2023-03-24 LAB
EST. AVERAGE GLUCOSE BLD GHB EST-MCNC: 151 MG/DL
HBA1C MFR BLD: 6.9 %

## 2023-03-24 NOTE — TELEPHONE ENCOUNTER
----- Message from Kennedi Jacobs DO sent at 3/24/2023  7:17 AM EDT -----  Diabetes is controlled with A1c of 6 9%  Kidney function and electrolytes look good   Cholesterol is normal  White blood cell count has come down to normal

## 2023-03-27 ENCOUNTER — HOME CARE VISIT (OUTPATIENT)
Dept: HOME HEALTH SERVICES | Facility: HOME HEALTHCARE | Age: 73
End: 2023-03-27

## 2023-03-27 VITALS
OXYGEN SATURATION: 97 % | TEMPERATURE: 97.5 F | SYSTOLIC BLOOD PRESSURE: 110 MMHG | DIASTOLIC BLOOD PRESSURE: 76 MMHG | RESPIRATION RATE: 20 BRPM | HEART RATE: 93 BPM

## 2023-05-15 PROBLEM — J18.9 PNEUMONIA: Status: RESOLVED | Noted: 2023-03-16 | Resolved: 2023-05-15

## 2023-05-30 ENCOUNTER — HOSPITAL ENCOUNTER (EMERGENCY)
Facility: HOSPITAL | Age: 73
Discharge: HOME | End: 2023-05-30
Attending: EMERGENCY MEDICINE | Admitting: EMERGENCY MEDICINE

## 2023-05-30 ENCOUNTER — APPOINTMENT (EMERGENCY)
Dept: RADIOLOGY | Facility: HOSPITAL | Age: 73
End: 2023-05-30

## 2023-05-30 VITALS
OXYGEN SATURATION: 97 % | SYSTOLIC BLOOD PRESSURE: 155 MMHG | DIASTOLIC BLOOD PRESSURE: 85 MMHG | TEMPERATURE: 97.8 F | RESPIRATION RATE: 18 BRPM | HEART RATE: 93 BPM

## 2023-05-30 DIAGNOSIS — R06.6 HICCUPS: Primary | ICD-10-CM

## 2023-05-30 DIAGNOSIS — J18.9 PNEUMONIA: ICD-10-CM

## 2023-05-30 LAB
FLUAV RNA RESP QL NAA+PROBE: NEGATIVE
FLUBV RNA RESP QL NAA+PROBE: NEGATIVE
RSV RNA RESP QL NAA+PROBE: NEGATIVE
SARS-COV-2 RNA RESP QL NAA+PROBE: NEGATIVE

## 2023-05-30 RX ORDER — OMEPRAZOLE 20 MG/1
20 CAPSULE, DELAYED RELEASE ORAL 2 TIMES DAILY
Qty: 28 CAPSULE | Refills: 0 | Status: SHIPPED | OUTPATIENT
Start: 2023-05-30 | End: 2023-06-13

## 2023-05-30 RX ORDER — METOCLOPRAMIDE 10 MG/1
10 TABLET ORAL EVERY 6 HOURS
Qty: 15 TABLET | Refills: 0 | Status: SHIPPED | OUTPATIENT
Start: 2023-05-30

## 2023-05-30 RX ORDER — CHLORPROMAZINE HYDROCHLORIDE 25 MG/ML
25 INJECTION INTRAMUSCULAR ONCE
Status: COMPLETED | OUTPATIENT
Start: 2023-05-30 | End: 2023-05-30

## 2023-05-30 RX ADMIN — CHLORPROMAZINE HYDROCHLORIDE 25 MG: 25 INJECTION INTRAMUSCULAR at 11:07

## 2023-05-30 NOTE — DISCHARGE INSTRUCTIONS
Follow up with PCP  Follow up with GI  PPI twice daily and reglan as needed for nausea  Return to the ED with new or worsening symptoms including but not limited to hiccups, chest pain, shortness of breath

## 2023-05-30 NOTE — ED PROVIDER NOTES
History  Chief Complaint   Patient presents with   • Hiccups     Per pt, has hiccups for about a week  Patient is a 71-year-old male past medical history of CVA, diabetes, esophagitis, hyperlipidemia, hypertension, type 2 diabetes presenting to the emergency department for evaluation of hiccups  Patient reports he has had hiccups for the past week  Patient reports he has not had any alleviation of the hiccups and his hiccups are continuous throughout the day and night  Denies fevers, chills, rash, headache, weakness, dizziness, visual changes, abdominal pain, nausea, vomiting, diarrhea, constipation, chest pain, shortness of breath or difficulty breathing  Does not offer any other concerns or complaints  Prior to Admission Medications   Prescriptions Last Dose Informant Patient Reported? Taking? Alcohol Swabs PADS  Self No No   Sig: Use daily to inject insulin   Januvia 100 MG tablet   No No   Sig: TAKE 1 TABLET BY MOUTH EVERY DAY   atorvastatin (LIPITOR) 40 mg tablet   No No   Sig: TAKE 1 TABLET BY MOUTH EVERY DAY IN THE EVENING   chlorhexidine (PERIDEX) 0 12 % solution  Self No No   Sig: Apply 15 mL to the mouth or throat daily   clopidogrel (PLAVIX) 75 mg tablet   No No   Sig: TAKE 1 TABLET BY MOUTH EVERY DAY   clotrimazole-betamethasone (LOTRISONE) 1-0 05 % cream  Self No No   Sig: Apply topically 2 (two) times a day   Patient not taking: Reported on 3/16/2023   famotidine (PEPCID) 40 MG tablet   No No   Sig: Take 1 tablet (40 mg total) by mouth in the morning and 1 tablet (40 mg total) in the evening     Patient not taking: Reported on 3/22/2023   metFORMIN (GLUCOPHAGE-XR) 750 mg 24 hr tablet   No No   Sig: TAKE 2 TABLETS (1,500 MG TOTAL) BY MOUTH DAILY WITH DINNER   metoprolol tartrate (LOPRESSOR) 25 mg tablet   No No   Sig: Take 0 5 tablets (12 5 mg total) by mouth every 12 (twelve) hours   pantoprazole (PROTONIX) 40 mg tablet   No No   Sig: TAKE 1 TABLET BY MOUTH EVERY DAY Facility-Administered Medications: None       Past Medical History:   Diagnosis Date   • Colon polyp    • CVA (cerebral vascular accident) ([facility] ) 02/18/2019    Bilateral pontine lacunar infarct   • Diabetes mellitus ([facility] )    • Esophagitis    • Hyperlipidemia    • Hypertension    • Non-sustained ventricular tachycardia (HCC)    • Stroke [facility])    • Type 2 diabetes mellitus ([facility] )        Past Surgical History:   Procedure Laterality Date   • COLONOSCOPY     • ESOPHAGOGASTRODUODENOSCOPY N/A 03/04/2019    Procedure: ESOPHAGOGASTRODUODENOSCOPY (EGD); Surgeon: Leonor Leija DO;  Location: [facility]; Service: Gastroenterology       History reviewed  No pertinent family history  I have reviewed and agree with the history as documented  E-Cigarette/Vaping   • E-Cigarette Use Never User      E-Cigarette/Vaping Substances   • Nicotine No    • THC No    • CBD No    • Flavoring No    • Other No    • Unknown No      Social History     Tobacco Use   • Smoking status: Never   • Smokeless tobacco: Never   Vaping Use   • Vaping Use: Never used   Substance Use Topics   • Alcohol use: Never   • Drug use: Never       Review of Systems   Constitutional: Negative for chills and fever  Hiccups   HENT: Negative for ear pain and sore throat  Eyes: Negative for pain and visual disturbance  Respiratory: Negative for cough, chest tightness, shortness of breath and wheezing  Cardiovascular: Negative for chest pain and palpitations  Gastrointestinal: Negative for abdominal pain, constipation, diarrhea, nausea and vomiting  Genitourinary: Negative for dysuria and hematuria  Musculoskeletal: Negative for arthralgias and back pain  Skin: Negative for color change and rash  Neurological: Negative for seizures and syncope  All other systems reviewed and are negative  Physical Exam  Physical Exam  Vitals and nursing note reviewed  Constitutional:       General: He is not in acute distress       Appearance: Normal appearance  He is well-developed  He is not ill-appearing, toxic-appearing or diaphoretic  Comments: Patient continues to hiccup through the exam  No relief with valsalva technique   HENT:      Head: Normocephalic and atraumatic  Right Ear: External ear normal       Left Ear: External ear normal       Nose: Nose normal       Mouth/Throat:      Mouth: Mucous membranes are moist    Eyes:      General: No scleral icterus  Right eye: No discharge  Left eye: No discharge  Conjunctiva/sclera: Conjunctivae normal    Cardiovascular:      Rate and Rhythm: Normal rate and regular rhythm  Heart sounds: No murmur heard  Pulmonary:      Effort: Pulmonary effort is normal  No respiratory distress  Breath sounds: Normal breath sounds  Abdominal:      Palpations: Abdomen is soft  Tenderness: There is no abdominal tenderness  Musculoskeletal:         General: No swelling, deformity or signs of injury  Normal range of motion  Cervical back: Normal range of motion and neck supple  No rigidity  Skin:     General: Skin is warm and dry  Capillary Refill: Capillary refill takes less than 2 seconds  Coloration: Skin is not jaundiced  Findings: No erythema or rash  Neurological:      General: No focal deficit present  Mental Status: He is alert and oriented to person, place, and time  Mental status is at baseline  Cranial Nerves: No cranial nerve deficit  Gait: Gait normal    Psychiatric:         Mood and Affect: Mood normal          Behavior: Behavior normal          Thought Content:  Thought content normal          Judgment: Judgment normal          Vital Signs  ED Triage Vitals [05/30/23 0858]   Temperature Pulse Respirations Blood Pressure SpO2   97 8 °F (36 6 °C) 93 18 155/85 97 %      Temp src Heart Rate Source Patient Position - Orthostatic VS BP Location FiO2 (%)   -- -- -- -- --      Pain Score       --           Vitals:    05/30/23 0858   BP: 155/85   Pulse: 93         Visual Acuity      ED Medications  Medications   chlorproMAZINE (THORAZINE) injection SOLN 25 mg (25 mg Intramuscular Given 5/30/23 1107)       Diagnostic Studies  Results Reviewed     Procedure Component Value Units Date/Time    FLU/RSV/COVID - if FLU/RSV clinically relevant [[id number]]  (Normal) Collected: 05/30/23 1101    Lab Status: Final result Specimen: Nares from Nose Updated: 05/30/23 1152     SARS-CoV-2 Negative     INFLUENZA A PCR Negative     INFLUENZA B PCR Negative     RSV PCR Negative    Narrative:      FOR PEDIATRIC PATIENTS - copy/paste COVID Guidelines URL to browser: [email or web address]    SARS-CoV-2 assay is a Nucleic Acid Amplification assay intended for the  qualitative detection of nucleic acid from SARS-CoV-2 in nasopharyngeal  swabs  Results are for the presumptive identification of SARS-CoV-2 RNA  Positive results are indicative of infection with SARS-CoV-2, the virus  causing COVID-19, but do not rule out bacterial infection or co-infection  with other viruses  Laboratories within [facility] and its  territories are required to report all positive results to the appropriate  public health authorities  Negative results do not preclude SARS-CoV-2  infection and should not be used as the sole basis for treatment or other  patient management decisions  Negative results must be combined with  clinical observations, patient history, and epidemiological information  This test has not been FDA cleared or approved  This test has been authorized by FDA under an Emergency Use Authorization  (EUA)  This test is only authorized for the duration of time the  declaration that circumstances exist justifying the authorization of the  emergency use of an in vitro diagnostic tests for detection of SARS-CoV-2  virus and/or diagnosis of COVID-19 infection under section 564(b)(1) of  the Act, 21 U  S C  999CBG-2(J)(7), unless the authorization is terminated  or revoked sooner  The test has been validated but independent review by FDA  and CLIA is pending  Test performed using [employer] GeneXpert: This RT-PCR assay targets N2,  a region unique to SARS-CoV-2  A conserved region in the E-gene was chosen  for pan-Sarbecovirus detection which includes SARS-CoV-2  According to CMS-2020-01-R, this platform meets the definition of high-throughput technology  XR chest 2 views   Final Result by Nuno Matos MD (05/31 5917)      Left lower lobe pneumonia  The study was marked in EPIC for significant notification  Workstation performed: [id number]                    Procedures  Procedures         ED Course  ED Course as of 05/31/23 1420   Tue May 30, 2023   1154 Hiccups resolved after thorazine  Will continue to watch prior to discharge  Patient is slightly tired after medication  Reports wife does not drive  18 Patient's sister in law is picking him and his wife up and they will come back for their car  Patient is still a little tired from the medication  SBIRT 22yo+    Flowsheet Row Most Recent Value   Initial Alcohol Screen: US AUDIT-C     1  How often do you have a drink containing alcohol? 0 Filed at: 05/30/2023 1103   2  How many drinks containing alcohol do you have on a typical day you are drinking? 0 Filed at: 05/30/2023 1103   3b  FEMALE Any Age, or MALE 65+: How often do you have 4 or more drinks on one occassion? 0 Filed at: 05/30/2023 1103   Audit-C Score 0 Filed at: 05/30/2023 1103   ÓSCAR: How many times in the past year have you    Used an illegal drug or used a prescription medication for non-medical reasons? Never Filed at: 05/30/2023 1103                    Medical Decision Making    This is a 71-year-old male presenting to the emergency department for evaluation of hiccups  Patient reports he has had hiccups for the past week    Patient reports this is happened in the past and has had negative work-ups  Patient denies any chest pain, shortness of breath or difficulty breathing  Patient is in no acute distress, stable vital signs on initial examination, well-appearing  Patient is hiccuping on exam     Differential diagnosis to include but is not limited to: Hiccups, pneumonia, COVID/flu/RSV    Initial ED Plan: CXR, COVID/flu/RSV, Thorazine    ED results:   -Patient's hiccups resolved after Thorazine  Xray images chest independently visualized and interpreted by me - no acute cardiopulmonary disease  All radiology studies independently viewed by me and interpreted by the radiologist   - on review after discharge, radiology read the CXR with a left lower lobe pneumonia, patient was not discharged on antibiotics  -Patient was called, no answer, callback not completed  Final ED assessment: Patient is stable and well appearing  Discussed radiologic studies and laboratory results  Discussed follow-up with PCP and GI  Strict return precautions were discussed including but not limited to chest pain, difficulty breathing, shortness of breath, weakness, returning of hiccups  Patient verbalized understanding and is agreeable with the plan for discharge  Amount and/or Complexity of Data Reviewed  Radiology: ordered  Risk  Prescription drug management  Disposition  Final diagnoses:   Hiccups     Time reflects when diagnosis was documented in both MDM as applicable and the Disposition within this note     Time User Action Codes Description Comment    5/30/2023  1:23 PM Braden Yousif Add [R06 6] Hiccups       ED Disposition     ED Disposition   Discharge    Condition   Stable    Date/Time   Tue May 30, 2023 11:45 AM    Comment   Awilda Barry discharge to home/self care                 Follow-up Information     Follow up With Specialties Details Why Contact Info Additional [address] [facility],  Internal Medicine Call in 3 days For follow up [address] [facility]  2nd [address] [facility] 1474 Emergency Department Emergency Medicine Go to  If symptoms worsen 34 [facility] 109 [facility] Emergency Department, [address] [place], [place], [address]  [facility] [place] [facility] Call in 3 days For follow up [address] [place] [id number]  Brendon Lobo Barrios [address] [facility] [place], [address] [facility], [address] [facility], [place], [place], [address]           Discharge Medication List as of 5/30/2023  1:25 PM      START taking these medications    Details   metoclopramide (Reglan) 10 mg tablet Take 1 tablet (10 mg total) by mouth every 6 (six) hours, Starting Tue 5/30/2023, Normal      omeprazole (PriLOSEC) 20 mg delayed release capsule Take 1 capsule (20 mg total) by mouth 2 (two) times a day for 14 days, Starting Tue 5/30/2023, Until Tue 6/13/2023, Normal         CONTINUE these medications which have NOT CHANGED    Details   Alcohol Swabs PADS Use daily to inject insulin, Normal      atorvastatin (LIPITOR) 40 mg tablet TAKE 1 TABLET BY MOUTH EVERY DAY IN THE EVENING, Normal      chlorhexidine (PERIDEX) 0 12 % solution Apply 15 mL to the mouth or throat daily, Starting Tue 4/27/2021, Normal      clopidogrel (PLAVIX) 75 mg tablet TAKE 1 TABLET BY MOUTH EVERY DAY, Normal      clotrimazole-betamethasone (LOTRISONE) 1-0 05 % cream Apply topically 2 (two) times a day, Starting Thu 4/29/2021, Normal      famotidine (PEPCID) 40 MG tablet Take 1 tablet (40 mg total) by mouth in the morning and 1 tablet (40 mg total) in the evening , Starting Wed 5/25/2022, Normal      Januvia 100 MG tablet TAKE 1 TABLET BY MOUTH EVERY DAY, Normal      metFORMIN (GLUCOPHAGE-XR) 750 mg 24 hr tablet TAKE 2 TABLETS (1,500 MG TOTAL) BY MOUTH DAILY WITH DINNER, Starting Tue 11/15/2022, Normal      metoprolol tartrate (LOPRESSOR) 25 mg tablet Take 0 5 tablets (12 5 mg total) by mouth every 12 (twelve) hours, Starting Fri 1/20/2023, Normal      pantoprazole (PROTONIX) 40 mg tablet TAKE 1 TABLET BY MOUTH EVERY DAY, Normal                 PDMP Review     None          ED Provider  Electronically Signed by           Prashant Martinez PA-C  05/31/23 4661

## 2023-05-30 NOTE — ED NOTES
Attempted to ambulate pt  Pt was able to walk a few feet and stated he was too tired   Assisted pt back to his bed and was informed to tell this writer when he felt well enough to try again      Curtis Samson RN  05/30/23 1034

## 2023-06-01 RX ORDER — AMOXICILLIN AND CLAVULANATE POTASSIUM 875; 125 MG/1; MG/1
1 TABLET, FILM COATED ORAL EVERY 12 HOURS SCHEDULED
Qty: 14 TABLET | Refills: 0 | Status: SHIPPED | OUTPATIENT
Start: 2023-06-01 | End: 2023-06-08

## 2023-06-01 RX ORDER — DOXYCYCLINE HYCLATE 100 MG/1
100 CAPSULE ORAL EVERY 12 HOURS SCHEDULED
Qty: 14 CAPSULE | Refills: 0 | Status: SHIPPED | OUTPATIENT
Start: 2023-06-01 | End: 2023-06-08

## 2023-06-01 NOTE — RESULT ENCOUNTER NOTE
X-ray results discussed with the patient by telephone call with plan for treatment of left lower lobe pneumonia with antibiotics which have been sent in to the pharmacy for the patient to  and take as directed  Patient comfortable and agreeable with plan as above  All questions answered and concerns addressed, call back complete

## 2023-06-02 ENCOUNTER — OFFICE VISIT (OUTPATIENT)
Dept: GASTROENTEROLOGY | Facility: CLINIC | Age: 73
End: 2023-06-02

## 2023-06-02 VITALS
HEIGHT: 65 IN | HEART RATE: 95 BPM | DIASTOLIC BLOOD PRESSURE: 82 MMHG | WEIGHT: 120.6 LBS | SYSTOLIC BLOOD PRESSURE: 122 MMHG | BODY MASS INDEX: 20.09 KG/M2

## 2023-06-02 DIAGNOSIS — K20.90 ESOPHAGITIS: ICD-10-CM

## 2023-06-02 DIAGNOSIS — R06.6 HICCUPS: ICD-10-CM

## 2023-06-02 DIAGNOSIS — Z87.19 HISTORY OF GASTROESOPHAGEAL REFLUX (GERD): ICD-10-CM

## 2023-06-02 DIAGNOSIS — E46 PROTEIN-CALORIE MALNUTRITION, UNSPECIFIED SEVERITY (HCC): Primary | ICD-10-CM

## 2023-06-02 RX ORDER — FAMOTIDINE 40 MG/1
40 TABLET, FILM COATED ORAL
Qty: 30 TABLET | Refills: 3 | Status: SHIPPED | OUTPATIENT
Start: 2023-06-02

## 2023-06-02 RX ORDER — BACLOFEN 10 MG/1
10 TABLET ORAL 3 TIMES DAILY PRN
Qty: 60 TABLET | Refills: 3 | Status: SHIPPED | OUTPATIENT
Start: 2023-06-02

## 2023-06-02 RX ORDER — PANTOPRAZOLE SODIUM 40 MG/1
40 TABLET, DELAYED RELEASE ORAL 2 TIMES DAILY
Qty: 60 TABLET | Refills: 3 | Status: SHIPPED | OUTPATIENT
Start: 2023-06-02

## 2023-06-02 NOTE — PROGRESS NOTES
[facility] - Outpatient Follow-up Note  Rosa Haskins 71 y o  male MRN: [id number]  Encounter: [id number]          ASSESSMENT AND PLAN:      1  Hiccups  2  History of gastroesophageal reflux (GERD)  Pantoprazole 40 mg twice daily  Pepcid 40 mg at bedtime  Avoid eating close to bedtime -2 hours  EGD last year and work-up of this symptom was negative but he did respond favorably to acid suppression    Follow-up in 1 month  Consider need for barium swallow/upper GI series  ______________________________________________________________________    SUBJECTIVE: a75-year-old male with a history of GERD who presents for evaluation of hiccups  This is a recurrent symptom  He presented to the office last year with these complaints  He was started on high-dose acid suppression and his symptoms resolved  He was scheduled for an upper endoscopy which was a normal exam   Recently when the symptoms came back and became severe he went to the emergency room  Work-up showed evidence of pneumonia  He was reporting a cough at that time  It is concerned to be an aspiration pneumonia  He denies any dysphagia  He denies a feeling of choking with swallowing  He was given a dose of Thorazine in the emergency room which resolved the symptoms however when he got home it returned  He has been taking famotidine but no PPI therapy  He was prescribed Reglan in the emergency room  He denies any symptoms of nausea or vomiting  REVIEW OF SYSTEMS IS OTHERWISE NEGATIVE        Historical Information   Past Medical History:   Diagnosis Date   • Colon polyp    • CVA (cerebral vascular accident) ([facility] ) 02/18/2019    Bilateral pontine lacunar infarct   • Diabetes mellitus ([facility] )    • Esophagitis    • Hyperlipidemia    • Hypertension    • Non-sustained ventricular tachycardia ([facility] )    • Pneumonia 05/15/2023   • Stroke [facility])    • Type 2 diabetes mellitus ([facility] )      Past Surgical History:   Procedure Laterality "Date   • COLONOSCOPY     • ESOPHAGOGASTRODUODENOSCOPY N/A 03/04/2019    Procedure: ESOPHAGOGASTRODUODENOSCOPY (EGD); Surgeon: Tomas Duffy DO;  Location: [facility]; Service: Gastroenterology     Social History   Social History     Substance and Sexual Activity   Alcohol Use Never     Social History     Substance and Sexual Activity   Drug Use Never     Social History     Tobacco Use   Smoking Status Never   Smokeless Tobacco Never     History reviewed  No pertinent family history  Meds/Allergies       Current Outpatient Medications:   •  Alcohol Swabs PADS  •  amoxicillin-clavulanate (AUGMENTIN) 875-125 mg per tablet  •  atorvastatin (LIPITOR) 40 mg tablet  •  baclofen 10 mg tablet  •  chlorhexidine (PERIDEX) 0 12 % solution  •  clopidogrel (PLAVIX) 75 mg tablet  •  doxycycline hyclate (VIBRAMYCIN) 100 mg capsule  •  famotidine (PEPCID) 40 MG tablet  •  Januvia 100 MG tablet  •  metFORMIN (GLUCOPHAGE-XR) 750 mg 24 hr tablet  •  metoclopramide (Reglan) 10 mg tablet  •  metoprolol tartrate (LOPRESSOR) 25 mg tablet  •  omeprazole (PriLOSEC) 20 mg delayed release capsule  •  pantoprazole (PROTONIX) 40 mg tablet    No Known Allergies        Objective     Blood pressure 122/82, pulse 95, height 5' 5\" (1 651 m), weight 54 7 kg (120 lb 9 6 oz)  Body mass index is 20 07 kg/m²  PHYSICAL EXAM:      General Appearance:   Alert, cooperative, no distress   HEENT:   Normocephalic, atraumatic, anicteric      Neck:  Supple, symmetrical, trachea midline   Lungs:   Clear to auscultation bilaterally; no rales, rhonchi or wheezing; respirations unlabored    Heart[de-identified]   Regular rate and rhythm; no murmur, rub, or gallop     Abdomen:   Soft, non-tender, non-distended; normal bowel sounds; no masses, no organomegaly    Genitalia:   Deferred    Rectal:   Deferred    Extremities:  No cyanosis, clubbing or edema    Pulses:  2+ and symmetric    Skin:  No jaundice, rashes, or lesions    Lymph nodes:  No palpable cervical " lymphadenopathy        Lab Results:   No visits with results within 1 Day(s) from this visit  Latest known visit with results is:   Admission on 05/30/2023, Discharged on 05/30/2023   Component Date Value   • SARS-CoV-2 05/30/2023 Negative    • INFLUENZA A PCR 05/30/2023 Negative    • INFLUENZA B PCR 05/30/2023 Negative    • RSV PCR 05/30/2023 Negative          Radiology Results:   XR chest 2 views    Result Date: 5/31/2023  Narrative: CHEST INDICATION:   hiccups  COMPARISON: 03/11/2023 EXAM PERFORMED/VIEWS:  XR CHEST PA & LATERAL Images: 2 FINDINGS: Cardiomediastinal silhouette appears unremarkable  Left lower lobe infiltrate  The lungs are hyperinflated  No pneumothorax or pleural effusion  Osseous structures appear within normal limits for patient age  Impression: Left lower lobe pneumonia  The study was marked in EPIC for significant notification   Workstation performed: [id number]

## 2023-06-24 DIAGNOSIS — R06.6 HICCUPS: ICD-10-CM

## 2023-06-24 DIAGNOSIS — Z87.19 HISTORY OF GASTROESOPHAGEAL REFLUX (GERD): ICD-10-CM

## 2023-06-26 RX ORDER — FAMOTIDINE 40 MG/1
TABLET, FILM COATED ORAL
Qty: 90 TABLET | Refills: 2 | Status: SHIPPED | OUTPATIENT
Start: 2023-06-26

## 2023-07-11 ENCOUNTER — OFFICE VISIT (OUTPATIENT)
Dept: GASTROENTEROLOGY | Facility: CLINIC | Age: 73
End: 2023-07-11
Payer: COMMERCIAL

## 2023-07-11 VITALS
WEIGHT: 124.4 LBS | BODY MASS INDEX: 20.73 KG/M2 | DIASTOLIC BLOOD PRESSURE: 76 MMHG | OXYGEN SATURATION: 97 % | HEIGHT: 65 IN | SYSTOLIC BLOOD PRESSURE: 120 MMHG | HEART RATE: 88 BPM

## 2023-07-11 DIAGNOSIS — K21.9 GASTROESOPHAGEAL REFLUX DISEASE, UNSPECIFIED WHETHER ESOPHAGITIS PRESENT: ICD-10-CM

## 2023-07-11 DIAGNOSIS — K20.90 ESOPHAGITIS: ICD-10-CM

## 2023-07-11 DIAGNOSIS — R06.6 HICCUPS: Primary | ICD-10-CM

## 2023-07-11 PROCEDURE — 99213 OFFICE O/P EST LOW 20 MIN: CPT | Performed by: PHYSICIAN ASSISTANT

## 2023-07-11 RX ORDER — PANTOPRAZOLE SODIUM 40 MG/1
40 TABLET, DELAYED RELEASE ORAL DAILY
Qty: 30 TABLET | Refills: 11
Start: 2023-07-11

## 2023-08-18 ENCOUNTER — VBI (OUTPATIENT)
Dept: ADMINISTRATIVE | Facility: OTHER | Age: 73
End: 2023-08-18

## 2023-11-05 DIAGNOSIS — E11.22 TYPE 2 DIABETES MELLITUS WITH STAGE 2 CHRONIC KIDNEY DISEASE, WITH LONG-TERM CURRENT USE OF INSULIN (HCC): Chronic | ICD-10-CM

## 2023-11-05 DIAGNOSIS — N18.2 TYPE 2 DIABETES MELLITUS WITH STAGE 2 CHRONIC KIDNEY DISEASE, WITH LONG-TERM CURRENT USE OF INSULIN (HCC): Chronic | ICD-10-CM

## 2023-11-05 DIAGNOSIS — Z79.4 TYPE 2 DIABETES MELLITUS WITH STAGE 2 CHRONIC KIDNEY DISEASE, WITH LONG-TERM CURRENT USE OF INSULIN (HCC): Chronic | ICD-10-CM

## 2023-11-05 DIAGNOSIS — E78.2 MIXED HYPERLIPIDEMIA: Chronic | ICD-10-CM

## 2023-11-05 RX ORDER — ATORVASTATIN CALCIUM 40 MG/1
TABLET, FILM COATED ORAL
Qty: 90 TABLET | Refills: 0 | Status: SHIPPED | OUTPATIENT
Start: 2023-11-05

## 2023-11-05 RX ORDER — METFORMIN HYDROCHLORIDE 750 MG/1
TABLET, EXTENDED RELEASE ORAL
Qty: 180 TABLET | Refills: 0 | Status: SHIPPED | OUTPATIENT
Start: 2023-11-05

## 2023-11-05 RX ORDER — SITAGLIPTIN 100 MG/1
TABLET, FILM COATED ORAL
Qty: 90 TABLET | Refills: 0 | Status: SHIPPED | OUTPATIENT
Start: 2023-11-05

## 2023-11-09 ENCOUNTER — RA CDI HCC (OUTPATIENT)
Dept: OTHER | Facility: HOSPITAL | Age: 73
End: 2023-11-09

## 2023-11-09 NOTE — PROGRESS NOTES
[address] [facility] coding opportunities       Chart reviewed, no opportunity found: [address]        Patients Insurance     Medicare Insurance: [employer]

## 2023-11-10 ENCOUNTER — TELEPHONE (OUTPATIENT)
Dept: GASTROENTEROLOGY | Facility: CLINIC | Age: 73
End: 2023-11-10

## 2023-11-16 ENCOUNTER — APPOINTMENT (OUTPATIENT)
Age: 73
End: 2023-11-16
Payer: COMMERCIAL

## 2023-11-16 ENCOUNTER — TELEPHONE (OUTPATIENT)
Age: 73
End: 2023-11-16

## 2023-11-16 ENCOUNTER — OFFICE VISIT (OUTPATIENT)
Age: 73
End: 2023-11-16
Payer: COMMERCIAL

## 2023-11-16 VITALS
TEMPERATURE: 97.8 F | RESPIRATION RATE: 16 BRPM | DIASTOLIC BLOOD PRESSURE: 72 MMHG | SYSTOLIC BLOOD PRESSURE: 119 MMHG | HEART RATE: 74 BPM | WEIGHT: 131 LBS | BODY MASS INDEX: 21.83 KG/M2 | HEIGHT: 65 IN | OXYGEN SATURATION: 99 %

## 2023-11-16 DIAGNOSIS — Z00.00 MEDICARE ANNUAL WELLNESS VISIT, SUBSEQUENT: ICD-10-CM

## 2023-11-16 DIAGNOSIS — E11.22 TYPE 2 DIABETES MELLITUS WITH STAGE 2 CHRONIC KIDNEY DISEASE, WITH LONG-TERM CURRENT USE OF INSULIN (HCC): Primary | Chronic | ICD-10-CM

## 2023-11-16 DIAGNOSIS — Z79.4 TYPE 2 DIABETES MELLITUS WITH STAGE 2 CHRONIC KIDNEY DISEASE, WITH LONG-TERM CURRENT USE OF INSULIN (HCC): Chronic | ICD-10-CM

## 2023-11-16 DIAGNOSIS — E11.22 TYPE 2 DIABETES MELLITUS WITH STAGE 2 CHRONIC KIDNEY DISEASE, WITH LONG-TERM CURRENT USE OF INSULIN (HCC): Chronic | ICD-10-CM

## 2023-11-16 DIAGNOSIS — N18.2 TYPE 2 DIABETES MELLITUS WITH STAGE 2 CHRONIC KIDNEY DISEASE, WITH LONG-TERM CURRENT USE OF INSULIN (HCC): Primary | Chronic | ICD-10-CM

## 2023-11-16 DIAGNOSIS — Z79.4 TYPE 2 DIABETES MELLITUS WITH STAGE 2 CHRONIC KIDNEY DISEASE, WITH LONG-TERM CURRENT USE OF INSULIN (HCC): Primary | Chronic | ICD-10-CM

## 2023-11-16 DIAGNOSIS — Z86.73 HISTORY OF LACUNAR CEREBROVASCULAR ACCIDENT (CVA): Chronic | ICD-10-CM

## 2023-11-16 DIAGNOSIS — E11.69 MIXED HYPERLIPIDEMIA DUE TO TYPE 2 DIABETES MELLITUS: Chronic | ICD-10-CM

## 2023-11-16 DIAGNOSIS — N18.2 TYPE 2 DIABETES MELLITUS WITH STAGE 2 CHRONIC KIDNEY DISEASE, WITH LONG-TERM CURRENT USE OF INSULIN (HCC): Chronic | ICD-10-CM

## 2023-11-16 DIAGNOSIS — E78.2 MIXED HYPERLIPIDEMIA DUE TO TYPE 2 DIABETES MELLITUS: Chronic | ICD-10-CM

## 2023-11-16 PROBLEM — E46 PROTEIN-CALORIE MALNUTRITION, UNSPECIFIED SEVERITY (HCC): Status: RESOLVED | Noted: 2023-06-02 | Resolved: 2023-11-16

## 2023-11-16 LAB
ALBUMIN SERPL BCP-MCNC: 4.2 G/DL (ref 3.5–5)
ALP SERPL-CCNC: 81 U/L (ref 34–104)
ALT SERPL W P-5'-P-CCNC: 11 U/L (ref 7–52)
ANION GAP SERPL CALCULATED.3IONS-SCNC: 9 MMOL/L
AST SERPL W P-5'-P-CCNC: 20 U/L (ref 13–39)
BASOPHILS # BLD AUTO: 0.04 THOUSANDS/ÂΜL (ref 0–0.1)
BASOPHILS NFR BLD AUTO: 1 % (ref 0–1)
BILIRUB SERPL-MCNC: 0.67 MG/DL (ref 0.2–1)
BUN SERPL-MCNC: 7 MG/DL (ref 5–25)
CALCIUM SERPL-MCNC: 10.1 MG/DL (ref 8.4–10.2)
CHLORIDE SERPL-SCNC: 105 MMOL/L (ref 96–108)
CHOLEST SERPL-MCNC: 105 MG/DL
CO2 SERPL-SCNC: 28 MMOL/L (ref 21–32)
CREAT SERPL-MCNC: 0.87 MG/DL (ref 0.6–1.3)
EOSINOPHIL # BLD AUTO: 0.04 THOUSAND/ÂΜL (ref 0–0.61)
EOSINOPHIL NFR BLD AUTO: 1 % (ref 0–6)
ERYTHROCYTE [DISTWIDTH] IN BLOOD BY AUTOMATED COUNT: 12.8 % (ref 11.6–15.1)
EST. AVERAGE GLUCOSE BLD GHB EST-MCNC: 137 MG/DL
GFR SERPL CREATININE-BSD FRML MDRD: 86 ML/MIN/1.73SQ M
GLUCOSE P FAST SERPL-MCNC: 118 MG/DL (ref 65–99)
HBA1C MFR BLD: 6.4 %
HCT VFR BLD AUTO: 41.9 % (ref 36.5–49.3)
HDLC SERPL-MCNC: 51 MG/DL
HGB BLD-MCNC: 13.8 G/DL (ref 12–17)
IMM GRANULOCYTES # BLD AUTO: 0.02 THOUSAND/UL (ref 0–0.2)
IMM GRANULOCYTES NFR BLD AUTO: 0 % (ref 0–2)
LDLC SERPL CALC-MCNC: 44 MG/DL (ref 0–100)
LEFT EYE DIABETIC RETINOPATHY: ABNORMAL
LEFT EYE IMAGE QUALITY: ABNORMAL
LEFT EYE MACULAR EDEMA: ABNORMAL
LEFT EYE OTHER RETINOPATHY: ABNORMAL
LYMPHOCYTES # BLD AUTO: 1.59 THOUSANDS/ÂΜL (ref 0.6–4.47)
LYMPHOCYTES NFR BLD AUTO: 24 % (ref 14–44)
MCH RBC QN AUTO: 29.4 PG (ref 26.8–34.3)
MCHC RBC AUTO-ENTMCNC: 32.9 G/DL (ref 31.4–37.4)
MCV RBC AUTO: 89 FL (ref 82–98)
MONOCYTES # BLD AUTO: 0.48 THOUSAND/ÂΜL (ref 0.17–1.22)
MONOCYTES NFR BLD AUTO: 7 % (ref 4–12)
NEUTROPHILS # BLD AUTO: 4.58 THOUSANDS/ÂΜL (ref 1.85–7.62)
NEUTS SEG NFR BLD AUTO: 67 % (ref 43–75)
NRBC BLD AUTO-RTO: 0 /100 WBCS
PLATELET # BLD AUTO: 221 THOUSANDS/UL (ref 149–390)
PMV BLD AUTO: 10.7 FL (ref 8.9–12.7)
POTASSIUM SERPL-SCNC: 4 MMOL/L (ref 3.5–5.3)
PROT SERPL-MCNC: 7.2 G/DL (ref 6.4–8.4)
RBC # BLD AUTO: 4.7 MILLION/UL (ref 3.88–5.62)
RIGHT EYE DIABETIC RETINOPATHY: ABNORMAL
RIGHT EYE IMAGE QUALITY: ABNORMAL
RIGHT EYE MACULAR EDEMA: ABNORMAL
RIGHT EYE OTHER RETINOPATHY: ABNORMAL
SEVERITY (EYE EXAM): ABNORMAL
SODIUM SERPL-SCNC: 142 MMOL/L (ref 135–147)
TRIGL SERPL-MCNC: 48 MG/DL
TSH SERPL DL<=0.05 MIU/L-ACNC: 0.54 UIU/ML (ref 0.45–4.5)
WBC # BLD AUTO: 6.75 THOUSAND/UL (ref 4.31–10.16)

## 2023-11-16 PROCEDURE — 99214 OFFICE O/P EST MOD 30 MIN: CPT | Performed by: INTERNAL MEDICINE

## 2023-11-16 PROCEDURE — 80053 COMPREHEN METABOLIC PANEL: CPT

## 2023-11-16 PROCEDURE — 80061 LIPID PANEL: CPT

## 2023-11-16 PROCEDURE — 36415 COLL VENOUS BLD VENIPUNCTURE: CPT

## 2023-11-16 PROCEDURE — 83036 HEMOGLOBIN GLYCOSYLATED A1C: CPT

## 2023-11-16 PROCEDURE — G0439 PPPS, SUBSEQ VISIT: HCPCS | Performed by: INTERNAL MEDICINE

## 2023-11-16 PROCEDURE — 84443 ASSAY THYROID STIM HORMONE: CPT

## 2023-11-16 PROCEDURE — 85025 COMPLETE CBC W/AUTO DIFF WBC: CPT

## 2023-11-16 NOTE — TELEPHONE ENCOUNTER
----- Message from Carmella Kaur MD sent at 11/16/2023  1:09 PM EST -----  Please inform the patient that he needs to see the eye doctor.

## 2023-11-16 NOTE — PATIENT INSTRUCTIONS
Medicare Preventive Visit Patient Instructions  Thank you for completing your Welcome to Medicare Visit or Medicare Annual Wellness Visit today. Your next wellness visit will be due in one year (11/16/2024). The screening/preventive services that you may require over the next 5-10 years are detailed below. Some tests may not apply to you based off risk factors and/or age. Screening tests ordered at today's visit but not completed yet may show as past due. Also, please note that scanned in results may not display below. Preventive Screenings:  Service Recommendations Previous Testing/Comments   Colorectal Cancer Screening  Colonoscopy    Fecal Occult Blood Test (FOBT)/Fecal Immunochemical Test (FIT)  Fecal DNA/Cologuard Test  Flexible Sigmoidoscopy Age: 43-73 years old   Colonoscopy: every 10 years (May be performed more frequently if at higher risk)  OR  FOBT/FIT: every 1 year  OR  Cologuard: every 3 years  OR  Sigmoidoscopy: every 5 years  Screening may be recommended earlier than age 39 if at higher risk for colorectal cancer. Also, an individualized decision between you and your healthcare provider will decide whether screening between the ages of 77-80 would be appropriate.  Colonoscopy: 08/04/2022  FOBT/FIT: Not on file  Cologuard: Not on file  Sigmoidoscopy: Not on file    Screening Current     Prostate Cancer Screening Individualized decision between patient and health care provider in men between ages of 53-66   Medicare will cover every 12 months beginning on the day after your 50th birthday PSA: 1.5 ng/mL           Hepatitis C Screening Once for adults born between 1945 and 1965  More frequently in patients at high risk for Hepatitis C Hep C Antibody: 09/16/2020    Screening Current   Diabetes Screening 1-2 times per year if you're at risk for diabetes or have pre-diabetes Fasting glucose: 131 mg/dL (3/23/2023)  A1C: 6.9 % (3/23/2023)  Screening Not Indicated  History Diabetes   Cholesterol Screening Once every 5 years if you don't have a lipid disorder. May order more often based on risk factors. Lipid panel: 03/23/2023  Screening Not Indicated  History Lipid Disorder      Other Preventive Screenings Covered by Medicare:  Abdominal Aortic Aneurysm (AAA) Screening: covered once if your at risk. You're considered to be at risk if you have a family history of AAA or a male between the age of 70-76 who smoking at least 100 cigarettes in your lifetime. Lung Cancer Screening: covers low dose CT scan once per year if you meet all of the following conditions: (1) Age 48-67; (2) No signs or symptoms of lung cancer; (3) Current smoker or have quit smoking within the last 15 years; (4) You have a tobacco smoking history of at least 20 pack years (packs per day x number of years you smoked); (5) You get a written order from a healthcare provider. Glaucoma Screening: covered annually if you're considered high risk: (1) You have diabetes OR (2) Family history of glaucoma OR (3)  aged 48 and older OR (3)  American aged 72 and older  Osteoporosis Screening: covered every 2 years if you meet one of the following conditions: (1) Have a vertebral abnormality; (2) On glucocorticoid therapy for more than 3 months; (3) Have primary hyperparathyroidism; (4) On osteoporosis medications and need to assess response to drug therapy. HIV Screening: covered annually if you're between the age of 14-79. Also covered annually if you are younger than 13 and older than 72 with risk factors for HIV infection. For pregnant patients, it is covered up to 3 times per pregnancy.     Immunizations:  Immunization Recommendations   Influenza Vaccine Annual influenza vaccination during flu season is recommended for all persons aged >= 6 months who do not have contraindications   Pneumococcal Vaccine   * Pneumococcal conjugate vaccine = PCV13 (Prevnar 13), PCV15 (Vaxneuvance), PCV20 (Prevnar 20)  * Pneumococcal polysaccharide vaccine = PPSV23 (Pneumovax) Adults 85-08 yo with certain risk factors or if 69+ yo  If never received any pneumonia vaccine: recommend Prevnar 20 (PCV20)  Give PCV20 if previously received 1 dose of PCV13 or PPSV23   Hepatitis B Vaccine 3 dose series if at intermediate or high risk (ex: diabetes, end stage renal disease, liver disease)   Respiratory syncytial virus (RSV) Vaccine - COVERED BY MEDICARE PART D  * RSVPreF3 (Arexvy) CDC recommends that adults 61years of age and older may receive a single dose of RSV vaccine using shared clinical decision-making (SCDM)   Tetanus (Td) Vaccine - COST NOT COVERED BY MEDICARE PART B Following completion of primary series, a booster dose should be given every 10 years to maintain immunity against tetanus. Td may also be given as tetanus wound prophylaxis. Tdap Vaccine - COST NOT COVERED BY MEDICARE PART B Recommended at least once for all adults. For pregnant patients, recommended with each pregnancy. Shingles Vaccine (Shingrix) - COST NOT COVERED BY MEDICARE PART B  2 shot series recommended in those 19 years and older who have or will have weakened immune systems or those 50 years and older     Health Maintenance Due:      Topic Date Due   • Colorectal Cancer Screening  08/03/2025   • Hepatitis C Screening  Completed     Immunizations Due:      Topic Date Due   • COVID-19 Vaccine (3 - Moderna series) 06/23/2021   • Influenza Vaccine (1) 09/01/2023     Advance Directives   What are advance directives? Advance directives are legal documents that state your wishes and plans for medical care. These plans are made ahead of time in case you lose your ability to make decisions for yourself. Advance directives can apply to any medical decision, such as the treatments you want, and if you want to donate organs. What are the types of advance directives? There are many types of advance directives, and each state has rules about how to use them.  You may choose a combination of any of the following:  Living will: This is a written record of the treatment you want. You can also choose which treatments you do not want, which to limit, and which to stop at a certain time. This includes surgery, medicine, IV fluid, and tube feedings. Durable power of  for healthcare [facility]): This is a written record that states who you want to make healthcare choices for you when you are unable to make them for yourself. This person, called a proxy, is usually a family member or a friend. You may choose more than 1 proxy. Do not resuscitate (DNR) order:  A DNR order is used in case your heart stops beating or you stop breathing. It is a request not to have certain forms of treatment, such as CPR. A DNR order may be included in other types of advance directives. Medical directive: This covers the care that you want if you are in a coma, near death, or unable to make decisions for yourself. You can list the treatments you want for each condition. Treatment may include pain medicine, surgery, blood transfusions, dialysis, IV or tube feedings, and a ventilator (breathing machine). Values history: This document has questions about your views, beliefs, and how you feel and think about life. This information can help others choose the care that you would choose. Why are advance directives important? An advance directive helps you control your care. Although spoken wishes may be used, it is better to have your wishes written down. Spoken wishes can be misunderstood, or not followed. Treatments may be given even if you do not want them. An advance directive may make it easier for your family to make difficult choices about your care. © Copyright [employer] 2018 Information is for End User's use only and may not be sold, redistributed or otherwise used for commercial purposes.  All illustrations and images included in CareNotes® are the copyrighted property of A.D.A[employer]M., Inc. or [employer]

## 2023-11-16 NOTE — PROGRESS NOTES
Assessment and Plan:   Type 2 diabetes is fairly well controlled, continue the same medication  Continue atorvastatin  He has a history of CVA and says that he has some weakness when he walks but does not want to do physical therapy. Problem List Items Addressed This Visit    None      Depression Screening and Follow-up Plan: Patient was screened for depression during today's encounter. They screened negative with a PHQ-2 score of 0. Preventive health issues were discussed with patient, and age appropriate screening tests were ordered as noted in patient's After Visit Summary. Personalized health advice and appropriate referrals for health education or preventive services given if needed, as noted in patient's After Visit Summary. History of Present Illness:     Patient presents for a Medicare Wellness Visit    HPI  Patient is here for follow-up of type 2 diabetes, hyperlipidemia. Has not had blood work done since March 2023 but takes his medications regularly and has no symptoms      Patient Care Team:  Janine Saunders DO as PCP - General (Internal Medicine)     Review of Systems:     Review of Systems   Constitutional:  Negative for chills, diaphoresis, fatigue and fever. HENT:  Negative for congestion, ear discharge, ear pain, hearing loss, postnasal drip, rhinorrhea, sinus pressure, sinus pain, sneezing, sore throat and voice change. Eyes:  Negative for pain, discharge, redness and visual disturbance. Respiratory:  Negative for cough, chest tightness, shortness of breath and wheezing. Cardiovascular:  Negative for chest pain, palpitations and leg swelling. Gastrointestinal:  Negative for abdominal distention, abdominal pain, blood in stool, constipation, diarrhea, nausea and vomiting. Endocrine: Negative for cold intolerance, heat intolerance, polydipsia, polyphagia and polyuria. Genitourinary:  Negative for dysuria, flank pain, frequency, hematuria and urgency. Musculoskeletal:  Negative for arthralgias, back pain, gait problem, joint swelling, myalgias, neck pain and neck stiffness. Skin:  Negative for rash. Neurological:  Negative for dizziness, tremors, syncope, facial asymmetry, speech difficulty, weakness, light-headedness, numbness and headaches. Hematological:  Does not bruise/bleed easily. Psychiatric/Behavioral:  Negative for behavioral problems, confusion and sleep disturbance. The patient is not nervous/anxious. Problem List:     Patient Active Problem List   Diagnosis    History of lacunar cerebrovascular accident (CVA)    Type 2 diabetes mellitus with stage 2 chronic kidney disease, with long-term current use of insulin (HCC)    Mixed hyperlipidemia due to type 2 diabetes mellitus     Protein-calorie malnutrition, unspecified severity ([address])      Past Medical and Surgical History:     Past Medical History:   Diagnosis Date    Colon polyp     CVA (cerebral vascular accident) ([address]) 02/18/2019    Bilateral pontine lacunar infarct    Diabetes mellitus ([address])     Esophagitis     Hyperlipidemia     Hypertension     Non-sustained ventricular tachycardia ([address])     Pneumonia 05/15/2023    Stroke ([address] [facility])     Type 2 diabetes mellitus ([address])      Past Surgical History:   Procedure Laterality Date    COLONOSCOPY      ESOPHAGOGASTRODUODENOSCOPY N/A 03/04/2019    Procedure: ESOPHAGOGASTRODUODENOSCOPY (EGD); Surgeon: Lino Joseph DO;  Location: [facility]; Service: Gastroenterology      Family History:     History reviewed. No pertinent family history.    Social History:     Social History     Socioeconomic History    Marital status: /Civil Union     Spouse name: None    Number of children: None    Years of education: None    Highest education level: None   Occupational History    None   Tobacco Use    Smoking status: Never    Smokeless tobacco: Never   Vaping Use    Vaping Use: Never used   Substance and Sexual Activity    Alcohol use: Never    Drug use: Never    Sexual activity: Yes     Partners: Female   Other Topics Concern    None   Social History Narrative    None     Social Determinants of Health     Financial Resource Strain: Low Risk  (10/20/2022)    Overall Financial Resource Strain (CARDIA)     Difficulty of Paying Living Expenses: Not hard at all   Food Insecurity: No Food Insecurity (3/16/2023)    Hunger Vital Sign     Worried About Running Out of Food in the Last Year: Never true     Ran Out of Food in the Last Year: Never true   Transportation Needs: No Transportation Needs (3/16/2023)    PRAPARE - Transportation     Lack of Transportation (Medical): No     Lack of Transportation (Non-Medical): No   Physical Activity: Inactive (4/29/2021)    Exercise Vital Sign     Days of Exercise per Week: 0 days     Minutes of Exercise per Session: 0 min   Stress: No Stress Concern Present (4/29/2021)    [facility]     Feeling of Stress :  Only a little   Social Connections: Not on file   Intimate Partner Violence: Not on file   Housing Stability: Low Risk  (3/16/2023)    Housing Stability Vital Sign     Unable to Pay for Housing in the Last Year: No     Number of Places Lived in the Last Year: 1     Unstable Housing in the Last Year: No      Medications and Allergies:     Current Outpatient Medications   Medication Sig Dispense Refill    Alcohol Swabs PADS Use daily to inject insulin 100 each 0    atorvastatin (LIPITOR) 40 mg tablet TAKE 1 TABLET BY MOUTH EVERY DAY IN THE EVENING 90 tablet 0    clopidogrel (PLAVIX) 75 mg tablet TAKE 1 TABLET BY MOUTH EVERY DAY 90 tablet 3    famotidine (PEPCID) 40 MG tablet TAKE 1 TABLET BY MOUTH DAILY AT BEDTIME 90 tablet 2    metFORMIN (GLUCOPHAGE-XR) 750 mg 24 hr tablet TAKE 2 TABLETS BY MOUTH DAILY WITH DINNER 180 tablet 0    metoprolol tartrate (LOPRESSOR) 25 mg tablet Take 0.5 tablets (12.5 mg total) by mouth every 12 (twelve) hours 90 tablet 3 pantoprazole (PROTONIX) 40 mg tablet Take 1 tablet (40 mg total) by mouth daily 30 tablet 11    sitaGLIPtin (Januvia) 100 mg tablet TAKE 1 TABLET BY MOUTH EVERY DAY 90 tablet 0     No current facility-administered medications for this visit. No Known Allergies   Immunizations:     Immunization History   Administered Date(s) Administered    COVID-19 MODERNA VACC 0.5 ML IM 03/28/2021, 04/28/2021    INFLUENZA 10/20/2022    Influenza, high dose seasonal 0.7 mL 10/20/2022    Pneumococcal Conjugate Vaccine 20-valent (Pcv20), Polysace 10/20/2022    Pneumococcal Polysaccharide PPV23 09/17/2021      Health Maintenance:         Topic Date Due    Colorectal Cancer Screening  08/03/2025    Hepatitis C Screening  Completed         Topic Date Due    COVID-19 Vaccine (3 - Moderna series) 06/23/2021    Influenza Vaccine (1) 09/01/2023      Medicare Screening Tests and Risk Assessments:     John Jara is here for his Subsequent Wellness visit. Health Risk Assessment:   Patient rates overall health as fair. Patient feels that their physical health rating is same. Patient is satisfied with their life. Eyesight was rated as same. Hearing was rated as same. Patient feels that their emotional and mental health rating is same. Patients states they are never, rarely angry. Patient states they are never, rarely unusually tired/fatigued. Pain experienced in the last 7 days has been none. Patient states that he has experienced no weight loss or gain in last 6 months. Depression Screening:   PHQ-2 Score: 0      Fall Risk Screening: In the past year, patient has experienced: no history of falling in past year      Home Safety:  Patient has trouble with stairs inside or outside of their home. Patient has working smoke alarms and has working carbon monoxide detector. Home safety hazards include: none. Nutrition:   Current diet is Diabetic. Medications:   Patient is not currently taking any over-the-counter supplements.  Patient is able to manage medications. Activities of Daily Living (ADLs)/Instrumental Activities of Daily Living (IADLs):   Walk and transfer into and out of bed and chair?: Yes  Dress and groom yourself?: Yes    Bathe or shower yourself?: Yes    Feed yourself? Yes  Do your laundry/housekeeping?: Yes  Manage your money, pay your bills and track your expenses?: Yes  Make your own meals?: Yes    Do your own shopping?: Yes    Previous Hospitalizations:   Any hospitalizations or ED visits within the last 12 months?: Yes    How many hospitalizations have you had in the last year?: 1-2    Advance Care Planning:   Living will: No      Cognitive Screening:   Provider or family/friend/caregiver concerned regarding cognition?: No    PREVENTIVE SCREENINGS      Cardiovascular Screening:    General: Screening Not Indicated and History Lipid Disorder      Diabetes Screening:     General: Screening Not Indicated and History Diabetes      Colorectal Cancer Screening:     General: Screening Current      Abdominal Aortic Aneurysm (AAA) Screening:    Risk factors include: age between 70-75 yo        Lung Cancer Screening:     General: Screening Not Indicated      Hepatitis C Screening:    General: Screening Current    Screening, Brief Intervention, and Referral to Treatment (SBIRT)    Screening  Typical number of drinks in a day: 0  Typical number of drinks in a week: 0  Interpretation: Low risk drinking behavior. Single Item Drug Screening:  How often have you used an illegal drug (including marijuana) or a prescription medication for non-medical reasons in the past year? never    Single Item Drug Screen Score: 0  Interpretation: Negative screen for possible drug use disorder    Other Counseling Topics:   Car/seat belt/driving safety, skin self-exam, sunscreen and calcium and vitamin D intake and regular weightbearing exercise. No results found.      Physical Exam:     Ht 5' 5" (1.651 m)   BMI 20.70 kg/m²     Physical Exam  Constitutional:       General: He is not in acute distress. Appearance: He is well-developed. He is not diaphoretic. HENT:      Head: Normocephalic and atraumatic. Right Ear: External ear normal.      Left Ear: External ear normal.      Nose: Nose normal.   Eyes:      General: No scleral icterus. Right eye: No discharge. Left eye: No discharge. Conjunctiva/sclera: Conjunctivae normal.   Cardiovascular:      Rate and Rhythm: Normal rate and regular rhythm. Heart sounds: Normal heart sounds. No murmur heard. No friction rub. No gallop. Pulmonary:      Effort: Pulmonary effort is normal. No respiratory distress. Breath sounds: Normal breath sounds. No wheezing or rales. Abdominal:      General: Bowel sounds are normal. There is no distension. Palpations: Abdomen is soft. Tenderness: There is no abdominal tenderness. There is no guarding or rebound. Musculoskeletal:         General: No tenderness. Skin:     General: Skin is warm and dry. Findings: No erythema or rash. Neurological:      Mental Status: He is alert and oriented to person, place, and time. Cranial Nerves: No cranial nerve deficit. Sensory: No sensory deficit. Motor: No abnormal muscle tone.    Psychiatric:         Behavior: Behavior normal.          Carmen Chatterjee MD

## 2023-12-02 DIAGNOSIS — K20.90 ESOPHAGITIS: ICD-10-CM

## 2023-12-04 RX ORDER — PANTOPRAZOLE SODIUM 40 MG/1
40 TABLET, DELAYED RELEASE ORAL 2 TIMES DAILY
Qty: 180 TABLET | Refills: 1 | Status: SHIPPED | OUTPATIENT
Start: 2023-12-04

## 2023-12-20 ENCOUNTER — VBI (OUTPATIENT)
Dept: ADMINISTRATIVE | Facility: OTHER | Age: 73
End: 2023-12-20

## 2024-01-17 DIAGNOSIS — I47.29 NONSUSTAINED VENTRICULAR TACHYCARDIA (HCC): ICD-10-CM

## 2024-01-24 NOTE — ASSESSMENT & PLAN NOTE
Acute cva on mri  On asa and statin  Worsening sxs since 0400 this am per patient with lower bp-stat ct head and fluid bolus as bp lower side  Also with dysphagia-speech consult  D/w neurology  Echo result and ct head/neck results pending Body Procedure 1 Percentage Discount: 0 Use Name For Above Discounts: No Facility 2 Price (In Dollars- Use Only Numbers And Decimals): 0.00 Include Sales Tax On Surgeon's Fees: Yes Face Procedure 10: ADD ON Dermaplaning Body Procedure 5 Price/Unit (In Dollars- Use Only Numbers And Decimals): 575.00 Face Procedure 4: Clinical Facial Face Procedure 7: Perfect 10 Chemical Peel Face Procedure 1: Express Hydrafacial Body Procedure 3: Perfect 10 Peel Body Procedure 3 Price/Unit (In Dollars- Use Only Numbers And Decimals): 239.00 Face Procedure 5: Deluxe Hydrafacial Face Procedure 2: Microneedling Anesthesia Fee Units (Optional): 1 Body Procedure 1 Price/Unit (In Dollars- Use Only Numbers And Decimals): 350.00 Body Procedure 4: VI Peel Body - Large Face Procedure 6 Price/Unit (In Dollars- Use Only Numbers And Decimals): 75.00 Face Procedure 9 Price/Unit (In Dollars- Use Only Numbers And Decimals): 269.00 Face Procedure 3 Price/Unit (In Dollars- Use Only Numbers And Decimals): 429.00 Body Procedure 5: Hydrafacial Keravive - Scalp Health Face Procedure 3 Units: 3 Face Procedure 10 Hall/Unit (In Dollars- Use Only Numbers And Decimals): 65.00 Face Procedure 3 Percentage Discount: 10 Face Procedure 4 Price/Unit (In Dollars- Use Only Numbers And Decimals): 175.00 Face Procedure 1 Price/Unit (In Dollars- Use Only Numbers And Decimals): 229.00 Face Procedure 8: Microneedling with PRP Body Procedure 1: Chemical Peel Body Face Procedure 8 Price/Unit (In Dollars- Use Only Numbers And Decimals): 789.00 Face Procedure 2 Price/Unit (In Dollars- Use Only Numbers And Decimals): 379.00 Face Procedure 5 Price/Unit (In Dollars- Use Only Numbers And Decimals): 299.00 Detail Level: Zone Face Procedure 9: Perfect 10 Chemical Peel - Advanced Body Procedure 4 Price/Unit (In Dollars- Use Only Numbers And Decimals): 500.00 Face Procedure 6: Teen Clinical Facial Face Procedure 3: VI Peel Series

## 2024-02-01 DIAGNOSIS — Z79.4 TYPE 2 DIABETES MELLITUS WITH STAGE 2 CHRONIC KIDNEY DISEASE, WITH LONG-TERM CURRENT USE OF INSULIN (HCC): Chronic | ICD-10-CM

## 2024-02-01 DIAGNOSIS — E78.2 MIXED HYPERLIPIDEMIA: Chronic | ICD-10-CM

## 2024-02-01 DIAGNOSIS — N18.2 TYPE 2 DIABETES MELLITUS WITH STAGE 2 CHRONIC KIDNEY DISEASE, WITH LONG-TERM CURRENT USE OF INSULIN (HCC): Chronic | ICD-10-CM

## 2024-02-01 DIAGNOSIS — E11.22 TYPE 2 DIABETES MELLITUS WITH STAGE 2 CHRONIC KIDNEY DISEASE, WITH LONG-TERM CURRENT USE OF INSULIN (HCC): Chronic | ICD-10-CM

## 2024-02-01 DIAGNOSIS — Z86.73 HISTORY OF LACUNAR CEREBROVASCULAR ACCIDENT (CVA): Chronic | ICD-10-CM

## 2024-02-01 RX ORDER — CLOPIDOGREL BISULFATE 75 MG/1
TABLET ORAL
Qty: 90 TABLET | Refills: 1 | Status: SHIPPED | OUTPATIENT
Start: 2024-02-01

## 2024-02-01 RX ORDER — ATORVASTATIN CALCIUM 40 MG/1
TABLET, FILM COATED ORAL
Qty: 90 TABLET | Refills: 1 | Status: SHIPPED | OUTPATIENT
Start: 2024-02-01

## 2024-02-01 RX ORDER — METFORMIN HYDROCHLORIDE 750 MG/1
TABLET, EXTENDED RELEASE ORAL
Qty: 180 TABLET | Refills: 1 | Status: SHIPPED | OUTPATIENT
Start: 2024-02-01

## 2024-02-01 RX ORDER — SITAGLIPTIN 100 MG/1
TABLET, FILM COATED ORAL
Qty: 90 TABLET | Refills: 1 | Status: SHIPPED | OUTPATIENT
Start: 2024-02-01

## 2024-02-23 ENCOUNTER — RA CDI HCC (OUTPATIENT)
Dept: OTHER | Facility: HOSPITAL | Age: 74
End: 2024-02-23

## 2024-02-23 NOTE — PROGRESS NOTES
E11.22 for 2024  HCC coding opportunities          Chart Reviewed number of suggestions sent to Provider: 1     Patients Insurance     Medicare Insurance: [employer] Advantage

## 2024-04-05 DIAGNOSIS — Z87.19 HISTORY OF GASTROESOPHAGEAL REFLUX (GERD): ICD-10-CM

## 2024-04-05 DIAGNOSIS — R06.6 HICCUPS: ICD-10-CM

## 2024-04-05 RX ORDER — FAMOTIDINE 40 MG/1
40 TABLET, FILM COATED ORAL
Qty: 90 TABLET | Refills: 1 | Status: SHIPPED | OUTPATIENT
Start: 2024-04-05

## 2024-04-13 DIAGNOSIS — I47.29 NONSUSTAINED VENTRICULAR TACHYCARDIA (HCC): ICD-10-CM

## 2024-04-22 ENCOUNTER — VBI (OUTPATIENT)
Dept: ADMINISTRATIVE | Facility: OTHER | Age: 74
End: 2024-04-22

## 2024-05-16 ENCOUNTER — TELEPHONE (OUTPATIENT)
Age: 74
End: 2024-05-16

## 2024-05-16 NOTE — TELEPHONE ENCOUNTER
----- Message from Carolina MOTTA sent at 5/16/2024  9:59 AM EDT -----  Regarding: FW: Appointment    ----- Message -----  From: Siva Greene DO  Sent: 5/16/2024   9:57 AM EDT  To: [facility]  Subject: Appointment                                      Patient is overdue for follow-up of their diabetes

## 2024-05-17 ENCOUNTER — RA CDI HCC (OUTPATIENT)
Dept: OTHER | Facility: HOSPITAL | Age: 74
End: 2024-05-17

## 2024-05-28 ENCOUNTER — OFFICE VISIT (OUTPATIENT)
Age: 74
End: 2024-05-28
Payer: COMMERCIAL

## 2024-05-28 ENCOUNTER — TELEPHONE (OUTPATIENT)
Age: 74
End: 2024-05-28

## 2024-05-28 VITALS
SYSTOLIC BLOOD PRESSURE: 118 MMHG | HEART RATE: 58 BPM | BODY MASS INDEX: 21.09 KG/M2 | DIASTOLIC BLOOD PRESSURE: 70 MMHG | HEIGHT: 65 IN | WEIGHT: 126.6 LBS | TEMPERATURE: 96.3 F | RESPIRATION RATE: 17 BRPM | OXYGEN SATURATION: 98 %

## 2024-05-28 DIAGNOSIS — E78.2 MIXED HYPERLIPIDEMIA DUE TO TYPE 2 DIABETES MELLITUS  (HCC): Primary | ICD-10-CM

## 2024-05-28 DIAGNOSIS — Z12.5 SCREENING FOR PROSTATE CANCER: ICD-10-CM

## 2024-05-28 DIAGNOSIS — Z86.73 HISTORY OF LACUNAR CEREBROVASCULAR ACCIDENT (CVA): Chronic | ICD-10-CM

## 2024-05-28 DIAGNOSIS — E11.69 MIXED HYPERLIPIDEMIA DUE TO TYPE 2 DIABETES MELLITUS  (HCC): Primary | ICD-10-CM

## 2024-05-28 LAB
LEFT EYE DIABETIC RETINOPATHY: ABNORMAL
LEFT EYE IMAGE QUALITY: ABNORMAL
LEFT EYE MACULAR EDEMA: ABNORMAL
LEFT EYE OTHER RETINOPATHY: ABNORMAL
RIGHT EYE DIABETIC RETINOPATHY: ABNORMAL
RIGHT EYE IMAGE QUALITY: ABNORMAL
RIGHT EYE MACULAR EDEMA: ABNORMAL
RIGHT EYE OTHER RETINOPATHY: ABNORMAL
SEVERITY (EYE EXAM): ABNORMAL
SL AMB POCT HEMOGLOBIN AIC: 6.3 (ref ?–6.5)

## 2024-05-28 PROCEDURE — 92250 FUNDUS PHOTOGRAPHY W/I&R: CPT | Performed by: INTERNAL MEDICINE

## 2024-05-28 PROCEDURE — 83036 HEMOGLOBIN GLYCOSYLATED A1C: CPT | Performed by: INTERNAL MEDICINE

## 2024-05-28 PROCEDURE — 99214 OFFICE O/P EST MOD 30 MIN: CPT | Performed by: INTERNAL MEDICINE

## 2024-05-28 NOTE — PATIENT INSTRUCTIONS
Type 2 Diabetes Management for Adults   WHAT YOU NEED TO KNOW:   What do I need to know about type 2 diabetes management?  Type 2 diabetes is a disease that affects how your body uses glucose (sugar). Either your body cannot make enough insulin, or it cannot use the insulin correctly. It is important to keep diabetes controlled to prevent damage to your heart, blood vessels, and other organs. Management will help you feel well and enjoy your daily activities. Your diabetes care team providers can help you make a plan to fit diabetes care into your schedule. Your plan can change over time to fit your needs and your family's needs.       What do I need to know about high blood sugar levels?  High blood sugar levels may not cause any symptoms. You may feel more thirsty or urinate more often than usual. Over time, high blood sugar levels can damage your nerves, blood vessels, tissues, and organs. The following can increase your blood sugar levels:  Large meals or large amounts of carbohydrates at one time    Less physical activity    Stress    Illness    A lower dose of diabetes medicine or insulin, or a late dose    What do I need to know about low blood sugar levels?  Symptoms include feeling shaky, dizzy, irritable, or confused. You can prevent symptoms by keeping your blood sugar levels from going too low.  Treat a low blood sugar level right away:      Drink 4 ounces of juice or have 1 tube of glucose gel.    Check your blood sugar level again 10 to 15 minutes later.    When the level goes back to normal, eat a meal or snack to prevent another decrease.       Keep glucose gel, raisins, or hard candy with you at all times to treat a low blood sugar level.     Your blood sugar level can get too low if you take diabetes medicine or insulin and do not eat enough food.     If you use insulin, check your blood sugar level before you exercise.      If your blood sugar level is below 100 mg/dL, eat 4 crackers or 2 ounces  of raisins, or drink 4 ounces of juice.    Check your level every 30 minutes if you exercise longer than 1 hour.    You may need a snack during or after exercise.    What can I do to manage my blood sugar levels?   Check your blood sugar levels as directed and as needed.  Several items are available to use to check your levels. You may need to check by testing a drop of blood in a glucose monitor. You may instead be given a continuous glucose monitoring (CGM) device. The device is worn at all times. The CGM checks your blood sugar level every 5 minutes. It sends results to an electronic device such as a smart phone. A CGM can be used with or without an insulin pump. You and your diabetes care team providers will decide on the best method for you. The goal for blood sugar levels before meals  is between 80 and 130 mg/dL and 2 hours after eating  is lower than 180 mg/dL.            Make healthy food choices.  Work with a dietitian to create a meal plan that works for you and your schedule. A dietitian can help you learn how to eat the right amount of carbohydrates (sugar and starchy foods) during your meals and snacks. Examples of carbohydrates are breads, cereals, rice, pasta, fruit, low-fat dairy, and sweets. Carbohydrates can raise your blood sugar level if you eat too many at one time.         Eat high-fiber foods as directed.  Fiber helps improve blood sugar levels. Fiber also lowers your risk for heart disease and other problems diabetes can cause. Examples of high-fiber foods include vegetables, whole-grain bread, and beans such as washburn beans. Your dietitian can tell you how much fiber to have each day.         Get regular physical activity.  Physical activity can help you get to your target blood sugar level goal and manage your weight. Get at least 150 minutes of moderate to vigorous aerobic physical activity each week. Resistance training, such as lifting weights, should be done 3 times each week. Do not  miss more than 2 days of physical activity in a row. Do not sit longer than 30 minutes at a time. Your healthcare provider can help you create an activity plan. The plan can include the best activities for you and can help you build your strength and endurance.            Maintain a healthy weight.  Ask your team what a healthy weight is for you. A healthy weight can help you control diabetes and prevent heart disease. Ask your team to help you create a weight-loss plan, if needed. Even a loss of 3% to 7% of your excess body weight can help make a difference in managing diabetes. Your team will help you set a weight-loss goal, such as 10 to 15 pounds, or 5% of your extra weight. Together you and your team can set manageable weight-loss goals.    Take your diabetes medicine or insulin as directed.  You may need diabetes medicine, insulin, or both to help control your blood sugar levels. Your provider will teach you how and when to take your diabetes medicine or insulin. You will also be taught about side effects oral diabetes medicine can cause. Insulin may be injected or given through a pump or pen. You and your providers will decide on the best method for you:    An insulin pump  is an implanted device that gives your insulin 24 hours a day. An insulin pump prevents the need for multiple insulin injections in a day.         An insulin pen  is a device prefilled with the right amount of insulin.         You and your family members will be taught how to draw up and give insulin  if this is the best method for you. Your providers will also teach you how to dispose of needles and syringes.    You will learn how much insulin you need  and when to give it. You will be taught when not to give insulin. You will also be taught what to do if your blood sugar level drops too low. This may happen if you take insulin and do not eat the right amount of carbohydrates.    What else can I do to manage type 2 diabetes?   Wear  medical alert identification.  Wear medical alert jewelry or carry a card that says you have diabetes. Ask your care team provider where to get these items.         Do not smoke.  Nicotine and other chemicals in cigarettes and cigars can cause lung and blood vessel damage. It also makes it more difficult to manage your diabetes. Ask your provider for information if you currently smoke and need help to quit. Do not use e-cigarettes or smokeless tobacco in place of cigarettes or to help you quit. They still contain nicotine.    Check your feet each day for cuts, scratches, calluses, or other wounds.  Look for redness and swelling, and feel for warmth. Wear shoes that fit well. Check your shoes for rocks or other objects that can hurt your feet. Do not walk barefoot or wear shoes without socks. Wear cotton socks to help keep your feet dry.         Ask about vaccines you may need.  You have a higher risk for serious illness if you get the flu, pneumonia, COVID-19, or hepatitis. Ask your provider if you should get vaccines to prevent these or other diseases, and when to get the vaccines.    Talk to your provider if you become stressed about diabetes care.  Sometimes being able to fit diabetes care into your life can cause increased stress. The stress can cause you not to take care of yourself properly. Your provider can help by offering tips about self-care. A mental health provider can listen and offer help with self-care issues. Other types of counseling can help you make nutrition or physical activity changes.    Have your A1c checked as directed.  Your provider may check your A1c every 3 months, or 2 times each year if your diabetes is controlled. An A1c test shows the average amount of sugar in your blood over the past 2 to 3 months. Your provider will tell you what your A1c level should be.    Have screening tests as directed.  Your provider may recommend screening for complications of diabetes and other conditions  that may develop. Some screenings may begin right away and some may happen within the first 5 years of diagnosis:    Examples of diabetes complications  include kidney problems, high cholesterol, high blood pressure, blood vessel problems, eye problems, and sleep apnea.    You may be screened for a low vitamin B level  if you take oral diabetes medicine for a long time.    You may be screened for polycystic ovarian syndrome (PCOS)  if you are of childbearing age.    Have someone call your local emergency number (911 in the [place]) if:   You cannot be woken.    You have signs of diabetic ketoacidosis:     confusion, fatigue    vomiting    rapid heartbeat    fruity smelling breath    extreme thirst    dry mouth and skin    You have any of the following signs of a heart attack:      Squeezing, pressure, or pain in your chest    You may  also have any of the following:     Discomfort or pain in your back, neck, jaw, stomach, or arm    Shortness of breath    Nausea or vomiting    Lightheadedness or a sudden cold sweat    You have any of the following signs of a stroke:      Numbness or drooping on one side of your face     Weakness in an arm or leg    Confusion or difficulty speaking    Dizziness, a severe headache, or vision loss    When should I call my doctor or diabetes care team provider?   You have a sore or wound that will not heal.    You have a change in the amount you urinate.    Your blood sugar levels are higher than your target goals.    You often have lower blood sugar levels than your target goals.    Your skin is red, dry, warm, or swollen.    You have trouble coping with diabetes, or you feel anxious or depressed.    You have trouble following any part of your care plan, such as your meal plan.    You have questions or concerns about your condition or care.    CARE AGREEMENT:   You have the right to help plan your care. Learn about your health condition and how it may be treated. Discuss treatment options  with your healthcare providers to decide what care you want to receive. You always have the right to refuse treatment. The above information is an  only. It is not intended as medical advice for individual conditions or treatments. Talk to your doctor, nurse or pharmacist before following any medical regimen to see if it is safe and effective for you.  © Copyright [employer] 2023 Information is for End User's use only and may not be sold, redistributed or otherwise used for commercial purposes.

## 2024-05-28 NOTE — PROGRESS NOTES
Assessment & Plan  Mixed hyperlipidemia due to type 2 diabetes mellitus  (HCC)    Lab Results   Component Value Date    HGBA1C 6.3 05/28/2024     Sugars remain well controlled. Continue metformin ER and januvia as prescribed. Continue statin. LDL goal <70. Will check diabetic eye exam in office today.    Orders:    [facility] Diabetic eye exam    POCT hemoglobin A1c    Albumin / creatinine urine ratio; Future    Comprehensive metabolic panel; Future    CBC and Platelet; Future    Lipid Panel with Direct LDL reflex; Future    History of lacunar cerebrovascular accident (CVA)    Keep blood pressure and blood sugar control. Continue plavix and statin. Regular exercise encouraged.  Screening for prostate cancer    Orders:    PSA, Total Screen; Future         History of Present Illness     History of Present Illness  The patient presents for follow-up of his diabetes.    The patient has not been monitoring his blood glucose levels at home. He denies experiencing any gastrointestinal symptoms such as nausea, vomiting, or abdominal pain. Furthermore, he denies the presence of hematuria or hematochezia. He acknowledges occasional forgetfulness in his medication regimen, however, there have been no instances of missed doses. He reports occasional visual disturbances, but denies any peripheral neuropathy symptoms such as numbness or tingling in his feet.     Review of Systems   Constitutional:  Negative for activity change, appetite change and fatigue.   Respiratory:  Negative for apnea, cough, chest tightness, shortness of breath and wheezing.    Cardiovascular:  Negative for chest pain, palpitations and leg swelling.   Gastrointestinal:  Negative for abdominal distention, abdominal pain, blood in stool, constipation, diarrhea, nausea and vomiting.   Neurological:  Negative for dizziness, weakness, light-headedness, numbness and headaches.   Psychiatric/Behavioral:  Negative for behavioral problems, confusion, hallucinations,  "sleep disturbance and suicidal ideas. The patient is not nervous/anxious.      Objective     /70 (BP Location: Left arm, Patient Position: Sitting, Cuff Size: Standard)   Pulse 58   Temp (!) 96.3 °F (35.7 °C) (Tympanic)   Resp 17   Ht 5' 5\" (1.651 m)   Wt 57.4 kg (126 lb 9.6 oz) Comment: with shoes on  SpO2 98%   BMI 21.07 kg/m²     Physical Exam  Constitutional:       General: He is not in acute distress.     Appearance: He is not ill-appearing.   Cardiovascular:      Rate and Rhythm: Normal rate and regular rhythm.      Pulses: no weak pulses.           Posterior tibial pulses are 2+ on the right side and 2+ on the left side.      Heart sounds: No murmur heard.  Pulmonary:      Effort: Pulmonary effort is normal. No respiratory distress.      Breath sounds: No wheezing.   Abdominal:      General: Bowel sounds are normal. There is no distension.      Tenderness: There is no abdominal tenderness.   Musculoskeletal:      Right lower leg: No edema.      Left lower leg: No edema.   Feet:      Right foot:      Skin integrity: No ulcer, skin breakdown, erythema, warmth, callus or dry skin.      Left foot:      Skin integrity: No ulcer, skin breakdown, erythema, warmth, callus or dry skin.   Neurological:      Mental Status: He is alert.       Diabetic Foot Exam    Patient's shoes and socks removed.    Right Foot/Ankle   Right Foot Inspection  Skin Exam: skin normal and skin intact. No dry skin, no warmth, no callus, no erythema, no maceration, no abnormal color, no pre-ulcer, no ulcer and no callus.     Toe Exam: ROM and strength within normal limits.     Sensory   Monofilament testing: intact    Vascular  Capillary refills: < 3 seconds  The right PT pulse is 2+.     Left Foot/Ankle  Left Foot Inspection  Skin Exam: skin normal and skin intact. No dry skin, no warmth, no erythema, no maceration, normal color, no pre-ulcer, no ulcer and no callus.     Toe Exam: ROM and strength within normal limits. "     Sensory   Monofilament testing: intact    Vascular  Capillary refills: < 3 seconds  The left PT pulse is 2+.     Assign Risk Category  No deformity present  No loss of protective sensation  No weak pulses  Risk: 0    Siva Greene,

## 2024-05-28 NOTE — TELEPHONE ENCOUNTER
----- [facility] at 5/28/2024 11:13 AM EDT -----  No retinopathy right eye. Left eye was inconclusive. Should follow-up with regular eye doctor within the next year.

## 2024-05-28 NOTE — ASSESSMENT & PLAN NOTE
Lab Results   Component Value Date    HGBA1C 6.3 05/28/2024     Sugars remain well controlled. Continue metformin ER and januvia as prescribed. Continue statin. LDL goal <70. Will check diabetic eye exam in office today.    Orders:    [facility] Diabetic eye exam    POCT hemoglobin A1c    Albumin / creatinine urine ratio; Future    Comprehensive metabolic panel; Future    CBC and Platelet; Future    Lipid Panel with Direct LDL reflex; Future

## 2024-05-28 NOTE — ASSESSMENT & PLAN NOTE
Keep blood pressure and blood sugar control. Continue plavix and statin. Regular exercise encouraged.

## 2024-06-10 ENCOUNTER — APPOINTMENT (OUTPATIENT)
Age: 74
End: 2024-06-10
Payer: COMMERCIAL

## 2024-06-10 DIAGNOSIS — E78.2 MIXED HYPERLIPIDEMIA DUE TO TYPE 2 DIABETES MELLITUS  (HCC): ICD-10-CM

## 2024-06-10 DIAGNOSIS — Z12.5 SCREENING FOR PROSTATE CANCER: ICD-10-CM

## 2024-06-10 DIAGNOSIS — E11.69 MIXED HYPERLIPIDEMIA DUE TO TYPE 2 DIABETES MELLITUS  (HCC): ICD-10-CM

## 2024-06-10 LAB
ALBUMIN SERPL BCP-MCNC: 3.9 G/DL (ref 3.5–5)
ALP SERPL-CCNC: 84 U/L (ref 34–104)
ALT SERPL W P-5'-P-CCNC: 10 U/L (ref 7–52)
ANION GAP SERPL CALCULATED.3IONS-SCNC: 10 MMOL/L (ref 4–13)
AST SERPL W P-5'-P-CCNC: 17 U/L (ref 13–39)
BILIRUB SERPL-MCNC: 0.41 MG/DL (ref 0.2–1)
BUN SERPL-MCNC: 11 MG/DL (ref 5–25)
CALCIUM SERPL-MCNC: 9.1 MG/DL (ref 8.4–10.2)
CHLORIDE SERPL-SCNC: 103 MMOL/L (ref 96–108)
CHOLEST SERPL-MCNC: 107 MG/DL
CO2 SERPL-SCNC: 28 MMOL/L (ref 21–32)
CREAT SERPL-MCNC: 0.85 MG/DL (ref 0.6–1.3)
CREAT UR-MCNC: 151.7 MG/DL
ERYTHROCYTE [DISTWIDTH] IN BLOOD BY AUTOMATED COUNT: 13.4 % (ref 11.6–15.1)
GFR SERPL CREATININE-BSD FRML MDRD: 87 ML/MIN/1.73SQ M
GLUCOSE P FAST SERPL-MCNC: 109 MG/DL (ref 65–99)
HCT VFR BLD AUTO: 43 % (ref 36.5–49.3)
HDLC SERPL-MCNC: 53 MG/DL
HGB BLD-MCNC: 13.2 G/DL (ref 12–17)
LDLC SERPL CALC-MCNC: 44 MG/DL (ref 0–100)
MCH RBC QN AUTO: 28.4 PG (ref 26.8–34.3)
MCHC RBC AUTO-ENTMCNC: 30.7 G/DL (ref 31.4–37.4)
MCV RBC AUTO: 93 FL (ref 82–98)
MICROALBUMIN UR-MCNC: <7 MG/L
MICROALBUMIN/CREAT 24H UR: <5 MG/G CREATININE (ref 0–30)
PLATELET # BLD AUTO: 257 THOUSANDS/UL (ref 149–390)
PMV BLD AUTO: 10.5 FL (ref 8.9–12.7)
POTASSIUM SERPL-SCNC: 4.3 MMOL/L (ref 3.5–5.3)
PROT SERPL-MCNC: 7.1 G/DL (ref 6.4–8.4)
PSA SERPL-MCNC: 2.52 NG/ML (ref 0–4)
RBC # BLD AUTO: 4.64 MILLION/UL (ref 3.88–5.62)
SODIUM SERPL-SCNC: 141 MMOL/L (ref 135–147)
TRIGL SERPL-MCNC: 50 MG/DL
WBC # BLD AUTO: 5.76 THOUSAND/UL (ref 4.31–10.16)

## 2024-06-10 PROCEDURE — 80061 LIPID PANEL: CPT

## 2024-06-10 PROCEDURE — 82043 UR ALBUMIN QUANTITATIVE: CPT

## 2024-06-10 PROCEDURE — 36415 COLL VENOUS BLD VENIPUNCTURE: CPT

## 2024-06-10 PROCEDURE — G0103 PSA SCREENING: HCPCS

## 2024-06-10 PROCEDURE — 80053 COMPREHEN METABOLIC PANEL: CPT

## 2024-06-10 PROCEDURE — 85027 COMPLETE CBC AUTOMATED: CPT

## 2024-06-10 PROCEDURE — 82570 ASSAY OF URINE CREATININE: CPT

## 2024-06-11 ENCOUNTER — TELEPHONE (OUTPATIENT)
Age: 74
End: 2024-06-11

## 2024-06-11 NOTE — TELEPHONE ENCOUNTER
----- Message from Siva [facility], DO sent at 6/11/2024  7:11 AM EDT -----  Lab work looks good. No problems with kidney of liver function. PSA is normal. Cholesterol/triglycerides are normal.

## 2024-06-11 NOTE — TELEPHONE ENCOUNTER
----- Message from Siva [facility], DO sent at 6/11/2024  7:11 AM EDT -----  Lab work looks good. No problems with kidney of liver function. PSA is normal. Cholesterol/triglycerides are normal.

## 2024-07-10 ENCOUNTER — OFFICE VISIT (OUTPATIENT)
Dept: GASTROENTEROLOGY | Facility: CLINIC | Age: 74
End: 2024-07-10
Payer: COMMERCIAL

## 2024-07-10 VITALS
HEIGHT: 65 IN | OXYGEN SATURATION: 99 % | BODY MASS INDEX: 21.83 KG/M2 | SYSTOLIC BLOOD PRESSURE: 102 MMHG | TEMPERATURE: 97.6 F | DIASTOLIC BLOOD PRESSURE: 64 MMHG | WEIGHT: 131 LBS | HEART RATE: 77 BPM

## 2024-07-10 DIAGNOSIS — K21.9 GASTROESOPHAGEAL REFLUX DISEASE, UNSPECIFIED WHETHER ESOPHAGITIS PRESENT: Primary | ICD-10-CM

## 2024-07-10 DIAGNOSIS — Z86.010 HISTORY OF COLON POLYPS: ICD-10-CM

## 2024-07-10 PROCEDURE — 99213 OFFICE O/P EST LOW 20 MIN: CPT | Performed by: PHYSICIAN ASSISTANT

## 2024-07-10 RX ORDER — FAMOTIDINE 40 MG/1
40 TABLET, FILM COATED ORAL DAILY
COMMUNITY

## 2024-07-10 RX ORDER — CHLORHEXIDINE GLUCONATE ORAL RINSE 1.2 MG/ML
15 SOLUTION DENTAL 2 TIMES DAILY
COMMUNITY

## 2024-07-10 NOTE — PROGRESS NOTES
[facility] - Outpatient Follow-up Note  Ani Edgar 73 y.o. male MRN: [id number]  Encounter: [id number]          ASSESSMENT AND PLAN:      1. Gastroesophageal reflux disease, unspecified whether esophagitis present  He continues to feel well  He is maintaining on Famotidine 40mg daily without symptoms  He was previously treated with Pantoprazole which he was able to stop  Discussed that he can try to stop Famotidine with the understanding to restart if recurrence of symptoms    2. History of colon polyps  Last colonoscopy August 2022 was normal however the patient had a poor bowel prep  Repeat colonoscopy advised in 3 years which would be August 2025    ______________________________________________________________________    SUBJECTIVE: 73-year-old male with a history of acid reflux which has manifested as hiccups as well as a history of colon polyps who presents for routine follow-up.  Overall he is doing very well.  He has had not had any major issues over the past year.  He is taking famotidine 40 mg daily.  He was previously treated with pantoprazole but has been able to come off of this.  He has no heartburn.  He denies any dysphagia or odynophagia.  His last colonoscopy was August 2022.  It was a basically normal exam however it was noted the patient had a poor bowel prep and so he was advised to have a follow-up exam in 5 years.  He denies any current issues with his bowel movements.      REVIEW OF SYSTEMS IS OTHERWISE NEGATIVE.      Historical Information   Past Medical History:   Diagnosis Date    Colon polyp     CVA (cerebral vascular accident) (HCC) 02/18/2019    Bilateral pontine lacunar infarct    Diabetes mellitus (HCC)     Esophagitis     Hyperlipidemia     Hypertension     Non-sustained ventricular tachycardia (HCC)     Pneumonia 05/15/2023    Stroke (HCC)     Type 2 diabetes mellitus (HCC)      Past Surgical History:   Procedure Laterality Date    COLONOSCOPY    "   ESOPHAGOGASTRODUODENOSCOPY N/A 03/04/2019    Procedure: ESOPHAGOGASTRODUODENOSCOPY (EGD);  Surgeon: Sandeep Thorne DO;  Location: [facility];  Service: Gastroenterology     Social History   Social History     Substance and Sexual Activity   Alcohol Use Never     Social History     Substance and Sexual Activity   Drug Use Never     Social History     Tobacco Use   Smoking Status Never   Smokeless Tobacco Never     History reviewed. No pertinent family history.    Meds/Allergies       Current Outpatient Medications:     atorvastatin (LIPITOR) 40 mg tablet    chlorhexidine (PERIDEX) 0.12 % solution    clopidogrel (PLAVIX) 75 mg tablet    famotidine (PEPCID) 40 MG tablet    Januvia 100 MG tablet    metFORMIN (GLUCOPHAGE-XR) 750 mg 24 hr tablet    Alcohol Swabs PADS    metoprolol tartrate (LOPRESSOR) 25 mg tablet    No Known Allergies        Objective     Blood pressure 102/64, pulse 77, temperature 97.6 °F (36.4 °C), temperature source Temporal, height 5' 5\" (1.651 m), weight 59.4 kg (131 lb), SpO2 99%. Body mass index is 21.8 kg/m².      PHYSICAL EXAM:      General Appearance:   Alert, cooperative, no distress   HEENT:   Normocephalic, atraumatic, anicteric.     Neck:  Supple, symmetrical, trachea midline   Lungs:   Clear to auscultation bilaterally; no rales, rhonchi or wheezing; respirations unlabored    Heart::   Regular rate and rhythm; no murmur, rub, or gallop.   Abdomen:   Soft, non-tender, non-distended; normal bowel sounds; no masses, no organomegaly    Genitalia:   Deferred    Rectal:   Deferred    Extremities:  No cyanosis, clubbing or edema    Pulses:  2+ and symmetric    Skin:  No jaundice, rashes, or lesions    Lymph nodes:  No palpable cervical lymphadenopathy        Lab Results:   No visits with results within 1 Day(s) from this visit.   Latest known visit with results is:   Appointment on 06/10/2024   Component Date Value    Creatinine, Ur 06/10/2024 151.7     Albumin,U,Random 06/10/2024 <7.0     " Albumin Creat Ratio 06/10/2024 <5     Sodium 06/10/2024 141     Potassium 06/10/2024 4.3     Chloride 06/10/2024 103     CO2 06/10/2024 28     ANION GAP 06/10/2024 10     BUN 06/10/2024 11     Creatinine 06/10/2024 0.85     Glucose, Fasting 06/10/2024 109 (H)     Calcium 06/10/2024 9.1     AST 06/10/2024 17     ALT 06/10/2024 10     Alkaline Phosphatase 06/10/2024 84     Total Protein 06/10/2024 7.1     Albumin 06/10/2024 3.9     Total Bilirubin 06/10/2024 0.41     eGFR 06/10/2024 87     WBC 06/10/2024 5.76     RBC 06/10/2024 4.64     Hemoglobin 06/10/2024 13.2     Hematocrit 06/10/2024 43.0     MCV 06/10/2024 93     MCH 06/10/2024 28.4     MCHC 06/10/2024 30.7 (L)     RDW 06/10/2024 13.4     Platelets 06/10/2024 257     MPV 06/10/2024 10.5     Cholesterol 06/10/2024 107     Triglycerides 06/10/2024 50     HDL, Direct 06/10/2024 53     LDL Calculated 06/10/2024 44     PSA 06/10/2024 2.52          Radiology Results:   No results found.

## 2024-07-27 DIAGNOSIS — Z79.4 TYPE 2 DIABETES MELLITUS WITH STAGE 2 CHRONIC KIDNEY DISEASE, WITH LONG-TERM CURRENT USE OF INSULIN (HCC): Chronic | ICD-10-CM

## 2024-07-27 DIAGNOSIS — N18.2 TYPE 2 DIABETES MELLITUS WITH STAGE 2 CHRONIC KIDNEY DISEASE, WITH LONG-TERM CURRENT USE OF INSULIN (HCC): Chronic | ICD-10-CM

## 2024-07-27 DIAGNOSIS — E11.22 TYPE 2 DIABETES MELLITUS WITH STAGE 2 CHRONIC KIDNEY DISEASE, WITH LONG-TERM CURRENT USE OF INSULIN (HCC): Chronic | ICD-10-CM

## 2024-07-28 RX ORDER — SITAGLIPTIN 100 MG/1
TABLET, FILM COATED ORAL
Qty: 90 TABLET | Refills: 1 | Status: SHIPPED | OUTPATIENT
Start: 2024-07-28

## 2024-07-28 RX ORDER — METFORMIN HYDROCHLORIDE 750 MG/1
TABLET, EXTENDED RELEASE ORAL
Qty: 180 TABLET | Refills: 1 | Status: SHIPPED | OUTPATIENT
Start: 2024-07-28

## 2024-07-29 DIAGNOSIS — Z86.73 HISTORY OF LACUNAR CEREBROVASCULAR ACCIDENT (CVA): Chronic | ICD-10-CM

## 2024-07-29 RX ORDER — CLOPIDOGREL BISULFATE 75 MG/1
TABLET ORAL
Qty: 90 TABLET | Refills: 1 | Status: SHIPPED | OUTPATIENT
Start: 2024-07-29

## 2024-08-02 DIAGNOSIS — E78.2 MIXED HYPERLIPIDEMIA: Chronic | ICD-10-CM

## 2024-08-02 RX ORDER — ATORVASTATIN CALCIUM 40 MG/1
TABLET, FILM COATED ORAL
Qty: 90 TABLET | Refills: 1 | Status: SHIPPED | OUTPATIENT
Start: 2024-08-02

## 2024-08-26 ENCOUNTER — TELEPHONE (OUTPATIENT)
Age: 74
End: 2024-08-26

## 2024-08-26 DIAGNOSIS — I47.29 NONSUSTAINED VENTRICULAR TACHYCARDIA (HCC): ICD-10-CM

## 2024-08-26 RX ORDER — METOPROLOL TARTRATE 25 MG/1
12.5 TABLET, FILM COATED ORAL EVERY 12 HOURS
Qty: 90 TABLET | Refills: 3 | Status: SHIPPED | OUTPATIENT
Start: 2024-08-26

## 2024-08-26 NOTE — TELEPHONE ENCOUNTER
"For some reason GI d/c'd it at last appt (7/10)with the reason the he \"was not taking\" but he has been taking Rx. Please determine if appropriate and contact pt to further discuss if needed at [phone]     Reason for call:   [x] Refill   [] Prior Auth  [] Other:     Office:   [x] PCP/Provider - [facility]   [] Specialty/Provider -     Medication:   metoprolol tartrate (LOPRESSOR) 25 mg tablet - TAKE 0.5 TABLETS (12.5 MG TOTAL) BY MOUTH EVERY 12 (TWELVE) HOURS     Pharmacy:   [facility]/pharmacy #[id number]  [address] - [address] [place] [address].     Does the patient have enough for 3 days?   [] Yes   [x] No - Send as HP to POD  "

## 2024-11-22 ENCOUNTER — RA CDI HCC (OUTPATIENT)
Dept: OTHER | Facility: HOSPITAL | Age: 74
End: 2024-11-22

## 2024-12-19 ENCOUNTER — VBI (OUTPATIENT)
Dept: ADMINISTRATIVE | Facility: OTHER | Age: 74
End: 2024-12-19

## 2024-12-19 NOTE — TELEPHONE ENCOUNTER
12/19/24 7:01 AM     Chart reviewed for Diabetic Eye Exam ; nothing is submitted to the patient's insurance at this time.     Jesse Patel MA   [facility]

## 2024-12-19 NOTE — TELEPHONE ENCOUNTER
12/19/24 7:38 AM     Chart reviewed for Hemoglobin A1c was/were submitted to the patient's insurance.     Mary Tyler   [facility]

## 2025-01-26 DIAGNOSIS — N18.2 TYPE 2 DIABETES MELLITUS WITH STAGE 2 CHRONIC KIDNEY DISEASE, WITH LONG-TERM CURRENT USE OF INSULIN (HCC): Chronic | ICD-10-CM

## 2025-01-26 DIAGNOSIS — E11.22 TYPE 2 DIABETES MELLITUS WITH STAGE 2 CHRONIC KIDNEY DISEASE, WITH LONG-TERM CURRENT USE OF INSULIN (HCC): Chronic | ICD-10-CM

## 2025-01-26 DIAGNOSIS — E78.2 MIXED HYPERLIPIDEMIA: Chronic | ICD-10-CM

## 2025-01-26 DIAGNOSIS — Z86.73 HISTORY OF LACUNAR CEREBROVASCULAR ACCIDENT (CVA): Chronic | ICD-10-CM

## 2025-01-26 DIAGNOSIS — Z79.4 TYPE 2 DIABETES MELLITUS WITH STAGE 2 CHRONIC KIDNEY DISEASE, WITH LONG-TERM CURRENT USE OF INSULIN (HCC): Chronic | ICD-10-CM

## 2025-01-26 RX ORDER — CLOPIDOGREL BISULFATE 75 MG/1
75 TABLET ORAL DAILY
Qty: 90 TABLET | Refills: 0 | Status: SHIPPED | OUTPATIENT
Start: 2025-01-26

## 2025-01-26 RX ORDER — METFORMIN HYDROCHLORIDE 750 MG/1
TABLET, EXTENDED RELEASE ORAL
Qty: 180 TABLET | Refills: 0 | Status: SHIPPED | OUTPATIENT
Start: 2025-01-26

## 2025-01-27 RX ORDER — ATORVASTATIN CALCIUM 40 MG/1
40 TABLET, FILM COATED ORAL EVERY EVENING
Qty: 90 TABLET | Refills: 0 | Status: SHIPPED | OUTPATIENT
Start: 2025-01-27

## 2025-01-29 DIAGNOSIS — Z79.4 TYPE 2 DIABETES MELLITUS WITH STAGE 2 CHRONIC KIDNEY DISEASE, WITH LONG-TERM CURRENT USE OF INSULIN (HCC): Chronic | ICD-10-CM

## 2025-01-29 DIAGNOSIS — E11.22 TYPE 2 DIABETES MELLITUS WITH STAGE 2 CHRONIC KIDNEY DISEASE, WITH LONG-TERM CURRENT USE OF INSULIN (HCC): Chronic | ICD-10-CM

## 2025-01-29 DIAGNOSIS — N18.2 TYPE 2 DIABETES MELLITUS WITH STAGE 2 CHRONIC KIDNEY DISEASE, WITH LONG-TERM CURRENT USE OF INSULIN (HCC): Chronic | ICD-10-CM

## 2025-01-29 RX ORDER — SITAGLIPTIN 100 MG/1
100 TABLET, FILM COATED ORAL DAILY
Qty: 90 TABLET | Refills: 0 | Status: SHIPPED | OUTPATIENT
Start: 2025-01-29

## 2025-04-26 DIAGNOSIS — E78.2 MIXED HYPERLIPIDEMIA: Chronic | ICD-10-CM

## 2025-04-28 RX ORDER — ATORVASTATIN CALCIUM 40 MG/1
40 TABLET, FILM COATED ORAL EVERY EVENING
Qty: 90 TABLET | Refills: 0 | Status: SHIPPED | OUTPATIENT
Start: 2025-04-28

## 2025-05-13 ENCOUNTER — VBI (OUTPATIENT)
Dept: ADMINISTRATIVE | Facility: OTHER | Age: 75
End: 2025-05-13

## 2025-05-13 NOTE — TELEPHONE ENCOUNTER
Patient contacted to schedule Annual Wellness Visit.   A message was left for the patient to return the call.    Thank you.  Jesse Patel MA  [facility]

## 2025-05-27 ENCOUNTER — OFFICE VISIT (OUTPATIENT)
Age: 75
End: 2025-05-27
Payer: COMMERCIAL

## 2025-05-27 VITALS
WEIGHT: 126.8 LBS | RESPIRATION RATE: 18 BRPM | DIASTOLIC BLOOD PRESSURE: 70 MMHG | HEIGHT: 65 IN | OXYGEN SATURATION: 97 % | BODY MASS INDEX: 21.13 KG/M2 | SYSTOLIC BLOOD PRESSURE: 122 MMHG | TEMPERATURE: 96.8 F | HEART RATE: 74 BPM

## 2025-05-27 DIAGNOSIS — Z79.4 TYPE 2 DIABETES MELLITUS WITHOUT COMPLICATION, WITH LONG-TERM CURRENT USE OF INSULIN (HCC): Primary | ICD-10-CM

## 2025-05-27 DIAGNOSIS — R31.0 GROSS HEMATURIA: ICD-10-CM

## 2025-05-27 DIAGNOSIS — E78.2 MIXED HYPERLIPIDEMIA DUE TO TYPE 2 DIABETES MELLITUS  (HCC): ICD-10-CM

## 2025-05-27 DIAGNOSIS — Z00.00 MEDICARE ANNUAL WELLNESS VISIT, SUBSEQUENT: ICD-10-CM

## 2025-05-27 DIAGNOSIS — E11.69 MIXED HYPERLIPIDEMIA DUE TO TYPE 2 DIABETES MELLITUS  (HCC): ICD-10-CM

## 2025-05-27 DIAGNOSIS — E11.9 TYPE 2 DIABETES MELLITUS WITHOUT COMPLICATION, WITH LONG-TERM CURRENT USE OF INSULIN (HCC): Primary | ICD-10-CM

## 2025-05-27 LAB
LEFT EYE DIABETIC RETINOPATHY: ABNORMAL
LEFT EYE IMAGE QUALITY: ABNORMAL
LEFT EYE MACULAR EDEMA: ABNORMAL
LEFT EYE OTHER RETINOPATHY: ABNORMAL
RIGHT EYE DIABETIC RETINOPATHY: ABNORMAL
RIGHT EYE IMAGE QUALITY: ABNORMAL
RIGHT EYE MACULAR EDEMA: ABNORMAL
RIGHT EYE OTHER RETINOPATHY: ABNORMAL
SEVERITY (EYE EXAM): ABNORMAL
SL AMB POCT HEMOGLOBIN AIC: 6 (ref ?–6.5)

## 2025-05-27 PROCEDURE — G0439 PPPS, SUBSEQ VISIT: HCPCS | Performed by: INTERNAL MEDICINE

## 2025-05-27 PROCEDURE — 83036 HEMOGLOBIN GLYCOSYLATED A1C: CPT | Performed by: INTERNAL MEDICINE

## 2025-05-27 PROCEDURE — 99214 OFFICE O/P EST MOD 30 MIN: CPT | Performed by: INTERNAL MEDICINE

## 2025-05-27 PROCEDURE — G2211 COMPLEX E/M VISIT ADD ON: HCPCS | Performed by: INTERNAL MEDICINE

## 2025-05-27 PROCEDURE — 99397 PER PM REEVAL EST PAT 65+ YR: CPT | Performed by: INTERNAL MEDICINE

## 2025-05-27 PROCEDURE — 92250 FUNDUS PHOTOGRAPHY W/I&R: CPT | Performed by: INTERNAL MEDICINE

## 2025-05-27 NOTE — ASSESSMENT & PLAN NOTE
Stable and will check updated lipid panel. Continue statin.    Orders:  •  [facility] Diabetic eye exam  •  POCT hemoglobin A1c  •  Basic metabolic panel; Future  •  Albumin / creatinine urine ratio; Future  •  Hepatic function panel; Future  •  CBC; Future  •  Lipid Panel with Direct LDL reflex; Future

## 2025-05-27 NOTE — PATIENT INSTRUCTIONS
Medicare Preventive Visit Patient Instructions  Thank you for completing your Welcome to Medicare Visit or Medicare Annual Wellness Visit today. Your next wellness visit will be due in one year (5/28/2026).  The screening/preventive services that you may require over the next 5-10 years are detailed below. Some tests may not apply to you based off risk factors and/or age. Screening tests ordered at today's visit but not completed yet may show as past due. Also, please note that scanned in results may not display below.  Preventive Screenings:  Service Recommendations Previous Testing/Comments   Colorectal Cancer Screening  Colonoscopy    Fecal Occult Blood Test (FOBT)/Fecal Immunochemical Test (FIT)  Fecal DNA/Cologuard Test  Flexible Sigmoidoscopy Age: 45-75 years old   Colonoscopy: every 10 years (May be performed more frequently if at higher risk)  OR  FOBT/FIT: every 1 year  OR  Cologuard: every 3 years  OR  Sigmoidoscopy: every 5 years  Screening may be recommended earlier than age 45 if at higher risk for colorectal cancer. Also, an individualized decision between you and your healthcare provider will decide whether screening between the ages of 76-85 would be appropriate. Colonoscopy: 08/04/2022  FOBT/FIT: Not on file  Cologuard: Not on file  Sigmoidoscopy: Not on file    Screening Current     Prostate Cancer Screening Individualized decision between patient and health care provider in men between ages of 55-69   Medicare will cover every 12 months beginning on the day after your 50th birthday PSA: 2.52 ng/mL     Screening Current     Hepatitis C Screening Once for adults born between 1945 and 1965  More frequently in patients at high risk for Hepatitis C Hep C Antibody: 09/16/2020    Screening Current   Diabetes Screening 1-2 times per year if you're at risk for diabetes or have pre-diabetes Fasting glucose: 109 mg/dL (6/10/2024)  A1C: 6.0 (5/27/2025)  Screening Not Indicated  History Diabetes   Cholesterol  Screening Once every 5 years if you don't have a lipid disorder. May order more often based on risk factors. Lipid panel: 06/10/2024  Screening Not Indicated  History Lipid Disorder      Other Preventive Screenings Covered by Medicare:  Abdominal Aortic Aneurysm (AAA) Screening: covered once if your at risk. You're considered to be at risk if you have a family history of AAA or a male between the age of 65-75 who smoking at least 100 cigarettes in your lifetime.  Lung Cancer Screening: covers low dose CT scan once per year if you meet all of the following conditions: (1) Age 55-77; (2) No signs or symptoms of lung cancer; (3) Current smoker or have quit smoking within the last 15 years; (4) You have a tobacco smoking history of at least 20 pack years (packs per day x number of years you smoked); (5) You get a written order from a healthcare provider.  Glaucoma Screening: covered annually if you're considered high risk: (1) You have diabetes OR (2) Family history of glaucoma OR (3)  aged 50 and older OR (4)  American aged 65 and older  Osteoporosis Screening: covered every 2 years if you meet one of the following conditions: (1) Have a vertebral abnormality; (2) On glucocorticoid therapy for more than 3 months; (3) Have primary hyperparathyroidism; (4) On osteoporosis medications and need to assess response to drug therapy.  HIV Screening: covered annually if you're between the age of 15-65. Also covered annually if you are younger than 15 and older than 65 with risk factors for HIV infection. For pregnant patients, it is covered up to 3 times per pregnancy.    Immunizations:  Immunization Recommendations   Influenza Vaccine Annual influenza vaccination during flu season is recommended for all persons aged >= 6 months who do not have contraindications   Pneumococcal Vaccine   * Pneumococcal conjugate vaccine = PCV13 (Prevnar 13), PCV15 (Vaxneuvance), PCV20 (Prevnar 20)  * Pneumococcal  polysaccharide vaccine = PPSV23 (Pneumovax) Adults 19-63 yo with certain risk factors or if 65+ yo  If never received any pneumonia vaccine: recommend Prevnar 20 (PCV20)  Give PCV20 if previously received 1 dose of PCV13 or PPSV23   Hepatitis B Vaccine 3 dose series if at intermediate or high risk (ex: diabetes, end stage renal disease, liver disease)   Respiratory syncytial virus (RSV) Vaccine - COVERED BY MEDICARE PART D  * RSVPreF3 (Arexvy) CDC recommends that adults 60 years of age and older may receive a single dose of RSV vaccine using shared clinical decision-making (SCDM)   Tetanus (Td) Vaccine - COST NOT COVERED BY MEDICARE PART B Following completion of primary series, a booster dose should be given every 10 years to maintain immunity against tetanus. Td may also be given as tetanus wound prophylaxis.   Tdap Vaccine - COST NOT COVERED BY MEDICARE PART B Recommended at least once for all adults. For pregnant patients, recommended with each pregnancy.   Shingles Vaccine (Shingrix) - COST NOT COVERED BY MEDICARE PART B  2 shot series recommended in those 19 years and older who have or will have weakened immune systems or those 50 years and older     Health Maintenance Due:      Topic Date Due    Colorectal Cancer Screening  08/03/2025    Hepatitis C Screening  Completed     Immunizations Due:      Topic Date Due    COVID-19 Vaccine (5 - 2024-25 season) 09/01/2024     Advance Directives   What are advance directives?  Advance directives are legal documents that state your wishes and plans for medical care. These plans are made ahead of time in case you lose your ability to make decisions for yourself. Advance directives can apply to any medical decision, such as the treatments you want, and if you want to donate organs.   What are the types of advance directives?  There are many types of advance directives, and each state has rules about how to use them. You may choose a combination of any of the  following:  Living will:  This is a written record of the treatment you want. You can also choose which treatments you do not want, which to limit, and which to stop at a certain time. This includes surgery, medicine, IV fluid, and tube feedings.   Durable power of  for healthcare (DPAHC):  This is a written record that states who you want to make healthcare choices for you when you are unable to make them for yourself. This person, called a proxy, is usually a family member or a friend. You may choose more than 1 proxy.  Do not resuscitate (DNR) order:  A DNR order is used in case your heart stops beating or you stop breathing. It is a request not to have certain forms of treatment, such as CPR. A DNR order may be included in other types of advance directives.  Medical directive:  This covers the care that you want if you are in a coma, near death, or unable to make decisions for yourself. You can list the treatments you want for each condition. Treatment may include pain medicine, surgery, blood transfusions, dialysis, IV or tube feedings, and a ventilator (breathing machine).  Values history:  This document has questions about your views, beliefs, and how you feel and think about life. This information can help others choose the care that you would choose.  Why are advance directives important?  An advance directive helps you control your care. Although spoken wishes may be used, it is better to have your wishes written down. Spoken wishes can be misunderstood, or not followed. Treatments may be given even if you do not want them. An advance directive may make it easier for your family to make difficult choices about your care.       © Copyright [employer] 2018 Information is for End User's use only and may not be sold, redistributed or otherwise used for commercial purposes. All illustrations and images included in CareNotes® are the copyrighted property of A.D.A.M., Inc. or [employer]

## 2025-05-27 NOTE — ASSESSMENT & PLAN NOTE
Lab Results   Component Value Date    HGBA1C 6.0 05/27/2025     Stable and sugars are very well controlled. Continue januvia and metformin. Check diabetic eye exam.    Orders:  •  sitaGLIPtin (Januvia) 100 mg tablet; Take 1 tablet (100 mg total) by mouth daily

## 2025-05-27 NOTE — PROGRESS NOTES
Name: Ani Edgar      : 1950      MRN: [id number]  Encounter Provider: Siva Greene DO  Encounter Date: 2025   Encounter department: [facility]  :  Assessment & Plan  Type 2 diabetes mellitus without complication, with long-term current use of insulin (HCC)    Lab Results   Component Value Date    HGBA1C 6.0 2025     Stable and sugars are very well controlled. Continue januvia and metformin. Check diabetic eye exam.    Orders:  •  sitaGLIPtin (Januvia) 100 mg tablet; Take 1 tablet (100 mg total) by mouth daily    Mixed hyperlipidemia due to type 2 diabetes mellitus  (HCC)    Stable and will check updated lipid panel. Continue statin.    Orders:  •  IRIS Diabetic eye exam  •  POCT hemoglobin A1c  •  Basic metabolic panel; Future  •  Albumin / creatinine urine ratio; Future  •  Hepatic function panel; Future  •  CBC; Future  •  Lipid Panel with Direct LDL reflex; Future    Gross hematuria    Discussed possible causes of gross hematuria. Check urinalysis for further evaluation. If no evidence of UTI, will check CT renal protocol and refer to urology.    Orders:  •  UA w Reflex to Microscopic w Reflex to Culture; Future    Medicare annual wellness visit, subsequent            Preventive health issues were discussed with patient, and age appropriate screening tests were ordered as noted in patient's After Visit Summary. Personalized health advice and appropriate referrals for health education or preventive services given if needed, as noted in patient's After Visit Summary.    History of Present Illness     Ani presents for follow-up and medicare wellness visit. Was concerned with 2 episodes of painless gross hematuria that he had.       Patient Care Team:  Siva Greene DO as PCP - General (Internal Medicine)    Review of Systems   Constitutional:  Negative for activity change, appetite change and fatigue.   Respiratory:  Negative for apnea, cough, chest  tightness, shortness of breath and wheezing.    Cardiovascular:  Negative for chest pain, palpitations and leg swelling.   Gastrointestinal:  Negative for abdominal distention, abdominal pain, blood in stool, constipation, diarrhea, nausea and vomiting.   Neurological:  Negative for dizziness, weakness, light-headedness, numbness and headaches.   Psychiatric/Behavioral:  Negative for behavioral problems, confusion, hallucinations, sleep disturbance and suicidal ideas. The patient is not nervous/anxious.      Medical History Reviewed by provider this encounter:  Tobacco  Allergies  Meds  Problems  Med Hx  Surg Hx  Fam Hx       Annual Wellness Visit Questionnaire   Ani is here for his Subsequent Wellness visit. Last Medicare Wellness visit information reviewed, patient interviewed and updates made to the record today.      Health Risk Assessment:   Patient rates overall health as fair. Patient feels that their physical health rating is same. Patient is satisfied with their life. Eyesight was rated as same. Hearing was rated as same. Patient feels that their emotional and mental health rating is same. Patients states they are never, rarely angry. Patient states they are never, rarely unusually tired/fatigued. Pain experienced in the last 7 days has been none. Patient states that he has experienced no weight loss or gain in last 6 months.     Depression Screening:   PHQ-2 Score: 0      Fall Risk Screening:   In the past year, patient has experienced: no history of falling in past year      Home Safety:  Patient has trouble with stairs inside or outside of their home. Patient has working smoke alarms and has working carbon monoxide detector. Home safety hazards include: none.     Nutrition:   Current diet is Diabetic.     Medications:   Patient is not currently taking any over-the-counter supplements. Patient is able to manage medications.     Activities of Daily Living (ADLs)/Instrumental Activities of Daily  Living (IADLs):   Walk and transfer into and out of bed and chair?: Yes  Dress and groom yourself?: Yes    Bathe or shower yourself?: Yes    Feed yourself? Yes  Do your laundry/housekeeping?: Yes  Manage your money, pay your bills and track your expenses?: Yes  Make your own meals?: Yes    Do your own shopping?: Yes    Previous Hospitalizations:   Any hospitalizations or ED visits within the last 12 months?: Yes    How many hospitalizations have you had in the last year?: 1-2    Advance Care Planning:   Living will: No    Durable POA for healthcare: No    Advanced directive: No    Five wishes given: No      Cognitive Screening:   Provider or family/friend/caregiver concerned regarding cognition?: No    Preventive Screenings      Cardiovascular Screening:    General: Screening Not Indicated and History Lipid Disorder      Diabetes Screening:     General: Screening Not Indicated and History Diabetes      Colorectal Cancer Screening:     General: Screening Current      Prostate Cancer Screening:    General: Screening Current      Osteoporosis Screening:    General: Screening Not Indicated      Abdominal Aortic Aneurysm (AAA) Screening:    Risk factors include: age between 65-74 yo        General: Screening Not Indicated      Lung Cancer Screening:     General: Screening Not Indicated      Hepatitis C Screening:    General: Screening Current    Immunizations:  - Immunizations due: Zoster (Shingrix)    Screening, Brief Intervention, and Referral to Treatment (SBIRT)     Screening  Typical number of drinks in a day: 0  Typical number of drinks in a week: 0  Interpretation: Low risk drinking behavior.    AUDIT-C Screenin) How often did you have a drink containing alcohol in the past year? never  2) How many drinks did you have on a typical day when you were drinking in the past year? 0  3) How often did you have 6 or more drinks on one occasion in the past year? never    AUDIT-C Score: 0  Interpretation: Score 0-3  "(male): Negative screen for alcohol misuse    Single Item Drug Screening:  How often have you used an illegal drug (including marijuana) or a prescription medication for non-medical reasons in the past year? never    Single Item Drug Screen Score: 0  Interpretation: Negative screen for possible drug use disorder    Other Counseling Topics:   Car/seat belt/driving safety, skin self-exam, sunscreen and calcium and vitamin D intake and regular weightbearing exercise.     Social Drivers of Health     Financial Resource Strain: Low Risk  (11/16/2023)    Overall Financial Resource Strain (CARDIA)    • Difficulty of Paying Living Expenses: Not hard at all   Food Insecurity: No Food Insecurity (5/27/2025)    Nursing - Inadequate Food Risk Classification    • Worried About Running Out of Food in the Last Year: Never true    • Ran Out of Food in the Last Year: Never true   Transportation Needs: No Transportation Needs (5/27/2025)    PRAPARE - Transportation    • Lack of Transportation (Medical): No    • Lack of Transportation (Non-Medical): No   Housing Stability: Low Risk  (5/27/2025)    Housing Stability Vital Sign    • Unable to Pay for Housing in the Last Year: No    • Number of Times Moved in the Last Year: 0    • Homeless in the Last Year: No   Utilities: Not At Risk (5/27/2025)    [facility] Utilities    • Threatened with loss of utilities: No     Objective   /70 (BP Location: Left arm, Patient Position: Sitting, Cuff Size: Standard)   Pulse 74   Temp (!) 96.8 °F (36 °C) (Tympanic)   Resp 18   Ht 5' 5\" (1.651 m)   Wt 57.5 kg (126 lb 12.8 oz)   SpO2 97%   BMI 21.10 kg/m²   Diabetic Foot Exam    Patient's shoes and socks removed.    Right Foot/Ankle   Right Foot Inspection  Skin Exam: skin normal and skin intact. No dry skin, no warmth, no callus, no erythema, no maceration, no abnormal color, no pre-ulcer, no ulcer and no callus.     Toe Exam: ROM and strength within normal limits.     Sensory   Monofilament " testing: intact    Vascular  Capillary refills: < 3 seconds  The right DP pulse is 2+. The right PT pulse is 2+.     Left Foot/Ankle  Left Foot Inspection  Skin Exam: skin normal and skin intact. No dry skin, no warmth, no erythema, no maceration, normal color, no pre-ulcer, no ulcer and no callus.     Toe Exam: ROM and strength within normal limits.     Sensory   Monofilament testing: intact    Vascular  Capillary refills: < 3 seconds  The left DP pulse is 2+. The left PT pulse is 2+.     Assign Risk Category  No deformity present  No loss of protective sensation  No weak pulses  Risk: 0    Physical Exam  Constitutional:       General: He is not in acute distress.     Appearance: He is not ill-appearing.     Cardiovascular:      Rate and Rhythm: Normal rate and regular rhythm.      Pulses: no weak pulses.           Dorsalis pedis pulses are 2+ on the right side and 2+ on the left side.        Posterior tibial pulses are 2+ on the right side and 2+ on the left side.      Heart sounds: No murmur heard.  Pulmonary:      Effort: Pulmonary effort is normal. No respiratory distress.      Breath sounds: No wheezing.   Abdominal:      General: Bowel sounds are normal. There is no distension.      Tenderness: There is no abdominal tenderness.     Musculoskeletal:      Right lower leg: No edema.      Left lower leg: No edema.   Feet:      Right foot:      Skin integrity: No ulcer, skin breakdown, erythema, warmth, callus or dry skin.      Left foot:      Skin integrity: No ulcer, skin breakdown, erythema, warmth, callus or dry skin.     Neurological:      Mental Status: He is alert.       [facility],

## 2025-05-28 ENCOUNTER — TELEPHONE (OUTPATIENT)
Age: 75
End: 2025-05-28

## 2025-05-28 DIAGNOSIS — Z79.4 TYPE 2 DIABETES MELLITUS WITH STAGE 2 CHRONIC KIDNEY DISEASE, WITH LONG-TERM CURRENT USE OF INSULIN (HCC): Chronic | ICD-10-CM

## 2025-05-28 DIAGNOSIS — R12 HEARTBURN: Primary | ICD-10-CM

## 2025-05-28 DIAGNOSIS — R31.0 GROSS HEMATURIA: ICD-10-CM

## 2025-05-28 DIAGNOSIS — Z86.73 HISTORY OF LACUNAR CEREBROVASCULAR ACCIDENT (CVA): Chronic | ICD-10-CM

## 2025-05-28 DIAGNOSIS — I47.29 NONSUSTAINED VENTRICULAR TACHYCARDIA (HCC): ICD-10-CM

## 2025-05-28 DIAGNOSIS — N18.2 TYPE 2 DIABETES MELLITUS WITH STAGE 2 CHRONIC KIDNEY DISEASE, WITH LONG-TERM CURRENT USE OF INSULIN (HCC): Chronic | ICD-10-CM

## 2025-05-28 DIAGNOSIS — E11.22 TYPE 2 DIABETES MELLITUS WITH STAGE 2 CHRONIC KIDNEY DISEASE, WITH LONG-TERM CURRENT USE OF INSULIN (HCC): Chronic | ICD-10-CM

## 2025-05-28 DIAGNOSIS — E78.2 MIXED HYPERLIPIDEMIA: Chronic | ICD-10-CM

## 2025-05-28 RX ORDER — METOPROLOL TARTRATE 25 MG/1
12.5 TABLET, FILM COATED ORAL EVERY 12 HOURS
Qty: 90 TABLET | Refills: 3 | Status: SHIPPED | OUTPATIENT
Start: 2025-05-28

## 2025-05-28 RX ORDER — FAMOTIDINE 40 MG/1
40 TABLET, FILM COATED ORAL DAILY
Qty: 90 TABLET | Refills: 3 | Status: SHIPPED | OUTPATIENT
Start: 2025-05-28

## 2025-05-28 RX ORDER — METFORMIN HYDROCHLORIDE 750 MG/1
1500 TABLET, EXTENDED RELEASE ORAL
Qty: 180 TABLET | Refills: 3 | Status: SHIPPED | OUTPATIENT
Start: 2025-05-28

## 2025-05-28 RX ORDER — CLOPIDOGREL BISULFATE 75 MG/1
75 TABLET ORAL DAILY
Qty: 90 TABLET | Refills: 3 | Status: SHIPPED | OUTPATIENT
Start: 2025-05-28

## 2025-05-28 RX ORDER — ATORVASTATIN CALCIUM 40 MG/1
40 TABLET, FILM COATED ORAL EVERY EVENING
Qty: 90 TABLET | Refills: 3 | Status: SHIPPED | OUTPATIENT
Start: 2025-05-28

## 2025-05-28 NOTE — TELEPHONE ENCOUNTER
New Patient      Insurance   Current Insurance?[employer] medicare   Insurance E-verified? yes     History   Reason for appointment/active symptoms?  Patient called stating he was referred to see Urology for gross hematuria      Has the patient had any previous Urologist(s)? 2021 Dr. Coffey     Was the patient seen in the ED?      Labs/Imaging(Including Out Of Network)?      Records Requested?   Records Visible in EPIC? yes      Personal history of cancer? no      Appointment   Office location preference:  [place]   ?   Appointment Details   Date:  0606/25  Time:  120 pm   Location:  [place]  Provider:  Paulina Ralph PA-C   Does the appointment need further review?

## 2025-05-28 NOTE — TELEPHONE ENCOUNTER
Patient called to request a refill on the following medications.       metoprolol tartrate (LOPRESSOR) 25 mg tablet   metFORMIN (GLUCOPHAGE-XR) 750 mg 24 hr tablet   famotidine (PEPCID) 40 MG tablet     clopidogrel (PLAVIX) 75 mg tablet     atorvastatin (LIPITOR) 40 mg tablet       He would like script sent to:   [id number] - [place] -   [address] [phone]

## 2025-05-28 NOTE — TELEPHONE ENCOUNTER
Patient called stating he was seen yesterday and was supposed to receive a referral to see a Urologist.       Patient would like assistance in setting up an appt with Urology.     Please contact patient at [phone]

## 2025-06-13 ENCOUNTER — HOSPITAL ENCOUNTER (EMERGENCY)
Facility: HOSPITAL | Age: 75
Discharge: HOME | End: 2025-06-13
Payer: COMMERCIAL

## 2025-06-13 ENCOUNTER — APPOINTMENT (EMERGENCY)
Dept: CT IMAGING | Facility: HOSPITAL | Age: 75
End: 2025-06-13
Payer: COMMERCIAL

## 2025-06-13 VITALS
HEART RATE: 86 BPM | SYSTOLIC BLOOD PRESSURE: 126 MMHG | TEMPERATURE: 97.8 F | RESPIRATION RATE: 18 BRPM | DIASTOLIC BLOOD PRESSURE: 60 MMHG | OXYGEN SATURATION: 99 %

## 2025-06-13 DIAGNOSIS — N30.90 CYSTITIS: ICD-10-CM

## 2025-06-13 DIAGNOSIS — R31.0 GROSS HEMATURIA: Primary | ICD-10-CM

## 2025-06-13 LAB
ALBUMIN SERPL BCG-MCNC: 3.7 G/DL (ref 3.5–5)
ALP SERPL-CCNC: 75 U/L (ref 34–104)
ALT SERPL W P-5'-P-CCNC: 10 U/L (ref 7–52)
ANION GAP SERPL CALCULATED.3IONS-SCNC: 6 MMOL/L (ref 4–13)
APTT PPP: 29 SECONDS (ref 23–34)
AST SERPL W P-5'-P-CCNC: 16 U/L (ref 13–39)
BACTERIA UR QL AUTO: ABNORMAL /HPF
BASOPHILS # BLD AUTO: 0.05 THOUSANDS/ÂΜL (ref 0–0.1)
BASOPHILS NFR BLD AUTO: 1 % (ref 0–1)
BILIRUB SERPL-MCNC: 0.36 MG/DL (ref 0.2–1)
BILIRUB UR QL STRIP: NEGATIVE
BUN SERPL-MCNC: 11 MG/DL (ref 5–25)
CALCIUM SERPL-MCNC: 9.2 MG/DL (ref 8.4–10.2)
CHLORIDE SERPL-SCNC: 107 MMOL/L (ref 96–108)
CLARITY UR: ABNORMAL
CO2 SERPL-SCNC: 28 MMOL/L (ref 21–32)
COLOR UR: ABNORMAL
CREAT SERPL-MCNC: 0.91 MG/DL (ref 0.6–1.3)
EOSINOPHIL # BLD AUTO: 0.14 THOUSAND/ÂΜL (ref 0–0.61)
EOSINOPHIL NFR BLD AUTO: 2 % (ref 0–6)
ERYTHROCYTE [DISTWIDTH] IN BLOOD BY AUTOMATED COUNT: 13.1 % (ref 11.6–15.1)
GFR SERPL CREATININE-BSD FRML MDRD: 83 ML/MIN/1.73SQ M
GLUCOSE SERPL-MCNC: 104 MG/DL (ref 65–140)
GLUCOSE UR STRIP-MCNC: NEGATIVE MG/DL
HCT VFR BLD AUTO: 35.4 % (ref 36.5–49.3)
HGB BLD-MCNC: 10.9 G/DL (ref 12–17)
HGB UR QL STRIP.AUTO: ABNORMAL
IMM GRANULOCYTES # BLD AUTO: 0.02 THOUSAND/UL (ref 0–0.2)
IMM GRANULOCYTES NFR BLD AUTO: 0 % (ref 0–2)
INR PPP: 0.92 (ref 0.85–1.19)
KETONES UR STRIP-MCNC: NEGATIVE MG/DL
LEUKOCYTE ESTERASE UR QL STRIP: ABNORMAL
LIPASE SERPL-CCNC: 56 U/L (ref 11–82)
LYMPHOCYTES # BLD AUTO: 2.55 THOUSANDS/ÂΜL (ref 0.6–4.47)
LYMPHOCYTES NFR BLD AUTO: 34 % (ref 14–44)
MCH RBC QN AUTO: 27.5 PG (ref 26.8–34.3)
MCHC RBC AUTO-ENTMCNC: 30.8 G/DL (ref 31.4–37.4)
MCV RBC AUTO: 89 FL (ref 82–98)
MONOCYTES # BLD AUTO: 0.74 THOUSAND/ÂΜL (ref 0.17–1.22)
MONOCYTES NFR BLD AUTO: 10 % (ref 4–12)
NEUTROPHILS # BLD AUTO: 4.11 THOUSANDS/ÂΜL (ref 1.85–7.62)
NEUTS SEG NFR BLD AUTO: 53 % (ref 43–75)
NITRITE UR QL STRIP: NEGATIVE
NON-SQ EPI CELLS URNS QL MICRO: ABNORMAL /HPF
NRBC BLD AUTO-RTO: 0 /100 WBCS
PH UR STRIP.AUTO: 6 [PH]
PLATELET # BLD AUTO: 227 THOUSANDS/UL (ref 149–390)
PMV BLD AUTO: 9.6 FL (ref 8.9–12.7)
POTASSIUM SERPL-SCNC: 4.1 MMOL/L (ref 3.5–5.3)
PROT SERPL-MCNC: 7 G/DL (ref 6.4–8.4)
PROT UR STRIP-MCNC: ABNORMAL MG/DL
PROTHROMBIN TIME: 13 SECONDS (ref 12.3–15)
RBC # BLD AUTO: 3.97 MILLION/UL (ref 3.88–5.62)
RBC #/AREA URNS AUTO: ABNORMAL /HPF
SODIUM SERPL-SCNC: 141 MMOL/L (ref 135–147)
SP GR UR STRIP.AUTO: 1.03 (ref 1–1.03)
UROBILINOGEN UR STRIP-ACNC: <2 MG/DL
WBC # BLD AUTO: 7.61 THOUSAND/UL (ref 4.31–10.16)
WBC #/AREA URNS AUTO: ABNORMAL /HPF

## 2025-06-13 PROCEDURE — 83690 ASSAY OF LIPASE: CPT

## 2025-06-13 PROCEDURE — 85025 COMPLETE CBC W/AUTO DIFF WBC: CPT

## 2025-06-13 PROCEDURE — 80053 COMPREHEN METABOLIC PANEL: CPT

## 2025-06-13 PROCEDURE — 99285 EMERGENCY DEPT VISIT HI MDM: CPT

## 2025-06-13 PROCEDURE — 85610 PROTHROMBIN TIME: CPT

## 2025-06-13 PROCEDURE — 87086 URINE CULTURE/COLONY COUNT: CPT

## 2025-06-13 PROCEDURE — 36415 COLL VENOUS BLD VENIPUNCTURE: CPT

## 2025-06-13 PROCEDURE — 85730 THROMBOPLASTIN TIME PARTIAL: CPT

## 2025-06-13 PROCEDURE — 74177 CT ABD & PELVIS W/CONTRAST: CPT

## 2025-06-13 PROCEDURE — 81001 URINALYSIS AUTO W/SCOPE: CPT

## 2025-06-13 RX ORDER — CEFPODOXIME PROXETIL 200 MG/1
200 TABLET, FILM COATED ORAL 2 TIMES DAILY
Qty: 19 TABLET | Refills: 0 | Status: SHIPPED | OUTPATIENT
Start: 2025-06-13 | End: 2025-06-23

## 2025-06-13 RX ORDER — CEFPODOXIME PROXETIL 200 MG/1
200 TABLET, FILM COATED ORAL ONCE
Status: COMPLETED | OUTPATIENT
Start: 2025-06-13 | End: 2025-06-13

## 2025-06-13 RX ADMIN — IOHEXOL 100 ML: 350 INJECTION, SOLUTION INTRAVENOUS at 21:19

## 2025-06-13 RX ADMIN — CEFPODOXIME PROXETIL 200 MG: 200 TABLET, FILM COATED ORAL at 23:14

## 2025-06-14 NOTE — DISCHARGE INSTRUCTIONS
Your CT scan showed bladder infection, but sometimes it is not able to see other causes of blood in the urine such as malignancy or growths. It is very important you follow up with urology for further workup.     Please take Vantin twice daily for the next ten days, you were given one dose in the ED, your next dose is tomorrow morning.

## 2025-06-14 NOTE — ED PROVIDER NOTES
ED Disposition       None          Assessment & Plan   {Hyperlinks  Risk Stratification - NIHSS - HEART SCORE - Fill out sepsis note and make sure you call [phone] if severe or septic shock:[id number]}    Medical Decision Making           Medications - No data to display    ED Risk Strat Scores                    No data recorded                            History of Present Illness   {Hyperlinks  History (Med, Surg, Fam, Social) - Current Medications - Allergies  :[id number]}    Chief Complaint   Patient presents with    Blood in Urine     Patient said that he has had blood in his urine for the past two weeks but it will start and stop. No pain, patient said that he has been bleeding more today.        Past Medical History[1]   Past Surgical History[2]   Family History[3]   Social History[4]   E-Cigarette/Vaping    E-Cigarette Use Never User       E-Cigarette/Vaping Substances    Nicotine No     THC No     CBD No     Flavoring No     Other No     Unknown No       I have reviewed and agree with the history as documented.       Blood in Urine        Review of Systems   Genitourinary:  Positive for hematuria.           Objective   {Hyperlinks  Historical Vitals - Historical Labs - Chart Review/Microbiology - Last Echo - Code Status  :[id number]}    ED Triage Vitals [06/13/25 1858]   Temperature Pulse Blood Pressure Respirations SpO2 Patient Position - Orthostatic VS   97.8 °F (36.6 °C) 94 (!) 173/80 18 100 % Sitting      Temp Source Heart Rate Source BP Location FiO2 (%) Pain Score    Temporal Monitor Left arm -- --      Vitals      Date and Time Temp Pulse SpO2 Resp BP Pain Score FACES Pain Rating User   06/13/25 2000 -- 86 99 % -- 126/60 -- -- SB   06/13/25 1858 97.8 °F (36.6 °C) 94 100 % 18 173/80 -- -- WT            Physical Exam    Results Reviewed       None            No orders to display       Procedures    ED Medication and Procedure Management   Prior to Admission Medications   Prescriptions Last Dose  Informant Patient Reported? Taking?   atorvastatin (LIPITOR) 40 mg tablet   No No   Sig: Take 1 tablet (40 mg total) by mouth every evening   clopidogrel (PLAVIX) 75 mg tablet   No No   Sig: Take 1 tablet (75 mg total) by mouth daily   famotidine (PEPCID) 40 MG tablet   No No   Sig: Take 1 tablet (40 mg total) by mouth in the evening.   metFORMIN (GLUCOPHAGE-XR) 750 mg 24 hr tablet   No No   Sig: Take 2 tablets (1,500 mg total) by mouth daily with dinner   metoprolol tartrate (LOPRESSOR) 25 mg tablet   No No   Sig: Take 0.5 tablets (12.5 mg total) by mouth every 12 (twelve) hours   sitaGLIPtin (Januvia) 100 mg tablet   No No   Sig: Take 1 tablet (100 mg total) by mouth daily      Facility-Administered Medications: None     Patient's Medications   Discharge Prescriptions    No medications on file     No discharge procedures on file.  ED SEPSIS DOCUMENTATION              [1]   Past Medical History:  Diagnosis Date    Colon polyp     CVA (cerebral vascular accident) (HCC) 02/18/2019    Bilateral pontine lacunar infarct    Diabetes mellitus (HCC)     Esophagitis     Hyperlipidemia     Hypertension     Non-sustained ventricular tachycardia (HCC)     Pneumonia 05/15/2023    Stroke (HCC)     Type 2 diabetes mellitus (HCC)    [2]   Past Surgical History:  Procedure Laterality Date    COLONOSCOPY      ESOPHAGOGASTRODUODENOSCOPY N/A 03/04/2019    Procedure: ESOPHAGOGASTRODUODENOSCOPY (EGD);  Surgeon: Sandeep Thorne DO;  Location: [facility];  Service: Gastroenterology   [3] No family history on file.  [4]   Social History  Tobacco Use    Smoking status: Never    Smokeless tobacco: Never   Vaping Use    Vaping status: Never Used   Substance Use Topics    Alcohol use: Never    Drug use: Never      Orthostatic VS   97.8 °F (36.6 °C) 94 (!) 173/80 18 100 % Sitting      Temp Source Heart Rate Source BP Location FiO2 (%) Pain Score    Temporal Monitor Left arm -- --      Vitals      Date and Time Temp Pulse SpO2 Resp BP Pain Score FACES Pain Rating User   06/13/25 2000 -- 86 99 % -- 126/60 -- -- SB   06/13/25 1858 97.8 °F (36.6 °C) 94 100 % 18 173/80 -- -- WT            Physical Exam  Vitals and nursing note reviewed.   Constitutional:       General: He is not in acute distress.     Appearance: He is well-developed.   HENT:      Head: Normocephalic and atraumatic.     Eyes:      Conjunctiva/sclera: Conjunctivae normal.       Cardiovascular:      Rate and Rhythm: Normal rate and regular rhythm.      Heart sounds: No murmur heard.  Pulmonary:      Effort: Pulmonary effort is normal. No respiratory distress.      Breath sounds: Normal breath sounds.   Abdominal:      Palpations: Abdomen is soft.      Tenderness: There is no abdominal tenderness.     Musculoskeletal:         General: No swelling.      Cervical back: Neck supple.     Skin:     General: Skin is warm and dry.      Capillary Refill: Capillary refill takes less than 2 seconds.     Neurological:      Mental Status: He is alert.     Psychiatric:         Mood and Affect: Mood normal.         Results Reviewed       Procedure Component Value Units Date/Time    Urine culture [[id number]] Collected: 06/13/25 2041    Lab Status: Final result Specimen: Urine, Other Updated: 06/15/25 0822     Urine Culture No Growth <1000 cfu/mL    Lipase [[id number]]  (Normal) Collected: 06/13/25 2041    Lab Status: Final result Specimen: Blood from Arm, Right Updated: 06/13/25 2231     Lipase 56 u/L     Urine Microscopic [[id number]]  (Abnormal) Collected: 06/13/25 2041    Lab Status: Final result Specimen: Urine, Other Updated: 06/13/25 2140     RBC, UA Innumerable /hpf      WBC, UA 20-30 /hpf      Epithelial Cells None Seen /hpf      Bacteria, UA None Seen /hpf     Protime-INR  [[id number]]  (Normal) Collected: 06/13/25 2041    Lab Status: Final result Specimen: Blood from Arm, Right Updated: 06/13/25 2124     Protime 13.0 seconds      INR 0.92    Narrative:      INR Therapeutic Range    Indication                                             INR Range      Atrial Fibrillation                                               2.0-3.0  Hypercoagulable State                                    2.0.2.3  Left Ventricular Asist Device                            2.0-3.0  Mechanical Heart Valve                                  -    Aortic(with afib, MI, embolism, HF, LA enlargement,    and/or coagulopathy)                                     2.0-3.0 (2.5-3.5)     Mitral                                                             2.5-3.5  Prosthetic/Bioprosthetic Heart Valve               2.0-3.0  Venous thromboembolism (VTE: VT, PE        2.0-3.0    APTT [[id number]]  (Normal) Collected: 06/13/25 2041    Lab Status: Final result Specimen: Blood from Arm, Right Updated: 06/13/25 2124     PTT 29 seconds     UA w Reflex to Microscopic w Reflex to Culture [[id number]]  (Abnormal) Collected: 06/13/25 2041    Lab Status: Final result Specimen: Urine, Other Updated: 06/13/25 2123     Color, UA Orange     Clarity, UA Extra Turbid     Specific Gravity, UA 1.028     pH, UA 6.0     Leukocytes, UA Moderate     Nitrite, UA Negative     Protein,  (2+) mg/dl      Glucose, UA Negative mg/dl      Ketones, UA Negative mg/dl      Urobilinogen, UA <2.0 mg/dl      Bilirubin, UA Negative     Occult Blood, UA Large    Comprehensive metabolic panel [[id number]] Collected: 06/13/25 2041    Lab Status: Final result Specimen: Blood from Arm, Right Updated: 06/13/25 2104     Sodium 141 mmol/L      Potassium 4.1 mmol/L      Chloride 107 mmol/L      CO2 28 mmol/L      ANION GAP 6 mmol/L      BUN 11 mg/dL      Creatinine 0.91 mg/dL      Glucose 104 mg/dL      Calcium 9.2 mg/dL      AST 16 U/L      ALT 10 U/L      Alkaline  Phosphatase 75 U/L      Total Protein 7.0 g/dL      Albumin 3.7 g/dL      Total Bilirubin 0.36 mg/dL      eGFR 83 ml/min/1.73sq m     Narrative:      National Kid[employer] guidelines for Chronic Kidney Disease (CKD):     Stage 1 with normal or high GFR (GFR > 90 mL/min/1.73 square meters)    Stage 2 Mild CKD (GFR = 60-89 mL/min/1.73 square meters)    Stage 3A Moderate CKD (GFR = 45-59 mL/min/1.73 square meters)    Stage 3B Moderate CKD (GFR = 30-44 mL/min/1.73 square meters)    Stage 4 Severe CKD (GFR = 15-29 mL/min/1.73 square meters)    Stage 5 End Stage CKD (GFR <15 mL/min/1.73 square meters)  Note: GFR calculation is accurate only with a steady state creatinine    CBC and differential [[id number]]  (Abnormal) Collected: 06/13/25 2041    Lab Status: Final result Specimen: Blood from Arm, Right Updated: 06/13/25 2048     WBC 7.61 Thousand/uL      RBC 3.97 Million/uL      Hemoglobin 10.9 g/dL      Hematocrit 35.4 %      MCV 89 fL      MCH 27.5 pg      MCHC 30.8 g/dL      RDW 13.1 %      MPV 9.6 fL      Platelets 227 Thousands/uL      nRBC 0 /100 WBCs      Segmented % 53 %      Immature Grans % 0 %      Lymphocytes % 34 %      Monocytes % 10 %      Eosinophils Relative 2 %      Basophils Relative 1 %      Absolute Neutrophils 4.11 Thousands/µL      Absolute Immature Grans 0.02 Thousand/uL      Absolute Lymphocytes 2.55 Thousands/µL      Absolute Monocytes 0.74 Thousand/µL      Eosinophils Absolute 0.14 Thousand/µL      Basophils Absolute 0.05 Thousands/µL             CT abdomen pelvis with contrast   Final Interpretation by Laurent Chisholm MD (06/13 2227)      Findings above suspicious for cystitis. Correlation with urinalysis recommended.      No acute obstructive uropathy or evidence of nephroureterolithiasis.         Workstation performed: [id number]             Procedures    ED Medication and Procedure Management   Prior to Admission Medications   Prescriptions Last Dose Informant Patient Reported?  Taking?   atorvastatin (LIPITOR) 40 mg tablet   No No   Sig: Take 1 tablet (40 mg total) by mouth every evening   clopidogrel (PLAVIX) 75 mg tablet   No No   Sig: Take 1 tablet (75 mg total) by mouth daily   famotidine (PEPCID) 40 MG tablet   No No   Sig: Take 1 tablet (40 mg total) by mouth in the evening.   metFORMIN (GLUCOPHAGE-XR) 750 mg 24 hr tablet   No No   Sig: Take 2 tablets (1,500 mg total) by mouth daily with dinner   metoprolol tartrate (LOPRESSOR) 25 mg tablet   No No   Sig: Take 0.5 tablets (12.5 mg total) by mouth every 12 (twelve) hours   sitaGLIPtin (Januvia) 100 mg tablet   No No   Sig: Take 1 tablet (100 mg total) by mouth daily      Facility-Administered Medications: None     Discharge Medication List as of 6/13/2025 10:56 PM        START taking these medications    Details   cefpodoxime (VANTIN) 200 mg tablet Take 1 tablet (200 mg total) by mouth 2 (two) times a day for 19 doses, Starting Fri 6/13/2025, Until Mon 6/23/2025, Normal           CONTINUE these medications which have NOT CHANGED    Details   atorvastatin (LIPITOR) 40 mg tablet Take 1 tablet (40 mg total) by mouth every evening, Starting Wed 5/28/2025, Normal      clopidogrel (PLAVIX) 75 mg tablet Take 1 tablet (75 mg total) by mouth daily, Starting Wed 5/28/2025, Normal      famotidine (PEPCID) 40 MG tablet Take 1 tablet (40 mg total) by mouth in the evening., Starting Wed 5/28/2025, Normal      metFORMIN (GLUCOPHAGE-XR) 750 mg 24 hr tablet Take 2 tablets (1,500 mg total) by mouth daily with dinner, Starting Wed 5/28/2025, Normal      metoprolol tartrate (LOPRESSOR) 25 mg tablet Take 0.5 tablets (12.5 mg total) by mouth every 12 (twelve) hours, Starting Wed 5/28/2025, Normal      sitaGLIPtin (Januvia) 100 mg tablet Take 1 tablet (100 mg total) by mouth daily, Starting Tue 5/27/2025, Normal             ED SEPSIS DOCUMENTATION   Time reflects when diagnosis was documented in both MDM as applicable and the Disposition within this note        Time User Action Codes Description Comment    6/13/2025 10:53 PM Felicia Dobson [R31.0] Gross hematuria     6/13/2025 10:53 PM Felicia Dobson [N30.90] Cystitis                      [1]   Past Medical History:  Diagnosis Date    Colon polyp     CVA (cerebral vascular accident) (HCC) 02/18/2019    Bilateral pontine lacunar infarct    Diabetes mellitus (HCC)     Esophagitis     Hyperlipidemia     Hypertension     Non-sustained ventricular tachycardia (HCC)     Pneumonia 05/15/2023    Stroke (HCC)     Type 2 diabetes mellitus (HCC)    [2]   Past Surgical History:  Procedure Laterality Date    COLONOSCOPY      ESOPHAGOGASTRODUODENOSCOPY N/A 03/04/2019    Procedure: ESOPHAGOGASTRODUODENOSCOPY (EGD);  Surgeon: Sandeep Thorne DO;  Location: [facility];  Service: Gastroenterology   [3] No family history on file.  [4]   Social History  Tobacco Use    Smoking status: Never    Smokeless tobacco: Never   Vaping Use    Vaping status: Never Used   Substance Use Topics    Alcohol use: Never    Drug use: Never        Felicia Dobson PA-C  06/16/25 0807

## 2025-06-15 LAB — BACTERIA UR CULT: NORMAL

## 2025-06-23 ENCOUNTER — CONSULT (OUTPATIENT)
Dept: UROLOGY | Facility: CLINIC | Age: 75
End: 2025-06-23
Attending: INTERNAL MEDICINE
Payer: COMMERCIAL

## 2025-06-23 ENCOUNTER — TELEPHONE (OUTPATIENT)
Dept: UROLOGY | Facility: CLINIC | Age: 75
End: 2025-06-23

## 2025-06-23 VITALS
HEART RATE: 117 BPM | BODY MASS INDEX: 20.66 KG/M2 | DIASTOLIC BLOOD PRESSURE: 84 MMHG | OXYGEN SATURATION: 97 % | WEIGHT: 124 LBS | HEIGHT: 65 IN | TEMPERATURE: 97.8 F | SYSTOLIC BLOOD PRESSURE: 138 MMHG

## 2025-06-23 DIAGNOSIS — R97.20 RISING PSA LEVEL: ICD-10-CM

## 2025-06-23 DIAGNOSIS — R31.0 GROSS HEMATURIA: Primary | ICD-10-CM

## 2025-06-23 PROCEDURE — 99204 OFFICE O/P NEW MOD 45 MIN: CPT | Performed by: PHYSICIAN ASSISTANT

## 2025-06-23 NOTE — PROGRESS NOTES
Name: Ani Edgar      : 1950      MRN: [id number]  Encounter Provider: Alyce Head PA-C  Encounter Date: 2025   Encounter department: [facility] [place]  :  Assessment & Plan  Gross hematuria  CT AP w contrast 25 - negative from  standpoint  Discussed CT renal protocol, BMP, and return for cystoscopy   Orders:    Ambulatory Referral to Urology    CT renal protocol; Future    Basic metabolic panel; Future    Cytology, urine; Future    Rising PSA level  PSA 6/10/24 - 2.52, previously 1.5  DAHRMESH today declined  Repeat PSA now  Orders:    PSA, total and free; Future        History of Present Illness   Ani Edgar is a 74 y.o. male who presents  as a new patient for intermittent gross hematuria.    Patient has been having recurrent gross hematuria for the past few weeks. Urine testing negative for infection. Denies history of gross hematuria, flank pain, dysuria, fever, chills, nausea, vomiting, prior  surgical manipulation. PSA last year 2.5, baseline 1.5. no updated PSA for review. Denies family history of  malignancies. Denies chemical exposures or tobacco history.     Review of Systems   Constitutional:  Negative for activity change, appetite change, chills and fever.   HENT:  Negative for congestion and trouble swallowing.    Respiratory:  Negative for cough and shortness of breath.    Cardiovascular:  Negative for chest pain, palpitations and leg swelling.   Gastrointestinal:  Negative for abdominal pain, constipation, diarrhea, nausea and vomiting.   Genitourinary:  Negative for difficulty urinating, dysuria, flank pain, frequency, hematuria and urgency.   Musculoskeletal:  Negative for back pain and gait problem.   Skin:  Negative for wound.   Allergic/Immunologic: Negative for immunocompromised state.   Neurological:  Negative for dizziness and syncope.   Hematological:  Does not bruise/bleed easily.   Psychiatric/Behavioral:  Negative for  "confusion.    All other systems reviewed and are negative.         Objective   /84 (BP Location: Left arm, Patient Position: Sitting, Cuff Size: Standard)   Pulse (!) 117   Temp 97.8 °F (36.6 °C)   Ht 5' 5\" (1.651 m)   Wt 56.2 kg (124 lb)   SpO2 97%   BMI 20.63 kg/m²     Physical Exam  Constitutional:       Appearance: Normal appearance.   HENT:      Head: Normocephalic.      Nose: Nose normal.      Mouth/Throat:      Pharynx: Oropharynx is clear.     Eyes:      Extraocular Movements: Extraocular movements intact.      Pupils: Pupils are equal, round, and reactive to light.     Pulmonary:      Effort: Pulmonary effort is normal.   Genitourinary:     Comments: DHARMESH declined     Musculoskeletal:         General: Normal range of motion.      Cervical back: Normal range of motion.     Skin:     General: Skin is warm.     Neurological:      General: No focal deficit present.      Mental Status: He is alert and oriented to person, place, and time. Mental status is at baseline.     Psychiatric:         Mood and Affect: Mood normal.         Behavior: Behavior normal.         Thought Content: Thought content normal.         Judgment: Judgment normal.           Results   Lab Results   Component Value Date    PSA 2.52 06/10/2024    PSA 1.5 10/20/2022    PSA 1.2 09/17/2021     Lab Results   Component Value Date    CALCIUM 9.2 06/13/2025    K 4.1 06/13/2025    CO2 28 06/13/2025     06/13/2025    BUN 11 06/13/2025    CREATININE 0.91 06/13/2025     Lab Results   Component Value Date    WBC 7.61 06/13/2025    HGB 10.9 (L) 06/13/2025    HCT 35.4 (L) 06/13/2025    MCV 89 06/13/2025     06/13/2025       Office Urine Dip  No results found for this or any previous visit (from the past hour).      "

## 2025-06-23 NOTE — TELEPHONE ENCOUNTER
Return for labs, imaging, cystoscopy.   Pt aware he is on a wait list for a cysto   CT scheduled for 6/27    Will remind pt to get PSA and urine done PTV of Cysto

## 2025-06-30 ENCOUNTER — HOSPITAL ENCOUNTER (OUTPATIENT)
Dept: CT IMAGING | Facility: CLINIC | Age: 75
Discharge: HOME | End: 2025-06-30
Attending: PHYSICIAN ASSISTANT
Payer: COMMERCIAL

## 2025-06-30 DIAGNOSIS — R31.0 GROSS HEMATURIA: ICD-10-CM

## 2025-06-30 PROCEDURE — 74178 CT ABD&PLV WO CNTR FLWD CNTR: CPT

## 2025-06-30 RX ADMIN — IOHEXOL 75 ML: 350 INJECTION, SOLUTION INTRAVENOUS at 10:29

## 2025-07-02 ENCOUNTER — TELEPHONE (OUTPATIENT)
Age: 75
End: 2025-07-02

## 2025-07-02 NOTE — TELEPHONE ENCOUNTER
Call was transferred by [facility].    PT asking when he can resume intercourse due to the gross hematuria.   PT was in the Er for gross hematuria 6/13/25.  PT also seen by Alyce on 6/23/25    PT  can be reached at [phone]

## 2025-07-02 NOTE — TELEPHONE ENCOUNTER
Spoke with: patient  Re:  resuming sexual intercourse    Provider's message relayed in full detail.  Comments:

## 2025-07-02 NOTE — TELEPHONE ENCOUNTER
Phone call from patient stating he was seen in the Emergency Room for blood in his urine. Patient did follow up on 6/23/2025 with Urology , but he forgot to ask if he can resume sexual intercourse. I did warm transfer him  to Sherice with the Urology office to have that question sent to his provider that seen him.

## 2025-07-03 NOTE — TELEPHONE ENCOUNTER
Called and left VM for the Pt with Paulina PEREZ recommendations. Office number left for the PT if any further question.

## 2025-08-08 ENCOUNTER — OFFICE VISIT (OUTPATIENT)
Dept: GASTROENTEROLOGY | Facility: CLINIC | Age: 75
End: 2025-08-08
Payer: COMMERCIAL

## 2025-08-08 VITALS
HEART RATE: 72 BPM | OXYGEN SATURATION: 98 % | DIASTOLIC BLOOD PRESSURE: 72 MMHG | HEIGHT: 65 IN | TEMPERATURE: 97.5 F | SYSTOLIC BLOOD PRESSURE: 106 MMHG | WEIGHT: 128 LBS | BODY MASS INDEX: 21.33 KG/M2

## 2025-08-08 DIAGNOSIS — Z86.0100 HISTORY OF COLON POLYPS: Primary | ICD-10-CM

## 2025-08-08 PROCEDURE — 99214 OFFICE O/P EST MOD 30 MIN: CPT | Performed by: PHYSICIAN ASSISTANT

## 2025-08-08 RX ORDER — SODIUM CHLORIDE, SODIUM LACTATE, POTASSIUM CHLORIDE, CALCIUM CHLORIDE 600; 310; 30; 20 MG/100ML; MG/100ML; MG/100ML; MG/100ML
125 INJECTION, SOLUTION INTRAVENOUS CONTINUOUS
OUTPATIENT
Start: 2025-08-08

## 2025-08-08 RX ORDER — PANTOPRAZOLE SODIUM 40 MG/1
40 TABLET, DELAYED RELEASE ORAL DAILY
COMMUNITY